# Patient Record
Sex: FEMALE | Race: WHITE | NOT HISPANIC OR LATINO | Employment: OTHER | URBAN - METROPOLITAN AREA
[De-identification: names, ages, dates, MRNs, and addresses within clinical notes are randomized per-mention and may not be internally consistent; named-entity substitution may affect disease eponyms.]

---

## 2017-01-10 ENCOUNTER — OFFICE VISIT (OUTPATIENT)
Dept: ORTHOPEDIC SURGERY | Facility: CLINIC | Age: 67
End: 2017-01-10

## 2017-01-10 VITALS — HEIGHT: 67 IN | TEMPERATURE: 98.6 F | BODY MASS INDEX: 31.39 KG/M2 | WEIGHT: 200 LBS

## 2017-01-10 DIAGNOSIS — M54.50 SPINE PAIN, LUMBAR: Primary | ICD-10-CM

## 2017-01-10 DIAGNOSIS — M43.16 SPONDYLOLISTHESIS OF LUMBAR REGION: ICD-10-CM

## 2017-01-10 PROCEDURE — 72100 X-RAY EXAM L-S SPINE 2/3 VWS: CPT | Performed by: ORTHOPAEDIC SURGERY

## 2017-01-10 PROCEDURE — 99204 OFFICE O/P NEW MOD 45 MIN: CPT | Performed by: ORTHOPAEDIC SURGERY

## 2017-01-10 NOTE — LETTER
January 10, 2017     Jayne Guerra MD  [address]  [address] [address]    Patient: Nessa Osuna   YOB: 1950   Date of Visit: 1/10/2017       Dear Dr. Charlie MD:    Thank you for referring Nessa Osuna to me for evaluation. Below are the relevant portions of my assessment and plan of care.    If you have questions, please do not hesitate to call me. I look forward to following Nessa along with you.         Sincerely,        Dougie Tobar MD        CC: No Recipients  Dougie Tobar MD  1/10/2017  3:08 PM  Signed  New patient or new problem visit    Chief Complaint   Patient presents with   • Lumbar Spine - Pain   • Right Hip - Pain   • Left Hip - Pain       HPI: She complains of back pain sometimes radiating into the hips.  Occasional pain on the outside a left foot and also complains of pain at the base of the right thumb following a lifting injury 2 or 3 months ago.  She is taken a Medrol Dosepak in the past and its helped with her back.  She has a spare but it is .  Physical therapy is also helped.  Pain is moderate, lumbar, constant aching worse when standing anti-inflammatory agents seem to help somewhat.  She is seen Dr. Mcknight in the past.    PFSH: See chart- reviewed    Review of Systems   Constitutional: Negative.  Negative for chills, diaphoresis, fever and unexpected weight change.   HENT: Positive for postnasal drip. Negative for hearing loss, nosebleeds, sore throat and tinnitus.    Eyes: Negative.  Negative for pain and visual disturbance.   Respiratory: Positive for shortness of breath. Negative for cough and wheezing.    Cardiovascular: Positive for chest pain and leg swelling. Negative for palpitations.   Gastrointestinal: Negative.  Negative for abdominal pain, diarrhea, nausea and vomiting.   Endocrine: Negative.  Negative for cold intolerance, heat intolerance and polydipsia.   Genitourinary: Positive for decreased urine volume. Negative for  difficulty urinating, dysuria and hematuria.   Musculoskeletal: Positive for back pain. Negative for arthralgias, joint swelling and myalgias.   Skin: Negative.  Negative for rash and wound.   Allergic/Immunologic: Negative for environmental allergies.   Neurological: Negative.  Negative for dizziness, syncope and numbness.   Hematological: Negative.  Does not bruise/bleed easily.   Psychiatric/Behavioral: Negative.  Negative for dysphoric mood and sleep disturbance. The patient is not nervous/anxious.        PE: Constitutional: Vital signs above-noted.  Awake, alert and oriented    Psychiatric: Affect and insight do not appear grossly disturbed.    Pulmonary: Breathing is unlabored, color is good.    Skin: Warm, dry and normal turgor    Cardiac:  pedal pulses intact.  No edema.    Eyesight and hearing appear adequate for examination purposes      Musculoskeletal:  There is minimal tenderness to percussion and palpation of the spine. Motion appears undisturbed.  Posture is unremarkable to coronal and sagittal inspection.    The skin about the area is intact.  There is no palpable or visible deformity.  There is no local spasm.       Neurologic:   Reflexes are 2+ and symmetrical in the patellae and achilles.   Motor function is undisturbed in quadriceps, EHL, and gastrocnemius      Sensation appears symmetrically intact to light touch   .  In the bilateral lower extremities there is no evidence of atrophy.   Clonus is absent..  Gait appears undisturbed.  SLR test negative      MEDICAL DECISION MAKING    XRAY: Plain film x-rays of lumbar spine show slight anterolisthesis at L4 5 and disc space narrowing at the same level.  No comparison views are available.    Other: n/a    Impression: Lumbar disc degeneration probable lumbar spinal stenosis    Plan: For now she may live with this I refilled a Dosepak she'll do home exercises and I will see her as needed.

## 2017-01-10 NOTE — MR AVS SNAPSHOT
[facility]  [phone]                    Nessa Osuna   1/10/2017 1:30 PM   Office Visit    Dept Phone:  [phone]   Encounter #:  [id number]    Provider:  Dougie Tobar MD   Department:  [facility]                Your Full Care Plan              Your Updated Medication List          This list is accurate as of: 1/10/17  2:07 PM.  Always use your most recent med list.                acetaminophen 500 MG tablet   Commonly known as:  TYLENOL       buPROPion  MG 12 hr tablet   Commonly known as:  WELLBUTRIN SR   TAKE ONE TABLET BY MOUTH TWICE A DAY       Calcium Citrate 200 MG tablet       diphenhydrAMINE 25 MG tablet   Commonly known as:  BENADRYL       Flaxseed Oil 1000 MG capsule       FLUoxetine 20 MG capsule   Commonly known as:  PROZAC   Take 1 capsule by mouth Daily.       fluticasone 50 MCG/ACT nasal spray   Commonly known as:  FLONASE       gabapentin 300 MG capsule   Commonly known as:  NEURONTIN   Take 1 capsule by mouth 2 (Two) Times a Day.       simvastatin 20 MG tablet   Commonly known as:  ZOCOR   TAKE ONE TABLET BY MOUTH DAILY       Vitamin D 2000 UNITS capsule               You Were Diagnosed With        Codes Comments    Spine pain, lumbar    -  Primary ICD-10-CM: M54.5  ICD-9-CM: 724.2       Instructions     None    Patient Instructions History      Upcoming Appointments     Visit Type Date Time Department    OFFICE VISIT 1/10/2017  1:30 PM [facility]    MAMMO [facility] SCREEN BILAT 3/7/2017 12:45 PM [facility]    FOLLOW UP 3/7/2017  2:00 PM [facility]      [facility] Signup     [facility] allows you to send messages to your doctor, view your test results, renew your prescriptions, schedule appointments, and more. To sign up, go to [email or web address] and click on the Sign Up Now link in the New User? box. Enter your [facility] Activation Code  "exactly as it appears below along with the last four digits of your Social Security Number and your Date of Birth () to complete the sign-up process. If you do not sign up before the expiration date, you must request a new code.    [facility] Activation Code: [email or web address]  Expires: 2017  2:06 PM    If you have questions, you can email [email or web address] or call [phone] to talk to our [facility] staff. Remember, [facility] is NOT to be used for urgent needs. For medical emergencies, dial 911.               Other Info from Your Visit           Your Appointments     Mar 07, 2017 12:45 PM EST   Mammo [facility] screen bilat with [facility]   [facility] ([place])    [address] [facility] [address] [phone]            Bring any films and reports from outside facilities. Arrive 15 min prior to exam time. Do not apply any lotions, oils, body sprays, powders, perfumes, or deodorants on the day of the exam. Bring a current list of medications. Arrive 15 mi            Mar 07, 2017  2:00 PM EST   Follow Up with Jayne Guerra MD   [facility] (--)    [address] [place] [id number] [phone]           Arrive 15 minutes prior to appointment.              Allergies     No Known Allergies      Reason for Visit     Lumbar Spine - Pain     Right Hip - Pain     Left Hip - Pain           Vital Signs     Temperature Height Weight Body Mass Index Smoking Status       98.6 °F (37 °C) 67\" (170.2 cm) 200 lb (90.7 kg) 31.32 kg/m2 Former Smoker       Problems and Diagnoses Noted     Low back pain    -  Primary        "

## 2017-01-11 RX ORDER — FLUTICASONE PROPIONATE 50 MCG
SPRAY, SUSPENSION (ML) NASAL
Qty: 1 EACH | Refills: 5 | Status: SHIPPED | OUTPATIENT
Start: 2017-01-11 | End: 2018-05-26 | Stop reason: SDUPTHER

## 2017-01-11 RX ORDER — BUPROPION HYDROCHLORIDE 150 MG/1
TABLET, EXTENDED RELEASE ORAL
Qty: 180 TABLET | Refills: 0 | Status: SHIPPED | OUTPATIENT
Start: 2017-01-11 | End: 2017-08-08

## 2017-01-11 RX ORDER — GABAPENTIN 300 MG/1
CAPSULE ORAL
Qty: 180 CAPSULE | Refills: 0 | Status: SHIPPED | OUTPATIENT
Start: 2017-01-11 | End: 2017-03-07

## 2017-02-28 RX ORDER — SIMVASTATIN 20 MG
TABLET ORAL
Qty: 90 TABLET | Refills: 0 | Status: SHIPPED | OUTPATIENT
Start: 2017-02-28 | End: 2017-04-30 | Stop reason: SDUPTHER

## 2017-03-07 ENCOUNTER — OFFICE VISIT (OUTPATIENT)
Dept: INTERNAL MEDICINE | Facility: CLINIC | Age: 67
End: 2017-03-07

## 2017-03-07 ENCOUNTER — HOSPITAL ENCOUNTER (OUTPATIENT)
Dept: MAMMOGRAPHY | Facility: HOSPITAL | Age: 67
Discharge: HOME | End: 2017-03-07
Admitting: INTERNAL MEDICINE

## 2017-03-07 VITALS
HEART RATE: 66 BPM | SYSTOLIC BLOOD PRESSURE: 110 MMHG | DIASTOLIC BLOOD PRESSURE: 70 MMHG | BODY MASS INDEX: 30.85 KG/M2 | WEIGHT: 197 LBS | OXYGEN SATURATION: 96 %

## 2017-03-07 DIAGNOSIS — M51.36 DDD (DEGENERATIVE DISC DISEASE), LUMBAR: ICD-10-CM

## 2017-03-07 DIAGNOSIS — E78.5 HYPERLIPIDEMIA, UNSPECIFIED HYPERLIPIDEMIA TYPE: ICD-10-CM

## 2017-03-07 DIAGNOSIS — F32.A DEPRESSION, UNSPECIFIED DEPRESSION TYPE: Primary | ICD-10-CM

## 2017-03-07 DIAGNOSIS — Z12.39 BREAST CANCER SCREENING: ICD-10-CM

## 2017-03-07 PROCEDURE — 99214 OFFICE O/P EST MOD 30 MIN: CPT | Performed by: INTERNAL MEDICINE

## 2017-03-07 PROCEDURE — G0202 SCR MAMMO BI INCL CAD: HCPCS

## 2017-03-07 NOTE — PROGRESS NOTES
"Chief Complaint   Patient presents with   • Hyperlipidemia   • Follow-up     no differnece in taking the med     depression  History of Present Illness   Nessa Osuna is a 67 y.o. female presents for follow up evaluation. Has depression. Started prozac. She really has not noticed much benefit from this medication. Reports that she would like to actually try coming off of medication. She does feel somewhat flat. Started buproprion years ago for to cessation and has never stopped the med after that. She is not suicidal. She is engaging in activities that she enjoys. She is going to many plays. Walking dogs almost every day. Healthy diet. Does feel fatigued. She in general gets \"at least 6 \" hours of sleep at night. Staying up at times to 4-5 am.   Long standing history of ddd and back pain. She has some burning of her hand and foot.       The following portions of the patient's history were reviewed and updated as appropriate: allergies, current medications, past family history, past medical history, past social history, past surgical history and problem list.  Current Outpatient Prescriptions on File Prior to Visit   Medication Sig Dispense Refill   • acetaminophen (TYLENOL) 500 MG tablet Take by mouth. Take 1 tablet every 4-6 hours as needed     • buPROPion SR (WELLBUTRIN SR) 150 MG 12 hr tablet TAKE ONE TABLET BY MOUTH TWICE A  tablet 0   • Calcium Citrate 200 MG tablet Take by mouth.     • Cholecalciferol (VITAMIN D) 2000 UNITS capsule Take 1 capsule by mouth daily.     • diphenhydrAMINE (BENADRYL) 25 MG tablet Take 2 tablets by mouth nightly as needed.     • Flaxseed, Linseed, (FLAXSEED OIL) 1000 MG capsule Take 1 capsule by mouth 2 (two) times a day.     • FLUoxetine (PROZAC) 20 MG capsule Take 1 capsule by mouth Daily. 30 capsule 4   • fluticasone (FLONASE) 50 MCG/ACT nasal spray PLACE TWO SPRAYS IN EACH NOSTRIL ONCE DAILY 1 each 5   • gabapentin (NEURONTIN) 300 MG capsule Take 1 capsule by mouth 2 " (Two) Times a Day. 180 capsule 0   • simvastatin (ZOCOR) 20 MG tablet TAKE ONE TABLET BY MOUTH DAILY 90 tablet 0   • [DISCONTINUED] gabapentin (NEURONTIN) 300 MG capsule TAKE ONE CAPSULE BY MOUTH EVERY 12 HOURS 180 capsule 0     No current facility-administered medications on file prior to visit.      Review of Systems   Constitutional: Positive for fatigue.   HENT: Negative.    Eyes: Negative.    Respiratory: Negative.    Cardiovascular: Negative.    Gastrointestinal: Negative.    Endocrine: Negative.    Genitourinary: Negative.    Musculoskeletal: Positive for arthralgias and back pain.   Skin: Negative.    Allergic/Immunologic: Negative.    Neurological: Negative.    Hematological: Negative.    Psychiatric/Behavioral: Positive for sleep disturbance. Negative for self-injury and suicidal ideas.        Depression       Objective   Physical Exam   Constitutional: She is oriented to person, place, and time. She appears well-developed and well-nourished.   HENT:   Head: Normocephalic and atraumatic.   Right Ear: External ear normal.   Left Ear: External ear normal.   Nose: Nose normal.   Mouth/Throat: Oropharynx is clear and moist.   Eyes: Conjunctivae and EOM are normal. Pupils are equal, round, and reactive to light.   Neck: Normal range of motion. Neck supple.   Cardiovascular: Normal rate, regular rhythm, normal heart sounds and intact distal pulses.    Pulmonary/Chest: Effort normal and breath sounds normal.   Abdominal: Soft. Bowel sounds are normal.   Musculoskeletal: Normal range of motion.   Neurological: She is alert and oriented to person, place, and time. She has normal reflexes.   Skin: Skin is warm and dry.   Psychiatric: She has a normal mood and affect. Her behavior is normal. Thought content normal.   Nursing note and vitals reviewed.       Visit Vitals   • /70   • Pulse 66   • Wt 197 lb (89.4 kg)   • SpO2 96%   • BMI 30.85 kg/m2       Assessment/Plan   Diagnoses and all orders for this  visit:    Depression, unspecified depression type    DDD (degenerative disc disease), lumbar    Hyperlipidemia, unspecified hyperlipidemia type    Other orders  -     diclofenac (VOLTAREN) 1 % gel gel; Apply 4 g topically 4 (Four) Times a Day As Needed (pain).      Patient w/ depression. She would like to stop meds. Will call and schedule w/ a therapist. She will try tapering off of prozac. If this goes well may try tapering buproprion as well. If needed in the future will explore cymbalta or other. She is encouraged to engage in increased fitness- possibly yoga v water activity due to ddd. Lipids are at goal on last assessment. Will retest at next lab evaluation. F/u in 4-5 months.

## 2017-04-14 ENCOUNTER — TELEPHONE (OUTPATIENT)
Dept: ORTHOPEDIC SURGERY | Facility: CLINIC | Age: 67
End: 2017-04-14

## 2017-04-20 ENCOUNTER — TELEPHONE (OUTPATIENT)
Dept: INTERNAL MEDICINE | Facility: CLINIC | Age: 67
End: 2017-04-20

## 2017-04-20 NOTE — TELEPHONE ENCOUNTER
"Pt called to see if she can get a referral to a specialist who sees \"all bones\" not just back or hip. Pt says she has arthritis and wants to be treated for all aches, did go to see Dr Tobar but said they only specialize in the back.  "

## 2017-04-21 NOTE — TELEPHONE ENCOUNTER
"There are other specialists in dr morgan office for hip. i do not know of any ortho who treats \"all bones\" as spinal specialty is very specific  "

## 2017-05-01 RX ORDER — SIMVASTATIN 20 MG
TABLET ORAL
Qty: 90 TABLET | Refills: 0 | Status: SHIPPED | OUTPATIENT
Start: 2017-05-01 | End: 2017-09-09 | Stop reason: SDUPTHER

## 2017-05-01 RX ORDER — GABAPENTIN 300 MG/1
CAPSULE ORAL
Qty: 180 CAPSULE | Refills: 0 | Status: SHIPPED | OUTPATIENT
Start: 2017-05-01 | End: 2017-08-08 | Stop reason: SDUPTHER

## 2017-05-02 ENCOUNTER — OFFICE VISIT (OUTPATIENT)
Dept: INTERNAL MEDICINE | Facility: CLINIC | Age: 67
End: 2017-05-02

## 2017-05-02 VITALS
WEIGHT: 198 LBS | SYSTOLIC BLOOD PRESSURE: 100 MMHG | OXYGEN SATURATION: 98 % | HEART RATE: 70 BPM | BODY MASS INDEX: 31.01 KG/M2 | DIASTOLIC BLOOD PRESSURE: 58 MMHG

## 2017-05-02 DIAGNOSIS — M15.9 OSTEOARTHRITIS OF MULTIPLE JOINTS, UNSPECIFIED OSTEOARTHRITIS TYPE: Primary | ICD-10-CM

## 2017-05-02 DIAGNOSIS — M79.672 LEFT FOOT PAIN: ICD-10-CM

## 2017-05-02 DIAGNOSIS — M79.641 RIGHT HAND PAIN: ICD-10-CM

## 2017-05-02 PROCEDURE — 99214 OFFICE O/P EST MOD 30 MIN: CPT | Performed by: INTERNAL MEDICINE

## 2017-05-02 RX ORDER — METHYLPREDNISOLONE 4 MG/1
TABLET ORAL
Qty: 21 TABLET | Refills: 0 | Status: SHIPPED | OUTPATIENT
Start: 2017-05-02 | End: 2017-06-01 | Stop reason: HOSPADM

## 2017-05-31 ENCOUNTER — HOSPITAL ENCOUNTER (OUTPATIENT)
Facility: HOSPITAL | Age: 67
Setting detail: OBSERVATION
Discharge: HOME | End: 2017-06-01
Attending: EMERGENCY MEDICINE | Admitting: INTERNAL MEDICINE

## 2017-05-31 ENCOUNTER — APPOINTMENT (OUTPATIENT)
Dept: GENERAL RADIOLOGY | Facility: HOSPITAL | Age: 67
End: 2017-05-31

## 2017-05-31 DIAGNOSIS — I48.91 ATRIAL FIBRILLATION WITH RVR (HCC): Primary | ICD-10-CM

## 2017-05-31 LAB
ALBUMIN SERPL-MCNC: 4.6 G/DL (ref 3.5–5.2)
ALBUMIN/GLOB SERPL: 1.6 G/DL
ALP SERPL-CCNC: 52 U/L (ref 39–117)
ALT SERPL W P-5'-P-CCNC: 18 U/L (ref 1–33)
ANION GAP SERPL CALCULATED.3IONS-SCNC: 13.8 MMOL/L
AST SERPL-CCNC: 22 U/L (ref 1–32)
BASOPHILS # BLD AUTO: 0.05 10*3/MM3 (ref 0–0.2)
BASOPHILS NFR BLD AUTO: 0.9 % (ref 0–1.5)
BILIRUB SERPL-MCNC: 0.9 MG/DL (ref 0.1–1.2)
BUN BLD-MCNC: 16 MG/DL (ref 8–23)
BUN/CREAT SERPL: 21.6 (ref 7–25)
CALCIUM SPEC-SCNC: 9.9 MG/DL (ref 8.6–10.5)
CHLORIDE SERPL-SCNC: 105 MMOL/L (ref 98–107)
CK SERPL-CCNC: 186 U/L (ref 20–180)
CO2 SERPL-SCNC: 26.2 MMOL/L (ref 22–29)
CREAT BLD-MCNC: 0.74 MG/DL (ref 0.57–1)
DEPRECATED RDW RBC AUTO: 44.8 FL (ref 37–54)
EOSINOPHIL # BLD AUTO: 0.26 10*3/MM3 (ref 0–0.7)
EOSINOPHIL NFR BLD AUTO: 4.8 % (ref 0.3–6.2)
ERYTHROCYTE [DISTWIDTH] IN BLOOD BY AUTOMATED COUNT: 13.2 % (ref 11.7–13)
GFR SERPL CREATININE-BSD FRML MDRD: 78 ML/MIN/1.73
GLOBULIN UR ELPH-MCNC: 2.8 GM/DL
GLUCOSE BLD-MCNC: 101 MG/DL (ref 65–99)
HCT VFR BLD AUTO: 42.1 % (ref 35.6–45.5)
HGB BLD-MCNC: 14.3 G/DL (ref 11.9–15.5)
HOLD SPECIMEN: NORMAL
HOLD SPECIMEN: NORMAL
IMM GRANULOCYTES # BLD: 0 10*3/MM3 (ref 0–0.03)
IMM GRANULOCYTES NFR BLD: 0 % (ref 0–0.5)
INR PPP: 1.03 (ref 0.9–1.1)
LYMPHOCYTES # BLD AUTO: 1.6 10*3/MM3 (ref 0.9–4.8)
LYMPHOCYTES NFR BLD AUTO: 29.6 % (ref 19.6–45.3)
MCH RBC QN AUTO: 31.8 PG (ref 26.9–32)
MCHC RBC AUTO-ENTMCNC: 34 G/DL (ref 32.4–36.3)
MCV RBC AUTO: 93.8 FL (ref 80.5–98.2)
MONOCYTES # BLD AUTO: 0.5 10*3/MM3 (ref 0.2–1.2)
MONOCYTES NFR BLD AUTO: 9.3 % (ref 5–12)
NEUTROPHILS # BLD AUTO: 2.99 10*3/MM3 (ref 1.9–8.1)
NEUTROPHILS NFR BLD AUTO: 55.4 % (ref 42.7–76)
PLATELET # BLD AUTO: 233 10*3/MM3 (ref 140–500)
PMV BLD AUTO: 12.1 FL (ref 6–12)
POTASSIUM BLD-SCNC: 3.7 MMOL/L (ref 3.5–5.2)
PROT SERPL-MCNC: 7.4 G/DL (ref 6–8.5)
PROTHROMBIN TIME: 13.1 SECONDS (ref 11.7–14.2)
RBC # BLD AUTO: 4.49 10*6/MM3 (ref 3.9–5.2)
SODIUM BLD-SCNC: 145 MMOL/L (ref 136–145)
TROPONIN T SERPL-MCNC: <0.01 NG/ML (ref 0–0.03)
TSH SERPL DL<=0.05 MIU/L-ACNC: 0.83 MIU/ML (ref 0.27–4.2)
WBC NRBC COR # BLD: 5.4 10*3/MM3 (ref 4.5–10.7)
WHOLE BLOOD HOLD SPECIMEN: NORMAL
WHOLE BLOOD HOLD SPECIMEN: NORMAL

## 2017-05-31 PROCEDURE — 71020 HC CHEST PA AND LATERAL: CPT

## 2017-05-31 PROCEDURE — G0378 HOSPITAL OBSERVATION PER HR: HCPCS

## 2017-05-31 PROCEDURE — 80053 COMPREHEN METABOLIC PANEL: CPT | Performed by: EMERGENCY MEDICINE

## 2017-05-31 PROCEDURE — 96365 THER/PROPH/DIAG IV INF INIT: CPT

## 2017-05-31 PROCEDURE — 82550 ASSAY OF CK (CPK): CPT | Performed by: EMERGENCY MEDICINE

## 2017-05-31 PROCEDURE — 84484 ASSAY OF TROPONIN QUANT: CPT | Performed by: EMERGENCY MEDICINE

## 2017-05-31 PROCEDURE — 96372 THER/PROPH/DIAG INJ SC/IM: CPT

## 2017-05-31 PROCEDURE — 99284 EMERGENCY DEPT VISIT MOD MDM: CPT

## 2017-05-31 PROCEDURE — 93005 ELECTROCARDIOGRAM TRACING: CPT

## 2017-05-31 PROCEDURE — 84443 ASSAY THYROID STIM HORMONE: CPT | Performed by: EMERGENCY MEDICINE

## 2017-05-31 PROCEDURE — 93010 ELECTROCARDIOGRAM REPORT: CPT | Performed by: INTERNAL MEDICINE

## 2017-05-31 PROCEDURE — 85025 COMPLETE CBC W/AUTO DIFF WBC: CPT | Performed by: EMERGENCY MEDICINE

## 2017-05-31 PROCEDURE — 93005 ELECTROCARDIOGRAM TRACING: CPT | Performed by: EMERGENCY MEDICINE

## 2017-05-31 PROCEDURE — 25010000002 ENOXAPARIN PER 10 MG: Performed by: EMERGENCY MEDICINE

## 2017-05-31 PROCEDURE — 85610 PROTHROMBIN TIME: CPT | Performed by: EMERGENCY MEDICINE

## 2017-05-31 PROCEDURE — 96366 THER/PROPH/DIAG IV INF ADDON: CPT

## 2017-05-31 RX ORDER — SODIUM CHLORIDE 0.9 % (FLUSH) 0.9 %
10 SYRINGE (ML) INJECTION AS NEEDED
Status: DISCONTINUED | OUTPATIENT
Start: 2017-05-31 | End: 2017-06-01 | Stop reason: HOSPADM

## 2017-05-31 RX ORDER — DIPHENHYDRAMINE HCL 25 MG
25 CAPSULE ORAL NIGHTLY PRN
Status: DISCONTINUED | OUTPATIENT
Start: 2017-05-31 | End: 2017-06-01 | Stop reason: HOSPADM

## 2017-05-31 RX ORDER — CELECOXIB 200 MG/1
200 CAPSULE ORAL AS NEEDED
COMMUNITY
End: 2017-06-01 | Stop reason: HOSPADM

## 2017-05-31 RX ORDER — GABAPENTIN 300 MG/1
300 CAPSULE ORAL EVERY 12 HOURS SCHEDULED
Status: DISCONTINUED | OUTPATIENT
Start: 2017-06-01 | End: 2017-06-01 | Stop reason: HOSPADM

## 2017-05-31 RX ORDER — ACETAMINOPHEN 325 MG/1
650 TABLET ORAL EVERY 4 HOURS PRN
Status: DISCONTINUED | OUTPATIENT
Start: 2017-05-31 | End: 2017-06-01 | Stop reason: HOSPADM

## 2017-05-31 RX ORDER — GABAPENTIN 100 MG/1
200 CAPSULE ORAL EVERY 12 HOURS SCHEDULED
Status: DISCONTINUED | OUTPATIENT
Start: 2017-06-01 | End: 2017-05-31

## 2017-05-31 RX ADMIN — DILTIAZEM HYDROCHLORIDE 5 MG/HR: 100 INJECTION, POWDER, LYOPHILIZED, FOR SOLUTION INTRAVENOUS at 18:57

## 2017-05-31 RX ADMIN — ENOXAPARIN SODIUM 90 MG: 100 INJECTION SUBCUTANEOUS at 19:32

## 2017-05-31 RX ADMIN — DILTIAZEM HYDROCHLORIDE 10 MG/HR: 100 INJECTION, POWDER, LYOPHILIZED, FOR SOLUTION INTRAVENOUS at 19:27

## 2017-06-01 ENCOUNTER — APPOINTMENT (OUTPATIENT)
Dept: CARDIOLOGY | Facility: HOSPITAL | Age: 67
End: 2017-06-01
Attending: INTERNAL MEDICINE

## 2017-06-01 VITALS
HEART RATE: 55 BPM | OXYGEN SATURATION: 94 % | SYSTOLIC BLOOD PRESSURE: 100 MMHG | WEIGHT: 200 LBS | TEMPERATURE: 98.1 F | HEIGHT: 68 IN | RESPIRATION RATE: 16 BRPM | DIASTOLIC BLOOD PRESSURE: 61 MMHG | BODY MASS INDEX: 30.31 KG/M2

## 2017-06-01 LAB
ASCENDING AORTA: 2.5 CM
BH CV ECHO MEAS - ACS: 1.5 CM
BH CV ECHO MEAS - AO MAX PG (FULL): 3.8 MMHG
BH CV ECHO MEAS - AO MAX PG: 6.1 MMHG
BH CV ECHO MEAS - AO MEAN PG (FULL): 2 MMHG
BH CV ECHO MEAS - AO MEAN PG: 3 MMHG
BH CV ECHO MEAS - AO ROOT AREA (BSA CORRECTED): 1.4
BH CV ECHO MEAS - AO ROOT AREA: 6.2 CM^2
BH CV ECHO MEAS - AO ROOT DIAM: 2.8 CM
BH CV ECHO MEAS - AO V2 MAX: 123 CM/SEC
BH CV ECHO MEAS - AO V2 MEAN: 80.2 CM/SEC
BH CV ECHO MEAS - AO V2 VTI: 30.5 CM
BH CV ECHO MEAS - ASC AORTA: 2.5 CM
BH CV ECHO MEAS - AVA(I,A): 2 CM^2
BH CV ECHO MEAS - AVA(I,D): 2 CM^2
BH CV ECHO MEAS - AVA(V,A): 1.9 CM^2
BH CV ECHO MEAS - AVA(V,D): 1.9 CM^2
BH CV ECHO MEAS - BSA(HAYCOCK): 2.1 M^2
BH CV ECHO MEAS - BSA: 2 M^2
BH CV ECHO MEAS - BZI_BMI: 30.4 KILOGRAMS/M^2
BH CV ECHO MEAS - BZI_METRIC_HEIGHT: 172.7 CM
BH CV ECHO MEAS - BZI_METRIC_WEIGHT: 90.7 KG
BH CV ECHO MEAS - CONTRAST EF (2CH): 50.4 ML/M^2
BH CV ECHO MEAS - CONTRAST EF 4CH: 56.9 ML/M^2
BH CV ECHO MEAS - EDV(CUBED): 74.1 ML
BH CV ECHO MEAS - EDV(MOD-SP2): 121 ML
BH CV ECHO MEAS - EDV(MOD-SP4): 109 ML
BH CV ECHO MEAS - EDV(TEICH): 78.6 ML
BH CV ECHO MEAS - EF(CUBED): 70.4 %
BH CV ECHO MEAS - EF(MOD-SP2): 50.4 %
BH CV ECHO MEAS - EF(MOD-SP4): 56.9 %
BH CV ECHO MEAS - EF(TEICH): 62.4 %
BH CV ECHO MEAS - ESV(CUBED): 22 ML
BH CV ECHO MEAS - ESV(MOD-SP2): 60 ML
BH CV ECHO MEAS - ESV(MOD-SP4): 47 ML
BH CV ECHO MEAS - ESV(TEICH): 29.6 ML
BH CV ECHO MEAS - FS: 33.3 %
BH CV ECHO MEAS - IVS/LVPW: 0.91
BH CV ECHO MEAS - IVSD: 1 CM
BH CV ECHO MEAS - LAT PEAK E' VEL: 9 CM/SEC
BH CV ECHO MEAS - LV DIASTOLIC VOL/BSA (35-75): 53.3 ML/M^2
BH CV ECHO MEAS - LV MASS(C)D: 147 GRAMS
BH CV ECHO MEAS - LV MASS(C)DI: 71.9 GRAMS/M^2
BH CV ECHO MEAS - LV MAX PG: 2.2 MMHG
BH CV ECHO MEAS - LV MEAN PG: 1 MMHG
BH CV ECHO MEAS - LV SYSTOLIC VOL/BSA (12-30): 23 ML/M^2
BH CV ECHO MEAS - LV V1 MAX: 74.7 CM/SEC
BH CV ECHO MEAS - LV V1 MEAN: 48.6 CM/SEC
BH CV ECHO MEAS - LV V1 VTI: 19.6 CM
BH CV ECHO MEAS - LVIDD: 4.2 CM
BH CV ECHO MEAS - LVIDS: 2.8 CM
BH CV ECHO MEAS - LVLD AP2: 8.4 CM
BH CV ECHO MEAS - LVLD AP4: 8.4 CM
BH CV ECHO MEAS - LVLS AP2: 7.3 CM
BH CV ECHO MEAS - LVLS AP4: 6.5 CM
BH CV ECHO MEAS - LVOT AREA (M): 3.1 CM^2
BH CV ECHO MEAS - LVOT AREA: 3.1 CM^2
BH CV ECHO MEAS - LVOT DIAM: 2 CM
BH CV ECHO MEAS - LVPWD: 1.1 CM
BH CV ECHO MEAS - MED PEAK E' VEL: 8 CM/SEC
BH CV ECHO MEAS - MR MAX PG: 97.2 MMHG
BH CV ECHO MEAS - MR MAX VEL: 493 CM/SEC
BH CV ECHO MEAS - MV A DUR: 0.17 SEC
BH CV ECHO MEAS - MV A MAX VEL: 90.7 CM/SEC
BH CV ECHO MEAS - MV DEC SLOPE: 340 CM/SEC^2
BH CV ECHO MEAS - MV DEC TIME: 0.23 SEC
BH CV ECHO MEAS - MV E MAX VEL: 118 CM/SEC
BH CV ECHO MEAS - MV E/A: 1.3
BH CV ECHO MEAS - MV MAX PG: 5.4 MMHG
BH CV ECHO MEAS - MV MEAN PG: 2 MMHG
BH CV ECHO MEAS - MV P1/2T MAX VEL: 117 CM/SEC
BH CV ECHO MEAS - MV P1/2T: 100.8 MSEC
BH CV ECHO MEAS - MV V2 MAX: 116 CM/SEC
BH CV ECHO MEAS - MV V2 MEAN: 59 CM/SEC
BH CV ECHO MEAS - MV V2 VTI: 40.4 CM
BH CV ECHO MEAS - MVA P1/2T LCG: 1.9 CM^2
BH CV ECHO MEAS - MVA(P1/2T): 2.2 CM^2
BH CV ECHO MEAS - MVA(VTI): 1.5 CM^2
BH CV ECHO MEAS - PA ACC TIME: 0.12 SEC
BH CV ECHO MEAS - PA MAX PG (FULL): 0.83 MMHG
BH CV ECHO MEAS - PA MAX PG: 2.6 MMHG
BH CV ECHO MEAS - PA PR(ACCEL): 23.7 MMHG
BH CV ECHO MEAS - PA V2 MAX: 80.1 CM/SEC
BH CV ECHO MEAS - PULM A REVS DUR: 0.16 SEC
BH CV ECHO MEAS - PULM A REVS VEL: 32 CM/SEC
BH CV ECHO MEAS - PULM DIAS VEL: 56.8 CM/SEC
BH CV ECHO MEAS - PULM S/D: 0.93
BH CV ECHO MEAS - PULM SYS VEL: 52.9 CM/SEC
BH CV ECHO MEAS - PVA(V,A): 2.3 CM^2
BH CV ECHO MEAS - PVA(V,D): 2.3 CM^2
BH CV ECHO MEAS - QP/QS: 0.81
BH CV ECHO MEAS - RAP SYSTOLE: 8 MMHG
BH CV ECHO MEAS - RV MAX PG: 1.7 MMHG
BH CV ECHO MEAS - RV MEAN PG: 1 MMHG
BH CV ECHO MEAS - RV V1 MAX: 65.8 CM/SEC
BH CV ECHO MEAS - RV V1 MEAN: 45 CM/SEC
BH CV ECHO MEAS - RV V1 VTI: 17.6 CM
BH CV ECHO MEAS - RVOT AREA: 2.8 CM^2
BH CV ECHO MEAS - RVOT DIAM: 1.9 CM
BH CV ECHO MEAS - RVSP: 31.2 MMHG
BH CV ECHO MEAS - SI(AO): 91.9 ML/M^2
BH CV ECHO MEAS - SI(CUBED): 25.5 ML/M^2
BH CV ECHO MEAS - SI(LVOT): 30.1 ML/M^2
BH CV ECHO MEAS - SI(MOD-SP2): 29.8 ML/M^2
BH CV ECHO MEAS - SI(MOD-SP4): 30.3 ML/M^2
BH CV ECHO MEAS - SI(TEICH): 24 ML/M^2
BH CV ECHO MEAS - SUP REN AO DIAM: 1.43 CM
BH CV ECHO MEAS - SV(AO): 187.8 ML
BH CV ECHO MEAS - SV(CUBED): 52.1 ML
BH CV ECHO MEAS - SV(LVOT): 61.6 ML
BH CV ECHO MEAS - SV(MOD-SP2): 61 ML
BH CV ECHO MEAS - SV(MOD-SP4): 62 ML
BH CV ECHO MEAS - SV(RVOT): 49.9 ML
BH CV ECHO MEAS - SV(TEICH): 49 ML
BH CV ECHO MEAS - TAPSE (>1.6): 2.8 CM2
BH CV ECHO MEAS - TR MAX VEL: 241 CM/SEC
BH CV VAS BP RIGHT ARM: NORMAL MMHG
BH CV XLRA - RV BASE: 3.27 CM
BH CV XLRA - TDI S': 11 CM/SEC
E/E' RATIO: 14
LEFT ATRIUM VOLUME INDEX: 54 ML/M2
LV EF 2D ECHO EST: 57 %

## 2017-06-01 PROCEDURE — 93306 TTE W/DOPPLER COMPLETE: CPT

## 2017-06-01 PROCEDURE — G0378 HOSPITAL OBSERVATION PER HR: HCPCS

## 2017-06-01 PROCEDURE — 99219 PR INITIAL OBSERVATION CARE/DAY 50 MINUTES: CPT | Performed by: INTERNAL MEDICINE

## 2017-06-01 PROCEDURE — 63710000001 DIPHENHYDRAMINE PER 50 MG: Performed by: INTERNAL MEDICINE

## 2017-06-01 PROCEDURE — 93306 TTE W/DOPPLER COMPLETE: CPT | Performed by: INTERNAL MEDICINE

## 2017-06-01 RX ORDER — SODIUM CHLORIDE 0.9 % (FLUSH) 0.9 %
1-10 SYRINGE (ML) INJECTION AS NEEDED
Status: DISCONTINUED | OUTPATIENT
Start: 2017-06-01 | End: 2017-06-01 | Stop reason: HOSPADM

## 2017-06-01 RX ORDER — ATORVASTATIN CALCIUM 10 MG/1
10 TABLET, FILM COATED ORAL DAILY
Status: DISCONTINUED | OUTPATIENT
Start: 2017-06-01 | End: 2017-06-01

## 2017-06-01 RX ORDER — ATORVASTATIN CALCIUM 10 MG/1
10 TABLET, FILM COATED ORAL NIGHTLY
Status: DISCONTINUED | OUTPATIENT
Start: 2017-06-01 | End: 2017-06-01 | Stop reason: HOSPADM

## 2017-06-01 RX ORDER — BUPROPION HYDROCHLORIDE 150 MG/1
150 TABLET, EXTENDED RELEASE ORAL EVERY 12 HOURS SCHEDULED
Status: DISCONTINUED | OUTPATIENT
Start: 2017-06-01 | End: 2017-06-01

## 2017-06-01 RX ORDER — FLUOXETINE HYDROCHLORIDE 20 MG/1
20 CAPSULE ORAL DAILY
Status: DISCONTINUED | OUTPATIENT
Start: 2017-06-01 | End: 2017-06-01

## 2017-06-01 RX ADMIN — DIPHENHYDRAMINE HYDROCHLORIDE 25 MG: 25 CAPSULE ORAL at 00:33

## 2017-06-01 RX ADMIN — METOPROLOL TARTRATE 12.5 MG: 25 TABLET ORAL at 11:00

## 2017-06-01 RX ADMIN — GABAPENTIN 300 MG: 300 CAPSULE ORAL at 00:33

## 2017-06-01 RX ADMIN — ACETAMINOPHEN 650 MG: 325 TABLET ORAL at 00:33

## 2017-06-01 RX ADMIN — GABAPENTIN 300 MG: 300 CAPSULE ORAL at 08:00

## 2017-06-01 NOTE — PLAN OF CARE
Problem: Patient Care Overview (Adult)  Goal: Plan of Care Review  Outcome: Ongoing (interventions implemented as appropriate)    06/01/17 1310   Coping/Psychosocial Response Interventions   Plan Of Care Reviewed With patient   Patient Care Overview   Progress improving   Outcome Evaluation   Outcome Summary/Follow up Plan no c/o pain, denies soa, no chest pain, v/s stabel will continue to monitor       Goal: Adult Individualization and Mutuality  Outcome: Ongoing (interventions implemented as appropriate)  Goal: Discharge Needs Assessment  Outcome: Ongoing (interventions implemented as appropriate)    Problem: Fall Risk (Adult)  Goal: Identify Related Risk Factors and Signs and Symptoms  Outcome: Ongoing (interventions implemented as appropriate)  Goal: Absence of Falls  Outcome: Ongoing (interventions implemented as appropriate)

## 2017-06-01 NOTE — PROGRESS NOTES
Continued Stay Note  [facility]     Patient Name: Nessa Osuna  MRN: [id number]  Today's Date: 6/1/2017    Admit Date: 5/31/2017          Discharge Plan       06/01/17 1209    Case Management/Social Work Plan    Plan Home, no needs    Patient/Family In Agreement With Plan yes    Additional Comments S/W patient at bedside and notified of cost of $30 for Xarelto and she said that it was fine for now.  She is able to pay the co-pay for Xarelto.  MATTHEW Porras, CCP      06/01/17 1109    Case Management/Social Work Plan    Plan Home, no needs    Patient/Family In Agreement With Plan yes    Additional Comments S/W patient at bedside and verified face sheet.  IMM noted.  Patient states that she normally lives alone, niece is staying with her now.  Called and s/w Pharmacist at [employer] and Xarelto will be a $30 co-pay.  Patient states that she drives, does not use any equipment at home.  Patient states that she would like to draw up an advance directive and have notified the 's office.  Patient states that she uses [facility] for medications and has no problems paying for them.  Patient will have a ride home, not sure who will pick her up (niece, or sister).  Will continue to monitor and assist, as needed.  MATTHEW Porras, CCP              Discharge Codes     None            Madisyn Callejas RN

## 2017-06-01 NOTE — ED NOTES
Spoke to Deshawn CASTRO on the floor and updated about pt converting to NSR and put a page out to MD Farrell.      Rashida Major RN  05/31/17 3152

## 2017-06-01 NOTE — PLAN OF CARE
Problem: Patient Care Overview (Adult)  Goal: Plan of Care Review  Outcome: Ongoing (interventions implemented as appropriate)    05/31/17 6520   Coping/Psychosocial Response Interventions   Plan Of Care Reviewed With patient   Patient Care Overview   Progress no change   Outcome Evaluation   Outcome Summary/Follow up Plan pt to floor vitals wnl. no complaints of pain. no chest pain or trouble breathing. call out to md for addmission orders        Goal: Adult Individualization and Mutuality  Outcome: Ongoing (interventions implemented as appropriate)  Goal: Discharge Needs Assessment  Outcome: Ongoing (interventions implemented as appropriate)    Problem: Fall Risk (Adult)  Goal: Identify Related Risk Factors and Signs and Symptoms  Outcome: Ongoing (interventions implemented as appropriate)  Goal: Absence of Falls  Outcome: Ongoing (interventions implemented as appropriate)

## 2017-06-01 NOTE — ED NOTES
Paged MD Farrell to notify pt converted into NSR. MD Pratt notified as well and gave verbal order to complete an ECG.      Rashida Major RN  05/31/17 5839

## 2017-06-01 NOTE — H&P
Patient Name: Nessa Osuna  :1950  67 y.o.    Date of Admission: 2017  Date of Consultation:  17  Encounter Provider: Adamaris Blue MD  Place of Service: [facility]  Referring Provider: Parker Farrell MD  Patient Care Team:  Jayne Guerra MD as PCP - General      Chief complaint: palpitations    History of Present Illness: Patient is a 67 year old female, with past medical history of HLD who is admitted with new onset atrial fibrillation. She denies any history of DM, HTN, prior stroke, or vascular disease. She has no prior cardiac evaluation. Over the past few years, patient has noticed occasional (8-9 times a year) episodes of palpitations lasting up to 2-3 hours. They typically go away after a couple hours and are not associated caffeine intake or anything else she can pinpoint. Yesterday, had palpitations starting in the morning and associated with fatigue and feeling listless.  She took her HR with an at home monitor and noted it to be 150. This time the episode lasted longer than normal which prompted her to come to the emergency room.  She presented to the emergency room and was found to be in atrial fibrillation with RVR. She was started on a cardizem drip and was given a dose of therapeutic lovenox. She had a normal TSH and CXR with mild congestion and chronic changes. She was admitted to telemetry and subsequently converted to NSR. Patient's symptoms have resolved. She denies any symptoms of angina. She has had dyspnea on exertion for several years and it is unchanged. She denies any recent falls or bleeding.           Past Medical History:   Diagnosis Date   • Closed fracture of head of metacarpal     left second   • Closed fracture of metacarpal bone     left   • DDD (degenerative disc disease), lumbar    • Depression    • Hyperlipidemia    • Osteopenia    • Primary localized osteoarthritis of hip    • Spinal stenosis        Past  Surgical History:   Procedure Laterality Date   • MOHS SURGERY      chemosurgery (Mohs Micrographic Technique); Dr Jarrett and Dr Franco   • TRIANA'S NEUROMA EXCISION      single neuroma   • TONSILLECTOMY      age 5         Prior to Admission medications    Medication Sig Start Date End Date Taking? Authorizing Provider   acetaminophen (TYLENOL) 500 MG tablet Take by mouth. Take 1 tablet every 4-6 hours as needed 6/29/13  Yes Historical Provider, MD   Calcium Citrate-Vitamin D (CALCIUM + D PO) Take 1,200 mg by mouth 2 (Two) Times a Day.   Yes Historical Provider, MD   celecoxib (CeleBREX) 200 MG capsule Take 200 mg by mouth As Needed.   Yes Historical Provider, MD   Cholecalciferol (VITAMIN D) 2000 UNITS capsule Take 1 capsule by mouth daily. 6/29/13  Yes Historical Provider, MD   Diclofenac Sodium (PENNSAID) 2 % solution Place 4 g on the skin 4 (Four) Times a Day. 5/2/17  Yes Jayne Guerra MD   diphenhydrAMINE (BENADRYL) 25 MG tablet Take 25 mg by mouth At Night As Needed. 8/14/13  Yes Historical Provider, MD   Flaxseed, Linseed, (FLAXSEED OIL) 1000 MG capsule Take 1 capsule by mouth 2 (two) times a day. 8/14/13  Yes Historical Provider, MD   fluticasone (FLONASE) 50 MCG/ACT nasal spray PLACE TWO SPRAYS IN EACH NOSTRIL ONCE DAILY 1/11/17  Yes Jayne Guerra MD   gabapentin (NEURONTIN) 300 MG capsule Take 1 capsule by mouth 2 (Two) Times a Day. 11/3/16  Yes Jayne Guerra MD   MethylPREDNISolone (MEDROL, KENDALL,) 4 MG tablet Take as directed on package instructions.  Patient taking differently: As Needed. Take as directed on package instructions. 5/2/17  Yes Jayne Guerra MD   simvastatin (ZOCOR) 20 MG tablet TAKE ONE TABLET BY MOUTH DAILY 5/1/17  Yes Jayne Guerra MD   buPROPion SR (WELLBUTRIN SR) 150 MG 12 hr tablet TAKE ONE TABLET BY MOUTH TWICE A DAY 1/11/17   Jayne Guerra MD   Calcium Citrate 200 MG tablet Take by mouth. 6/29/13   Historical Provider, MD   diclofenac (VOLTAREN) 1 % gel gel Apply 4 g  topically 4 (Four) Times a Day As Needed (pain). 3/7/17   Jayne Guerra MD   FLUoxetine (PROZAC) 20 MG capsule Take 1 capsule by mouth Daily. 11/29/16   Jayne Guerra MD   gabapentin (NEURONTIN) 300 MG capsule TAKE ONE CAPSULE BY MOUTH EVERY 12 HOURS 5/1/17   Jayne Guerra MD       No Known Allergies    Social History     Social History   • Marital status: Single     Spouse name: N/A   • Number of children: N/A   • Years of education: N/A     Social History Main Topics   • Smoking status: Former Smoker     Quit date: 1997   • Smokeless tobacco: Never Used   • Alcohol use Yes      Comment: social drinker   • Drug use: No   • Sexual activity: Defer     Other Topics Concern   • None     Social History Narrative   • None       Family History   Problem Relation Age of Onset   • Cancer Mother      bladder   • Osteoporosis Mother    • Lung cancer Father        REVIEW OF SYSTEMS:   All systems reviewed.  Pertinent positives identified in HPI.  All other systems are negative.      Objective:     Vitals:    05/31/17 2128 05/31/17 2208 06/01/17 0405 06/01/17 0741   BP:  96/73 103/58 113/69   BP Location:  Right arm Right arm Right arm   Patient Position:  Sitting Lying Lying   Pulse: 74 80 60 61   Resp:  16 16 16   Temp:  97.8 °F (36.6 °C) 97.5 °F (36.4 °C) 97.8 °F (36.6 °C)   TempSrc:  Oral Oral Oral   SpO2:  94%  95%   Weight:       Height:         Body mass index is 30.41 kg/(m^2).    General Appearance:    Alert, cooperative, in no acute distress   Head:    Normocephalic, without obvious abnormality, atraumatic   Eyes:            Lids and lashes normal, conjunctivae and sclerae normal, no   icterus, no pallor, corneas clear, PERRLA   Ears:    Ears appear intact with no abnormalities noted   Neck:   No adenopathy, supple, trachea midline, no thyromegaly, no   carotid bruit, no JVD   Back:     No kyphosis present, no scoliosis present, no skin lesions, erythema or scars, no tenderness to percussion or palpation, range  of motion normal   Lungs:     Clear to auscultation,respirations regular, even and unlabored    Heart:    Regular rhythm and normal rate, normal S1 and S2, no murmur, no gallop, no rub, no click   Chest Wall:    No abnormalities observed   Abdomen:     Normal bowel sounds, no masses, no organomegaly, soft        non-tender, non-distended, no guarding, no rebound  tenderness   Extremities:   Moves all extremities well, no edema, no cyanosis, no redness   Pulses:   Pulses palpable and equal bilaterally. Normal radial, carotid, femoral, dorsalis pedis and posterior tibial pulses bilaterally. Normal abdominal aorta   Skin:  Psychiatric:   No bleeding, bruising or rash    Alert and oriented x 3, normal mood and affect   Lab Review:       Results from last 7 days  Lab Units 05/31/17  1748   SODIUM mmol/L 145   POTASSIUM mmol/L 3.7   CHLORIDE mmol/L 105   TOTAL CO2 mmol/L 26.2   BUN mg/dL 16   CREATININE mg/dL 0.74   CALCIUM mg/dL 9.9   BILIRUBIN mg/dL 0.9   ALK PHOS U/L 52   ALT (SGPT) U/L 18   AST (SGOT) U/L 22   GLUCOSE mg/dL 101*       Results from last 7 days  Lab Units 05/31/17  1748   CK TOTAL U/L 186*   TROPONIN T ng/mL <0.010       Results from last 7 days  Lab Units 05/31/17  1748   WBC 10*3/mm3 5.40   HEMOGLOBIN g/dL 14.3   HEMATOCRIT % 42.1   PLATELETS 10*3/mm3 233       Results from last 7 days  Lab Units 05/31/17  1748   INR  1.03                              I personally viewed and interpreted the patient's EKG/Telemetry data.        Assessment and Plan:       1. Paroxysmal atrial fibrillation: Back in sinus rhythm.  It appears she has been having episodes that are likely atrial fibrillation over the last couple of years lasting 2-3 hours at a time.   -  Will start low dose metoprolol tartrate.  Consider antiarrhythmic in the future if does not tolerate.   -  Check echocardiogram  -  Discussed anticoagulation options with her recurrent episodes and CHADS-VASc of 2. If no significant valvular disease we  decided to try rivaroxaban.  Will have CCP check into cost.   2.  Dyslipidemia: Continue statin.    -  If echo looks ok and tolerate metoprolol will plan for discharge home later today.     Adamaris Blue MD  06/01/17  8:44 AM    Addendum  Echo reviewed (report pending).  Normal left ventricular systolic function and no significant valvular issues.  CCP note about Xarelto cost noted.    Will discharge home.  Results of echo and discharge instruction discussed over the phone.  Will see patient back in office in 4 weeks.

## 2017-06-01 NOTE — PROGRESS NOTES
Discharge Planning Assessment  [facility]     Patient Name: Nessa Osuna  MRN: [id number]  Today's Date: 6/1/2017    Admit Date: 5/31/2017          Discharge Needs Assessment       06/01/17 1104    Living Environment    Lives With alone    Living Arrangements house    Home Accessibility no concerns    Stair Railings at Home none    Type of Financial/Environmental Concern none    Transportation Available car;family or friend will provide    Discharge Needs Assessment    Concerns To Be Addressed basic needs concerns    Readmission Within The Last 30 Days no previous admission in last 30 days    Equipment Currently Used at Home none    Equipment Needed After Discharge none    Discharge Planning Comments Home, no needs            Discharge Plan       06/01/17 1109    Case Management/Social Work Plan    Plan Home, no needs    Patient/Family In Agreement With Plan yes    Additional Comments S/W patient at bedside and verified face sheet.  IMM noted.  Patient states that she normally lives alone, niece is staying with her now.  Called and s/w Pharmacist at [employer] and Xarelto will be a $30 co-pay.  Patient states that she drives, does not use any equipment at home.  Patient states that she would like to draw up an advance directive and have notified the 's office.  Patient states that she uses [facility] for medications and has no problems paying for them.  Patient will have a ride home, not sure who will pick her up (niece, or sister).  Will continue to monitor and assist, as needed.  Chiquita RN, CCP        Discharge Placement     No information found                Demographic Summary       06/01/17 1056    Referral Information    Admission Type inpatient    Arrived From home or self-care    Referral Source admission list    Reason For Consult discharge planning    Record Reviewed plan of care    Contact Information    Permission Granted to Share Information With family/designee   sister Kumar  [phone] or solo Hernandez [phone]    Primary Care Physician Information    Name Dr. Jayne Guerra            Functional Status       06/01/17 1058    Functional Status Current    Ambulation 0-->independent    Transferring 0-->independent    Toileting 0-->independent    Bathing 0-->independent    Dressing 0-->independent    Eating 0-->independent    Communication 0-->understands/communicates without difficulty    Swallowing (if score 2 or more for any item, consult Rehab Services) 0-->swallows foods/liquids without difficulty    Functional Status Prior    Ambulation 0-->independent    Transferring 0-->independent    Toileting 0-->independent    Bathing 0-->independent    Dressing 0-->independent    Eating 0-->independent    Communication 0-->understands/communicates without difficulty    Swallowing 0-->swallows foods/liquids without difficulty    IADL    Medications independent    Meal Preparation independent    Housekeeping independent    Laundry independent    Shopping independent    Oral Care independent    Activity Tolerance    Current Activity Limitations none    Cognitive/Perceptual/Developmental    Current Mental Status/Cognitive Functioning no deficits noted            Psychosocial     None            Abuse/Neglect     None            Legal     None            Substance Abuse     None            Patient Forms     None          Madisyn Callejas, RN

## 2017-06-01 NOTE — PLAN OF CARE
Problem: Arrhythmia/Dysrhythmia (Symptomatic) (Adult)  Goal: Signs and Symptoms of Listed Potential Problems Will be Absent or Manageable (Arrhythmia/Dysrhythmia)  Outcome: Ongoing (interventions implemented as appropriate)    06/01/17 1546   Arrhythmia/Dysrhythmia (Symptomatic)   Problems Assessed (Arrhythmia/Dysrhythmia) all   Problems Present (Arrhythmia/Dysrhythmia) none

## 2017-06-02 NOTE — PROGRESS NOTES
Continued Stay Note  [facility]     Patient Name: Nessa Osuna  MRN: [id number]  Today's Date: 6/2/2017    Admit Date: 5/31/2017          Discharge Plan       06/02/17 0748    Case Management/Social Work Plan    Plan Home, no needs    Final Note    Final Note d/c'd home              Discharge Codes       06/02/17 0748    Discharge Codes    Discharge Codes 01  Discharge to home        Expected Discharge Date and Time     Expected Discharge Date Expected Discharge Time    Jun 1, 2017             Madisyn Callejas RN

## 2017-06-27 ENCOUNTER — OFFICE VISIT (OUTPATIENT)
Dept: CARDIOLOGY | Facility: CLINIC | Age: 67
End: 2017-06-27

## 2017-06-27 VITALS
DIASTOLIC BLOOD PRESSURE: 74 MMHG | HEIGHT: 67 IN | SYSTOLIC BLOOD PRESSURE: 118 MMHG | BODY MASS INDEX: 30.76 KG/M2 | HEART RATE: 64 BPM | WEIGHT: 196 LBS

## 2017-06-27 DIAGNOSIS — E78.5 HYPERLIPIDEMIA, UNSPECIFIED HYPERLIPIDEMIA TYPE: ICD-10-CM

## 2017-06-27 DIAGNOSIS — I48.0 PAROXYSMAL ATRIAL FIBRILLATION (HCC): Primary | ICD-10-CM

## 2017-06-27 PROCEDURE — 99213 OFFICE O/P EST LOW 20 MIN: CPT | Performed by: INTERNAL MEDICINE

## 2017-06-27 PROCEDURE — 93000 ELECTROCARDIOGRAM COMPLETE: CPT | Performed by: INTERNAL MEDICINE

## 2017-06-27 RX ORDER — METHYLPREDNISOLONE 4 MG/1
4 TABLET ORAL DAILY
COMMUNITY
End: 2017-08-08

## 2017-08-08 ENCOUNTER — OFFICE VISIT (OUTPATIENT)
Dept: INTERNAL MEDICINE | Facility: CLINIC | Age: 67
End: 2017-08-08

## 2017-08-08 VITALS
WEIGHT: 199 LBS | HEART RATE: 58 BPM | BODY MASS INDEX: 31.17 KG/M2 | SYSTOLIC BLOOD PRESSURE: 110 MMHG | DIASTOLIC BLOOD PRESSURE: 60 MMHG | OXYGEN SATURATION: 98 %

## 2017-08-08 DIAGNOSIS — F32.A DEPRESSION, UNSPECIFIED DEPRESSION TYPE: ICD-10-CM

## 2017-08-08 DIAGNOSIS — M54.30 SCIATICA, UNSPECIFIED LATERALITY: ICD-10-CM

## 2017-08-08 DIAGNOSIS — E78.5 HYPERLIPIDEMIA, UNSPECIFIED HYPERLIPIDEMIA TYPE: Primary | ICD-10-CM

## 2017-08-08 DIAGNOSIS — R53.83 FATIGUE, UNSPECIFIED TYPE: ICD-10-CM

## 2017-08-08 DIAGNOSIS — I48.0 PAROXYSMAL ATRIAL FIBRILLATION (HCC): ICD-10-CM

## 2017-08-08 DIAGNOSIS — M19.072 OSTEOARTHRITIS OF LEFT FOOT, UNSPECIFIED OSTEOARTHRITIS TYPE: ICD-10-CM

## 2017-08-08 PROCEDURE — 99214 OFFICE O/P EST MOD 30 MIN: CPT | Performed by: INTERNAL MEDICINE

## 2017-08-08 RX ORDER — DULOXETIN HYDROCHLORIDE 60 MG/1
60 CAPSULE, DELAYED RELEASE ORAL DAILY
Qty: 90 CAPSULE | Refills: 2 | Status: SHIPPED | OUTPATIENT
Start: 2017-08-08 | End: 2018-05-05 | Stop reason: SDUPTHER

## 2017-08-08 RX ORDER — GABAPENTIN 300 MG/1
300 CAPSULE ORAL 2 TIMES DAILY
Qty: 180 CAPSULE | Refills: 0 | Status: SHIPPED | OUTPATIENT
Start: 2017-08-08 | End: 2017-11-10 | Stop reason: SDUPTHER

## 2017-08-08 NOTE — PROGRESS NOTES
No chief complaint on file.    Fatigue, depressed mood, night time urination, hyperlipidemia, atrial fibrillation    History of Present Illness   Nessa Osuna is a 67 y.o. female presents for f/u evaluation. She reports feeling fatigued and depressed recently. Has atrial fibrillation. She walks dogs at a slow pace without dyspnea. Does note some dyspnea with more activity but activity is really limited by left ankle pain. She went to an orthopedist to have this evaluated. Was told to elevate and ice and wear compression stockings no real surgical measures offered. MRI taken at [facility]. Takes neurontin for pain of the back w sciatic radiation w/ fairly good benefit. Not interested in epidurals for these.   Fatigue. Sleeping 6-7 hours nightly. Goes to bed around 11:30 but not sleeping until 4-5 am.  Up in night approx 2 times nightly to urinate. Drinking about 4 cups of coffee daily.   Previously taking welbutrin v prozac for mood and has felt less motivation, worse mood since stopping.           The following portions of the patient's history were reviewed and updated as appropriate: allergies, current medications, past family history, past medical history, past social history, past surgical history and problem list.  Current Outpatient Prescriptions on File Prior to Visit   Medication Sig Dispense Refill   • acetaminophen (TYLENOL) 500 MG tablet Take by mouth. Take 1 tablet every 4-6 hours as needed     • Calcium Citrate 200 MG tablet Take by mouth.     • Calcium Citrate-Vitamin D (CALCIUM + D PO) Take 1,200 mg by mouth 2 (Two) Times a Day.     • Cholecalciferol (VITAMIN D) 2000 UNITS capsule Take 1 capsule by mouth daily.     • diphenhydrAMINE (BENADRYL) 25 MG tablet Take 25 mg by mouth At Night As Needed.     • Flaxseed, Linseed, (FLAXSEED OIL) 1000 MG capsule Take 1 capsule by mouth 2 (two) times a day.     • fluticasone (FLONASE) 50 MCG/ACT nasal spray PLACE TWO SPRAYS IN EACH NOSTRIL ONCE DAILY 1  each 5   • metoprolol tartrate (LOPRESSOR) 25 MG tablet Take 0.5 tablets by mouth Every 12 (Twelve) Hours. 90 tablet 2   • rivaroxaban (XARELTO) 20 MG tablet Take 1 tablet by mouth Daily With Dinner. 90 tablet 2   • simvastatin (ZOCOR) 20 MG tablet TAKE ONE TABLET BY MOUTH DAILY 90 tablet 0   • [DISCONTINUED] buPROPion SR (WELLBUTRIN SR) 150 MG 12 hr tablet TAKE ONE TABLET BY MOUTH TWICE A  tablet 0   • [DISCONTINUED] diclofenac (VOLTAREN) 1 % gel gel Apply 4 g topically 4 (Four) Times a Day As Needed (pain). 500 g 2   • [DISCONTINUED] Diclofenac Sodium (PENNSAID) 2 % solution Place 4 g on the skin 4 (Four) Times a Day. 112 g 5   • [DISCONTINUED] FLUoxetine (PROZAC) 20 MG capsule Take 1 capsule by mouth Daily. 30 capsule 4   • [DISCONTINUED] gabapentin (NEURONTIN) 300 MG capsule Take 1 capsule by mouth 2 (Two) Times a Day. 180 capsule 0   • [DISCONTINUED] gabapentin (NEURONTIN) 300 MG capsule TAKE ONE CAPSULE BY MOUTH EVERY 12 HOURS 180 capsule 0   • [DISCONTINUED] methylPREDNISolone (MEDROL) 4 MG tablet Take 4 mg by mouth Daily.       No current facility-administered medications on file prior to visit.      Review of Systems   Constitutional: Positive for fatigue.   HENT: Negative.    Eyes: Negative.    Respiratory: Positive for shortness of breath.    Cardiovascular: Positive for leg swelling.   Gastrointestinal: Negative.    Endocrine: Negative.    Genitourinary:        2 times night nocturia   Musculoskeletal: Positive for back pain.        Ankle and foot pain   Skin: Negative.    Allergic/Immunologic: Negative.    Neurological: Negative.    Hematological: Negative.    Psychiatric/Behavioral: Positive for sleep disturbance. The patient is nervous/anxious.         Depression         Objective   Physical Exam   Constitutional: She is oriented to person, place, and time. She appears well-developed and well-nourished.   HENT:   Head: Normocephalic and atraumatic.   Right Ear: External ear normal.   Left Ear:  External ear normal.   Nose: Nose normal.   Mouth/Throat: Oropharynx is clear and moist.   Eyes: Conjunctivae and EOM are normal. Pupils are equal, round, and reactive to light.   Neck: Normal range of motion. Neck supple.   Cardiovascular: Normal rate, regular rhythm, normal heart sounds and intact distal pulses.    Pulmonary/Chest: Effort normal and breath sounds normal.   Abdominal: Soft. Bowel sounds are normal.   Musculoskeletal:   Left ankle pain/ foot pain   Neurological: She is alert and oriented to person, place, and time.   Skin: Skin is warm and dry.   Psychiatric: She has a normal mood and affect. Her behavior is normal. Judgment and thought content normal.   Nursing note and vitals reviewed.       /60  Pulse 58  Wt 199 lb (90.3 kg)  SpO2 98%  BMI 31.17 kg/m2    Assessment/Plan   Diagnoses and all orders for this visit:    Hyperlipidemia, unspecified hyperlipidemia type  -     Basic Metabolic Panel    Paroxysmal atrial fibrillation    Sciatica, unspecified laterality    Osteoarthritis of left foot, unspecified osteoarthritis type  -     Basic Metabolic Panel  -     Ambulatory Referral to Podiatry    Depression, unspecified depression type  -     CBC & Differential  -     Basic Metabolic Panel  -     TSH    Fatigue, unspecified type  -     Ambulatory Referral to Sleep Medicine    Other orders  -     DULoxetine (CYMBALTA) 60 MG capsule; Take 1 capsule by mouth Daily.      Patient w/ fatigue. Advised to reduce caffeine. Stop evening benadryl. Trial melatonin. To go to be earlier/ get on regular schedule. She will try cymbalta 60 mg qam. Will continue foot exercises, wear brace if needed. Consider orthotics. She will go to podiatry for further evaluation of foot pain. Sleep medicine if continued fatigue after listed changes. Will test listed labs. Follow up in 6 weeks or prn.

## 2017-08-09 LAB
BASOPHILS # BLD AUTO: 0.03 10*3/MM3 (ref 0–0.2)
BASOPHILS NFR BLD AUTO: 0.6 % (ref 0–1.5)
BUN SERPL-MCNC: 17 MG/DL (ref 8–23)
BUN/CREAT SERPL: 24.6 (ref 7–25)
CALCIUM SERPL-MCNC: 9.6 MG/DL (ref 8.6–10.5)
CHLORIDE SERPL-SCNC: 102 MMOL/L (ref 98–107)
CO2 SERPL-SCNC: 26.9 MMOL/L (ref 22–29)
CREAT SERPL-MCNC: 0.69 MG/DL (ref 0.57–1)
EOSINOPHIL # BLD AUTO: 0.25 10*3/MM3 (ref 0–0.7)
EOSINOPHIL NFR BLD AUTO: 5 % (ref 0.3–6.2)
ERYTHROCYTE [DISTWIDTH] IN BLOOD BY AUTOMATED COUNT: 13.2 % (ref 11.7–13)
GLUCOSE SERPL-MCNC: 89 MG/DL (ref 65–99)
HCT VFR BLD AUTO: 38 % (ref 35.6–45.5)
HGB BLD-MCNC: 12.3 G/DL (ref 11.9–15.5)
IMM GRANULOCYTES # BLD: 0 10*3/MM3 (ref 0–0.03)
IMM GRANULOCYTES NFR BLD: 0 % (ref 0–0.5)
LYMPHOCYTES # BLD AUTO: 1.61 10*3/MM3 (ref 0.9–4.8)
LYMPHOCYTES NFR BLD AUTO: 32.4 % (ref 19.6–45.3)
MCH RBC QN AUTO: 31.4 PG (ref 26.9–32)
MCHC RBC AUTO-ENTMCNC: 32.4 G/DL (ref 32.4–36.3)
MCV RBC AUTO: 96.9 FL (ref 80.5–98.2)
MONOCYTES # BLD AUTO: 0.53 10*3/MM3 (ref 0.2–1.2)
MONOCYTES NFR BLD AUTO: 10.7 % (ref 5–12)
NEUTROPHILS # BLD AUTO: 2.55 10*3/MM3 (ref 1.9–8.1)
NEUTROPHILS NFR BLD AUTO: 51.3 % (ref 42.7–76)
PLATELET # BLD AUTO: 220 10*3/MM3 (ref 140–500)
POTASSIUM SERPL-SCNC: 4.1 MMOL/L (ref 3.5–5.2)
RBC # BLD AUTO: 3.92 10*6/MM3 (ref 3.9–5.2)
SODIUM SERPL-SCNC: 142 MMOL/L (ref 136–145)
TSH SERPL DL<=0.005 MIU/L-ACNC: 1.18 MIU/ML (ref 0.27–4.2)
WBC # BLD AUTO: 4.97 10*3/MM3 (ref 4.5–10.7)

## 2017-08-09 NOTE — PROGRESS NOTES
Your laboratory results are NORMAL. We will discuss these results in detail at your next office visit. Please call with any questions or concerns.  Sincerely,  Jayne morales MD

## 2017-09-11 RX ORDER — SIMVASTATIN 20 MG
TABLET ORAL
Qty: 90 TABLET | Refills: 0 | Status: SHIPPED | OUTPATIENT
Start: 2017-09-11 | End: 2017-12-03 | Stop reason: SDUPTHER

## 2017-10-10 ENCOUNTER — RESULTS ENCOUNTER (OUTPATIENT)
Dept: INTERNAL MEDICINE | Facility: CLINIC | Age: 67
End: 2017-10-10

## 2017-10-10 ENCOUNTER — OFFICE VISIT (OUTPATIENT)
Dept: INTERNAL MEDICINE | Facility: CLINIC | Age: 67
End: 2017-10-10

## 2017-10-10 VITALS
BODY MASS INDEX: 31.66 KG/M2 | OXYGEN SATURATION: 98 % | HEART RATE: 59 BPM | WEIGHT: 197 LBS | DIASTOLIC BLOOD PRESSURE: 78 MMHG | SYSTOLIC BLOOD PRESSURE: 120 MMHG | HEIGHT: 66 IN | RESPIRATION RATE: 18 BRPM

## 2017-10-10 DIAGNOSIS — E78.5 HYPERLIPIDEMIA, UNSPECIFIED HYPERLIPIDEMIA TYPE: ICD-10-CM

## 2017-10-10 DIAGNOSIS — F32.A DEPRESSION, UNSPECIFIED DEPRESSION TYPE: Primary | ICD-10-CM

## 2017-10-10 DIAGNOSIS — M19.072 OSTEOARTHRITIS OF LEFT FOOT, UNSPECIFIED OSTEOARTHRITIS TYPE: ICD-10-CM

## 2017-10-10 DIAGNOSIS — I48.0 PAROXYSMAL ATRIAL FIBRILLATION (HCC): ICD-10-CM

## 2017-10-10 DIAGNOSIS — Z12.11 COLON CANCER SCREENING: ICD-10-CM

## 2017-10-10 PROCEDURE — 99214 OFFICE O/P EST MOD 30 MIN: CPT | Performed by: INTERNAL MEDICINE

## 2017-10-10 NOTE — PROGRESS NOTES
"Chief Complaint   Patient presents with   • Hyperlipidemia     2 month      hyperlipidemia, depression,atrial fibrillation   History of Present Illness   Nessa Osuna is a 67 y.o. female presents for follow up evaluation. She is doing relatively well today. Reports that mood is improving. She is starting to engage in more activities. Still feels like she has to \"make\" herself do these things. Activity is predominanty walking her dog. She has medically managed dyslipidemia. She has rate controlled afib on xarelto and metoprolol. Recently noted vaginal polyp. To gynecologist. Pathology is pending. Some foot pain related to oa. She has good footwear most of the time.         The following portions of the patient's history were reviewed and updated as appropriate: allergies, current medications, past family history, past medical history, past social history, past surgical history and problem list.  Current Outpatient Prescriptions on File Prior to Visit   Medication Sig Dispense Refill   • acetaminophen (TYLENOL) 500 MG tablet Take by mouth. Take 1 tablet every 4-6 hours as needed     • Calcium Citrate 200 MG tablet Take by mouth.     • Calcium Citrate-Vitamin D (CALCIUM + D PO) Take 1,200 mg by mouth 2 (Two) Times a Day.     • Cholecalciferol (VITAMIN D) 2000 UNITS capsule Take 1 capsule by mouth daily.     • diphenhydrAMINE (BENADRYL) 25 MG tablet Take 25 mg by mouth At Night As Needed.     • DULoxetine (CYMBALTA) 60 MG capsule Take 1 capsule by mouth Daily. 90 capsule 2   • Flaxseed, Linseed, (FLAXSEED OIL) 1000 MG capsule Take 1 capsule by mouth 2 (two) times a day.     • fluticasone (FLONASE) 50 MCG/ACT nasal spray PLACE TWO SPRAYS IN EACH NOSTRIL ONCE DAILY 1 each 5   • gabapentin (NEURONTIN) 300 MG capsule Take 1 capsule by mouth 2 (Two) Times a Day. 180 capsule 0   • metoprolol tartrate (LOPRESSOR) 25 MG tablet Take 0.5 tablets by mouth Every 12 (Twelve) Hours. 90 tablet 2   • rivaroxaban (XARELTO) 20 MG " "tablet Take 1 tablet by mouth Daily With Dinner. 90 tablet 2   • simvastatin (ZOCOR) 20 MG tablet TAKE ONE TABLET BY MOUTH DAILY 90 tablet 0     No current facility-administered medications on file prior to visit.      Review of Systems   Constitutional: Negative.    HENT: Negative.    Eyes: Negative.    Respiratory: Negative.    Cardiovascular: Negative.    Gastrointestinal: Negative.    Endocrine: Negative.    Genitourinary: Negative.    Musculoskeletal: Positive for arthralgias.   Skin: Negative.    Allergic/Immunologic: Negative.    Neurological: Negative.    Hematological: Negative.    Psychiatric/Behavioral: Negative.        Objective   Physical Exam   Constitutional: She is oriented to person, place, and time. She appears well-developed and well-nourished.   HENT:   Head: Normocephalic and atraumatic.   Right Ear: External ear normal.   Left Ear: External ear normal.   Nose: Nose normal.   Mouth/Throat: Oropharynx is clear and moist.   Eyes: Conjunctivae and EOM are normal. Pupils are equal, round, and reactive to light.   Neck: Normal range of motion. Neck supple.   Cardiovascular: Normal rate, regular rhythm and normal heart sounds.    Pulmonary/Chest: Effort normal and breath sounds normal. No respiratory distress.   Abdominal: Soft. Bowel sounds are normal.   Musculoskeletal: Normal range of motion.   Neurological: She is alert and oriented to person, place, and time.   Skin: Skin is warm and dry.   Psychiatric: She has a normal mood and affect. Her behavior is normal. Judgment and thought content normal.   Nursing note and vitals reviewed.       /78  Pulse 59  Resp 18  Ht 65.5\" (166.4 cm)  Wt 197 lb (89.4 kg)  SpO2 98%  BMI 32.28 kg/m2    Assessment/Plan   Diagnoses and all orders for this visit:    Colon cancer screening  -     Cologuard - Stool, Per Rectum; Future    Hyperlipidemia, unspecified hyperlipidemia type    Paroxysmal atrial fibrillation    Osteoarthritis of left foot, unspecified " osteoarthritis type    Depression, unspecified depression type      Depression- much improved. Will continue daily cymbalta and is encouraged to engage in more physical activities especially w/ others. She has oa and will continue w/ orthotics and foot specialist as needed. Has a regular rate and will cotninue xarelto and metoprolol for atrial fibrillation. She will continue zocor for lipids. To have colorectal cancer screening by [facility].. Follow up for cpe in 8 mo or prn.

## 2017-10-12 ENCOUNTER — TELEPHONE (OUTPATIENT)
Dept: INTERNAL MEDICINE | Facility: CLINIC | Age: 67
End: 2017-10-12

## 2017-11-09 ENCOUNTER — TELEPHONE (OUTPATIENT)
Dept: INTERNAL MEDICINE | Facility: CLINIC | Age: 67
End: 2017-11-09

## 2017-11-09 NOTE — TELEPHONE ENCOUNTER
Patient states she called a few hours ago asking about her refill on her gabapentin. She states we only filled it for 30 days 1 tab by mouth BID. I do see that before we called it for 90 days 1 tab by mouth BID. I did called Randy to verify what we sent in. The patient is correct it was only filled for 30 days 1 tab by mouth BID. Patient would like this corrected. Please advise

## 2017-11-10 RX ORDER — GABAPENTIN 300 MG/1
300 CAPSULE ORAL 2 TIMES DAILY
Qty: 180 CAPSULE | Refills: 0 | Status: SHIPPED | OUTPATIENT
Start: 2017-11-10 | End: 2018-01-09 | Stop reason: SDUPTHER

## 2017-11-10 NOTE — TELEPHONE ENCOUNTER
Please see below. We have made the patient aware that we have called a new prescription for gabapentin 300 mg OTY: 180 with one refill. Verify with the pharmacy for next months refill.

## 2017-12-04 RX ORDER — SIMVASTATIN 20 MG
TABLET ORAL
Qty: 90 TABLET | Refills: 0 | Status: SHIPPED | OUTPATIENT
Start: 2017-12-04 | End: 2018-02-27 | Stop reason: SDUPTHER

## 2018-01-09 ENCOUNTER — OFFICE VISIT (OUTPATIENT)
Dept: CARDIOLOGY | Facility: CLINIC | Age: 68
End: 2018-01-09

## 2018-01-09 VITALS
BODY MASS INDEX: 31.39 KG/M2 | HEART RATE: 55 BPM | WEIGHT: 200 LBS | HEIGHT: 67 IN | DIASTOLIC BLOOD PRESSURE: 82 MMHG | SYSTOLIC BLOOD PRESSURE: 118 MMHG

## 2018-01-09 DIAGNOSIS — K21.9 GASTROESOPHAGEAL REFLUX DISEASE, ESOPHAGITIS PRESENCE NOT SPECIFIED: ICD-10-CM

## 2018-01-09 DIAGNOSIS — I48.0 PAROXYSMAL ATRIAL FIBRILLATION (HCC): Primary | ICD-10-CM

## 2018-01-09 DIAGNOSIS — E78.5 HYPERLIPIDEMIA, UNSPECIFIED HYPERLIPIDEMIA TYPE: ICD-10-CM

## 2018-01-09 PROCEDURE — 99213 OFFICE O/P EST LOW 20 MIN: CPT | Performed by: INTERNAL MEDICINE

## 2018-01-09 PROCEDURE — 93000 ELECTROCARDIOGRAM COMPLETE: CPT | Performed by: INTERNAL MEDICINE

## 2018-01-09 RX ORDER — GABAPENTIN 300 MG/1
300 CAPSULE ORAL 3 TIMES DAILY
Qty: 180 CAPSULE | Refills: 1 | Status: SHIPPED | OUTPATIENT
Start: 2018-01-09 | End: 2018-04-06 | Stop reason: SDUPTHER

## 2018-01-09 RX ORDER — INFLUENZA VACCINE, ADJUVANTED 15; 15; 15 UG/.5ML; UG/.5ML; UG/.5ML
INJECTION, SUSPENSION INTRAMUSCULAR
Refills: 0 | COMMUNITY
Start: 2017-11-08 | End: 2018-04-23

## 2018-02-28 RX ORDER — SIMVASTATIN 20 MG
TABLET ORAL
Qty: 90 TABLET | Refills: 0 | Status: SHIPPED | OUTPATIENT
Start: 2018-02-28 | End: 2018-06-09 | Stop reason: SDUPTHER

## 2018-03-13 DIAGNOSIS — I48.0 PAROXYSMAL ATRIAL FIBRILLATION (HCC): ICD-10-CM

## 2018-03-29 DIAGNOSIS — I48.0 PAROXYSMAL ATRIAL FIBRILLATION (HCC): ICD-10-CM

## 2018-03-29 RX ORDER — RIVAROXABAN 20 MG/1
TABLET, FILM COATED ORAL
Qty: 90 TABLET | Refills: 1 | Status: SHIPPED | OUTPATIENT
Start: 2018-03-29 | End: 2018-09-17 | Stop reason: SDUPTHER

## 2018-04-06 RX ORDER — GABAPENTIN 300 MG/1
300 CAPSULE ORAL 3 TIMES DAILY
Qty: 270 CAPSULE | Refills: 1 | Status: SHIPPED | OUTPATIENT
Start: 2018-04-06 | End: 2018-08-15 | Stop reason: SDUPTHER

## 2018-04-09 ENCOUNTER — TELEPHONE (OUTPATIENT)
Dept: INTERNAL MEDICINE | Facility: CLINIC | Age: 68
End: 2018-04-09

## 2018-04-09 NOTE — TELEPHONE ENCOUNTER
Patient should get a tdap booster. She may check w/ her insurance to see if this is covered. She would likely get this for less expense at her local pharmacy. She should have them fax us a record of vaccination after she receives this.

## 2018-04-23 ENCOUNTER — OFFICE VISIT (OUTPATIENT)
Dept: INTERNAL MEDICINE | Facility: CLINIC | Age: 68
End: 2018-04-23

## 2018-04-23 VITALS
OXYGEN SATURATION: 98 % | DIASTOLIC BLOOD PRESSURE: 66 MMHG | HEART RATE: 65 BPM | WEIGHT: 205 LBS | SYSTOLIC BLOOD PRESSURE: 120 MMHG | BODY MASS INDEX: 32.11 KG/M2

## 2018-04-23 DIAGNOSIS — Z12.31 ENCOUNTER FOR SCREENING MAMMOGRAM FOR BREAST CANCER: ICD-10-CM

## 2018-04-23 DIAGNOSIS — R31.9 HEMATURIA, UNSPECIFIED TYPE: Primary | ICD-10-CM

## 2018-04-23 DIAGNOSIS — I48.0 PAROXYSMAL ATRIAL FIBRILLATION (HCC): ICD-10-CM

## 2018-04-23 DIAGNOSIS — Z80.52 FAMILY HISTORY OF BLADDER CANCER: ICD-10-CM

## 2018-04-23 PROCEDURE — 99214 OFFICE O/P EST MOD 30 MIN: CPT | Performed by: INTERNAL MEDICINE

## 2018-04-23 PROCEDURE — 81003 URINALYSIS AUTO W/O SCOPE: CPT | Performed by: INTERNAL MEDICINE

## 2018-04-23 NOTE — PROGRESS NOTES
Chief Complaint   Patient presents with   • Blood in Urine     Hematuria, atrial fibrillation    History of Present Illness   Nessa Osuna is a 68 y.o. female presents for acute care. Patient recently had a low grade fever. Patient went to the urgent care. She was diagnosed with an upper respiratory infection. She started zyrtec and symptoms did resolve. She was found to have microscopic blood in the urine. She is concerned as she is on a blood thinner for atrial fibrillation. She is stable from this but does get palpitations on occasion.  She reports having a recent cut on her finger and had 2 hours until she reached hemostasis. She did not do continuous direct pressure.       The following portions of the patient's history were reviewed and updated as appropriate: allergies, current medications, past family history, past medical history, past social history, past surgical history and problem list.  Current Outpatient Prescriptions on File Prior to Visit   Medication Sig Dispense Refill   • acetaminophen (TYLENOL) 500 MG tablet Take by mouth. Take 1 tablet every 4-6 hours as needed     • Calcium Citrate 200 MG tablet Take by mouth.     • Calcium Citrate-Vitamin D (CALCIUM + D PO) Take 1,200 mg by mouth 2 (Two) Times a Day.     • Cholecalciferol (VITAMIN D) 2000 UNITS capsule Take 1 capsule by mouth daily.     • diphenhydrAMINE (BENADRYL) 25 MG tablet Take 25 mg by mouth At Night As Needed.     • DULoxetine (CYMBALTA) 60 MG capsule Take 1 capsule by mouth Daily. 90 capsule 2   • Flaxseed, Linseed, (FLAXSEED OIL) 1000 MG capsule Take 1 capsule by mouth 2 (two) times a day.     • fluticasone (FLONASE) 50 MCG/ACT nasal spray PLACE TWO SPRAYS IN EACH NOSTRIL ONCE DAILY 1 each 5   • gabapentin (NEURONTIN) 300 MG capsule Take 1 capsule by mouth 3 (Three) Times a Day. 270 capsule 1   • metoprolol tartrate (LOPRESSOR) 25 MG tablet Take 0.5 tablets by mouth Every 12 (Twelve) Hours. 90 tablet 2   • simvastatin (ZOCOR)  20 MG tablet TAKE ONE TABLET BY MOUTH DAILY 90 tablet 0   • XARELTO 20 MG tablet TAKE ONE TABLET BY MOUTH DAILY WITH DINNER 90 tablet 1   • [DISCONTINUED] FLUAD 0.5 ML suspension prefilled syringe ADM 0.5ML IM UTD  0     No current facility-administered medications on file prior to visit.      Review of Systems   Constitutional: Negative.    HENT: Positive for postnasal drip and rhinorrhea.    Eyes: Negative.    Respiratory: Negative.  Negative for cough.    Cardiovascular: Negative.    Gastrointestinal: Negative.    Endocrine: Negative.    Genitourinary: Negative for flank pain, frequency and urgency.   Musculoskeletal: Negative.    Skin: Negative.    Allergic/Immunologic: Negative.    Neurological: Negative.    Hematological: Negative.    Psychiatric/Behavioral: Negative.        Objective   Physical Exam   Constitutional: She is oriented to person, place, and time. She appears well-developed and well-nourished.   HENT:   Head: Normocephalic and atraumatic.   Right Ear: External ear normal.   Left Ear: External ear normal.   Nose: Nose normal.   Mouth/Throat: Oropharynx is clear and moist.   Eyes: Conjunctivae and EOM are normal. Pupils are equal, round, and reactive to light.   Neck: Normal range of motion. Neck supple.   Cardiovascular: Normal rate, regular rhythm and normal heart sounds.    Pulmonary/Chest: Effort normal and breath sounds normal. No respiratory distress.   Abdominal: Soft. Bowel sounds are normal.   Musculoskeletal: Normal range of motion.   Neurological: She is alert and oriented to person, place, and time.   Skin: Skin is warm and dry.   Psychiatric: She has a normal mood and affect. Her behavior is normal. Judgment and thought content normal.   Nursing note and vitals reviewed.       /66   Pulse 65   Wt 93 kg (205 lb)   SpO2 98%   BMI 32.11 kg/m²     Assessment/Plan   Diagnoses and all orders for this visit:    Hematuria, unspecified type  -     POC Urinalysis Dipstick, Automated  -      Urine Culture - Urine, Urine, Clean Catch    Paroxysmal atrial fibrillation    Encounter for screening mammogram for breast cancer  -     Mammo screening digital tomosynthesis bilateral w CAD; Future      Patient with microscopic hematuria at outlying facility. Will retest. Given maternal h/o bladder CA will refer to urology if still present. Increase hydration. Caffeine avoidance for atrial fibrillation. She was instructed on the correct way to give direct pressure to a wound for hemostasis. She should report if she has any bleeding difficulties in the future.

## 2018-04-24 LAB
BILIRUB BLD-MCNC: NEGATIVE MG/DL
CLARITY, POC: ABNORMAL
COLOR UR: YELLOW
GLUCOSE UR STRIP-MCNC: NEGATIVE MG/DL
KETONES UR QL: NEGATIVE
LEUKOCYTE EST, POC: NEGATIVE
NITRITE UR-MCNC: NEGATIVE MG/ML
PH UR: 6 [PH] (ref 5–8)
PROT UR STRIP-MCNC: ABNORMAL MG/DL
RBC # UR STRIP: ABNORMAL /UL
SP GR UR: 1.03 (ref 1–1.03)
UROBILINOGEN UR QL: NORMAL

## 2018-04-25 LAB
APPEARANCE UR: CLEAR
BACTERIA #/AREA URNS HPF: NORMAL /HPF
BILIRUB UR QL STRIP: NEGATIVE
COLOR UR: YELLOW
EPI CELLS #/AREA URNS HPF: NORMAL /HPF
GLUCOSE UR QL: NEGATIVE
HGB UR QL STRIP: NEGATIVE
KETONES UR QL STRIP: ABNORMAL
LEUKOCYTE ESTERASE UR QL STRIP: NEGATIVE
MICRO URNS: ABNORMAL
MICRO URNS: ABNORMAL
MUCOUS THREADS URNS QL MICRO: PRESENT /HPF
NITRITE UR QL STRIP: NEGATIVE
PH UR STRIP: 5.5 [PH] (ref 5–7.5)
PROT UR QL STRIP: NEGATIVE
RBC #/AREA URNS HPF: NORMAL /HPF
SP GR UR: 1.02 (ref 1–1.03)
URINALYSIS REFLEX: ABNORMAL
UROBILINOGEN UR STRIP-MCNC: 0.2 MG/DL (ref 0.2–1)
WBC #/AREA URNS HPF: NORMAL /HPF

## 2018-05-07 RX ORDER — DULOXETIN HYDROCHLORIDE 60 MG/1
CAPSULE, DELAYED RELEASE ORAL
Qty: 90 CAPSULE | Refills: 1 | Status: SHIPPED | OUTPATIENT
Start: 2018-05-07 | End: 2018-05-08 | Stop reason: SDUPTHER

## 2018-05-08 ENCOUNTER — OFFICE VISIT (OUTPATIENT)
Dept: INTERNAL MEDICINE | Facility: CLINIC | Age: 68
End: 2018-05-08

## 2018-05-08 VITALS
BODY MASS INDEX: 32.42 KG/M2 | WEIGHT: 207 LBS | DIASTOLIC BLOOD PRESSURE: 60 MMHG | HEART RATE: 70 BPM | SYSTOLIC BLOOD PRESSURE: 116 MMHG | OXYGEN SATURATION: 97 %

## 2018-05-08 DIAGNOSIS — R31.9 HEMATURIA, UNSPECIFIED TYPE: Primary | ICD-10-CM

## 2018-05-08 DIAGNOSIS — R35.0 URINARY FREQUENCY: ICD-10-CM

## 2018-05-08 LAB
BILIRUB BLD-MCNC: NEGATIVE MG/DL
CLARITY, POC: CLEAR
COLOR UR: YELLOW
GLUCOSE UR STRIP-MCNC: NEGATIVE MG/DL
KETONES UR QL: NEGATIVE
LEUKOCYTE EST, POC: ABNORMAL
NITRITE UR-MCNC: NEGATIVE MG/ML
PH UR: 7 [PH] (ref 5–8)
PROT UR STRIP-MCNC: ABNORMAL MG/DL
RBC # UR STRIP: ABNORMAL /UL
SP GR UR: 1.01 (ref 1–1.03)
UROBILINOGEN UR QL: NORMAL

## 2018-05-08 PROCEDURE — 99213 OFFICE O/P EST LOW 20 MIN: CPT | Performed by: INTERNAL MEDICINE

## 2018-05-08 PROCEDURE — 81003 URINALYSIS AUTO W/O SCOPE: CPT | Performed by: INTERNAL MEDICINE

## 2018-05-08 RX ORDER — DULOXETIN HYDROCHLORIDE 60 MG/1
60 CAPSULE, DELAYED RELEASE ORAL DAILY
Qty: 90 CAPSULE | Refills: 2 | Status: SHIPPED | OUTPATIENT
Start: 2018-05-08 | End: 2020-03-17

## 2018-05-08 NOTE — PROGRESS NOTES
Chief Complaint   Patient presents with   • Blood in Urine       History of Present Illness   Nessa Osuna is a 68 y.o. female presents for acute care. Reports having urinary discomfort started Friday night. She started uricalm max (pyridium with cranberry) and this did relieve her symptoms. Last dose was 24 hours ago and she is symptom free today. Prior urine w/ trace blood. However, microscopy revealed 0-2 rbc on repeat in our office. Patient's mother did have bladder cancer.       The following portions of the patient's history were reviewed and updated as appropriate: allergies, current medications, past family history, past medical history, past social history, past surgical history and problem list.  Current Outpatient Prescriptions on File Prior to Visit   Medication Sig Dispense Refill   • acetaminophen (TYLENOL) 500 MG tablet Take by mouth. Take 1 tablet every 4-6 hours as needed     • Calcium Citrate 200 MG tablet Take by mouth.     • Calcium Citrate-Vitamin D (CALCIUM + D PO) Take 1,200 mg by mouth 2 (Two) Times a Day.     • Cholecalciferol (VITAMIN D) 2000 UNITS capsule Take 1 capsule by mouth daily.     • diphenhydrAMINE (BENADRYL) 25 MG tablet Take 25 mg by mouth At Night As Needed.     • Flaxseed, Linseed, (FLAXSEED OIL) 1000 MG capsule Take 1 capsule by mouth 2 (two) times a day.     • fluticasone (FLONASE) 50 MCG/ACT nasal spray PLACE TWO SPRAYS IN EACH NOSTRIL ONCE DAILY 1 each 5   • gabapentin (NEURONTIN) 300 MG capsule Take 1 capsule by mouth 3 (Three) Times a Day. 270 capsule 1   • metoprolol tartrate (LOPRESSOR) 25 MG tablet Take 0.5 tablets by mouth Every 12 (Twelve) Hours. 90 tablet 2   • simvastatin (ZOCOR) 20 MG tablet TAKE ONE TABLET BY MOUTH DAILY 90 tablet 0   • XARELTO 20 MG tablet TAKE ONE TABLET BY MOUTH DAILY WITH DINNER 90 tablet 1   • [DISCONTINUED] DULoxetine (CYMBALTA) 60 MG capsule TAKE ONE CAPSULE BY MOUTH DAILY 90 capsule 1     No current facility-administered  medications on file prior to visit.      Review of Systems   Constitutional: Negative.    HENT: Negative.    Eyes: Negative.    Respiratory: Negative.    Cardiovascular: Negative.    Gastrointestinal: Negative.    Endocrine: Negative.    Genitourinary: Positive for frequency and urgency.   Musculoskeletal: Negative.    Skin: Negative.    Allergic/Immunologic: Negative.    Neurological: Negative.    Hematological: Negative.    Psychiatric/Behavioral: Negative.         Stable mood.        Objective   Physical Exam   Constitutional: She is oriented to person, place, and time. She appears well-developed and well-nourished.   HENT:   Head: Normocephalic and atraumatic.   Right Ear: External ear normal.   Left Ear: External ear normal.   Nose: Nose normal.   Mouth/Throat: Oropharynx is clear and moist.   Eyes: EOM are normal. Pupils are equal, round, and reactive to light.   Neck: Normal range of motion. Neck supple.   Cardiovascular: Normal rate, regular rhythm and normal heart sounds.    Pulmonary/Chest: Effort normal and breath sounds normal.   Abdominal: Soft. Bowel sounds are normal.   Musculoskeletal: Normal range of motion.   Neurological: She is alert and oriented to person, place, and time.   Skin: Skin is warm and dry.   Psychiatric: She has a normal mood and affect. Her behavior is normal. Judgment and thought content normal.   Nursing note and vitals reviewed.       /60   Pulse 70   Wt 93.9 kg (207 lb)   SpO2 97%   BMI 32.42 kg/m²     Assessment/Plan   Diagnoses and all orders for this visit:    Hematuria, unspecified type  -     POC Urinalysis Dipstick, Automated  -     Urine Culture - Urine, Urine, Clean Catch; Future    Urinary frequency  -     Urine Culture - Urine, Urine, Clean Catch; Future    Other orders  -     DULoxetine (CYMBALTA) 60 MG capsule; Take 1 capsule by mouth Daily.      Patient w/ recent possible hematuria now w/ urinary frequency/ discomfort that improved after pyridium. Will  send urine for culture. Given several episodes of urinary symptoms recently and maternal history of bladder cancer she will be referred to urology for possible cystoscopy. Follow up routinely.

## 2018-05-11 LAB
BACTERIA UR CULT: ABNORMAL
BACTERIA UR CULT: ABNORMAL
OTHER ANTIBIOTIC SUSC ISLT: ABNORMAL

## 2018-05-11 RX ORDER — CIPROFLOXACIN 500 MG/1
500 TABLET, FILM COATED ORAL 2 TIMES DAILY
Qty: 14 TABLET | Refills: 0 | Status: SHIPPED | OUTPATIENT
Start: 2018-05-11 | End: 2018-05-18

## 2018-05-15 ENCOUNTER — HOSPITAL ENCOUNTER (OUTPATIENT)
Dept: MAMMOGRAPHY | Facility: HOSPITAL | Age: 68
Discharge: HOME | End: 2018-05-15
Admitting: INTERNAL MEDICINE

## 2018-05-15 DIAGNOSIS — Z12.31 ENCOUNTER FOR SCREENING MAMMOGRAM FOR BREAST CANCER: ICD-10-CM

## 2018-05-15 PROCEDURE — 77063 BREAST TOMOSYNTHESIS BI: CPT

## 2018-05-15 PROCEDURE — 77067 SCR MAMMO BI INCL CAD: CPT

## 2018-05-17 ENCOUNTER — TELEPHONE (OUTPATIENT)
Dept: INTERNAL MEDICINE | Facility: CLINIC | Age: 68
End: 2018-05-17

## 2018-05-17 NOTE — TELEPHONE ENCOUNTER
DIONEI pt called to say that she rescheduled her appt for June 13 with Dr Ye (Urology) and wanted to make sure your ok with this

## 2018-05-29 RX ORDER — FLUTICASONE PROPIONATE 50 MCG
SPRAY, SUSPENSION (ML) NASAL
Qty: 1 BOTTLE | Refills: 4 | Status: SHIPPED | OUTPATIENT
Start: 2018-05-29 | End: 2023-02-24

## 2018-05-30 DIAGNOSIS — E55.9 VITAMIN D DEFICIENCY: ICD-10-CM

## 2018-05-30 DIAGNOSIS — Z11.59 ENCOUNTER FOR HEPATITIS C SCREENING TEST FOR LOW RISK PATIENT: ICD-10-CM

## 2018-05-30 DIAGNOSIS — F32.A DEPRESSION, UNSPECIFIED DEPRESSION TYPE: ICD-10-CM

## 2018-05-30 DIAGNOSIS — E78.5 HYPERLIPIDEMIA, UNSPECIFIED HYPERLIPIDEMIA TYPE: Primary | ICD-10-CM

## 2018-05-30 DIAGNOSIS — M85.80 OSTEOPENIA, UNSPECIFIED LOCATION: ICD-10-CM

## 2018-06-01 LAB
25(OH)D3+25(OH)D2 SERPL-MCNC: 36.8 NG/ML (ref 30–100)
ALBUMIN SERPL-MCNC: 4.4 G/DL (ref 3.5–5.2)
ALBUMIN/GLOB SERPL: 2.2 G/DL
ALP SERPL-CCNC: 52 U/L (ref 39–117)
ALT SERPL-CCNC: 17 U/L (ref 1–33)
APPEARANCE UR: CLEAR
AST SERPL-CCNC: 19 U/L (ref 1–32)
BACTERIA #/AREA URNS HPF: NORMAL /HPF
BASOPHILS # BLD AUTO: 0.04 10*3/MM3 (ref 0–0.2)
BASOPHILS NFR BLD AUTO: 0.9 % (ref 0–1.5)
BILIRUB SERPL-MCNC: 1.1 MG/DL (ref 0.1–1.2)
BILIRUB UR QL STRIP: NEGATIVE
BUN SERPL-MCNC: 19 MG/DL (ref 8–23)
BUN/CREAT SERPL: 27.1 (ref 7–25)
CALCIUM SERPL-MCNC: 9.2 MG/DL (ref 8.6–10.5)
CHLORIDE SERPL-SCNC: 101 MMOL/L (ref 98–107)
CHOLEST SERPL-MCNC: 167 MG/DL (ref 0–200)
CO2 SERPL-SCNC: 24.9 MMOL/L (ref 22–29)
COLOR UR: YELLOW
CREAT SERPL-MCNC: 0.7 MG/DL (ref 0.57–1)
EOSINOPHIL # BLD AUTO: 0.37 10*3/MM3 (ref 0–0.7)
EOSINOPHIL NFR BLD AUTO: 8.6 % (ref 0.3–6.2)
EPI CELLS #/AREA URNS HPF: NORMAL /HPF
ERYTHROCYTE [DISTWIDTH] IN BLOOD BY AUTOMATED COUNT: 13 % (ref 11.7–13)
GFR SERPLBLD CREATININE-BSD FMLA CKD-EPI: 101 ML/MIN/1.73
GFR SERPLBLD CREATININE-BSD FMLA CKD-EPI: 83 ML/MIN/1.73
GLOBULIN SER CALC-MCNC: 2 GM/DL
GLUCOSE SERPL-MCNC: 88 MG/DL (ref 65–99)
GLUCOSE UR QL: NEGATIVE
HCT VFR BLD AUTO: 38.4 % (ref 35.6–45.5)
HCV AB S/CO SERPL IA: 0.1 S/CO RATIO (ref 0–0.9)
HDLC SERPL-MCNC: 57 MG/DL (ref 40–60)
HGB BLD-MCNC: 12.4 G/DL (ref 11.9–15.5)
HGB UR QL STRIP: NEGATIVE
IMM GRANULOCYTES # BLD: 0 10*3/MM3 (ref 0–0.03)
IMM GRANULOCYTES NFR BLD: 0 % (ref 0–0.5)
KETONES UR QL STRIP: NEGATIVE
LDLC SERPL CALC-MCNC: 93 MG/DL (ref 0–100)
LDLC/HDLC SERPL: 1.64 {RATIO}
LEUKOCYTE ESTERASE UR QL STRIP: NEGATIVE
LYMPHOCYTES # BLD AUTO: 1.52 10*3/MM3 (ref 0.9–4.8)
LYMPHOCYTES NFR BLD AUTO: 35.5 % (ref 19.6–45.3)
MCH RBC QN AUTO: 30.5 PG (ref 26.9–32)
MCHC RBC AUTO-ENTMCNC: 32.3 G/DL (ref 32.4–36.3)
MCV RBC AUTO: 94.6 FL (ref 80.5–98.2)
MICRO URNS: NORMAL
MICRO URNS: NORMAL
MONOCYTES # BLD AUTO: 0.52 10*3/MM3 (ref 0.2–1.2)
MONOCYTES NFR BLD AUTO: 12.1 % (ref 5–12)
MUCOUS THREADS URNS QL MICRO: PRESENT /HPF
NEUTROPHILS # BLD AUTO: 1.83 10*3/MM3 (ref 1.9–8.1)
NEUTROPHILS NFR BLD AUTO: 42.9 % (ref 42.7–76)
NITRITE UR QL STRIP: NEGATIVE
PH UR STRIP: 6.5 [PH] (ref 5–7.5)
PLATELET # BLD AUTO: 237 10*3/MM3 (ref 140–500)
POTASSIUM SERPL-SCNC: 4.1 MMOL/L (ref 3.5–5.2)
PROT SERPL-MCNC: 6.4 G/DL (ref 6–8.5)
PROT UR QL STRIP: NEGATIVE
RBC # BLD AUTO: 4.06 10*6/MM3 (ref 3.9–5.2)
RBC #/AREA URNS HPF: NORMAL /HPF
SODIUM SERPL-SCNC: 141 MMOL/L (ref 136–145)
SP GR UR: 1.01 (ref 1–1.03)
TRIGL SERPL-MCNC: 84 MG/DL (ref 0–150)
TSH SERPL DL<=0.005 MIU/L-ACNC: 1.34 MIU/ML (ref 0.27–4.2)
URINALYSIS REFLEX: NORMAL
UROBILINOGEN UR STRIP-MCNC: 0.2 MG/DL (ref 0.2–1)
VLDLC SERPL CALC-MCNC: 16.8 MG/DL (ref 5–40)
WBC # BLD AUTO: 4.28 10*3/MM3 (ref 4.5–10.7)
WBC #/AREA URNS HPF: NORMAL /HPF

## 2018-06-11 RX ORDER — SIMVASTATIN 20 MG
TABLET ORAL
Qty: 90 TABLET | Refills: 0 | Status: SHIPPED | OUTPATIENT
Start: 2018-06-11 | End: 2018-09-03 | Stop reason: SDUPTHER

## 2018-06-12 ENCOUNTER — OFFICE VISIT (OUTPATIENT)
Dept: INTERNAL MEDICINE | Facility: CLINIC | Age: 68
End: 2018-06-12

## 2018-06-12 VITALS
WEIGHT: 203 LBS | BODY MASS INDEX: 31.86 KG/M2 | DIASTOLIC BLOOD PRESSURE: 72 MMHG | OXYGEN SATURATION: 97 % | HEIGHT: 67 IN | SYSTOLIC BLOOD PRESSURE: 124 MMHG | RESPIRATION RATE: 18 BRPM | HEART RATE: 67 BPM

## 2018-06-12 DIAGNOSIS — Z00.00 HEALTHCARE MAINTENANCE: Primary | ICD-10-CM

## 2018-06-12 DIAGNOSIS — J30.2 CHRONIC SEASONAL ALLERGIC RHINITIS, UNSPECIFIED TRIGGER: ICD-10-CM

## 2018-06-12 DIAGNOSIS — E78.5 HYPERLIPIDEMIA, UNSPECIFIED HYPERLIPIDEMIA TYPE: ICD-10-CM

## 2018-06-12 DIAGNOSIS — M25.562 ACUTE PAIN OF LEFT KNEE: ICD-10-CM

## 2018-06-12 DIAGNOSIS — I48.0 PAROXYSMAL ATRIAL FIBRILLATION (HCC): ICD-10-CM

## 2018-06-12 PROCEDURE — 96160 PT-FOCUSED HLTH RISK ASSMT: CPT | Performed by: INTERNAL MEDICINE

## 2018-06-12 PROCEDURE — G0439 PPPS, SUBSEQ VISIT: HCPCS | Performed by: INTERNAL MEDICINE

## 2018-06-12 PROCEDURE — 99214 OFFICE O/P EST MOD 30 MIN: CPT | Performed by: INTERNAL MEDICINE

## 2018-06-12 RX ORDER — BENZONATATE 200 MG/1
200 CAPSULE ORAL 3 TIMES DAILY PRN
Qty: 30 CAPSULE | Refills: 2 | Status: SHIPPED | OUTPATIENT
Start: 2018-06-12 | End: 2019-01-22

## 2018-06-12 RX ORDER — CHOLECALCIFEROL (VITAMIN D3) 50 MCG
CAPSULE ORAL
Refills: 0 | COMMUNITY
Start: 2018-04-13 | End: 2020-03-17

## 2018-06-12 RX ORDER — CETIRIZINE HYDROCHLORIDE 10 MG/1
10 TABLET ORAL
COMMUNITY
Start: 2018-04-13 | End: 2018-06-12 | Stop reason: CLARIF

## 2018-06-12 RX ORDER — ALBUTEROL SULFATE 90 UG/1
2 AEROSOL, METERED RESPIRATORY (INHALATION) EVERY 6 HOURS PRN
Qty: 1 INHALER | Refills: 11 | Status: SHIPPED | OUTPATIENT
Start: 2018-06-12 | End: 2019-05-29

## 2018-06-12 RX ORDER — MONTELUKAST SODIUM 10 MG/1
10 TABLET ORAL NIGHTLY
Qty: 30 TABLET | Refills: 6 | Status: SHIPPED | OUTPATIENT
Start: 2018-06-12 | End: 2019-01-22

## 2018-06-12 NOTE — PROGRESS NOTES
"Subjective   AWV  Allergic rhinitis  Left knee pain  Cough  Depression  Hyperlipidemia      Nessa Osuna is a 68 y.o. female who presents for an annual wellness visit, review of chronic issues, and acute needs that are new. Patient has a complaint of left knee pain. Onset approx 2 weeks ago. First noted after working in her yard \"quite a bit\". Swollen and painful front and back of knee. Pain improved today compared to 2 weeks ago but some persistence of pain and swelling. She is taking tylenol only for pain relief. Patient has a persistent cough. She went to [facility] and was diagnosed with allergies. She was started on claritin and flonase. Some modest benefit from this.   Patient has a history of depression. She reports she just returned from vacationing at [place] and this was good for her mood. She feels that mood is stable on cymbalta daily.         Review of Systems   Constitutional: Negative.    HENT: Positive for postnasal drip and rhinorrhea.    Eyes: Negative.    Respiratory: Positive for cough.    Cardiovascular: Positive for palpitations.        Atrial fibrillation. Takes additional metoprolol prn w/ good benefit.    Gastrointestinal: Negative.    Endocrine: Negative.    Genitourinary: Positive for frequency.   Musculoskeletal: Positive for arthralgias.   Skin: Negative.    Allergic/Immunologic: Negative.    Neurological: Negative.    Hematological: Negative.    Psychiatric/Behavioral: Negative.        The following portions of the patient's history were reviewed and updated as appropriate: allergies, current medications, past family history, past medical history, past social history, past surgical history and problem list.     Patient Active Problem List   Diagnosis   • Hyperlipidemia   • Osteoarthritis   • Depression   • Osteopenia   • Sciatica   • Healthcare maintenance   • DDD (degenerative disc disease), lumbar   • Paroxysmal atrial fibrillation   • Vitamin D deficiency       Past Medical " History:   Diagnosis Date   • Breast nodule    • Chest wall mass    • Closed fracture of head of metacarpal     left second   • Closed fracture of metacarpal bone     left   • DDD (degenerative disc disease), lumbar    • Depression    • Difficulty breathing    • Fatigue    • GERD (gastroesophageal reflux disease)    • Hip pain, right    • Hyperlipidemia    • Leiomyoma of uterus    • Osteoarthritis    • Osteopenia    • Primary localized osteoarthritis of hip    • Sciatica    • Spinal stenosis        Past Surgical History:   Procedure Laterality Date   • MOHS SURGERY      chemosurgery (Mohs Micrographic Technique); Dr Jarrett and Dr Franco   • TRIANA'S NEUROMA EXCISION      single neuroma   • TONSILLECTOMY      age 5       Family History   Problem Relation Age of Onset   • Cancer Mother         bladder   • Osteoporosis Mother    • Lung cancer Father        Social History     Social History   • Marital status: Single     Spouse name: N/A   • Number of children: N/A   • Years of education: N/A     Occupational History   • Not on file.     Social History Main Topics   • Smoking status: Former Smoker     Quit date: 1997   • Smokeless tobacco: Never Used   • Alcohol use Yes      Comment: social drinker   • Drug use: No   • Sexual activity: Defer     Other Topics Concern   • Not on file     Social History Narrative   • No narrative on file       Current Outpatient Prescriptions on File Prior to Visit   Medication Sig Dispense Refill   • acetaminophen (TYLENOL) 500 MG tablet Take by mouth. Take 1 tablet every 4-6 hours as needed     • Cholecalciferol (VITAMIN D) 2000 UNITS capsule Take 1 capsule by mouth daily.     • DULoxetine (CYMBALTA) 60 MG capsule Take 1 capsule by mouth Daily. 90 capsule 2   • Flaxseed, Linseed, (FLAXSEED OIL) 1000 MG capsule Take 1 capsule by mouth 2 (two) times a day.     • fluticasone (FLONASE) 50 MCG/ACT nasal spray PLACE TWO SPRAYS IN EACH NOSTRIL ONCE DAILY 1 bottle 4   • gabapentin (NEURONTIN) 300  "MG capsule Take 1 capsule by mouth 3 (Three) Times a Day. 270 capsule 1   • metoprolol tartrate (LOPRESSOR) 25 MG tablet Take 0.5 tablets by mouth Every 12 (Twelve) Hours. 90 tablet 2   • metoprolol tartrate (LOPRESSOR) 25 MG tablet TAKE ONE-HALF TABLET BY MOUTH EVERY 12 HOURS 90 tablet 2   • simvastatin (ZOCOR) 20 MG tablet TAKE ONE TABLET BY MOUTH DAILY 90 tablet 0   • XARELTO 20 MG tablet TAKE ONE TABLET BY MOUTH DAILY WITH DINNER 90 tablet 1   • [DISCONTINUED] diphenhydrAMINE (BENADRYL) 25 MG tablet Take 25 mg by mouth At Night As Needed.       No current facility-administered medications on file prior to visit.        No Known Allergies    Immunization History   Administered Date(s) Administered   • Influenza, Quadrivalent 09/15/2015   • Pneumococcal Polysaccharide (PPSV23) 09/15/2015   • Td 01/05/2016   • Tdap 11/13/2007   • Zostavax 07/24/2011       Objective     /72   Pulse 67   Resp 18   Ht 170.2 cm (67\")   Wt 92.1 kg (203 lb)   SpO2 97%   BMI 31.79 kg/m²     Physical Exam   Constitutional: She is oriented to person, place, and time. She appears well-developed and well-nourished.   HENT:   Head: Normocephalic and atraumatic.   Right Ear: External ear normal.   Left Ear: External ear normal.   Eyes: Conjunctivae and EOM are normal. Pupils are equal, round, and reactive to light.   Neck: Normal range of motion. Neck supple.   Cardiovascular: Normal rate, regular rhythm, normal heart sounds and intact distal pulses.    Pulmonary/Chest: Effort normal and breath sounds normal.   Abdominal: Soft. Bowel sounds are normal.   Musculoskeletal:   Right knee w/ crepitus, pain, and swelling   Neurological: She is alert and oriented to person, place, and time.   Skin: Skin is warm and dry.   Psychiatric: She has a normal mood and affect. Her behavior is normal. Judgment and thought content normal.   Nursing note and vitals reviewed.      Assessment/Plan   Nessa was seen today for annual exam.    Diagnoses " and all orders for this visit:    Healthcare maintenance    Chronic seasonal allergic rhinitis, unspecified trigger    Paroxysmal atrial fibrillation    Hyperlipidemia, unspecified hyperlipidemia type    Acute pain of left knee    Other orders  -     montelukast (SINGULAIR) 10 MG tablet; Take 1 tablet by mouth Every Night.  -     albuterol (PROVENTIL HFA;VENTOLIN HFA) 108 (90 Base) MCG/ACT inhaler; Inhale 2 puffs Every 6 (Six) Hours As Needed for Wheezing.  -     benzonatate (TESSALON) 200 MG capsule; Take 1 capsule by mouth 3 (Three) Times a Day As Needed for Cough.        Discussion    AWV.     Direct observation of cognitive ability was evaluated and is normal. Patient was given health advice or handouts on the following:  nutrition, fall prevention, exercise, weight management. Patient is encouraged reduced calories and increased activity as knee allows. She is going to try weight watchers or nutrisystem. Her knee is improving w/ conservative measures and she is encouraged continued ice elevation etc. She may try biofreeze. She declines referral to sports med or physical therapy at this time. She has afib and is rate controlled and anticoagulated. She will use tessalon, albuterol prn cough. To start singulair qd. Continue flonase and claritin. Start nasal saline rinse. Call if worsens or no improvemetn. Continue med for lipid regulation. Labs reviewed w/ patient and at goal levels. Follow up 6 mo or prn.              Future Appointments  Date Time Provider Department Center   7/17/2018 12:30 PM Adamaris Blue MD [id number] None

## 2018-06-12 NOTE — PATIENT INSTRUCTIONS
Knee Pain, Adult  Knee pain in adults is common. It can be caused by many things, including:  · Arthritis.  · A fluid-filled sac (cyst) or growth in your knee.  · An infection in your knee.  · An injury that will not heal.  · Damage, swelling, or irritation of the tissues that support your knee.  Knee pain is usually not a sign of a serious problem. The pain may go away on its own with time and rest. If it does not, a health care provider may order tests to find the cause of the pain. These may include:  · Imaging tests, such as an X-ray, MRI, or ultrasound.  · Joint aspiration. In this test, fluid is removed from the knee.  · Arthroscopy. In this test, a lighted tube is inserted into knee and an image is projected onto a TV screen.  · A biopsy. In this test, a sample of tissue is removed from the body and studied under a microscope.  Follow these instructions at home:  Pay attention to any changes in your symptoms. Take these actions to relieve your pain.  Activity   · Rest your knee.  · Do not do things that cause pain or make pain worse.  · Avoid high-impact activities or exercises, such as running, jumping rope, or doing jumping jacks.  General instructions   · Take over-the-counter and prescription medicines only as told by your health care provider.  · Raise (elevate) your knee above the level of your heart when you are sitting or lying down.  · Sleep with a pillow under your knee.  · If directed, apply ice to the knee:  ¨ Put ice in a plastic bag.  ¨ Place a towel between your skin and the bag.  ¨ Leave the ice on for 20 minutes, 2-3 times a day.  · Ask your health care provider if you should wear an elastic knee support.  · Lose weight if you are overweight. Extra weight can put pressure on your knee.  · Do not use any products that contain nicotine or tobacco, such as cigarettes and e-cigarettes. Smoking may slow the healing of any bone and joint problems that you may have. If you need help quitting, ask  your health care provider.  Contact a health care provider if:  · Your knee pain continues, changes, or gets worse.  · You have a fever along with knee pain.  · Your knee dixie or locks up.  · Your knee swells, and the swelling becomes worse.  Get help right away if:  · Your knee feels warm to the touch.  · You cannot move your knee.  · You have severe pain in your knee.  · You have chest pain.  · You have trouble breathing.  Summary  · Knee pain in adults is common. It can be caused by many things, including, arthritis, infection, cysts, or injury.  · Knee pain is usually not a sign of a serious problem, but if it does not go away, a health care provider may perform tests to know the cause of the pain.  · Pay attention to any changes in your symptoms. Relieve your pain with rest, medicines, light activity, and use of ice.  · Get help if your pain continues or becomes very severe, or if your knee dixie or locks up, or if you have chest pain or trouble breathing.  This information is not intended to replace advice given to you by your health care provider. Make sure you discuss any questions you have with your health care provider.  Document Released: 10/14/2008 Document Revised: 12/08/2017 Document Reviewed: 12/08/2017  [employer] Interactive Patient Education © 2017 [employer]

## 2018-08-14 ENCOUNTER — OFFICE VISIT (OUTPATIENT)
Dept: CARDIOLOGY | Facility: CLINIC | Age: 68
End: 2018-08-14

## 2018-08-14 VITALS
WEIGHT: 206 LBS | HEART RATE: 61 BPM | SYSTOLIC BLOOD PRESSURE: 134 MMHG | BODY MASS INDEX: 32.33 KG/M2 | HEIGHT: 67 IN | DIASTOLIC BLOOD PRESSURE: 82 MMHG

## 2018-08-14 DIAGNOSIS — I48.0 PAROXYSMAL ATRIAL FIBRILLATION (HCC): Primary | ICD-10-CM

## 2018-08-14 DIAGNOSIS — E78.5 HYPERLIPIDEMIA, UNSPECIFIED HYPERLIPIDEMIA TYPE: ICD-10-CM

## 2018-08-14 PROCEDURE — 99213 OFFICE O/P EST LOW 20 MIN: CPT | Performed by: INTERNAL MEDICINE

## 2018-08-14 PROCEDURE — 93000 ELECTROCARDIOGRAM COMPLETE: CPT | Performed by: INTERNAL MEDICINE

## 2018-08-14 NOTE — PROGRESS NOTES
Subjective:     Encounter Date:08/14/2018      Patient ID: Nessa Osuna is a 68 y.o. female.    Chief Complaint:  History of Present Illness    This is a 68-year-old with a history of hyperlipidemia, paroxysmal atrial fibrillation, who presents for follow-up.     I began following the patient when she was admitted to the hospital on 5/2017 with atrial fibrillation with rapid ventricular rate.  Patient reported at that time that she been having episodes lasting up to 2-3 hours over the last few years.  Episodes occurred about 8-9 times a year.  She underwent echocardiogram during that admission that showed normal left ventricular systolic function and wall motion with an ejection fraction of 57%, moderately to severely dilated left atrium, and no significant valvular disease.  I started on metoprolol tartrate 12.5 mg twice a day.  She is also started on rivaroxaban for her CHADS-VASc score of 2. In follow-up she has done well with no prolonged episodes.     I saw her last in follow-up in 1/2018 she reported continued intermittent episodes of atrial fibrillation.  The majority of them lasted only a few minutes but somewhat last half-hour or more.  I recommended that she take an additional dose of metoprolol tartrate 12.5 mg as needed for prolonged episodes.    Today she presents for routine follow-up.  She was that she continues to have episodes of atrial fibrillation usually 5-6 times a week.  She reports that about every 2 weeks she has an episode that lasted 30 minutes or more at which point she'll take an additional metoprolol tartrate.  With this additional medication she reports that the atrial fibrillation usually resolves.  She continues to have chronic dyspnea on exertion that is unchanged.  Chronic lower extremity edema is also stable.  She denies any chest pain, PND or orthopnea, near-syncope or syncope or bleeding issues.  She has been having more issues with her knees.  For this reason she has  started exercising by swimming in the pool.    Review of Systems   Constitution: Positive for malaise/fatigue. Negative for weakness.   HENT: Negative for hearing loss, hoarse voice, nosebleeds and sore throat.    Eyes: Negative for pain.   Cardiovascular: Positive for dyspnea on exertion, leg swelling and palpitations. Negative for chest pain, claudication, cyanosis, irregular heartbeat, near-syncope, orthopnea, paroxysmal nocturnal dyspnea and syncope.   Respiratory: Negative for shortness of breath and snoring.    Endocrine: Negative for cold intolerance, heat intolerance, polydipsia, polyphagia and polyuria.   Skin: Negative for itching and rash.   Musculoskeletal: Negative for arthritis, falls, joint pain, joint swelling, muscle cramps, muscle weakness and myalgias.   Gastrointestinal: Negative for constipation, diarrhea, dysphagia, heartburn, hematemesis, hematochezia, melena, nausea and vomiting.   Genitourinary: Negative for frequency, hematuria and hesitancy.   Neurological: Negative for excessive daytime sleepiness, dizziness, headaches, light-headedness and numbness.   Psychiatric/Behavioral: Negative for depression. The patient is not nervous/anxious.           Current Outpatient Prescriptions:   •  acetaminophen (TYLENOL) 500 MG tablet, Take by mouth. Take 1 tablet every 4-6 hours as needed, Disp: , Rfl:   •  Cholecalciferol (VITAMIN D) 2000 UNITS capsule, Take 1 capsule by mouth daily., Disp: , Rfl:   •  [employer] ALLERGY RELIEF 10 MG tablet, TAKE 1 TABLET BY MOUTH DAILY, Disp: , Rfl: 0  •  DULoxetine (CYMBALTA) 60 MG capsule, Take 1 capsule by mouth Daily., Disp: 90 capsule, Rfl: 2  •  Flaxseed, Linseed, (FLAXSEED OIL) 1000 MG capsule, Take 1 capsule by mouth 2 (two) times a day., Disp: , Rfl:   •  fluticasone (FLONASE) 50 MCG/ACT nasal spray, PLACE TWO SPRAYS IN EACH NOSTRIL ONCE DAILY, Disp: 1 bottle, Rfl: 4  •  gabapentin (NEURONTIN) 300 MG capsule, Take 1 capsule by mouth 3 (Three) Times a Day., Disp:  270 capsule, Rfl: 1  •  metoprolol tartrate (LOPRESSOR) 25 MG tablet, Take 0.5 tablets by mouth Every 12 (Twelve) Hours., Disp: 90 tablet, Rfl: 2  •  simvastatin (ZOCOR) 20 MG tablet, TAKE ONE TABLET BY MOUTH DAILY, Disp: 90 tablet, Rfl: 0  •  XARELTO 20 MG tablet, TAKE ONE TABLET BY MOUTH DAILY WITH DINNER, Disp: 90 tablet, Rfl: 1  •  albuterol (PROVENTIL HFA;VENTOLIN HFA) 108 (90 Base) MCG/ACT inhaler, Inhale 2 puffs Every 6 (Six) Hours As Needed for Wheezing., Disp: 1 inhaler, Rfl: 11  •  benzonatate (TESSALON) 200 MG capsule, Take 1 capsule by mouth 3 (Three) Times a Day As Needed for Cough., Disp: 30 capsule, Rfl: 2  •  montelukast (SINGULAIR) 10 MG tablet, Take 1 tablet by mouth Every Night., Disp: 30 tablet, Rfl: 6    Past Medical History:   Diagnosis Date   • Breast nodule    • Chest wall mass    • Closed fracture of head of metacarpal     left second   • Closed fracture of metacarpal bone     left   • DDD (degenerative disc disease), lumbar    • Depression    • Difficulty breathing    • Fatigue    • GERD (gastroesophageal reflux disease)    • Hip pain, right    • Hyperlipidemia    • Leiomyoma of uterus    • Osteoarthritis    • Osteopenia    • Primary localized osteoarthritis of hip    • Sciatica    • Spinal stenosis      Past Surgical History:   Procedure Laterality Date   • MOHS SURGERY      chemosurgery (Mohs Micrographic Technique); Dr Jarrett and Dr Franco   • TRIANA'S NEUROMA EXCISION      single neuroma   • TONSILLECTOMY      age 5     Family History   Problem Relation Age of Onset   • Cancer Mother         bladder   • Osteoporosis Mother    • Lung cancer Father      Social History   Substance Use Topics   • Smoking status: Former Smoker     Quit date: 1997   • Smokeless tobacco: Never Used   • Alcohol use Yes      Comment: social drinker           ECG 12 Lead  Date/Time: 8/14/2018 4:40 PM  Performed by: LANA BENNETT  Authorized by: LANA BENNETT   Comparison: compared with previous ECG   Similar  "to previous ECG  Rhythm: sinus rhythm               Objective:         Visit Vitals  /82 (BP Location: Right arm, Patient Position: Sitting)   Pulse 61   Ht 170.2 cm (67\")   Wt 93.4 kg (206 lb)   BMI 32.26 kg/m²          Physical Exam   Constitutional: She is oriented to person, place, and time. She appears well-developed and well-nourished.   HENT:   Head: Normocephalic and atraumatic.   Eyes: Pupils are equal, round, and reactive to light. Conjunctivae, EOM and lids are normal.   Neck: Normal range of motion and full passive range of motion without pain. Neck supple. No JVD present. Carotid bruit is not present.   Cardiovascular: Normal rate, regular rhythm, S1 normal and S2 normal.  Exam reveals no gallop.    No murmur heard.  Pulses:       Radial pulses are 2+ on the right side, and 2+ on the left side.   No bilateral lower extremity edema   Pulmonary/Chest: Effort normal and breath sounds normal.   Abdominal: Soft. Normal appearance.   Lymphadenopathy:     She has no cervical adenopathy.   Neurological: She is alert and oriented to person, place, and time.   Skin: Skin is warm, dry and intact.   Psychiatric: She has a normal mood and affect.       Lab Review:       Assessment:          Diagnosis Plan   1. Paroxysmal atrial fibrillation (CMS/HCC)     2. Hyperlipidemia, unspecified hyperlipidemia type            Plan:       1.  Paroxysmal atrial fibrillation.  Remains in sinus rhythm today.  Continue daily metoprolol tartrate with an additional dosage with prolonged episodes of atrial fibrillation.  On anticoagulation with rivaroxaban.  I encouraged the patient to continue with her exercise.  I explained that lifestyle changes including weight loss and exercise can help reduce the frequency of her atrial fibrillation.  2.  Hyperlipidemia.  On simvastatin which is managed by Dr. Guerra.    Atrial Fibrillation and Atrial Flutter  Assessment  • The patient has paroxysmal atrial fibrillation  • This is " non-valvular in etiology  • The patient's CHADS2-VASc score is 2  • A [id number] score of 2 or more is considered a high risk for a thromboembolic event  • Rivaroxaban prescribed    Plan  • Attempt to maintain sinus rhythm  • Continue rivaroxaban for antithrombotic therapy, bleeding issues discussed  • Continue beta blocker for rhythm control  • Continue beta blocker for rate control

## 2018-08-17 RX ORDER — GABAPENTIN 300 MG/1
CAPSULE ORAL
Qty: 270 CAPSULE | Refills: 0 | Status: SHIPPED | OUTPATIENT
Start: 2018-08-17 | End: 2018-12-30 | Stop reason: SDUPTHER

## 2018-09-04 RX ORDER — SIMVASTATIN 20 MG
TABLET ORAL
Qty: 90 TABLET | Refills: 0 | Status: SHIPPED | OUTPATIENT
Start: 2018-09-04 | End: 2018-12-06 | Stop reason: SDUPTHER

## 2018-09-17 DIAGNOSIS — I48.0 PAROXYSMAL ATRIAL FIBRILLATION (HCC): ICD-10-CM

## 2018-09-18 RX ORDER — RIVAROXABAN 20 MG/1
TABLET, FILM COATED ORAL
Qty: 90 TABLET | Refills: 3 | Status: SHIPPED | OUTPATIENT
Start: 2018-09-18 | End: 2019-09-11 | Stop reason: SDUPTHER

## 2018-12-07 RX ORDER — SIMVASTATIN 20 MG
TABLET ORAL
Qty: 90 TABLET | Refills: 0 | Status: SHIPPED | OUTPATIENT
Start: 2018-12-07 | End: 2019-03-05 | Stop reason: SDUPTHER

## 2018-12-31 RX ORDER — GABAPENTIN 300 MG/1
CAPSULE ORAL
Qty: 270 CAPSULE | Refills: 0 | Status: SHIPPED | OUTPATIENT
Start: 2018-12-31 | End: 2019-05-06 | Stop reason: SDUPTHER

## 2019-01-22 ENCOUNTER — OFFICE VISIT (OUTPATIENT)
Dept: INTERNAL MEDICINE | Facility: CLINIC | Age: 69
End: 2019-01-22

## 2019-01-22 VITALS
HEART RATE: 64 BPM | BODY MASS INDEX: 31.95 KG/M2 | DIASTOLIC BLOOD PRESSURE: 70 MMHG | SYSTOLIC BLOOD PRESSURE: 134 MMHG | WEIGHT: 204 LBS

## 2019-01-22 DIAGNOSIS — M51.36 DDD (DEGENERATIVE DISC DISEASE), LUMBAR: ICD-10-CM

## 2019-01-22 DIAGNOSIS — M19.072 OSTEOARTHRITIS OF LEFT FOOT, UNSPECIFIED OSTEOARTHRITIS TYPE: ICD-10-CM

## 2019-01-22 DIAGNOSIS — M54.16 LUMBAR RADICULOPATHY: Primary | ICD-10-CM

## 2019-01-22 PROCEDURE — 99214 OFFICE O/P EST MOD 30 MIN: CPT | Performed by: INTERNAL MEDICINE

## 2019-01-22 NOTE — PROGRESS NOTES
Chief Complaint   Patient presents with   • Hyperlipidemia   back hip and leg pain  Difficulty with walking      History of Present Illness   Nessa Osuna is a 68 y.o. female presents for routine follow up evaluation. Patient is doing fairly well today. She does report some difficulty with walking. She is having left hip pain down into her left leg. She feels a lack of strength when she wants to go from sitting to standing. pateint has known spinal stenosis as well as OA of the hip. She has been to orthopedist for this in the past. She has been to physical therapy without benefit. She is taking gabapentin 2 times daily.         The following portions of the patient's history were reviewed and updated as appropriate: allergies, current medications, past family history, past medical history, past social history, past surgical history and problem list.  Current Outpatient Medications on File Prior to Visit   Medication Sig Dispense Refill   • acetaminophen (TYLENOL) 500 MG tablet Take by mouth. Take 1 tablet every 4-6 hours as needed     • albuterol (PROVENTIL HFA;VENTOLIN HFA) 108 (90 Base) MCG/ACT inhaler Inhale 2 puffs Every 6 (Six) Hours As Needed for Wheezing. 1 inhaler 11   • Cholecalciferol (VITAMIN D) 2000 UNITS capsule Take 1 capsule by mouth daily.     • [employer] ALLERGY RELIEF 10 MG tablet TAKE 1 TABLET BY MOUTH DAILY  0   • DULoxetine (CYMBALTA) 60 MG capsule Take 1 capsule by mouth Daily. 90 capsule 2   • Flaxseed, Linseed, (FLAXSEED OIL) 1000 MG capsule Take 1 capsule by mouth 2 (two) times a day.     • fluticasone (FLONASE) 50 MCG/ACT nasal spray PLACE TWO SPRAYS IN EACH NOSTRIL ONCE DAILY 1 bottle 4   • gabapentin (NEURONTIN) 300 MG capsule TAKE ONE CAPSULE BY MOUTH THREE TIMES A  capsule 0   • metoprolol tartrate (LOPRESSOR) 25 MG tablet Take 0.5 tablets by mouth Every 12 (Twelve) Hours. 90 tablet 2   • simvastatin (ZOCOR) 20 MG tablet TAKE ONE TABLET BY MOUTH DAILY 90 tablet 0   • XARELTO 20  MG tablet TAKE ONE TABLET BY MOUTH DAILY WITH DINNER 90 tablet 3   • [DISCONTINUED] benzonatate (TESSALON) 200 MG capsule Take 1 capsule by mouth 3 (Three) Times a Day As Needed for Cough. 30 capsule 2   • [DISCONTINUED] montelukast (SINGULAIR) 10 MG tablet Take 1 tablet by mouth Every Night. 30 tablet 6     No current facility-administered medications on file prior to visit.      Review of Systems   Constitutional: Negative.    HENT: Negative.    Eyes: Negative.    Respiratory: Negative.    Cardiovascular: Negative.    Gastrointestinal: Negative.    Endocrine: Negative.    Genitourinary: Negative.    Musculoskeletal: Positive for back pain and gait problem.   Skin: Negative.    Allergic/Immunologic: Negative.    Hematological: Negative.    Psychiatric/Behavioral: Negative.        Objective   Physical Exam   Constitutional: She is oriented to person, place, and time. She appears well-developed and well-nourished.   HENT:   Head: Normocephalic and atraumatic.   Right Ear: External ear normal.   Left Ear: External ear normal.   Nose: Nose normal.   Mouth/Throat: Oropharynx is clear and moist.   Eyes: Conjunctivae and EOM are normal. Pupils are equal, round, and reactive to light.   Neck: Normal range of motion. Neck supple.   Cardiovascular: Normal rate, regular rhythm, normal heart sounds and intact distal pulses.   Pulmonary/Chest: Effort normal and breath sounds normal.   Abdominal: Soft. Bowel sounds are normal.   Musculoskeletal:   Left low back pain radiating into the left leg   Neurological: She is alert and oriented to person, place, and time.   Skin: Skin is warm and dry.   Psychiatric: She has a normal mood and affect. Her behavior is normal. Judgment and thought content normal.   Nursing note and vitals reviewed.       /70   Pulse 64   Wt 92.5 kg (204 lb)   BMI 31.95 kg/m²     Assessment/Plan   Diagnoses and all orders for this visit:    Lumbar radiculopathy  -     MRI Lumbar Spine Without  Contrast; Future    DDD (degenerative disc disease), lumbar  -     MRI Lumbar Spine Without Contrast; Future    Osteoarthritis of left foot, unspecified osteoarthritis type        Patient w/ acute on chronic low back pain. She will increase gabapentin to tid. Will get MRI lspine. Then consider injection therapy or other. Declines physical therapy. She will follow up routinely.

## 2019-02-05 ENCOUNTER — HOSPITAL ENCOUNTER (OUTPATIENT)
Dept: MRI IMAGING | Facility: HOSPITAL | Age: 69
Discharge: HOME | End: 2019-02-05
Admitting: INTERNAL MEDICINE

## 2019-02-05 DIAGNOSIS — M54.16 LUMBAR RADICULOPATHY: ICD-10-CM

## 2019-02-05 DIAGNOSIS — M51.36 DDD (DEGENERATIVE DISC DISEASE), LUMBAR: ICD-10-CM

## 2019-02-05 PROCEDURE — 72148 MRI LUMBAR SPINE W/O DYE: CPT

## 2019-02-08 DIAGNOSIS — M48.061 SPINAL STENOSIS OF LUMBAR REGION WITHOUT NEUROGENIC CLAUDICATION: Primary | ICD-10-CM

## 2019-02-21 ENCOUNTER — TELEPHONE (OUTPATIENT)
Dept: NEUROSURGERY | Facility: CLINIC | Age: 69
End: 2019-02-21

## 2019-03-04 ENCOUNTER — OFFICE VISIT (OUTPATIENT)
Dept: NEUROSURGERY | Facility: CLINIC | Age: 69
End: 2019-03-04

## 2019-03-04 VITALS
HEART RATE: 78 BPM | SYSTOLIC BLOOD PRESSURE: 122 MMHG | DIASTOLIC BLOOD PRESSURE: 74 MMHG | WEIGHT: 200 LBS | HEIGHT: 67 IN | BODY MASS INDEX: 31.39 KG/M2

## 2019-03-04 DIAGNOSIS — M48.062 SPINAL STENOSIS OF LUMBAR REGION WITH NEUROGENIC CLAUDICATION: Primary | ICD-10-CM

## 2019-03-04 DIAGNOSIS — M43.16 SPONDYLOLISTHESIS AT L4-L5 LEVEL: ICD-10-CM

## 2019-03-04 PROCEDURE — 99204 OFFICE O/P NEW MOD 45 MIN: CPT | Performed by: NEUROLOGICAL SURGERY

## 2019-03-05 ENCOUNTER — TELEPHONE (OUTPATIENT)
Dept: INTERNAL MEDICINE | Facility: CLINIC | Age: 69
End: 2019-03-05

## 2019-03-05 DIAGNOSIS — R25.2 LEG CRAMPS: Primary | ICD-10-CM

## 2019-03-05 NOTE — TELEPHONE ENCOUNTER
Pt would like to start magnesium 400 mg for leg cramps  She is asking if it is ok to take with her other meds

## 2019-03-06 RX ORDER — SIMVASTATIN 20 MG
TABLET ORAL
Qty: 90 TABLET | Refills: 0 | Status: SHIPPED | OUTPATIENT
Start: 2019-03-06 | End: 2019-06-09 | Stop reason: SDUPTHER

## 2019-05-06 DIAGNOSIS — I48.0 PAROXYSMAL ATRIAL FIBRILLATION (HCC): ICD-10-CM

## 2019-05-07 RX ORDER — GABAPENTIN 300 MG/1
CAPSULE ORAL
Qty: 270 CAPSULE | Refills: 0 | Status: SHIPPED | OUTPATIENT
Start: 2019-05-07 | End: 2019-12-15 | Stop reason: SDUPTHER

## 2019-05-17 NOTE — PROGRESS NOTES
Subjective   Patient ID: Nessa Osuna is a 69 y.o. female is here today for follow-up.  She is taking Gabapentin 300 mg TID as instructed.  She has not had any GABY since her last visit here.      Patient was last seen 3.4.19 for low back, hip and leg pain and weakness.    Patient is currently having constant mild to moderate low back and bilateral hip pain, no leg pain.  Patient is now having intermittent right foot pain.  No weakness.  She does have intermittent N/T bilateral feet.    The patient is with her sister and daughter.  She has severe spinal stenosis at L4-L5 with a degenerative spondylolisthesis.  Last time she was getting a little bit better.  She remains on the gabapentin at 300 mg t.i.d., but she has been having a bit more pain into her back and into her buttocks.  She has some discomfort in the dorsum of her right foot, but it is tender to palpation and I do not think that it is related to the spine.  She has had a history of a Becerra's neuroma and has a podiatrist, so I told her to speak to her podiatrist about that.  But she does have trouble walking and standing, even though she does take the dog for a walk.  She thinks she is ready for lumbar epidural blocks and we will go ahead and give that a try and have her come back in 2 months to see how she is doing.                   Back Pain   The problem occurs constantly. The problem is unchanged. The pain is present in the lumbar spine. The pain does not radiate. The pain is at a severity of 5/10. The pain is moderate. Associated symptoms include numbness and tingling. Pertinent negatives include no weakness.       The following portions of the patient's history were reviewed and updated as appropriate: allergies, current medications, past family history, past medical history, past social history, past surgical history and problem list.    Review of Systems   Musculoskeletal: Positive for arthralgias and back pain.   Neurological: Positive for  tingling and numbness. Negative for weakness.   All other systems reviewed and are negative.      Objective   Physical Exam   Constitutional: She is oriented to person, place, and time. She appears well-developed and well-nourished.   HENT:   Head: Normocephalic and atraumatic.   Eyes: Conjunctivae and EOM are normal. Pupils are equal, round, and reactive to light.   Fundoscopic exam:       The right eye shows no papilledema. The right eye shows venous pulsations.        The left eye shows no papilledema. The left eye shows venous pulsations.   Neck: Carotid bruit is not present.   Neurological: She is oriented to person, place, and time. She has a normal Finger-Nose-Finger Test and a normal Heel to Shin Test. Gait normal.   Reflex Scores:       Tricep reflexes are 2+ on the right side and 2+ on the left side.       Bicep reflexes are 2+ on the right side and 2+ on the left side.       Brachioradialis reflexes are 2+ on the right side and 2+ on the left side.       Patellar reflexes are 2+ on the right side and 2+ on the left side.       Achilles reflexes are 2+ on the right side and 2+ on the left side.  Psychiatric: Her speech is normal.     Neurologic Exam     Mental Status   Oriented to person, place, and time.   Registration of memory: Good recent and remote memory.   Attention: normal. Concentration: normal.   Speech: speech is normal   Level of consciousness: alert  Knowledge: consistent with education.     Cranial Nerves     CN II   Visual fields full to confrontation.   Visual acuity: normal    CN III, IV, VI   Pupils are equal, round, and reactive to light.  Extraocular motions are normal.     CN V   Facial sensation intact.   Right corneal reflex: normal  Left corneal reflex: normal    CN VII   Facial expression full, symmetric.   Right facial weakness: none  Left facial weakness: none    CN VIII   Hearing: intact    CN IX, X   Palate: symmetric    CN XI   Right sternocleidomastoid strength: normal  Left  sternocleidomastoid strength: normal    CN XII   Tongue: not atrophic  Tongue deviation: none    Motor Exam   Muscle bulk: normal  Right arm tone: normal  Left arm tone: normal  Right leg tone: normal  Left leg tone: normal    Strength   Strength 5/5 except as noted.     Sensory Exam   Light touch normal.     Gait, Coordination, and Reflexes     Gait  Gait: normal    Coordination   Finger to nose coordination: normal  Heel to shin coordination: normal    Reflexes   Right brachioradialis: 2+  Left brachioradialis: 2+  Right biceps: 2+  Left biceps: 2+  Right triceps: 2+  Left triceps: 2+  Right patellar: 2+  Left patellar: 2+  Right achilles: 2+  Left achilles: 2+  Right : 2+  Left : 2+      Assessment/Plan   Independent Review of Radiographic Studies:    I reviewed her lumbar MRI done in February 2019 which did show severe spinal stenosis at L4-L5 with a 7 mm anterolisthesis.  Agree with the report.      Medical Decision Making:    We will go ahead and set up the epidural blocks and have her come back in 2 months.  Hopefully this will help her symptoms and hopefully we can avoid surgery.      Nessa was seen today for back pain, hip pain, foot pain and numbness.    Diagnoses and all orders for this visit:    Spinal stenosis of lumbar region with neurogenic claudication  -     Epidural Block    Spondylolisthesis at L4-L5 level  -     Epidural Block      Return in about 2 months (around 7/29/2019).

## 2019-05-29 ENCOUNTER — OFFICE VISIT (OUTPATIENT)
Dept: NEUROSURGERY | Facility: CLINIC | Age: 69
End: 2019-05-29

## 2019-05-29 VITALS
HEART RATE: 72 BPM | DIASTOLIC BLOOD PRESSURE: 78 MMHG | HEIGHT: 67 IN | SYSTOLIC BLOOD PRESSURE: 120 MMHG | BODY MASS INDEX: 31.39 KG/M2 | WEIGHT: 200 LBS

## 2019-05-29 DIAGNOSIS — M43.16 SPONDYLOLISTHESIS AT L4-L5 LEVEL: ICD-10-CM

## 2019-05-29 DIAGNOSIS — M48.062 SPINAL STENOSIS OF LUMBAR REGION WITH NEUROGENIC CLAUDICATION: Primary | ICD-10-CM

## 2019-05-29 PROCEDURE — 99214 OFFICE O/P EST MOD 30 MIN: CPT | Performed by: NEUROLOGICAL SURGERY

## 2019-06-10 DIAGNOSIS — I10 ESSENTIAL HYPERTENSION: ICD-10-CM

## 2019-06-10 DIAGNOSIS — Z00.00 HEALTHCARE MAINTENANCE: Primary | ICD-10-CM

## 2019-06-10 DIAGNOSIS — E78.5 HYPERLIPIDEMIA, UNSPECIFIED HYPERLIPIDEMIA TYPE: ICD-10-CM

## 2019-06-10 RX ORDER — SIMVASTATIN 20 MG
TABLET ORAL
Qty: 90 TABLET | Refills: 0 | Status: SHIPPED | OUTPATIENT
Start: 2019-06-10 | End: 2019-09-11 | Stop reason: SDUPTHER

## 2019-06-14 LAB
ALBUMIN SERPL-MCNC: 4.3 G/DL (ref 3.5–5.2)
ALBUMIN/GLOB SERPL: 2 G/DL
ALP SERPL-CCNC: 65 U/L (ref 39–117)
ALT SERPL-CCNC: 13 U/L (ref 1–33)
APPEARANCE UR: CLEAR
AST SERPL-CCNC: 17 U/L (ref 1–32)
BACTERIA #/AREA URNS HPF: NORMAL /HPF
BASOPHILS # BLD AUTO: 0.04 10*3/MM3 (ref 0–0.2)
BASOPHILS NFR BLD AUTO: 1.1 % (ref 0–1.5)
BILIRUB SERPL-MCNC: 1 MG/DL (ref 0.2–1.2)
BILIRUB UR QL STRIP: NEGATIVE
BUN SERPL-MCNC: 15 MG/DL (ref 8–23)
BUN/CREAT SERPL: 21.7 (ref 7–25)
CALCIUM SERPL-MCNC: 9.1 MG/DL (ref 8.6–10.5)
CHLORIDE SERPL-SCNC: 105 MMOL/L (ref 98–107)
CHOLEST SERPL-MCNC: 156 MG/DL (ref 0–200)
CO2 SERPL-SCNC: 28.2 MMOL/L (ref 22–29)
COLOR UR: YELLOW
CREAT SERPL-MCNC: 0.69 MG/DL (ref 0.57–1)
EOSINOPHIL # BLD AUTO: 0.26 10*3/MM3 (ref 0–0.4)
EOSINOPHIL NFR BLD AUTO: 6.9 % (ref 0.3–6.2)
EPI CELLS #/AREA URNS HPF: NORMAL /HPF
ERYTHROCYTE [DISTWIDTH] IN BLOOD BY AUTOMATED COUNT: 12.7 % (ref 12.3–15.4)
GLOBULIN SER CALC-MCNC: 2.1 GM/DL
GLUCOSE SERPL-MCNC: 90 MG/DL (ref 65–99)
GLUCOSE UR QL: NEGATIVE
HCT VFR BLD AUTO: 40 % (ref 34–46.6)
HDLC SERPL-MCNC: 59 MG/DL (ref 40–60)
HGB BLD-MCNC: 13 G/DL (ref 12–15.9)
HGB UR QL STRIP: NEGATIVE
IMM GRANULOCYTES # BLD AUTO: 0.01 10*3/MM3 (ref 0–0.05)
IMM GRANULOCYTES NFR BLD AUTO: 0.3 % (ref 0–0.5)
KETONES UR QL STRIP: NEGATIVE
LDLC SERPL CALC-MCNC: 85 MG/DL (ref 0–100)
LEUKOCYTE ESTERASE UR QL STRIP: NEGATIVE
LYMPHOCYTES # BLD AUTO: 1.29 10*3/MM3 (ref 0.7–3.1)
LYMPHOCYTES NFR BLD AUTO: 34.3 % (ref 19.6–45.3)
MAGNESIUM SERPL-MCNC: 2.3 MG/DL (ref 1.6–2.4)
MCH RBC QN AUTO: 31.3 PG (ref 26.6–33)
MCHC RBC AUTO-ENTMCNC: 32.5 G/DL (ref 31.5–35.7)
MCV RBC AUTO: 96.4 FL (ref 79–97)
MICRO URNS: ABNORMAL
MICRO URNS: ABNORMAL
MONOCYTES # BLD AUTO: 0.43 10*3/MM3 (ref 0.1–0.9)
MONOCYTES NFR BLD AUTO: 11.4 % (ref 5–12)
MUCOUS THREADS URNS QL MICRO: PRESENT /HPF
NEUTROPHILS # BLD AUTO: 1.73 10*3/MM3 (ref 1.7–7)
NEUTROPHILS NFR BLD AUTO: 46 % (ref 42.7–76)
NITRITE UR QL STRIP: NEGATIVE
NRBC BLD AUTO-RTO: 0 /100 WBC (ref 0–0.2)
PH UR STRIP: 8 [PH] (ref 5–7.5)
PLATELET # BLD AUTO: 211 10*3/MM3 (ref 140–450)
POTASSIUM SERPL-SCNC: 4 MMOL/L (ref 3.5–5.2)
PROT SERPL-MCNC: 6.4 G/DL (ref 6–8.5)
PROT UR QL STRIP: NEGATIVE
RBC # BLD AUTO: 4.15 10*6/MM3 (ref 3.77–5.28)
RBC #/AREA URNS HPF: NORMAL /HPF
SODIUM SERPL-SCNC: 142 MMOL/L (ref 136–145)
SP GR UR: 1.02 (ref 1–1.03)
TRIGL SERPL-MCNC: 62 MG/DL (ref 0–150)
URINALYSIS REFLEX: ABNORMAL
UROBILINOGEN UR STRIP-MCNC: 0.2 MG/DL (ref 0.2–1)
VLDLC SERPL CALC-MCNC: 12.4 MG/DL
WBC # BLD AUTO: 3.76 10*3/MM3 (ref 3.4–10.8)
WBC #/AREA URNS HPF: NORMAL /HPF

## 2019-06-18 ENCOUNTER — OFFICE VISIT (OUTPATIENT)
Dept: INTERNAL MEDICINE | Facility: CLINIC | Age: 69
End: 2019-06-18

## 2019-06-18 VITALS
OXYGEN SATURATION: 98 % | HEIGHT: 67 IN | DIASTOLIC BLOOD PRESSURE: 68 MMHG | BODY MASS INDEX: 32.33 KG/M2 | SYSTOLIC BLOOD PRESSURE: 128 MMHG | WEIGHT: 206 LBS | HEART RATE: 58 BPM

## 2019-06-18 DIAGNOSIS — Z12.31 ENCOUNTER FOR SCREENING MAMMOGRAM FOR MALIGNANT NEOPLASM OF BREAST: Primary | ICD-10-CM

## 2019-06-18 DIAGNOSIS — Z00.00 HEALTHCARE MAINTENANCE: ICD-10-CM

## 2019-06-18 DIAGNOSIS — M48.062 SPINAL STENOSIS OF LUMBAR REGION WITH NEUROGENIC CLAUDICATION: ICD-10-CM

## 2019-06-18 DIAGNOSIS — I48.0 PAROXYSMAL ATRIAL FIBRILLATION (HCC): ICD-10-CM

## 2019-06-18 DIAGNOSIS — M19.072 OSTEOARTHRITIS OF LEFT FOOT, UNSPECIFIED OSTEOARTHRITIS TYPE: ICD-10-CM

## 2019-06-18 DIAGNOSIS — E78.5 HYPERLIPIDEMIA, UNSPECIFIED HYPERLIPIDEMIA TYPE: ICD-10-CM

## 2019-06-18 PROCEDURE — 90670 PCV13 VACCINE IM: CPT | Performed by: INTERNAL MEDICINE

## 2019-06-18 PROCEDURE — G0009 ADMIN PNEUMOCOCCAL VACCINE: HCPCS | Performed by: INTERNAL MEDICINE

## 2019-06-18 PROCEDURE — G0439 PPPS, SUBSEQ VISIT: HCPCS | Performed by: INTERNAL MEDICINE

## 2019-06-18 PROCEDURE — 99213 OFFICE O/P EST LOW 20 MIN: CPT | Performed by: INTERNAL MEDICINE

## 2019-06-18 RX ORDER — MONTELUKAST SODIUM 10 MG/1
10 TABLET ORAL NIGHTLY
Qty: 90 TABLET | Refills: 1 | Status: SHIPPED | OUTPATIENT
Start: 2019-06-18 | End: 2020-06-15

## 2019-06-18 NOTE — PROGRESS NOTES
"Subjective   AWV  hyperlipidema  Osteoarthritis  Depression  Osteopenia  Atrial fibrillation  Spinal stenosis    Nessa Osuna is a 69 y.o. female who presents for an annual wellness visit, to review chronic medical issues, and to discuss acute needs. Patient reports right foot pain on the dorsal surface medially.   She has atrial fibrillation. She is rate controlled. She reports that it is \"worse\" in that she feels palpitations. She does not have dyspnea or exertional symtpoms.   She has a history of dyslipidemia, OA, depression, spinal stenosis, and osteopenia. She is having OA pain that \"migrates\" but in general is well managed. She ahs been offered spinal epidural injections but feels that the pain is not great enough to warrant that at this time.   Patient has depression. Mood is doing fairly well today. She has recently been to a therapist but could benefit from this.           Review of Systems   Constitutional: Negative.    HENT: Positive for postnasal drip and rhinorrhea.         Flonase and claritin for sinus allergies   Eyes: Negative.    Respiratory: Negative.    Cardiovascular: Negative.    Gastrointestinal: Negative.    Endocrine: Negative.    Genitourinary: Negative.    Musculoskeletal: Positive for arthralgias and back pain.   Skin: Negative.    Allergic/Immunologic: Negative.    Neurological: Negative.    Hematological: Negative.    Psychiatric/Behavioral: Negative.        The following portions of the patient's history were reviewed and updated as appropriate: allergies, current medications, past family history, past medical history, past social history, past surgical history and problem list.     Patient Active Problem List   Diagnosis   • Hyperlipidemia   • Osteoarthritis   • Depression   • Osteopenia   • Sciatica   • Healthcare maintenance   • DDD (degenerative disc disease), lumbar   • Paroxysmal atrial fibrillation (CMS/[facility])   • Vitamin D deficiency   • Spinal stenosis of lumbar region " with neurogenic claudication   • Spondylolisthesis at L4-L5 level       Past Medical History:   Diagnosis Date   • A-fib (CMS/[facility]) 2017   • Breast nodule    • Chest wall mass    • Closed fracture of head of metacarpal     left second   • Closed fracture of metacarpal bone     left   • DDD (degenerative disc disease), lumbar    • Depression    • Difficulty breathing    • Diverticulitis    • Fatigue    • GERD (gastroesophageal reflux disease)    • Hip pain, right    • Hyperlipidemia    • Leiomyoma of uterus    • Osteoarthritis    • Osteopenia    • Primary localized osteoarthritis of hip    • Sciatica    • Skin cancer    • Spinal stenosis        Past Surgical History:   Procedure Laterality Date   • MOHS SURGERY      chemosurgery (Mohs Micrographic Technique); Dr Jarrett and Dr Franco   • TRIANA'S NEUROMA EXCISION      single neuroma   • TONSILLECTOMY      age 5   • TONSILLECTOMY AND ADENOIDECTOMY         Family History   Problem Relation Age of Onset   • Cancer Mother         bladder   • Osteoporosis Mother    • Heart failure Mother    • Lung cancer Father    • No Known Problems Sister        Social History     Socioeconomic History   • Marital status: Single     Spouse name: Not on file   • Number of children: 0   • Years of education: 14   • Highest education level: Not asked   Occupational History   • Occupation: RETIRED   Social Needs   • Financial resource strain: Patient refused   • Food insecurity:     Worry: Patient refused     Inability: Patient refused   • Transportation needs:     Medical: Patient refused     Non-medical: Patient refused   Tobacco Use   • Smoking status: Former Smoker     Last attempt to quit:      Years since quittin.4   • Smokeless tobacco: Never Used   Substance and Sexual Activity   • Alcohol use: Yes     Comment: social drinker   • Drug use: No   • Sexual activity: Defer   Social History Narrative    LIVES ALONE       Current Outpatient Medications on File  "Prior to Visit   Medication Sig Dispense Refill   • acetaminophen (TYLENOL) 500 MG tablet Take by mouth. Take 1 tablet every 4-6 hours as needed     • Cholecalciferol (VITAMIN D) 2000 UNITS capsule Take 1 capsule by mouth daily.     • [employer] ALLERGY RELIEF 10 MG tablet TAKE 1 TABLET BY MOUTH DAILY  0   • DULoxetine (CYMBALTA) 60 MG capsule Take 1 capsule by mouth Daily. 90 capsule 2   • Flaxseed, Linseed, (FLAXSEED OIL) 1000 MG capsule Take 1 capsule by mouth 2 (two) times a day.     • fluticasone (FLONASE) 50 MCG/ACT nasal spray PLACE TWO SPRAYS IN EACH NOSTRIL ONCE DAILY 1 bottle 4   • gabapentin (NEURONTIN) 300 MG capsule TAKE ONE CAPSULE BY MOUTH THREE TIMES A  capsule 0   • metoprolol tartrate (LOPRESSOR) 25 MG tablet TAKE ONE-HALF TABLET BY MOUTH EVERY 12 HOURS 90 tablet 1   • simvastatin (ZOCOR) 20 MG tablet TAKE ONE TABLET BY MOUTH DAILY 90 tablet 0   • XARELTO 20 MG tablet TAKE ONE TABLET BY MOUTH DAILY WITH DINNER 90 tablet 3     No current facility-administered medications on file prior to visit.        No Known Allergies    Immunization History   Administered Date(s) Administered   • Flu Mist 09/15/2015   • Flu Vaccine High Dose PF 65YR+ 09/23/2018   • Hepatitis A 01/01/1970, 01/01/1970, 01/01/1970   • Hepatitis B 01/01/1970, 01/01/1970, 01/01/1970, 01/01/1970   • Pneumococcal Polysaccharide (PPSV23) 09/15/2015   • Shingrix 09/23/2018   • Td 01/05/2016   • Tdap 11/13/2007   • Zostavax 07/24/2011       Objective     /68   Pulse 58   Ht 170.2 cm (67\")   Wt 93.4 kg (206 lb)   SpO2 98%   BMI 32.26 kg/m²     Physical Exam   Constitutional: She is oriented to person, place, and time. She appears well-developed and well-nourished.   HENT:   Head: Normocephalic and atraumatic.   Right Ear: External ear normal.   Left Ear: External ear normal.   Nose: Nose normal.   Mouth/Throat: Oropharynx is clear and moist.   Eyes: Conjunctivae and EOM are normal. Pupils are equal, round, and reactive to light. "   Neck: Normal range of motion. Neck supple.   Cardiovascular: Normal rate, regular rhythm, normal heart sounds and intact distal pulses.   Pulmonary/Chest: Effort normal and breath sounds normal. No respiratory distress.   Abdominal: Soft. Bowel sounds are normal.   Musculoskeletal: Normal range of motion.   Neurological: She is alert and oriented to person, place, and time.   Skin: Skin is warm and dry.   Psychiatric: She has a normal mood and affect. Her behavior is normal. Judgment and thought content normal.   Nursing note and vitals reviewed.      Assessment/Plan   Nessa was seen today for annual exam.    Diagnoses and all orders for this visit:    Healthcare maintenance    Spinal stenosis of lumbar region with neurogenic claudication    Paroxysmal atrial fibrillation (CMS/HCC)    Hyperlipidemia, unspecified hyperlipidemia type    Osteoarthritis of left foot, unspecified osteoarthritis type    Other orders  -     montelukast (SINGULAIR) 10 MG tablet; Take 1 tablet by mouth Every Night.        Discussion    AWV.     Direct observation of cognitive ability was evaluated and is normal. Patient was given health advice or handouts on the following:  nutrition, fall prevention, exercise, tobacco cessation, weight management  Patient with spinal stenosis and atrial fibrillation. She has OA and is encouraged to increase physical fitness. She will continue meds for atrial fibrillation and will call if any symptoms develop. She has foot pain. She will stretch, ice, and use topicals on the area. She is given a contact number for her podiatrist to follow up there. She will start singulair for sinus allergies. Lab results were reviewed and these are wnl. She is encouraged to start a new fitness plan and a hobby. She will follow up in 8 months or prn.              Future Appointments   Date Time Provider Department Center   8/15/2019 12:30 PM Adamaris Blue MD [facility] None   9/11/2019 11:45 AM George Hensley  MD [facility] None

## 2019-07-09 ENCOUNTER — HOSPITAL ENCOUNTER (OUTPATIENT)
Dept: MAMMOGRAPHY | Facility: HOSPITAL | Age: 69
Discharge: HOME | End: 2019-07-09
Admitting: INTERNAL MEDICINE

## 2019-07-09 PROCEDURE — 77067 SCR MAMMO BI INCL CAD: CPT

## 2019-07-09 PROCEDURE — 77063 BREAST TOMOSYNTHESIS BI: CPT

## 2019-08-05 RX ORDER — DULOXETIN HYDROCHLORIDE 60 MG/1
CAPSULE, DELAYED RELEASE ORAL
Qty: 90 CAPSULE | Refills: 0 | Status: SHIPPED | OUTPATIENT
Start: 2019-08-05 | End: 2019-10-15 | Stop reason: SDUPTHER

## 2019-08-15 ENCOUNTER — OFFICE VISIT (OUTPATIENT)
Dept: CARDIOLOGY | Facility: CLINIC | Age: 69
End: 2019-08-15

## 2019-08-15 ENCOUNTER — TELEPHONE (OUTPATIENT)
Dept: INTERNAL MEDICINE | Facility: CLINIC | Age: 69
End: 2019-08-15

## 2019-08-15 VITALS
WEIGHT: 208 LBS | DIASTOLIC BLOOD PRESSURE: 84 MMHG | HEART RATE: 63 BPM | SYSTOLIC BLOOD PRESSURE: 142 MMHG | BODY MASS INDEX: 32.65 KG/M2 | HEIGHT: 67 IN

## 2019-08-15 DIAGNOSIS — E78.5 HYPERLIPIDEMIA, UNSPECIFIED HYPERLIPIDEMIA TYPE: ICD-10-CM

## 2019-08-15 DIAGNOSIS — I48.0 PAROXYSMAL ATRIAL FIBRILLATION (HCC): Primary | ICD-10-CM

## 2019-08-15 PROCEDURE — 93000 ELECTROCARDIOGRAM COMPLETE: CPT | Performed by: INTERNAL MEDICINE

## 2019-08-15 PROCEDURE — 99214 OFFICE O/P EST MOD 30 MIN: CPT | Performed by: INTERNAL MEDICINE

## 2019-08-15 NOTE — TELEPHONE ENCOUNTER
Pt LM asking for a referral to [facility] for dry needling. She needs it back dated for 8-   She did not leave any other info  I LM for pt to call back. I will add more info when she does

## 2019-08-15 NOTE — PROGRESS NOTES
Subjective:     Encounter Date:08/15/2019      Patient ID: Nessa Osuna is a 69 y.o. female.    Chief Complaint:  History of Present Illness    This is a 69-year-old with a history of hyperlipidemia, paroxysmal atrial fibrillation, who presents for follow-up.     I began following the patient when she was admitted to the hospital on 5/2017 with atrial fibrillation with rapid ventricular rate.  Patient reported at that time that she been having episodes lasting up to 2-3 hours over the last few years.  Episodes occurred about 8-9 times a year.  She underwent echocardiogram during that admission that showed normal left ventricular systolic function and wall motion with an ejection fraction of 57%, moderately to severely dilated left atrium, and no significant valvular disease.  I started on metoprolol tartrate 12.5 mg twice a day.  She is also started on rivaroxaban for her CHADS-VASc score of 2.      Today she presents for annual follow-up.  She reports that over the last year she feels like the frequency of her atrial fibrillation episodes has increased.  For the most part the episodes do not last longer than about 10 or 15 minutes.  She reports that she tolerates them pretty well and her only symptom is palpitations.  She only takes an additional half tablet of metoprolol if the symptoms started at nighttime when she is trying to fall asleep.  She otherwise denies any chest pain, PND orthopnea, near-syncope or syncope, or lower extremity edema.  She is been dealing with more issues with spinal stenosis and may be requiring epidural soon.  She denies any bleeding issues with the rivaroxaban.      Review of Systems   Constitution: Positive for malaise/fatigue. Negative for weakness.   HENT: Negative for hearing loss, hoarse voice, nosebleeds and sore throat.    Eyes: Negative for pain.   Cardiovascular: Positive for palpitations. Negative for chest pain, claudication, cyanosis, dyspnea on exertion,  irregular heartbeat, leg swelling, near-syncope, orthopnea, paroxysmal nocturnal dyspnea and syncope.   Respiratory: Negative for shortness of breath and snoring.    Endocrine: Negative for cold intolerance, heat intolerance, polydipsia, polyphagia and polyuria.   Skin: Negative for itching and rash.   Musculoskeletal: Positive for muscle cramps. Negative for arthritis, falls, joint pain, joint swelling, muscle weakness and myalgias.   Gastrointestinal: Negative for constipation, diarrhea, dysphagia, heartburn, hematemesis, hematochezia, melena, nausea and vomiting.   Genitourinary: Negative for frequency, hematuria and hesitancy.   Neurological: Negative for excessive daytime sleepiness, dizziness, headaches, light-headedness and numbness.   Psychiatric/Behavioral: Negative for depression. The patient is not nervous/anxious.           Current Outpatient Medications:   •  acetaminophen (TYLENOL) 500 MG tablet, Take by mouth. Take 1 tablet every 4-6 hours as needed, Disp: , Rfl:   •  Cholecalciferol (VITAMIN D) 2000 UNITS capsule, Take 1 capsule by mouth daily., Disp: , Rfl:   •  [employer]S ALLERGY RELIEF 10 MG tablet, TAKE 1 TABLET BY MOUTH DAILY, Disp: , Rfl: 0  •  DULoxetine (CYMBALTA) 60 MG capsule, Take 1 capsule by mouth Daily., Disp: 90 capsule, Rfl: 2  •  DULoxetine (CYMBALTA) 60 MG capsule, TAKE ONE CAPSULE BY MOUTH DAILY, Disp: 90 capsule, Rfl: 0  •  Flaxseed, Linseed, (FLAXSEED OIL) 1000 MG capsule, Take 1 capsule by mouth 2 (two) times a day., Disp: , Rfl:   •  fluticasone (FLONASE) 50 MCG/ACT nasal spray, PLACE TWO SPRAYS IN EACH NOSTRIL ONCE DAILY, Disp: 1 bottle, Rfl: 4  •  gabapentin (NEURONTIN) 300 MG capsule, TAKE ONE CAPSULE BY MOUTH THREE TIMES A DAY, Disp: 270 capsule, Rfl: 0  •  metoprolol tartrate (LOPRESSOR) 25 MG tablet, TAKE ONE-HALF TABLET BY MOUTH EVERY 12 HOURS, Disp: 90 tablet, Rfl: 1  •  montelukast (SINGULAIR) 10 MG tablet, Take 1 tablet by mouth Every Night., Disp: 90 tablet, Rfl: 1  •   "simvastatin (ZOCOR) 20 MG tablet, TAKE ONE TABLET BY MOUTH DAILY, Disp: 90 tablet, Rfl: 0  •  XARELTO 20 MG tablet, TAKE ONE TABLET BY MOUTH DAILY WITH DINNER, Disp: 90 tablet, Rfl: 3    Past Medical History:   Diagnosis Date   • A-fib (CMS/HCC) 2017   • Breast nodule    • Chest wall mass    • Closed fracture of head of metacarpal     left second   • Closed fracture of metacarpal bone     left   • DDD (degenerative disc disease), lumbar    • Depression    • Difficulty breathing    • Diverticulitis    • Fatigue    • GERD (gastroesophageal reflux disease)    • Hip pain, right    • Hyperlipidemia    • Leiomyoma of uterus    • Osteoarthritis    • Osteopenia    • Primary localized osteoarthritis of hip    • Sciatica    • Skin cancer    • Spinal stenosis      Past Surgical History:   Procedure Laterality Date   • MOHS SURGERY      chemosurgery (Mohs Micrographic Technique); Dr Jarrett and Dr Franco   • TRIANA'S NEUROMA EXCISION      single neuroma   • TONSILLECTOMY      age 5   • TONSILLECTOMY AND ADENOIDECTOMY       Family History   Problem Relation Age of Onset   • Cancer Mother         bladder   • Osteoporosis Mother    • Heart failure Mother    • Lung cancer Father    • No Known Problems Sister      Social History     Tobacco Use   • Smoking status: Former Smoker     Last attempt to quit:      Years since quittin.6   • Smokeless tobacco: Never Used   Substance Use Topics   • Alcohol use: Yes     Comment: social drinker   • Drug use: No           ECG 12 Lead  Date/Time: 8/15/2019 5:26 PM  Performed by: Adamaris Blue MD  Authorized by: Adamaris Blue MD   Comparison: compared with previous ECG   Similar to previous ECG  Rhythm: sinus rhythm               Objective:         Visit Vitals  /84   Pulse 63   Ht 170.2 cm (67\")   Wt 94.3 kg (208 lb)   BMI 32.58 kg/m²          Physical Exam   Constitutional: She is oriented to person, place, and time. She appears well-developed and " well-nourished.   HENT:   Head: Normocephalic and atraumatic.   Eyes: Conjunctivae, EOM and lids are normal. Pupils are equal, round, and reactive to light.   Neck: Normal range of motion and full passive range of motion without pain. Neck supple. No JVD present. Carotid bruit is not present.   Cardiovascular: Normal rate, regular rhythm, S1 normal and S2 normal. Exam reveals no gallop.   No murmur heard.  Pulses:       Radial pulses are 2+ on the right side, and 2+ on the left side.   No bilateral lower extremity edema   Pulmonary/Chest: Effort normal and breath sounds normal.   Abdominal: Soft. Normal appearance.   Lymphadenopathy:     She has no cervical adenopathy.   Neurological: She is alert and oriented to person, place, and time.   Skin: Skin is warm, dry and intact.   Psychiatric: She has a normal mood and affect.       Lab Review:       Assessment:          Diagnosis Plan   1. Paroxysmal atrial fibrillation (CMS/HCC)     2. Hyperlipidemia, unspecified hyperlipidemia type            Plan:       1.  Paroxysmal atrial fibrillation.  In sinus rhythm today.  She reports some increase in the frequency of her symptoms although the duration and her symptoms associated with the episodes are tolerable.  For the most part she is not requiring additional metoprolol unless she is has episodes at night when she is trying to fall asleep.  I told her that she does not need to take additional metoprolol unless the symptoms are really bothering her.  Otherwise continue current medical management including rivaroxaban for anticoagulation.  I told her that she could hold the rivaroxaban 3 days before if she ends up pursuing epidural injections.  2.  Hyperlipidemia.  On simvastatin which is managed by Dr. Guerra.    We will plan on seeing the patient back again in 1 year or sooner if further issues arise.    Atrial Fibrillation and Atrial Flutter  Assessment  • The patient has paroxysmal atrial fibrillation  • This is  non-valvular in etiology  • The patient's CHADS2-VASc score is 2  • A [id number] score of 2 or more is considered a high risk for a thromboembolic event  • Rivaroxaban prescribed    Plan  • Attempt to maintain sinus rhythm  • Continue rivaroxaban for antithrombotic therapy, bleeding issues discussed  • Continue beta blocker for rhythm control  • Continue beta blocker for rate control

## 2019-09-06 NOTE — PROGRESS NOTES
Subjective   Patient ID: Nessa Osuna is a 69 y.o. female is here today for follow-up after GABY, however, patient decided not to pursue that at this time.  She is going for PT and having dry needling as ordered by her PCP, she states that it is helping some.  She saw a podiatrist who gave her brace for her right foot yesterday    Patient was last seen 5.29.19 for intermittent mild low back and left hip pain, right foot pain and numbness which is unchanged.  She does have intermittent bilateral leg pain that is mild.  She denies leg weakness or N/T.    She is taking Gabapentin 300 mg TID    She has known spinal stenosis at L4-L5 with a spondylolisthesis. We have been trying to avoid surgery. She decided not to pursue the blocks since she felt that she was not hurting enough to justify it. But she takes her gabapentin at 300 mg tid given by her primary care physician. She does her exercises and does dry needling. She feels that she can tolerate things for now so we will continue that and just see her from time to time starting around 6 months from now. If she wants to try a block she will let us know in the meantime.        Back Pain   The problem occurs intermittently. The problem is unchanged. The pain is present in the lumbar spine. The pain radiates to the left thigh and right thigh. The pain is at a severity of 3/10. The pain is mild. The symptoms are aggravated by bending and twisting. Associated symptoms include leg pain. Pertinent negatives include no numbness, tingling or weakness.   Leg Pain    The pain is present in the left leg, right leg, left hip and right foot. The pain is at a severity of 3/10. The pain is mild. Pertinent negatives include no numbness or tingling.       The following portions of the patient's history were reviewed and updated as appropriate: allergies, current medications, past family history, past medical history, past social history, past surgical history and problem  list.    Review of Systems   Musculoskeletal: Positive for back pain. Negative for arthralgias, gait problem and myalgias.   Neurological: Negative for tingling, weakness and numbness.   All other systems reviewed and are negative.      Objective   Physical Exam   Constitutional: She is oriented to person, place, and time. She appears well-developed and well-nourished.   HENT:   Head: Normocephalic and atraumatic.   Eyes: Conjunctivae and EOM are normal. Pupils are equal, round, and reactive to light.   Fundoscopic exam:       The right eye shows no papilledema. The right eye shows venous pulsations.        The left eye shows no papilledema. The left eye shows venous pulsations.   Neck: Carotid bruit is not present.   Neurological: She is oriented to person, place, and time. She has a normal Finger-Nose-Finger Test and a normal Heel to Shin Test. Gait normal.   Reflex Scores:       Tricep reflexes are 2+ on the right side and 2+ on the left side.       Bicep reflexes are 2+ on the right side and 2+ on the left side.       Brachioradialis reflexes are 2+ on the right side and 2+ on the left side.       Patellar reflexes are 2+ on the right side and 2+ on the left side.       Achilles reflexes are 2+ on the right side and 2+ on the left side.  Psychiatric: Her speech is normal.     Neurologic Exam     Mental Status   Oriented to person, place, and time.   Registration of memory: Good recent and remote memory.   Attention: normal. Concentration: normal.   Speech: speech is normal   Level of consciousness: alert  Knowledge: consistent with education.     Cranial Nerves     CN II   Visual fields full to confrontation.   Visual acuity: normal    CN III, IV, VI   Pupils are equal, round, and reactive to light.  Extraocular motions are normal.     CN V   Facial sensation intact.   Right corneal reflex: normal  Left corneal reflex: normal    CN VII   Facial expression full, symmetric.   Right facial weakness: none  Left  facial weakness: none    CN VIII   Hearing: intact    CN IX, X   Palate: symmetric    CN XI   Right sternocleidomastoid strength: normal  Left sternocleidomastoid strength: normal    CN XII   Tongue: not atrophic  Tongue deviation: none    Motor Exam   Muscle bulk: normal  Right arm tone: normal  Left arm tone: normal  Right leg tone: normal  Left leg tone: normal    Strength   Strength 5/5 except as noted.     Sensory Exam   Light touch normal.     Gait, Coordination, and Reflexes     Gait  Gait: normal    Coordination   Finger to nose coordination: normal  Heel to shin coordination: normal    Reflexes   Right brachioradialis: 2+  Left brachioradialis: 2+  Right biceps: 2+  Left biceps: 2+  Right triceps: 2+  Left triceps: 2+  Right patellar: 2+  Left patellar: 2+  Right achilles: 2+  Left achilles: 2+  Right : 2+  Left : 2+      Assessment/Plan   Independent Review of Radiographic Studies:    I reviewed her lumbar MRI done in February 2019 which did show severe spinal stenosis at L4-L5 with a 7 mm anterolisthesis.  Agree with the report.    Medical Decision Making:    We will continue with nonoperative treatment for right now.  We will hold off on the blocks unless she calls us and let us know that she wants to try it.  She will take her gabapentin from her primary care physician and do her exercises and come back to see us in 6 months.      Nessa was seen today for back pain, hip pain, foot pain and leg pain.    Diagnoses and all orders for this visit:    Spinal stenosis of lumbar region with neurogenic claudication    Spondylolisthesis at L4-L5 level      Return in about 6 months (around 3/11/2020).

## 2019-09-11 ENCOUNTER — OFFICE VISIT (OUTPATIENT)
Dept: NEUROSURGERY | Facility: CLINIC | Age: 69
End: 2019-09-11

## 2019-09-11 VITALS
HEART RATE: 74 BPM | DIASTOLIC BLOOD PRESSURE: 78 MMHG | HEIGHT: 67 IN | SYSTOLIC BLOOD PRESSURE: 143 MMHG | BODY MASS INDEX: 31.39 KG/M2 | WEIGHT: 200 LBS

## 2019-09-11 DIAGNOSIS — I48.0 PAROXYSMAL ATRIAL FIBRILLATION (HCC): ICD-10-CM

## 2019-09-11 DIAGNOSIS — M43.16 SPONDYLOLISTHESIS AT L4-L5 LEVEL: ICD-10-CM

## 2019-09-11 DIAGNOSIS — M48.062 SPINAL STENOSIS OF LUMBAR REGION WITH NEUROGENIC CLAUDICATION: Primary | ICD-10-CM

## 2019-09-11 PROCEDURE — 99213 OFFICE O/P EST LOW 20 MIN: CPT | Performed by: NEUROLOGICAL SURGERY

## 2019-09-12 RX ORDER — SIMVASTATIN 20 MG
TABLET ORAL
Qty: 90 TABLET | Refills: 0 | Status: SHIPPED | OUTPATIENT
Start: 2019-09-12 | End: 2019-12-15 | Stop reason: SDUPTHER

## 2019-09-12 RX ORDER — RIVAROXABAN 20 MG/1
TABLET, FILM COATED ORAL
Qty: 90 TABLET | Refills: 3 | Status: SHIPPED | OUTPATIENT
Start: 2019-09-12 | End: 2020-09-18

## 2019-10-15 DIAGNOSIS — I48.0 PAROXYSMAL ATRIAL FIBRILLATION (HCC): ICD-10-CM

## 2019-10-15 RX ORDER — DULOXETIN HYDROCHLORIDE 60 MG/1
CAPSULE, DELAYED RELEASE ORAL
Qty: 90 CAPSULE | Refills: 0 | Status: SHIPPED | OUTPATIENT
Start: 2019-10-15 | End: 2020-01-27

## 2019-12-16 RX ORDER — SIMVASTATIN 20 MG
TABLET ORAL
Qty: 90 TABLET | Refills: 0 | Status: SHIPPED | OUTPATIENT
Start: 2019-12-16 | End: 2020-03-09

## 2019-12-16 RX ORDER — GABAPENTIN 300 MG/1
CAPSULE ORAL
Qty: 270 CAPSULE | Refills: 0 | Status: SHIPPED | OUTPATIENT
Start: 2019-12-16 | End: 2019-12-17 | Stop reason: SDUPTHER

## 2019-12-17 RX ORDER — GABAPENTIN 300 MG/1
CAPSULE ORAL
Qty: 270 CAPSULE | Refills: 0 | Status: SHIPPED | OUTPATIENT
Start: 2019-12-17 | End: 2020-03-20

## 2020-01-27 RX ORDER — DULOXETIN HYDROCHLORIDE 60 MG/1
CAPSULE, DELAYED RELEASE ORAL
Qty: 90 CAPSULE | Refills: 0 | Status: SHIPPED | OUTPATIENT
Start: 2020-01-27 | End: 2020-04-23

## 2020-03-09 RX ORDER — SIMVASTATIN 20 MG
TABLET ORAL
Qty: 90 TABLET | Refills: 0 | Status: SHIPPED | OUTPATIENT
Start: 2020-03-09 | End: 2020-06-15 | Stop reason: SDUPTHER

## 2020-03-10 DIAGNOSIS — E78.5 HYPERLIPIDEMIA, UNSPECIFIED HYPERLIPIDEMIA TYPE: Primary | ICD-10-CM

## 2020-03-10 DIAGNOSIS — I48.0 PAROXYSMAL ATRIAL FIBRILLATION (HCC): ICD-10-CM

## 2020-03-12 LAB
ALBUMIN SERPL-MCNC: 4.4 G/DL (ref 3.5–5.2)
ALBUMIN/GLOB SERPL: 2.2 G/DL
ALP SERPL-CCNC: 71 U/L (ref 39–117)
ALT SERPL-CCNC: 20 U/L (ref 1–33)
APPEARANCE UR: CLEAR
AST SERPL-CCNC: 20 U/L (ref 1–32)
BACTERIA #/AREA URNS HPF: NORMAL /HPF
BASOPHILS # BLD AUTO: 0.04 10*3/MM3 (ref 0–0.2)
BASOPHILS NFR BLD AUTO: 1 % (ref 0–1.5)
BILIRUB SERPL-MCNC: 1.3 MG/DL (ref 0.2–1.2)
BILIRUB UR QL STRIP: NEGATIVE
BUN SERPL-MCNC: 12 MG/DL (ref 8–23)
BUN/CREAT SERPL: 17.4 (ref 7–25)
CALCIUM SERPL-MCNC: 9.2 MG/DL (ref 8.6–10.5)
CHLORIDE SERPL-SCNC: 103 MMOL/L (ref 98–107)
CHOLEST SERPL-MCNC: 150 MG/DL (ref 0–200)
CO2 SERPL-SCNC: 26.6 MMOL/L (ref 22–29)
COLOR UR: YELLOW
CREAT SERPL-MCNC: 0.69 MG/DL (ref 0.57–1)
EOSINOPHIL # BLD AUTO: 0.26 10*3/MM3 (ref 0–0.4)
EOSINOPHIL NFR BLD AUTO: 6.4 % (ref 0.3–6.2)
EPI CELLS #/AREA URNS HPF: NORMAL /HPF (ref 0–10)
ERYTHROCYTE [DISTWIDTH] IN BLOOD BY AUTOMATED COUNT: 12.4 % (ref 12.3–15.4)
GLOBULIN SER CALC-MCNC: 2 GM/DL
GLUCOSE SERPL-MCNC: 94 MG/DL (ref 65–99)
GLUCOSE UR QL: NEGATIVE
HCT VFR BLD AUTO: 39.1 % (ref 34–46.6)
HDLC SERPL-MCNC: 60 MG/DL (ref 40–60)
HGB BLD-MCNC: 13 G/DL (ref 12–15.9)
HGB UR QL STRIP: NEGATIVE
IMM GRANULOCYTES # BLD AUTO: 0.01 10*3/MM3 (ref 0–0.05)
IMM GRANULOCYTES NFR BLD AUTO: 0.2 % (ref 0–0.5)
KETONES UR QL STRIP: NEGATIVE
LDLC SERPL CALC-MCNC: 74 MG/DL (ref 0–100)
LDLC/HDLC SERPL: 1.24 {RATIO}
LEUKOCYTE ESTERASE UR QL STRIP: NEGATIVE
LYMPHOCYTES # BLD AUTO: 1.17 10*3/MM3 (ref 0.7–3.1)
LYMPHOCYTES NFR BLD AUTO: 28.8 % (ref 19.6–45.3)
Lab: NORMAL
MCH RBC QN AUTO: 30.4 PG (ref 26.6–33)
MCHC RBC AUTO-ENTMCNC: 33.2 G/DL (ref 31.5–35.7)
MCV RBC AUTO: 91.4 FL (ref 79–97)
MICRO URNS: NORMAL
MICRO URNS: NORMAL
MONOCYTES # BLD AUTO: 0.5 10*3/MM3 (ref 0.1–0.9)
MONOCYTES NFR BLD AUTO: 12.3 % (ref 5–12)
MUCOUS THREADS URNS QL MICRO: PRESENT /HPF
NEUTROPHILS # BLD AUTO: 2.08 10*3/MM3 (ref 1.7–7)
NEUTROPHILS NFR BLD AUTO: 51.3 % (ref 42.7–76)
NITRITE UR QL STRIP: NEGATIVE
NRBC BLD AUTO-RTO: 0 /100 WBC (ref 0–0.2)
PH UR STRIP: 7 [PH] (ref 5–7.5)
PLATELET # BLD AUTO: 212 10*3/MM3 (ref 140–450)
POTASSIUM SERPL-SCNC: 4 MMOL/L (ref 3.5–5.2)
PROT SERPL-MCNC: 6.4 G/DL (ref 6–8.5)
PROT UR QL STRIP: NEGATIVE
RBC # BLD AUTO: 4.28 10*6/MM3 (ref 3.77–5.28)
RBC #/AREA URNS HPF: NORMAL /HPF (ref 0–2)
SODIUM SERPL-SCNC: 142 MMOL/L (ref 136–145)
SP GR UR: 1.01 (ref 1–1.03)
TRIGL SERPL-MCNC: 78 MG/DL (ref 0–150)
TSH SERPL DL<=0.005 MIU/L-ACNC: 2.09 UIU/ML (ref 0.27–4.2)
URINALYSIS REFLEX: NORMAL
UROBILINOGEN UR STRIP-MCNC: 0.2 MG/DL (ref 0.2–1)
VLDLC SERPL CALC-MCNC: 15.6 MG/DL
WBC # BLD AUTO: 4.06 10*3/MM3 (ref 3.4–10.8)
WBC #/AREA URNS HPF: NORMAL /HPF (ref 0–5)

## 2020-03-12 NOTE — PROGRESS NOTES
Your laboratory results are NORMAL. We will discuss these results in detail at your next office visit. Please call with any questions or concerns.  Sincerely,  Jayne Guerra MD

## 2020-03-17 ENCOUNTER — TELEPHONE (OUTPATIENT)
Dept: NEUROSURGERY | Facility: CLINIC | Age: 70
End: 2020-03-17

## 2020-03-17 ENCOUNTER — OFFICE VISIT (OUTPATIENT)
Dept: INTERNAL MEDICINE | Facility: CLINIC | Age: 70
End: 2020-03-17

## 2020-03-17 VITALS
WEIGHT: 214 LBS | DIASTOLIC BLOOD PRESSURE: 80 MMHG | HEART RATE: 87 BPM | SYSTOLIC BLOOD PRESSURE: 108 MMHG | BODY MASS INDEX: 33.59 KG/M2 | HEIGHT: 67 IN

## 2020-03-17 DIAGNOSIS — R06.09 DYSPNEA ON EXERTION: ICD-10-CM

## 2020-03-17 DIAGNOSIS — R10.32 LEFT LOWER QUADRANT ABDOMINAL PAIN: ICD-10-CM

## 2020-03-17 DIAGNOSIS — M54.30 SCIATICA, UNSPECIFIED LATERALITY: ICD-10-CM

## 2020-03-17 DIAGNOSIS — I07.1 TRICUSPID VALVE INSUFFICIENCY, UNSPECIFIED ETIOLOGY: ICD-10-CM

## 2020-03-17 DIAGNOSIS — Z12.2 ENCOUNTER FOR SCREENING FOR LUNG CANCER: Primary | ICD-10-CM

## 2020-03-17 DIAGNOSIS — I48.91 ATRIAL FIBRILLATION, UNSPECIFIED TYPE (HCC): ICD-10-CM

## 2020-03-17 DIAGNOSIS — Z87.891 HISTORY OF TOBACCO ABUSE: ICD-10-CM

## 2020-03-17 PROCEDURE — 99214 OFFICE O/P EST MOD 30 MIN: CPT | Performed by: INTERNAL MEDICINE

## 2020-03-17 NOTE — PROGRESS NOTES
Chief Complaint   Patient presents with   • Hyperlipidemia   • Back Pain     refill gabapentin   • Abdominal Pain     left lower quadrant       History of Present Illness   Nessa Osuna is a 70 y.o. female presents for follow up evaluation. Patient has back pain that radiates into her leg. She takes gabapentin 300 mg po tid as well as cymbalta one daily. She reports that with these medications she is fairly well regulated. She reports some shortness of breath. Has needed to reduce her walking w/ her dog by a couple of blocks. Patient has atrial fibrillation. She takes xarelto and metoprolol for this. Patient worries about lung cancer related to father w/ lung cancer.   She has a separate issue of left lower quadrant pain. She has thrown up about 6 times in the last year with this.      The following portions of the patient's history were reviewed and updated as appropriate: allergies, current medications, past family history, past medical history, past social history, past surgical history and problem list.  Current Outpatient Medications on File Prior to Visit   Medication Sig Dispense Refill   • acetaminophen (TYLENOL) 500 MG tablet Take by mouth. Take 1 tablet every 4-6 hours as needed     • Cholecalciferol (VITAMIN D) 2000 UNITS capsule Take 1 capsule by mouth daily.     • DULoxetine (CYMBALTA) 60 MG capsule TAKE ONE CAPSULE BY MOUTH DAILY 90 capsule 0   • Flaxseed, Linseed, (FLAXSEED OIL) 1000 MG capsule Take 1 capsule by mouth 2 (two) times a day.     • fluticasone (FLONASE) 50 MCG/ACT nasal spray PLACE TWO SPRAYS IN EACH NOSTRIL ONCE DAILY 1 bottle 4   • gabapentin (NEURONTIN) 300 MG capsule TAKE ONE CAPSULE BY MOUTH THREE TIMES A  capsule 0   • metoprolol tartrate (LOPRESSOR) 25 MG tablet TAKE ONE-HALF TABLET BY MOUTH EVERY 12 HOURS 90 tablet 3   • montelukast (SINGULAIR) 10 MG tablet Take 1 tablet by mouth Every Night. 90 tablet 1   • simvastatin (ZOCOR) 20 MG tablet TAKE ONE TABLET BY MOUTH  DAILY 90 tablet 0   • XARELTO 20 MG tablet TAKE ONE TABLET BY MOUTH DAILY WITH DINNER 90 tablet 3   • [DISCONTINUED] [facility] ALLERGY RELIEF 10 MG tablet TAKE 1 TABLET BY MOUTH DAILY  0   • [DISCONTINUED] DULoxetine (CYMBALTA) 60 MG capsule Take 1 capsule by mouth Daily. 90 capsule 2     No current facility-administered medications on file prior to visit.      Review of Systems   Constitutional: Positive for fatigue.   HENT: Positive for rhinorrhea. Negative for ear pain and sore throat.    Eyes: Negative.    Respiratory: Positive for shortness of breath. Negative for wheezing.    Cardiovascular: Positive for leg swelling. Negative for chest pain.   Gastrointestinal: Positive for abdominal pain and vomiting.   Endocrine: Negative.    Genitourinary: Negative.    Musculoskeletal: Negative.  Negative for neck pain.   Skin: Negative.  Negative for rash.   Allergic/Immunologic: Negative.    Neurological: Negative.  Negative for headaches.   Hematological: Negative.    Psychiatric/Behavioral: Negative.        Objective   Physical Exam   Constitutional: She is oriented to person, place, and time. She appears well-developed and well-nourished.   HENT:   Head: Normocephalic and atraumatic.   Right Ear: External ear normal.   Left Ear: External ear normal.   Nose: Nose normal.   Mouth/Throat: Oropharynx is clear and moist.   Eyes: Pupils are equal, round, and reactive to light. EOM are normal.   Neck: Normal range of motion. Neck supple.   Cardiovascular: Normal rate, regular rhythm, normal heart sounds and intact distal pulses.   Pulmonary/Chest: Effort normal and breath sounds normal.   Abdominal: Soft. Bowel sounds are normal.   Musculoskeletal: Normal range of motion.   Neurological: She is alert and oriented to person, place, and time.   Skin: Skin is warm and dry.   Psychiatric: She has a normal mood and affect. Her behavior is normal. Judgment and thought content normal.   Nursing note and vitals reviewed.       /80   " Pulse 87   Ht 170.2 cm (67\")   Wt 97.1 kg (214 lb)   BMI 33.52 kg/m²     Assessment/Plan   Diagnoses and all orders for this visit:    Encounter for screening for lung cancer  -     CT Chest Low Dose Wo; Future    History of tobacco abuse  -     CT Chest Low Dose Wo; Future    Dyspnea on exertion  -     Adult Transthoracic Echo Complete W/ Cont if Necessary Per Protocol; Future  -     Ambulatory Referral to Cardiology    Atrial fibrillation, unspecified type (CMS/HCC)  -     Adult Transthoracic Echo Complete W/ Cont if Necessary Per Protocol; Future  -     Ambulatory Referral to Cardiology    Tricuspid valve insufficiency, unspecified etiology  -     Adult Transthoracic Echo Complete W/ Cont if Necessary Per Protocol; Future    Left lower quadrant abdominal pain  -     CT Abdomen Pelvis Without Contrast; Future    patient w/ sciatica in for routine follow up. She will continue gabapentin tid. She also complains of exertional dyspnea. Advise and echo and follow up w/ cardiology. To continue metoprolol and xarelto. She has a history of smoking and will order ct lung screening. She has abdominal pain that is intermittent in nature. Some emesis with this. She is to have ct abd for this. She had cologuard 10/17 and will repeat 10/20 v have cscope if continued symptoms.            "

## 2020-03-17 NOTE — TELEPHONE ENCOUNTER
I have left Ms. Osuna a message to call the office back to reschedule her appointment. Her new appointment will need to be after May 18th.

## 2020-03-19 ENCOUNTER — HOSPITAL ENCOUNTER (OUTPATIENT)
Dept: CARDIOLOGY | Facility: HOSPITAL | Age: 70
Discharge: HOME | End: 2020-03-19
Admitting: INTERNAL MEDICINE

## 2020-03-19 VITALS
WEIGHT: 214 LBS | SYSTOLIC BLOOD PRESSURE: 110 MMHG | BODY MASS INDEX: 33.59 KG/M2 | OXYGEN SATURATION: 97 % | HEIGHT: 67 IN | DIASTOLIC BLOOD PRESSURE: 70 MMHG

## 2020-03-19 DIAGNOSIS — I48.91 ATRIAL FIBRILLATION, UNSPECIFIED TYPE (HCC): ICD-10-CM

## 2020-03-19 DIAGNOSIS — I07.1 TRICUSPID VALVE INSUFFICIENCY, UNSPECIFIED ETIOLOGY: ICD-10-CM

## 2020-03-19 DIAGNOSIS — R06.09 DYSPNEA ON EXERTION: ICD-10-CM

## 2020-03-19 LAB
AORTIC ARCH: 2.4 CM
AORTIC ROOT ANNULUS: 1.8 CM
ASCENDING AORTA: 2.6 CM
BH CV ECHO MEAS - ACS: 1.8 CM
BH CV ECHO MEAS - AO MAX PG (FULL): 2.3 MMHG
BH CV ECHO MEAS - AO MAX PG: 5 MMHG
BH CV ECHO MEAS - AO MEAN PG (FULL): 1.3 MMHG
BH CV ECHO MEAS - AO MEAN PG: 2.9 MMHG
BH CV ECHO MEAS - AO ROOT AREA (BSA CORRECTED): 1.5
BH CV ECHO MEAS - AO ROOT AREA: 7.5 CM^2
BH CV ECHO MEAS - AO ROOT DIAM: 3.1 CM
BH CV ECHO MEAS - AO V2 MAX: 111.8 CM/SEC
BH CV ECHO MEAS - AO V2 MEAN: 79.4 CM/SEC
BH CV ECHO MEAS - AO V2 VTI: 22.4 CM
BH CV ECHO MEAS - ASC AORTA: 2.6 CM
BH CV ECHO MEAS - AVA(I,A): 1.5 CM^2
BH CV ECHO MEAS - AVA(I,D): 1.5 CM^2
BH CV ECHO MEAS - AVA(V,A): 1.8 CM^2
BH CV ECHO MEAS - AVA(V,D): 1.8 CM^2
BH CV ECHO MEAS - BSA(HAYCOCK): 2.2 M^2
BH CV ECHO MEAS - BSA: 2.1 M^2
BH CV ECHO MEAS - BZI_BMI: 33.5 KILOGRAMS/M^2
BH CV ECHO MEAS - BZI_METRIC_HEIGHT: 170.2 CM
BH CV ECHO MEAS - BZI_METRIC_WEIGHT: 97.1 KG
BH CV ECHO MEAS - EDV(MOD-SP2): 56 ML
BH CV ECHO MEAS - EDV(MOD-SP4): 76 ML
BH CV ECHO MEAS - EDV(TEICH): 72.8 ML
BH CV ECHO MEAS - EF(CUBED): 60.7 %
BH CV ECHO MEAS - EF(MOD-BP): 53 %
BH CV ECHO MEAS - EF(MOD-SP2): 50 %
BH CV ECHO MEAS - EF(MOD-SP4): 55.3 %
BH CV ECHO MEAS - EF(TEICH): 52.7 %
BH CV ECHO MEAS - ESV(MOD-SP2): 28 ML
BH CV ECHO MEAS - ESV(MOD-SP4): 34 ML
BH CV ECHO MEAS - ESV(TEICH): 34.4 ML
BH CV ECHO MEAS - FS: 26.7 %
BH CV ECHO MEAS - IVS/LVPW: 1.1
BH CV ECHO MEAS - IVSD: 1.1 CM
BH CV ECHO MEAS - LV DIASTOLIC VOL/BSA (35-75): 36.5 ML/M^2
BH CV ECHO MEAS - LV MASS(C)D: 144.1 GRAMS
BH CV ECHO MEAS - LV MASS(C)DI: 69.2 GRAMS/M^2
BH CV ECHO MEAS - LV MAX PG: 2.7 MMHG
BH CV ECHO MEAS - LV MEAN PG: 1.5 MMHG
BH CV ECHO MEAS - LV SYSTOLIC VOL/BSA (12-30): 16.3 ML/M^2
BH CV ECHO MEAS - LV V1 MAX: 82.6 CM/SEC
BH CV ECHO MEAS - LV V1 MEAN: 58.8 CM/SEC
BH CV ECHO MEAS - LV V1 VTI: 14.4 CM
BH CV ECHO MEAS - LVIDD: 4.1 CM
BH CV ECHO MEAS - LVIDS: 3 CM
BH CV ECHO MEAS - LVLD AP2: 7.1 CM
BH CV ECHO MEAS - LVLD AP4: 7.4 CM
BH CV ECHO MEAS - LVLS AP2: 6.5 CM
BH CV ECHO MEAS - LVLS AP4: 6.3 CM
BH CV ECHO MEAS - LVOT AREA (M): 2.3 CM^2
BH CV ECHO MEAS - LVOT AREA: 2.4 CM^2
BH CV ECHO MEAS - LVOT DIAM: 1.7 CM
BH CV ECHO MEAS - LVPWD: 1 CM
BH CV ECHO MEAS - MV DEC SLOPE: 542.4 CM/SEC^2
BH CV ECHO MEAS - MV DEC TIME: 0.17 SEC
BH CV ECHO MEAS - MV E MAX VEL: 108 CM/SEC
BH CV ECHO MEAS - MV MAX PG: 6.9 MMHG
BH CV ECHO MEAS - MV MEAN PG: 3.6 MMHG
BH CV ECHO MEAS - MV P1/2T MAX VEL: 123.1 CM/SEC
BH CV ECHO MEAS - MV P1/2T: 66.5 MSEC
BH CV ECHO MEAS - MV V2 MAX: 130.9 CM/SEC
BH CV ECHO MEAS - MV V2 MEAN: 88.5 CM/SEC
BH CV ECHO MEAS - MV V2 VTI: 26.6 CM
BH CV ECHO MEAS - MVA P1/2T LCG: 1.8 CM^2
BH CV ECHO MEAS - MVA(P1/2T): 3.3 CM^2
BH CV ECHO MEAS - MVA(VTI): 1.3 CM^2
BH CV ECHO MEAS - PA ACC TIME: 0.07 SEC
BH CV ECHO MEAS - PA MAX PG (FULL): 1.1 MMHG
BH CV ECHO MEAS - PA MAX PG: 2.7 MMHG
BH CV ECHO MEAS - PA PR(ACCEL): 49.3 MMHG
BH CV ECHO MEAS - PA V2 MAX: 81.4 CM/SEC
BH CV ECHO MEAS - PULM DIAS VEL: 35.1 CM/SEC
BH CV ECHO MEAS - PULM S/D: 0.65
BH CV ECHO MEAS - PULM SYS VEL: 22.7 CM/SEC
BH CV ECHO MEAS - PVA(V,A): 3.5 CM^2
BH CV ECHO MEAS - PVA(V,D): 3.5 CM^2
BH CV ECHO MEAS - QP/QS: 1.7
BH CV ECHO MEAS - RAP SYSTOLE: 15 MMHG
BH CV ECHO MEAS - RV MAX PG: 1.5 MMHG
BH CV ECHO MEAS - RV MEAN PG: 0.88 MMHG
BH CV ECHO MEAS - RV V1 MAX: 62.1 CM/SEC
BH CV ECHO MEAS - RV V1 MEAN: 43.9 CM/SEC
BH CV ECHO MEAS - RV V1 VTI: 12.5 CM
BH CV ECHO MEAS - RVOT AREA: 4.6 CM^2
BH CV ECHO MEAS - RVOT DIAM: 2.4 CM
BH CV ECHO MEAS - RVSP: 48 MMHG
BH CV ECHO MEAS - SI(AO): 80.8 ML/M^2
BH CV ECHO MEAS - SI(CUBED): 19.6 ML/M^2
BH CV ECHO MEAS - SI(LVOT): 16.6 ML/M^2
BH CV ECHO MEAS - SI(MOD-SP2): 13.5 ML/M^2
BH CV ECHO MEAS - SI(MOD-SP4): 20.2 ML/M^2
BH CV ECHO MEAS - SI(TEICH): 18.5 ML/M^2
BH CV ECHO MEAS - SUP REN AO DIAM: 2.3 CM
BH CV ECHO MEAS - SV(AO): 168.1 ML
BH CV ECHO MEAS - SV(CUBED): 40.8 ML
BH CV ECHO MEAS - SV(LVOT): 34.4 ML
BH CV ECHO MEAS - SV(MOD-SP2): 28 ML
BH CV ECHO MEAS - SV(MOD-SP4): 42 ML
BH CV ECHO MEAS - SV(RVOT): 57.6 ML
BH CV ECHO MEAS - SV(TEICH): 38.4 ML
BH CV ECHO MEAS - TAPSE (>1.6): 2 CM2
BH CV ECHO MEAS - TR MAX VEL: 285.1 CM/SEC
BH CV XLRA - RV BASE: 3 CM
BH CV XLRA - RV LENGTH: 7.2 CM
BH CV XLRA - RV MID: 3 CM
BH CV XLRA - TDI S': 8 CM/SEC
LEFT ATRIUM VOLUME INDEX: 54 ML/M2
SINUS: 2.7 CM
STJ: 2.6 CM

## 2020-03-19 PROCEDURE — 93306 TTE W/DOPPLER COMPLETE: CPT

## 2020-03-19 PROCEDURE — 93306 TTE W/DOPPLER COMPLETE: CPT | Performed by: INTERNAL MEDICINE

## 2020-03-20 RX ORDER — GABAPENTIN 300 MG/1
CAPSULE ORAL
Qty: 270 CAPSULE | Refills: 0 | Status: SHIPPED | OUTPATIENT
Start: 2020-03-20 | End: 2020-06-15

## 2020-03-23 ENCOUNTER — TELEPHONE (OUTPATIENT)
Dept: CARDIOLOGY | Facility: CLINIC | Age: 70
End: 2020-03-23

## 2020-03-23 NOTE — TELEPHONE ENCOUNTER
I called her and she indicated that her symptoms are currently stable.  She denies any chest pain.  She does feel like she is having more frequent episodes of atrial fibrillation but that it is not limiting.  I went over results of echocardiogram.     I explained to her that we are trying to limit in office visits and testing as much as possible at this time.  Eventually I would like to perform further testing (monitor and possibly a stress test) but would like to see if we can wait on that as long as she remains stable.     I am going to switch her metoprolol tartrate to succinate and set up a telephone visit in a couple of weeks with plans for in office visit as soon as possible.     Kristin- can you see that she is scheduled for a telephone visit in 2 weeks?

## 2020-03-25 RX ORDER — METOPROLOL SUCCINATE 25 MG/1
25 TABLET, EXTENDED RELEASE ORAL DAILY
Qty: 90 TABLET | Refills: 3 | Status: SHIPPED | OUTPATIENT
Start: 2020-03-25 | End: 2020-04-20 | Stop reason: SDUPTHER

## 2020-03-25 NOTE — TELEPHONE ENCOUNTER
You mentioned changing metop tart to succ.  Can you advise on dose please and I will send in.    Thanks!

## 2020-04-09 ENCOUNTER — OFFICE VISIT (OUTPATIENT)
Dept: CARDIOLOGY | Facility: CLINIC | Age: 70
End: 2020-04-09

## 2020-04-09 VITALS
BODY MASS INDEX: 33.59 KG/M2 | WEIGHT: 214 LBS | SYSTOLIC BLOOD PRESSURE: 122 MMHG | DIASTOLIC BLOOD PRESSURE: 73 MMHG | HEART RATE: 98 BPM | HEIGHT: 67 IN

## 2020-04-09 DIAGNOSIS — E78.5 HYPERLIPIDEMIA, UNSPECIFIED HYPERLIPIDEMIA TYPE: ICD-10-CM

## 2020-04-09 DIAGNOSIS — I42.9 CARDIOMYOPATHY, UNSPECIFIED TYPE (HCC): ICD-10-CM

## 2020-04-09 DIAGNOSIS — I48.0 PAROXYSMAL ATRIAL FIBRILLATION (HCC): Primary | ICD-10-CM

## 2020-04-09 PROCEDURE — 99442 PR PHYS/QHP TELEPHONE EVALUATION 11-20 MIN: CPT | Performed by: INTERNAL MEDICINE

## 2020-04-09 NOTE — PROGRESS NOTES
Subjective:     Encounter Date:04/09/2020      Patient ID: Nessa Osuna is a 70 y.o. female.    Chief Complaint:  History of Present Illness    This is a 70-year-old with a history of hyperlipidemia, paroxysmal atrial fibrillation, cardiomyopathy, who presents for telephone follow-up.     The patient presents today for telephone follow up in place of an office visit due to the ongoing COVID-19 pandemic.  Since I saw the patient last she was evaluated by Dr. Guerra for worsening dyspnea on exertion.  She reported getting out of breath after walking a 1/2 block as opposed to the 2 miles she was walking in the past.  She was sent for an echocardiogram on 3/19/2020 that showed mildly depressed left ventricular systolic function with an EF of 40-45%, mild to moderate mitral and tricuspid regurgitation, biatrial enlargement and a right ventricular systolic pressure of 48 mmHg.  I called the patient at that time and recommended switching metoprolol tartrate to succinate 25 mg.  I recommended that we hold off on further work up due to he pandemic.     She reports today that her symptoms are minimally better.  She denies any chest pain, worsening of chronic lower extremity swelling, orthopnea, near syncope or syncope.  She does not believe that her atrial fibrillation is persistent but does note that her heart rates are often labile.      Prior History:  I began following the patient when she was admitted to the hospital on 5/2017 with atrial fibrillation with rapid ventricular rate.  Patient reported at that time that she been having episodes lasting up to 2-3 hours over the last few years.  Episodes occurred about 8-9 times a year.  She underwent echocardiogram during that admission that showed normal left ventricular systolic function and wall motion with an ejection fraction of 57%, moderately to severely dilated left atrium, and no significant valvular disease.  I started on metoprolol tartrate 12.5 mg twice a  day.  She is also started on rivaroxaban for her CHADS-VASc score of 2.      In 8/2019 she reported that the frequency of her atrial fibrillation episodes had increased.  For the most part the episodes do not last longer than about 10 or 15 minutes.  She was only taking an additional half tablet of metoprolol if the symptoms started at nighttime when she is trying to fall asleep.      Review of Systems   Constitution: Negative for malaise/fatigue.   HENT: Negative for hearing loss, hoarse voice, nosebleeds and sore throat.    Eyes: Negative for pain.   Cardiovascular: Positive for dyspnea on exertion, leg swelling and palpitations. Negative for chest pain, claudication, cyanosis, irregular heartbeat, near-syncope, orthopnea, paroxysmal nocturnal dyspnea and syncope.   Respiratory: Negative for shortness of breath and snoring.    Endocrine: Negative for cold intolerance, heat intolerance, polydipsia, polyphagia and polyuria.   Skin: Negative for itching and rash.   Musculoskeletal: Negative for arthritis, falls, joint pain, joint swelling, muscle cramps, muscle weakness and myalgias.   Gastrointestinal: Negative for constipation, diarrhea, dysphagia, heartburn, hematemesis, hematochezia, melena, nausea and vomiting.   Genitourinary: Negative for frequency, hematuria and hesitancy.   Neurological: Negative for excessive daytime sleepiness, dizziness, headaches, light-headedness, numbness and weakness.   Psychiatric/Behavioral: Negative for depression. The patient is not nervous/anxious.          Current Outpatient Medications:   •  acetaminophen (TYLENOL) 500 MG tablet, Take by mouth. Take 1 tablet every 4-6 hours as needed, Disp: , Rfl:   •  Cholecalciferol (VITAMIN D) 2000 UNITS capsule, Take 1 capsule by mouth daily., Disp: , Rfl:   •  DULoxetine (CYMBALTA) 60 MG capsule, TAKE ONE CAPSULE BY MOUTH DAILY, Disp: 90 capsule, Rfl: 0  •  Flaxseed, Linseed, (FLAXSEED OIL) 1000 MG capsule, Take 1 capsule by mouth 2 (two)  times a day., Disp: , Rfl:   •  fluticasone (FLONASE) 50 MCG/ACT nasal spray, PLACE TWO SPRAYS IN EACH NOSTRIL ONCE DAILY, Disp: 1 bottle, Rfl: 4  •  gabapentin (NEURONTIN) 300 MG capsule, TAKE ONE CAPSULE BY MOUTH THREE TIMES A DAY, Disp: 270 capsule, Rfl: 0  •  metoprolol succinate XL (TOPROL-XL) 25 MG 24 hr tablet, Take 1 tablet by mouth Daily., Disp: 90 tablet, Rfl: 3  •  montelukast (SINGULAIR) 10 MG tablet, Take 1 tablet by mouth Every Night., Disp: 90 tablet, Rfl: 1  •  simvastatin (ZOCOR) 20 MG tablet, TAKE ONE TABLET BY MOUTH DAILY, Disp: 90 tablet, Rfl: 0  •  XARELTO 20 MG tablet, TAKE ONE TABLET BY MOUTH DAILY WITH DINNER, Disp: 90 tablet, Rfl: 3    Past Medical History:   Diagnosis Date   • A-fib (CMS/[facility]) 2017   • Breast nodule    • Chest wall mass    • Closed fracture of head of metacarpal     left second   • Closed fracture of metacarpal bone     left   • DDD (degenerative disc disease), lumbar    • Depression    • Difficulty breathing    • Diverticulitis    • Fatigue    • GERD (gastroesophageal reflux disease)    • Hip pain, right    • Hyperlipidemia    • Leiomyoma of uterus    • Osteoarthritis    • Osteopenia    • Primary localized osteoarthritis of hip    • Sciatica    • Skin cancer    • Spinal stenosis        Past Surgical History:   Procedure Laterality Date   • MOHS SURGERY      chemosurgery (Mohs Micrographic Technique); Dr Jarrett and Dr Franco   • TRIANA'S NEUROMA EXCISION      single neuroma   • TONSILLECTOMY      age 5   • TONSILLECTOMY AND ADENOIDECTOMY         Family History   Problem Relation Age of Onset   • Cancer Mother         bladder   • Osteoporosis Mother    • Heart failure Mother    • Lung cancer Father    • No Known Problems Sister        Social History     Tobacco Use   • Smoking status: Former Smoker     Last attempt to quit:      Years since quittin.2   • Smokeless tobacco: Never Used   Substance Use Topics   • Alcohol use: Yes     Comment: social  "drinker   • Drug use: No          Objective:     Visit Vitals (per patient's BP cuff at home)  /73 (BP Location: Right leg)   Pulse 98   Ht 170.2 cm (67\")   Wt 97.1 kg (214 lb)   BMI 33.52 kg/m²         Physical Exam  Unable to perform due to telephone visit.     Assessment:          Diagnosis Plan   1. Paroxysmal atrial fibrillation (CMS/HCC)     2. Hyperlipidemia, unspecified hyperlipidemia type     3. Cardiomyopathy, unspecified type (CMS/HCC)            Plan:       1. Cardiomyopathy.  Unclear if this is non-ischemic and possibly tachycardia mediated versus ischemic.  Continue metoprolol succinate.  Will titrate as tolerates.  Will get her set up with a monitor since that can be mailed to the patient without her coming in to office, in order to determine frequency of atrial fibrillation and if it is now persistent.  Eventually will likely need an ischemic work up.   2. Dyspnea on exertion.  Unclear if this is due to cardiomyopathy, atrial fibrillation, or if an anginal equivalent.  She does not sound like she is volume overloaded but her RVSP was elevated on echocardiogram and may need to consider starting a diuretic.   3. Paroxysmal atrial fibrillation.  Plan as above.  Otherwise continue rivaroxaban.   4. Hyperlipidemia    Will set the patient up for a monitor and determine further recommendations based on those results.      This patient has consented to a telehealth visit via telephone. The visit was scheduled as a telephone visit to comply with patient safety concerns in accordance with CDC recommendations.  All vitals recorded within this visit are reported by the patient.  I spent  30 minutes in total including but not limited to the 16 minutes spent in direct conversation with this patient.            "

## 2020-04-13 ENCOUNTER — TELEPHONE (OUTPATIENT)
Dept: CARDIOLOGY | Facility: CLINIC | Age: 70
End: 2020-04-13

## 2020-04-13 NOTE — TELEPHONE ENCOUNTER
Patient calling to schedule heart monitor.  Can someone please call patient to schedule.    Thanks!

## 2020-04-20 DIAGNOSIS — I48.19 ATRIAL FIBRILLATION, PERSISTENT (HCC): Primary | ICD-10-CM

## 2020-04-20 RX ORDER — METOPROLOL SUCCINATE 25 MG/1
25 TABLET, EXTENDED RELEASE ORAL 2 TIMES DAILY
Qty: 180 TABLET | Refills: 3 | Status: SHIPPED | OUTPATIENT
Start: 2020-04-20 | End: 2020-05-22

## 2020-04-23 RX ORDER — DULOXETIN HYDROCHLORIDE 60 MG/1
CAPSULE, DELAYED RELEASE ORAL
Qty: 90 CAPSULE | Refills: 0 | Status: SHIPPED | OUTPATIENT
Start: 2020-04-23 | End: 2020-08-07 | Stop reason: SDUPTHER

## 2020-05-22 ENCOUNTER — OFFICE VISIT (OUTPATIENT)
Dept: CARDIOLOGY | Facility: CLINIC | Age: 70
End: 2020-05-22

## 2020-05-22 ENCOUNTER — TRANSCRIBE ORDERS (OUTPATIENT)
Dept: CARDIOLOGY | Facility: CLINIC | Age: 70
End: 2020-05-22

## 2020-05-22 VITALS
HEART RATE: 108 BPM | SYSTOLIC BLOOD PRESSURE: 118 MMHG | DIASTOLIC BLOOD PRESSURE: 60 MMHG | BODY MASS INDEX: 33.43 KG/M2 | HEIGHT: 67 IN | WEIGHT: 213 LBS

## 2020-05-22 DIAGNOSIS — Z13.6 SCREENING FOR CARDIOVASCULAR CONDITION: Primary | ICD-10-CM

## 2020-05-22 DIAGNOSIS — Z01.810 PREPROCEDURAL CARDIOVASCULAR EXAMINATION: ICD-10-CM

## 2020-05-22 DIAGNOSIS — I42.9 CARDIOMYOPATHY, UNSPECIFIED TYPE (HCC): ICD-10-CM

## 2020-05-22 DIAGNOSIS — I48.19 PERSISTENT ATRIAL FIBRILLATION (HCC): Primary | ICD-10-CM

## 2020-05-22 DIAGNOSIS — I48.19 PERSISTENT ATRIAL FIBRILLATION (HCC): ICD-10-CM

## 2020-05-22 DIAGNOSIS — E78.5 HYPERLIPIDEMIA, UNSPECIFIED HYPERLIPIDEMIA TYPE: ICD-10-CM

## 2020-05-22 PROCEDURE — 93000 ELECTROCARDIOGRAM COMPLETE: CPT | Performed by: INTERNAL MEDICINE

## 2020-05-22 PROCEDURE — 99214 OFFICE O/P EST MOD 30 MIN: CPT | Performed by: INTERNAL MEDICINE

## 2020-05-22 RX ORDER — ATENOLOL 50 MG/1
50 TABLET ORAL 2 TIMES DAILY
Qty: 60 TABLET | Refills: 5 | Status: SHIPPED | OUTPATIENT
Start: 2020-05-22 | End: 2020-06-24

## 2020-05-22 NOTE — PROGRESS NOTES
Subjective:     Encounter Date:05/22/2020      Patient ID: Nessa Osuna is a 70 y.o. female.    Chief Complaint:  History of Present Illness    This is a 70-year-old with a history of hyperlipidemia, paroxysmal atrial fibrillation, cardiomyopathy, who presents for telephone follow-up.     She presents today for follow-up.  Since the increase in the metoprolol succinate she has not had any improvement in her symptoms.  Her dyspnea on exertion and fatigue has been largely stable.  She denies any chest pain, orthopnea, near-syncope or syncope, or change in her chronic lower extremity edema.  She remains compliant with rivaroxaban and has not missed any doses.     Prior History:  I began following the patient when she was admitted to the hospital on 5/2017 with atrial fibrillation with rapid ventricular rate.  Patient reported at that time that she been having episodes lasting up to 2-3 hours over the last few years.  Episodes occurred about 8-9 times a year.  She underwent echocardiogram during that admission that showed normal left ventricular systolic function and wall motion with an ejection fraction of 57%, moderately to severely dilated left atrium, and no significant valvular disease.  I started on metoprolol tartrate 12.5 mg twice a day.  She is also started on rivaroxaban for her CHADS-VASc score of 2.      In 8/2019 she reported that the frequency of her atrial fibrillation episodes had increased.  For the most part the episodes do not last longer than about 10 or 15 minutes.  She was only taking an additional half tablet of metoprolol if the symptoms started at nighttime when she is trying to fall asleep.       In 3/2020 she complained of worsening dyspnea on exertion to Dr. Guerra.  She reported getting out of breath after walking a 1/2 block as opposed to the 2 miles she was walking in the past.  She was sent for an echocardiogram on 3/19/2020 that showed mildly depressed left ventricular systolic  function with an EF of 40-45%, mild to moderate mitral and tricuspid regurgitation, biatrial enlargement and a right ventricular systolic pressure of 48 mmHg.      I called the patient at that time and recommended switching metoprolol tartrate to succinate 25 mg.  I recommended that we hold off on further work up due to he pandemic.  We had a telephone follow up appointment on 4/9 at which time she reported only minimal improvement in her symptoms.  There was concern at that time that she was now in persistent atrial fibrillation.  I recommended proceeding with a holter monitor.  This was performed on 4/16/2020 and showed persistent atrial fibrillation with an average heart rate of 112 bpm and a maximum heart rate of 176 bpm.  When I called the patient back I recommended increasing her metoprolol succinate to 25 mg twice a day.       Review of Systems   Constitution: Positive for malaise/fatigue.   HENT: Negative for hearing loss, hoarse voice, nosebleeds and sore throat.    Eyes: Negative for pain.   Cardiovascular: Positive for dyspnea on exertion and leg swelling. Negative for chest pain, claudication, cyanosis, irregular heartbeat, near-syncope, orthopnea, palpitations, paroxysmal nocturnal dyspnea and syncope.   Respiratory: Negative for shortness of breath and snoring.    Endocrine: Negative for cold intolerance, heat intolerance, polydipsia, polyphagia and polyuria.   Skin: Negative for itching and rash.   Musculoskeletal: Negative for arthritis, falls, joint pain, joint swelling, muscle cramps, muscle weakness and myalgias.   Gastrointestinal: Negative for constipation, diarrhea, dysphagia, heartburn, hematemesis, hematochezia, melena, nausea and vomiting.   Genitourinary: Negative for frequency, hematuria and hesitancy.   Neurological: Negative for excessive daytime sleepiness, dizziness, headaches, light-headedness, numbness and weakness.   Psychiatric/Behavioral: Negative for depression. The patient is  not nervous/anxious.          Current Outpatient Medications:   •  acetaminophen (TYLENOL) 500 MG tablet, Take 500 mg by mouth Every Night. Take 1 tablet every 4-6 hours as needed, Disp: , Rfl:   •  Cholecalciferol (VITAMIN D) 2000 UNITS capsule, Take 1 capsule by mouth daily., Disp: , Rfl:   •  DULoxetine (CYMBALTA) 60 MG capsule, TAKE ONE CAPSULE BY MOUTH DAILY, Disp: 90 capsule, Rfl: 0  •  Flaxseed, Linseed, (FLAXSEED OIL) 1000 MG capsule, Take 1 capsule by mouth Daily., Disp: , Rfl:   •  fluticasone (FLONASE) 50 MCG/ACT nasal spray, PLACE TWO SPRAYS IN EACH NOSTRIL ONCE DAILY, Disp: 1 bottle, Rfl: 4  •  gabapentin (NEURONTIN) 300 MG capsule, TAKE ONE CAPSULE BY MOUTH THREE TIMES A DAY, Disp: 270 capsule, Rfl: 0  •  metoprolol succinate XL (TOPROL-XL) 25 MG 24 hr tablet, Take 1 tablet by mouth 2 (Two) Times a Day., Disp: 180 tablet, Rfl: 3  •  montelukast (SINGULAIR) 10 MG tablet, Take 1 tablet by mouth Every Night., Disp: 90 tablet, Rfl: 1  •  simvastatin (ZOCOR) 20 MG tablet, TAKE ONE TABLET BY MOUTH DAILY, Disp: 90 tablet, Rfl: 0  •  XARELTO 20 MG tablet, TAKE ONE TABLET BY MOUTH DAILY WITH DINNER, Disp: 90 tablet, Rfl: 3    Past Medical History:   Diagnosis Date   • A-fib (CMS/[facility]) 05/31/2017   • Breast nodule    • Chest wall mass    • Closed fracture of head of metacarpal     left second   • Closed fracture of metacarpal bone     left   • DDD (degenerative disc disease), lumbar    • Depression    • Difficulty breathing    • Diverticulitis 2010   • Fatigue    • GERD (gastroesophageal reflux disease)    • Hip pain, right    • Hyperlipidemia    • Leiomyoma of uterus    • Osteoarthritis    • Osteopenia    • Primary localized osteoarthritis of hip    • Sciatica    • Skin cancer 2010   • Spinal stenosis        Past Surgical History:   Procedure Laterality Date   • MOHS SURGERY      chemosurgery (Mohs Micrographic Technique); Dr Jarrett and Dr Franco   • TRIANA'S NEUROMA EXCISION      single neuroma   • TONSILLECTOMY  "     age 5   • TONSILLECTOMY AND ADENOIDECTOMY  5       Family History   Problem Relation Age of Onset   • Cancer Mother         bladder   • Osteoporosis Mother    • Heart failure Mother    • Lung cancer Father    • No Known Problems Sister        Social History     Tobacco Use   • Smoking status: Former Smoker     Last attempt to quit:      Years since quittin.4   • Smokeless tobacco: Never Used   Substance Use Topics   • Alcohol use: Yes     Comment: social drinker   • Drug use: No         ECG 12 Lead  Date/Time: 2020 4:46 PM  Performed by: Adamaris Blue MD  Authorized by: Adamaris Blue MD   Comparison: compared with previous ECG   Comparison to previous ECG: Atrial fibrillation is now present  Rhythm: atrial fibrillation  Ectopy: unifocal PVCs               Objective:     Visit Vitals  /60   Pulse 108   Ht 170.2 cm (67\")   Wt 96.6 kg (213 lb)   BMI 33.36 kg/m²         Physical Exam   Constitutional: She is oriented to person, place, and time. She appears well-developed and well-nourished.   HENT:   Head: Normocephalic and atraumatic.   Eyes: Pupils are equal, round, and reactive to light. Conjunctivae, EOM and lids are normal.   Neck: Normal range of motion and full passive range of motion without pain. Neck supple. No JVD present. Carotid bruit is not present.   Cardiovascular: Normal rate, S1 normal and S2 normal. An irregularly irregular rhythm present. Exam reveals no gallop.   No murmur heard.  Pulses:       Radial pulses are 2+ on the right side, and 2+ on the left side.   Pulmonary/Chest: Effort normal and breath sounds normal.   Abdominal: Soft. Normal appearance.   Musculoskeletal: She exhibits edema.   Trace lower extremity edema   Lymphadenopathy:     She has no cervical adenopathy.   Neurological: She is alert and oriented to person, place, and time.   Skin: Skin is warm, dry and intact.   Psychiatric: She has a normal mood and affect.           Assessment:          " Diagnosis Plan   1. Persistent atrial fibrillation     2. Cardiomyopathy, unspecified type (CMS/HCC)     3. Hyperlipidemia, unspecified hyperlipidemia type            Plan:       1.  Persistent atrial fibrillation.  I think this is largely responsible for her symptoms.  At this point is unclear to me whether rate control versus rhythm control strategy would be better.  I do not think we have a lot of room from a blood pressure standpoint to push the metoprolol dosage much further.  I discussed the case with Dr. Wagner.  He recommended switching her to atenolol 50 mg twice a day and setting her up for a cardioversion next week.  If she is able to convert to sinus rhythm we will then be able to determine if her symptoms improve in sinus rhythm.  If that is the case even if she does revert back to atrial fibrillation we will then know if a rhythm strategy is worth pursuing.  In the meanwhile she will continue to remain compliant with the rivaroxaban.  2.  Cardiomyopathy.  I strongly suspect this is related to her atrial fibrillation.  Although we have not ruled out ischemia I think this is less likely.  I suspect her LV function will improve once we are able to obtain either better rate control or get her back in sinus rhythm.  3.  Hyperlipidemia.  On simvastatin which is managed by Dr. Guerra.    She will be scheduled for a cardioversion with Dr. Wagner next week.  Further recommendations regarding her management will be determined at that time.

## 2020-05-26 ENCOUNTER — LAB (OUTPATIENT)
Dept: LAB | Facility: HOSPITAL | Age: 70
End: 2020-05-26

## 2020-05-26 DIAGNOSIS — I48.19 PERSISTENT ATRIAL FIBRILLATION (HCC): ICD-10-CM

## 2020-05-26 DIAGNOSIS — Z13.6 SCREENING FOR CARDIOVASCULAR CONDITION: ICD-10-CM

## 2020-05-26 DIAGNOSIS — Z01.810 PREPROCEDURAL CARDIOVASCULAR EXAMINATION: ICD-10-CM

## 2020-05-26 LAB
ANION GAP SERPL CALCULATED.3IONS-SCNC: 10 MMOL/L (ref 5–15)
BUN BLD-MCNC: 18 MG/DL (ref 8–23)
BUN/CREAT SERPL: 25 (ref 7–25)
CALCIUM SPEC-SCNC: 9.2 MG/DL (ref 8.6–10.5)
CHLORIDE SERPL-SCNC: 106 MMOL/L (ref 98–107)
CO2 SERPL-SCNC: 25 MMOL/L (ref 22–29)
CREAT BLD-MCNC: 0.72 MG/DL (ref 0.57–1)
GFR SERPL CREATININE-BSD FRML MDRD: 80 ML/MIN/1.73
GLUCOSE BLD-MCNC: 82 MG/DL (ref 65–99)
POTASSIUM BLD-SCNC: 4.4 MMOL/L (ref 3.5–5.2)
SODIUM BLD-SCNC: 141 MMOL/L (ref 136–145)

## 2020-05-26 PROCEDURE — 36415 COLL VENOUS BLD VENIPUNCTURE: CPT

## 2020-05-26 PROCEDURE — C9803 HOPD COVID-19 SPEC COLLECT: HCPCS

## 2020-05-26 PROCEDURE — 80048 BASIC METABOLIC PNL TOTAL CA: CPT

## 2020-05-26 PROCEDURE — U0004 COV-19 TEST NON-CDC HGH THRU: HCPCS

## 2020-05-27 LAB
REF LAB TEST METHOD: NORMAL
SARS-COV-2 RNA RESP QL NAA+PROBE: NOT DETECTED

## 2020-05-28 ENCOUNTER — HOSPITAL ENCOUNTER (OUTPATIENT)
Dept: CARDIOLOGY | Facility: HOSPITAL | Age: 70
Discharge: HOME | End: 2020-05-28
Admitting: INTERNAL MEDICINE

## 2020-05-28 VITALS
OXYGEN SATURATION: 95 % | WEIGHT: 200 LBS | HEIGHT: 67 IN | BODY MASS INDEX: 31.39 KG/M2 | RESPIRATION RATE: 16 BRPM | DIASTOLIC BLOOD PRESSURE: 90 MMHG | SYSTOLIC BLOOD PRESSURE: 118 MMHG | HEART RATE: 59 BPM | TEMPERATURE: 97.3 F

## 2020-05-28 DIAGNOSIS — I48.19 PERSISTENT ATRIAL FIBRILLATION (HCC): ICD-10-CM

## 2020-05-28 DIAGNOSIS — I42.9 CARDIOMYOPATHY, UNSPECIFIED TYPE (HCC): ICD-10-CM

## 2020-05-28 PROCEDURE — 93005 ELECTROCARDIOGRAM TRACING: CPT | Performed by: INTERNAL MEDICINE

## 2020-05-28 PROCEDURE — 93010 ELECTROCARDIOGRAM REPORT: CPT | Performed by: INTERNAL MEDICINE

## 2020-05-28 PROCEDURE — 92960 CARDIOVERSION ELECTRIC EXT: CPT | Performed by: INTERNAL MEDICINE

## 2020-05-28 PROCEDURE — 92960 CARDIOVERSION ELECTRIC EXT: CPT

## 2020-05-28 RX ORDER — SODIUM CHLORIDE 0.9 % (FLUSH) 0.9 %
3 SYRINGE (ML) INJECTION EVERY 12 HOURS SCHEDULED
Status: DISCONTINUED | OUTPATIENT
Start: 2020-05-28 | End: 2020-05-28 | Stop reason: HOSPADM

## 2020-05-28 RX ORDER — SODIUM CHLORIDE 0.9 % (FLUSH) 0.9 %
10 SYRINGE (ML) INJECTION AS NEEDED
Status: DISCONTINUED | OUTPATIENT
Start: 2020-05-28 | End: 2020-05-28 | Stop reason: HOSPADM

## 2020-05-28 RX ORDER — SODIUM CHLORIDE 9 MG/ML
75 INJECTION, SOLUTION INTRAVENOUS CONTINUOUS
Status: DISCONTINUED | OUTPATIENT
Start: 2020-05-28 | End: 2020-05-29 | Stop reason: HOSPADM

## 2020-05-28 RX ADMIN — SODIUM CHLORIDE 75 ML/HR: 9 INJECTION, SOLUTION INTRAVENOUS at 12:04

## 2020-05-28 RX ADMIN — METHOHEXITAL SODIUM 60 MG: 500 INJECTION, POWDER, LYOPHILIZED, FOR SOLUTION INTRAMUSCULAR; INTRAVENOUS; RECTAL at 12:30

## 2020-06-01 ENCOUNTER — TELEPHONE (OUTPATIENT)
Dept: CARDIOLOGY | Facility: CLINIC | Age: 70
End: 2020-06-01

## 2020-06-01 NOTE — TELEPHONE ENCOUNTER
Josefina, please call patient to schedule appt with Dr. Wagner s/p cardioversion on 5/28.    Thank you!

## 2020-06-01 NOTE — TELEPHONE ENCOUNTER
She can start with the appointment with Bernard and they can decide when she needs to see me after that.

## 2020-06-01 NOTE — TELEPHONE ENCOUNTER
Dr. Wagner cardioverted Mrs. Osuna on 5/28.  She was told that she needed to f/u with Dr. Wagner in 2 weeks and Dr. Blue in 1 month.      Patient would like to know if she needs both appts or just needs to see 1 of you.

## 2020-06-03 ENCOUNTER — TELEPHONE (OUTPATIENT)
Dept: CARDIOLOGY | Facility: CLINIC | Age: 70
End: 2020-06-03

## 2020-06-03 NOTE — TELEPHONE ENCOUNTER
Patient s/p ablation on 5/28, called today reporting that she has been in AFIB.  She can't tell if it's constant, but def at times she can tell she is in AFIB and would like to discuss.    She can be reached at [phone].    Thank you!

## 2020-06-03 NOTE — TELEPHONE ENCOUNTER
Called and spoke with patient.  She believes she went back into atrial fibrillation 3 days ago.  She did not feel much improvement in her breathing when she was in sinus rhythm versus atrial fibrillation.  I told her I would touch base with Dr. Wagner and we would get back with her.

## 2020-06-04 NOTE — TELEPHONE ENCOUNTER
I think I should do a visit with her in the office  However --if no diff in QOL in SR then I would not rec further rhythm control

## 2020-06-09 ENCOUNTER — HOSPITAL ENCOUNTER (OUTPATIENT)
Dept: CT IMAGING | Facility: HOSPITAL | Age: 70
Discharge: HOME | End: 2020-06-09
Admitting: INTERNAL MEDICINE

## 2020-06-09 ENCOUNTER — HOSPITAL ENCOUNTER (OUTPATIENT)
Dept: CT IMAGING | Facility: HOSPITAL | Age: 70
Discharge: HOME | End: 2020-06-09

## 2020-06-09 DIAGNOSIS — Z12.2 ENCOUNTER FOR SCREENING FOR LUNG CANCER: ICD-10-CM

## 2020-06-09 DIAGNOSIS — Z87.891 HISTORY OF TOBACCO ABUSE: ICD-10-CM

## 2020-06-09 DIAGNOSIS — R10.32 LEFT LOWER QUADRANT ABDOMINAL PAIN: ICD-10-CM

## 2020-06-09 PROCEDURE — 74176 CT ABD & PELVIS W/O CONTRAST: CPT

## 2020-06-09 PROCEDURE — G0297 LDCT FOR LUNG CA SCREEN: HCPCS

## 2020-06-12 NOTE — PROGRESS NOTES
Subjective   History of Present Illness: Nessa Osuna is a 70 y.o. female for televisit follow up.  Patient states that she did not go to pain management as recommended at her last visit, but would like to now.    Patient was last seen 9.11.19 for low back, hip, leg and foot pain    Patient states that she is currently having intermittent low back and left left hip and knee pain with prolonged walking, she denies leg weakness or N/T, urinary incontinence or problems with her balance and gait.  She denies leg pain    You have chosen to receive care through a telephone visit. Do you consent to use a telephone visit for your medical care today? Yes, at home    This was a televisit from my office. The patient was at home. The visit lasted 8 minutes. I have been following her for lumbar stenosis at L4-L5 with a spondylolisthesis. Previously she had been trying to manage this with gabapentin only, which she continues to take at 300 mg t.i.d., but things have progressed in her back and her right leg to the extent that she cannot sleep well and she now is exhibiting a shopping cart sign when she goes to the store. Standing for a long period of time brings on the pain and sitting helps it. She wants to try the epidural blocks. She has been diagnosed with atrial fibrillation and is on Xarelto, so she would need to come off of that temporarily for a block, but I think it is reasonable to do at this point. We will have her come back for a followup visit in 2 months. Hopefully, we can avoid surgery.       Back Pain   The problem occurs intermittently. The pain is present in the lumbar spine. The pain radiates to the left thigh. The pain is at a severity of 5/10. The pain is moderate. The symptoms are aggravated by position, lying down, standing, sitting, twisting and bending. Pertinent negatives include no leg pain, numbness or weakness.       The following portions of the patient's history were reviewed and updated as  appropriate: allergies, current medications, past family history, past medical history, past social history, past surgical history and problem list.    Review of Systems   Constitutional: Negative.    HENT: Negative.    Eyes: Negative.    Respiratory: Negative.    Cardiovascular: Negative.    Gastrointestinal: Negative.    Endocrine: Negative.    Genitourinary: Negative for urgency.   Musculoskeletal: Positive for back pain. Negative for arthralgias, gait problem and myalgias.   Allergic/Immunologic: Negative.    Neurological: Negative for weakness and numbness.   Hematological: Negative.    Psychiatric/Behavioral: Negative.        Objective             Physical Exam   Deferred  Neurologic Exam   Deferred        Assessment/Plan   Independent Review of Radiographic Studies:      I personally reviewed the images from the following studies.    I reviewed her lumbar MRI done in February 2019 which did show severe spinal stenosis at L4-L5 with a 7 mm anterolisthesis.  Agree with the report.    Medical Decision Making:      We will go ahead and set up the lumbar epidural blocks and have her follow-up with me in 2 months.  Hopefully we can avoid surgery but it might come to that given the severity of her stenosis.      Nessa was seen today for back pain, hip pain and knee pain.    Diagnoses and all orders for this visit:    Spinal stenosis of lumbar region with neurogenic claudication  -     Epidural Block    Spondylolisthesis at L4-L5 level  -     Epidural Block      Return in about 2 months (around 8/24/2020) for Face-to-face visit.

## 2020-06-15 RX ORDER — GABAPENTIN 300 MG/1
CAPSULE ORAL
Qty: 270 CAPSULE | Refills: 0 | Status: SHIPPED | OUTPATIENT
Start: 2020-06-15 | End: 2020-09-18

## 2020-06-15 RX ORDER — MONTELUKAST SODIUM 10 MG/1
TABLET ORAL
Qty: 90 TABLET | Refills: 0 | Status: SHIPPED | OUTPATIENT
Start: 2020-06-15 | End: 2020-09-18

## 2020-06-15 RX ORDER — SIMVASTATIN 20 MG
20 TABLET ORAL DAILY
Qty: 90 TABLET | Refills: 0 | Status: SHIPPED | OUTPATIENT
Start: 2020-06-15 | End: 2020-09-18

## 2020-06-16 ENCOUNTER — DOCUMENTATION (OUTPATIENT)
Dept: INTERNAL MEDICINE | Facility: CLINIC | Age: 70
End: 2020-06-16

## 2020-06-16 DIAGNOSIS — R11.11 NON-INTRACTABLE VOMITING WITHOUT NAUSEA, UNSPECIFIED VOMITING TYPE: ICD-10-CM

## 2020-06-16 DIAGNOSIS — R10.31 RIGHT LOWER QUADRANT ABDOMINAL PAIN: Primary | ICD-10-CM

## 2020-06-16 RX ORDER — FAMOTIDINE 40 MG/1
40 TABLET, FILM COATED ORAL DAILY
Qty: 90 TABLET | Refills: 1 | Status: SHIPPED | OUTPATIENT
Start: 2020-06-16 | End: 2020-06-30

## 2020-06-16 NOTE — PROGRESS NOTES
Patient w/ abdominal pain and emesis. She had ct abdomen and pelvis. She has no true source for this mild hiatal hernia. Uterine fibroids. She will try pepcid one daily, follow up w/ gyn, and will have gi evaluation given frequent emesis. She will f/u here as scheduled.     dalila

## 2020-06-23 DIAGNOSIS — Z00.00 HEALTHCARE MAINTENANCE: Primary | ICD-10-CM

## 2020-06-24 ENCOUNTER — OFFICE VISIT (OUTPATIENT)
Dept: NEUROSURGERY | Facility: CLINIC | Age: 70
End: 2020-06-24

## 2020-06-24 ENCOUNTER — OFFICE VISIT (OUTPATIENT)
Dept: CARDIOLOGY | Facility: CLINIC | Age: 70
End: 2020-06-24

## 2020-06-24 VITALS
DIASTOLIC BLOOD PRESSURE: 98 MMHG | SYSTOLIC BLOOD PRESSURE: 130 MMHG | HEART RATE: 107 BPM | BODY MASS INDEX: 32.65 KG/M2 | HEIGHT: 67 IN | WEIGHT: 208 LBS

## 2020-06-24 DIAGNOSIS — I48.19 PERSISTENT ATRIAL FIBRILLATION (HCC): Primary | ICD-10-CM

## 2020-06-24 DIAGNOSIS — M43.16 SPONDYLOLISTHESIS AT L4-L5 LEVEL: ICD-10-CM

## 2020-06-24 DIAGNOSIS — M48.062 SPINAL STENOSIS OF LUMBAR REGION WITH NEUROGENIC CLAUDICATION: Primary | ICD-10-CM

## 2020-06-24 LAB
ALBUMIN SERPL-MCNC: 4.9 G/DL (ref 3.5–5.2)
ALBUMIN/GLOB SERPL: 2.2 G/DL
ALP SERPL-CCNC: 76 U/L (ref 39–117)
ALT SERPL-CCNC: 22 U/L (ref 1–33)
AST SERPL-CCNC: 26 U/L (ref 1–32)
BASOPHILS # BLD AUTO: 0.05 10*3/MM3 (ref 0–0.2)
BASOPHILS NFR BLD AUTO: 1 % (ref 0–1.5)
BILIRUB SERPL-MCNC: 1.7 MG/DL (ref 0.2–1.2)
BUN SERPL-MCNC: 15 MG/DL (ref 8–23)
BUN/CREAT SERPL: 21.1 (ref 7–25)
CALCIUM SERPL-MCNC: 9.8 MG/DL (ref 8.6–10.5)
CHLORIDE SERPL-SCNC: 104 MMOL/L (ref 98–107)
CHOLEST SERPL-MCNC: 152 MG/DL (ref 0–200)
CO2 SERPL-SCNC: 28.5 MMOL/L (ref 22–29)
CREAT SERPL-MCNC: 0.71 MG/DL (ref 0.57–1)
EOSINOPHIL # BLD AUTO: 0.38 10*3/MM3 (ref 0–0.4)
EOSINOPHIL NFR BLD AUTO: 7.6 % (ref 0.3–6.2)
ERYTHROCYTE [DISTWIDTH] IN BLOOD BY AUTOMATED COUNT: 12.7 % (ref 12.3–15.4)
GLOBULIN SER CALC-MCNC: 2.2 GM/DL
GLUCOSE SERPL-MCNC: 93 MG/DL (ref 65–99)
HCT VFR BLD AUTO: 41.4 % (ref 34–46.6)
HDLC SERPL-MCNC: 57 MG/DL (ref 40–60)
HGB BLD-MCNC: 14.1 G/DL (ref 12–15.9)
IMM GRANULOCYTES # BLD AUTO: 0.02 10*3/MM3 (ref 0–0.05)
IMM GRANULOCYTES NFR BLD AUTO: 0.4 % (ref 0–0.5)
LDLC SERPL CALC-MCNC: 80 MG/DL (ref 0–100)
LDLC/HDLC SERPL: 1.4 {RATIO}
LYMPHOCYTES # BLD AUTO: 1.32 10*3/MM3 (ref 0.7–3.1)
LYMPHOCYTES NFR BLD AUTO: 26.3 % (ref 19.6–45.3)
MCH RBC QN AUTO: 30.9 PG (ref 26.6–33)
MCHC RBC AUTO-ENTMCNC: 34.1 G/DL (ref 31.5–35.7)
MCV RBC AUTO: 90.6 FL (ref 79–97)
MONOCYTES # BLD AUTO: 0.64 10*3/MM3 (ref 0.1–0.9)
MONOCYTES NFR BLD AUTO: 12.7 % (ref 5–12)
NEUTROPHILS # BLD AUTO: 2.61 10*3/MM3 (ref 1.7–7)
NEUTROPHILS NFR BLD AUTO: 52 % (ref 42.7–76)
NRBC BLD AUTO-RTO: 0 /100 WBC (ref 0–0.2)
PLATELET # BLD AUTO: 212 10*3/MM3 (ref 140–450)
POTASSIUM SERPL-SCNC: 3.9 MMOL/L (ref 3.5–5.2)
PROT SERPL-MCNC: 7.1 G/DL (ref 6–8.5)
RBC # BLD AUTO: 4.57 10*6/MM3 (ref 3.77–5.28)
SODIUM SERPL-SCNC: 142 MMOL/L (ref 136–145)
TRIGL SERPL-MCNC: 75 MG/DL (ref 0–150)
TSH SERPL DL<=0.005 MIU/L-ACNC: 2.8 UIU/ML (ref 0.27–4.2)
UNABLE TO VOID: NORMAL
VLDLC SERPL CALC-MCNC: 15 MG/DL
WBC # BLD AUTO: 5.02 10*3/MM3 (ref 3.4–10.8)

## 2020-06-24 PROCEDURE — 93000 ELECTROCARDIOGRAM COMPLETE: CPT | Performed by: INTERNAL MEDICINE

## 2020-06-24 PROCEDURE — 99441 PR PHYS/QHP TELEPHONE EVALUATION 5-10 MIN: CPT | Performed by: NEUROLOGICAL SURGERY

## 2020-06-24 PROCEDURE — 99214 OFFICE O/P EST MOD 30 MIN: CPT | Performed by: INTERNAL MEDICINE

## 2020-06-24 RX ORDER — ATENOLOL 50 MG/1
50 TABLET ORAL EVERY 12 HOURS
Qty: 180 TABLET | Refills: 1 | Status: SHIPPED | OUTPATIENT
Start: 2020-06-24 | End: 2020-08-17

## 2020-06-24 NOTE — PROGRESS NOTES
Date of Office Visit: 2020  Encounter Provider: Buzz Wagner MD  Place of Service: [facility] CARDIOLOGY  Patient Name: Nessa Osuna  : 1950    Subjective:     Encounter Date:2020      Patient ID: Nessa Osuna is a 70 y.o. female who has a cc of  pers AF and recent CV two weeks ago.     Her soa and la are a problem. She is in AF most of the time. She tells by pulse.     Holter -in APril showed 100% AF      She thinks the SR lasted 2 days and felt somewhat better.     Maybe more alert.     There have been no hospital admission since the last visit.     There have been no bleeding events.       Past Medical History:   Diagnosis Date   • A-fib (CMS/[facility]) 2017   • Breast nodule    • Chest wall mass    • Closed fracture of head of metacarpal     left second   • Closed fracture of metacarpal bone     left   • DDD (degenerative disc disease), lumbar    • Depression    • Difficulty breathing    • Diverticulitis    • Fatigue    • GERD (gastroesophageal reflux disease)    • Hip pain, right    • Hyperlipidemia    • Leiomyoma of uterus    • Osteoarthritis    • Osteopenia    • Primary localized osteoarthritis of hip    • Sciatica    • Skin cancer    • Spinal stenosis        Social History     Socioeconomic History   • Marital status: Single     Spouse name: Not on file   • Number of children: 0   • Years of education: 14   • Highest education level: Not asked   Occupational History   • Occupation: RETIRED   Social Needs   • Financial resource strain: Patient refused   • Food insecurity:     Worry: Patient refused     Inability: Patient refused   • Transportation needs:     Medical: Patient refused     Non-medical: Patient refused   Tobacco Use   • Smoking status: Former Smoker     Last attempt to quit:      Years since quittin.4   • Smokeless tobacco: Never Used   Substance and Sexual Activity   • Alcohol use: Yes     Comment: social drinker   •  "Drug use: No   • Sexual activity: Defer   Social History Narrative    LIVES ALONE       Review of Systems   Constitution: Negative for fever and night sweats.   HENT: Negative for ear pain and stridor.    Eyes: Negative for discharge and visual halos.   Cardiovascular: Negative for cyanosis.   Respiratory: Negative for hemoptysis and sputum production.    Hematologic/Lymphatic: Negative for adenopathy.   Skin: Negative for nail changes and unusual hair distribution.   Musculoskeletal: Negative for gout and joint swelling.   Gastrointestinal: Negative for bowel incontinence and flatus.   Genitourinary: Negative for dysuria and flank pain.   Neurological: Negative for seizures and tremors.   Psychiatric/Behavioral: Negative for altered mental status. The patient is not nervous/anxious.             Objective:     Vitals:    06/24/20 0847   BP: 130/98   BP Location: Right arm   Patient Position: Sitting   Cuff Size: Large Adult   Pulse: 107   Weight: 94.3 kg (208 lb)   Height: 170.2 cm (67\")         Physical Exam   Constitutional: She is oriented to person, place, and time.   HENT:   Head: Normocephalic and atraumatic.   Eyes: Right eye exhibits no discharge. Left eye exhibits no discharge.   Neck: No JVD present. No thyromegaly present.   Cardiovascular: Normal rate. An irregularly irregular rhythm present. Exam reveals no gallop and no friction rub.   No murmur heard.  Pulmonary/Chest: Effort normal and breath sounds normal. She has no rales.   Abdominal: Soft. Bowel sounds are normal. There is no tenderness.   Musculoskeletal: Normal range of motion. She exhibits no edema or deformity.   Neurological: She is alert and oriented to person, place, and time. She exhibits normal muscle tone.   Skin: Skin is warm and dry. No erythema.   Psychiatric: She has a normal mood and affect. Her behavior is normal. Thought content normal.         ECG 12 Lead  Date/Time: 6/24/2020 9:08 AM  Performed by: Buzz Wagner, " MD  Authorized by: Buzz Wagner MD   Comparison: compared with previous ECG   Similar to previous ECG  Rhythm: atrial fibrillation  Other findings: non-specific ST-T wave changes    Clinical impression: abnormal EKG            Lab Review:       Assessment:          Diagnosis Plan   1. Persistent atrial fibrillation            Plan:     She has had intermittent AF for years but recently seems to have dev either persistent AF or a lot more AF.     She has symptoms from this.    Echo -- mild LV dysfunction but also LA enlargement     I think she   1) needs sleep eval   2) Zio to determine is this indeed paroxysmal -- IF PAF then I might try propafenone and if persistent sotalol or dofetilide.  3) I think for now we should increase atenolol to 50 every 12 hours.

## 2020-06-26 ENCOUNTER — TELEPHONE (OUTPATIENT)
Dept: CARDIOLOGY | Facility: CLINIC | Age: 70
End: 2020-06-26

## 2020-06-30 ENCOUNTER — OFFICE VISIT (OUTPATIENT)
Dept: INTERNAL MEDICINE | Facility: CLINIC | Age: 70
End: 2020-06-30

## 2020-06-30 VITALS
OXYGEN SATURATION: 97 % | HEIGHT: 67 IN | DIASTOLIC BLOOD PRESSURE: 67 MMHG | WEIGHT: 214 LBS | TEMPERATURE: 97.5 F | HEART RATE: 59 BPM | BODY MASS INDEX: 33.59 KG/M2 | SYSTOLIC BLOOD PRESSURE: 108 MMHG

## 2020-06-30 DIAGNOSIS — Z00.00 HEALTHCARE MAINTENANCE: Primary | ICD-10-CM

## 2020-06-30 DIAGNOSIS — Z12.31 ENCOUNTER FOR SCREENING MAMMOGRAM FOR BREAST CANCER: ICD-10-CM

## 2020-06-30 DIAGNOSIS — I48.19 PERSISTENT ATRIAL FIBRILLATION (HCC): ICD-10-CM

## 2020-06-30 DIAGNOSIS — E78.5 HYPERLIPIDEMIA, UNSPECIFIED HYPERLIPIDEMIA TYPE: ICD-10-CM

## 2020-06-30 PROCEDURE — 96160 PT-FOCUSED HLTH RISK ASSMT: CPT | Performed by: INTERNAL MEDICINE

## 2020-06-30 PROCEDURE — G0439 PPPS, SUBSEQ VISIT: HCPCS | Performed by: INTERNAL MEDICINE

## 2020-06-30 PROCEDURE — 99214 OFFICE O/P EST MOD 30 MIN: CPT | Performed by: INTERNAL MEDICINE

## 2020-06-30 NOTE — PROGRESS NOTES
Subjective   AWV  DDD  Atrial fibrillation  KAREN  Dizziness    Nessa Osuna is a 70 y.o. female who presents for an annual wellness visit as well as check up of chronic issues and acute needs.       History of Present Illness   Patient w/ atrial fibrillation. She recently had cardioversion. She still is having symptoms from atrial fibrillation w/ palpitations. She feels this more on her left side particularly when lying down. Normally does not have dizziness but did have dizziness when getting out of the car. She is having dyspnea. She has a zio monitor on now to see what her rhythm is when she is having symptoms.       Review of Systems    The following portions of the patient's history were reviewed and updated as appropriate: allergies, current medications, past family history, past medical history, past social history, past surgical history and problem list.  Health maintenance tab was reviewed and updated with the patient.       Patient Active Problem List    Diagnosis Date Noted   • Cardiomyopathy (CMS/[facility]) 04/09/2020   • Spinal stenosis of lumbar region with neurogenic claudication 03/04/2019   • Spondylolisthesis at L4-L5 level 03/04/2019   • Vitamin D deficiency 05/30/2018   • Persistent atrial fibrillation 05/31/2017   • Healthcare maintenance 11/29/2016   • DDD (degenerative disc disease), lumbar 11/29/2016   • Hyperlipidemia 11/22/2016   • Osteoarthritis 11/22/2016   • Depression 11/22/2016   • Osteopenia 11/22/2016   • Sciatica 11/22/2016   • Diverticulosis 01/01/2010       Past Medical History:   Diagnosis Date   • A-fib (CMS/[facility]) 05/31/2017   • Breast nodule    • Chest wall mass    • Closed fracture of head of metacarpal     left second   • Closed fracture of metacarpal bone     left   • DDD (degenerative disc disease), lumbar    • Depression    • Difficulty breathing    • Diverticulitis 2010   • Fatigue    • GERD (gastroesophageal reflux disease)    • Hip pain, right    • Hyperlipidemia    •  Leiomyoma of uterus    • Osteoarthritis    • Osteopenia    • Primary localized osteoarthritis of hip    • Sciatica    • Skin cancer    • Spinal stenosis        Past Surgical History:   Procedure Laterality Date   • MOHS SURGERY      chemosurgery (Mohs Micrographic Technique); Dr Jarrett and Dr Franco   • TRIANA'S NEUROMA EXCISION      single neuroma   • RECONSTRUCTION OF NOSE     • TONSILLECTOMY      age 5   • TONSILLECTOMY AND ADENOIDECTOMY         Family History   Problem Relation Age of Onset   • Cancer Mother         bladder   • Osteoporosis Mother    • Heart failure Mother    • Lung cancer Father    • No Known Problems Sister        Social History     Socioeconomic History   • Marital status: Single     Spouse name: Not on file   • Number of children: 0   • Years of education: 14   • Highest education level: Not asked   Occupational History   • Occupation: RETIRED   Social Needs   • Financial resource strain: Patient refused   • Food insecurity:     Worry: Patient refused     Inability: Patient refused   • Transportation needs:     Medical: Patient refused     Non-medical: Patient refused   Tobacco Use   • Smoking status: Former Smoker     Last attempt to quit:      Years since quittin.5   • Smokeless tobacco: Never Used   Substance and Sexual Activity   • Alcohol use: Yes     Comment: social drinker   • Drug use: No   • Sexual activity: Defer   Social History Narrative    LIVES ALONE       Current Outpatient Medications on File Prior to Visit   Medication Sig Dispense Refill   • acetaminophen (TYLENOL) 500 MG tablet Take 500 mg by mouth Every Night. Take 1 tablet every 4-6 hours as needed     • atenolol (TENORMIN) 50 MG tablet Take 1 tablet by mouth Every 12 (Twelve) Hours. 180 tablet 1   • Cholecalciferol (VITAMIN D) 2000 UNITS capsule Take 1 capsule by mouth daily.     • DULoxetine (CYMBALTA) 60 MG capsule TAKE ONE CAPSULE BY MOUTH DAILY 90 capsule 0   • Flaxseed, Linseed, (FLAXSEED OIL) 1000  "MG capsule Take 1 capsule by mouth Daily.     • fluticasone (FLONASE) 50 MCG/ACT nasal spray PLACE TWO SPRAYS IN EACH NOSTRIL ONCE DAILY 1 bottle 4   • gabapentin (NEURONTIN) 300 MG capsule TAKE 1 CAPSULE BY MOUTH THREE TIMES A  capsule 0   • montelukast (SINGULAIR) 10 MG tablet TAKE ONE TABLET BY MOUTH ONCE NIGHTLY 90 tablet 0   • simvastatin (ZOCOR) 20 MG tablet Take 1 tablet by mouth Daily. 90 tablet 0   • XARELTO 20 MG tablet TAKE ONE TABLET BY MOUTH DAILY WITH DINNER 90 tablet 3   • [DISCONTINUED] famotidine (Pepcid) 40 MG tablet Take 1 tablet by mouth Daily. 90 tablet 1     No current facility-administered medications on file prior to visit.        No Known Allergies    Immunization History   Administered Date(s) Administered   • Flu Mist 09/15/2015   • Fluzone High Dose =>65 Years ([employer] ONLY) 09/23/2018, 10/05/2019   • Hepatitis A 01/01/1970, 01/01/1970, 01/01/1970, 03/03/1999   • Hepatitis B 01/01/1970, 01/01/1970, 01/01/1970, 01/01/1970   • Influenza, Unspecified 10/08/2019   • Pneumococcal Conjugate 13-Valent (PCV13) 06/18/2019   • Pneumococcal Polysaccharide (PPSV23) 09/15/2015   • Shingrix 09/23/2018, 03/27/2019   • Td 03/03/1999, 01/05/2016   • Tdap 11/13/2007, 05/24/2018   • Zostavax 07/24/2011       Objective     /67   Pulse 59   Temp 97.5 °F (36.4 °C) (Temporal)   Ht 170.2 cm (67.01\")   Wt 97.1 kg (214 lb)   SpO2 97%   BMI 33.51 kg/m²     Physical Exam   Constitutional: She is oriented to person, place, and time. She appears well-developed and well-nourished.   HENT:   Head: Normocephalic and atraumatic.   Right Ear: External ear normal.   Left Ear: External ear normal.   Nose: Nose normal.   Mouth/Throat: Oropharynx is clear and moist.   Eyes: Pupils are equal, round, and reactive to light. Conjunctivae and EOM are normal.   Neck: Normal range of motion. Neck supple.   Cardiovascular: Normal rate, regular rhythm, normal heart sounds and intact distal pulses.   Pulmonary/Chest: " Effort normal and breath sounds normal. No respiratory distress.   Abdominal: Soft. Bowel sounds are normal.   Genitourinary: Vagina normal and uterus normal.   Musculoskeletal: Normal range of motion.   Neurological: She is alert and oriented to person, place, and time.   Skin: Skin is warm and dry.   Psychiatric: She has a normal mood and affect. Her behavior is normal. Judgment and thought content normal.   Nursing note and vitals reviewed.      Assessment/Plan   Nessa was seen today for medicare wellness-subsequent.    Diagnoses and all orders for this visit:    Healthcare maintenance    Encounter for screening mammogram for breast cancer  -     Mammo screening digital tomosynthesis bilateral w CAD    Hyperlipidemia, unspecified hyperlipidemia type    Persistent atrial fibrillation        Discussion    AWV.  See below for maximilian history, PHQ-9, functional ability questionnaire, cognitive impairment screening.  Direct observation of cognitive abilities:  Normal. These were all reviewed with the patient and the patient was provided with a personal prevention plan of service in patient instructions.  Patient was given advice or handouts on the following topics:  nutrition, fall prevention, exercise, weight management.   ACP discussion was held with the patient during this visit. Patient has an advance directive (not in EMR), copy requested..  Immunizations are all complete. Patient previously was having LLQ abdominal pain. This has not been present for one month. Ct abd/ pelvis wnel 3 months ago. Neg cologuard 2017. Patient has atrial fibrillation w/ palpitations. Will check larissa monitor results. Suspect she had orthostatic hypotension earlier today. She will increase hydration and use compression stockings. Routine physical activity. She she will get a screenign mammogram. She will get a handicap permit given ddd and some gait impairment. She will follow up in 6 months or prn.     Depression Screen:     PHQ-2/PHQ-9 Depression Screening 6/30/2020   Little interest or pleasure in doing things 0   Feeling down, depressed, or hopeless 0   Total Score 0       Fall Risk Screen:  TIERAADI Fall Risk Assessment was completed, and patient is at LOW risk for falls.Assessment completed on:6/30/2020    Health Habits and Functional/Cognitive screen:  Functional & Cognitive Status 6/30/2020   Do you have difficulty preparing food and eating? No   Do you have difficulty bathing yourself, getting dressed or grooming yourself? No   Do you have difficulty using the toilet? No   Do you have difficulty moving around from place to place? Yes   Do you have trouble with steps or getting out of a bed or a chair? Yes   Current Diet Well Balanced Diet   Dental Exam Up to date   Eye Exam Up to date   Exercise (times per week) 7 times per week   Current Exercise Activities Include Walking   Do you need help using the phone?  No   Are you deaf or do you have serious difficulty hearing?  No   Do you need help with transportation? No   Do you need help shopping? No   Do you need help preparing meals?  No   Do you need help with housework?  No   Do you need help with laundry? No   Do you need help taking your medications? No   Do you need help managing money? No   Do you ever drive or ride in a car without wearing a seat belt? No   Have you felt unusual stress, anger or loneliness in the last month? No   Who do you live with? Alone   If you need help, do you have trouble finding someone available to you? No   Have you been bothered in the last four weeks by sexual problems? No   Do you have difficulty concentrating, remembering or making decisions? Yes       Health Maintenance   Topic Date Due   • DXA SCAN  11/22/2016   • MEDICARE ANNUAL WELLNESS  06/18/2020   • INFLUENZA VACCINE  08/01/2020   • LIPID PANEL  06/24/2021   • MAMMOGRAM  07/09/2021   • COLONOSCOPY  10/13/2027   • TDAP/TD VACCINES (4 - Td) 05/24/2028   • HEPATITIS C SCREENING   Completed   • Pneumococcal Vaccine Once at 65 Years Old  Completed   • ZOSTER VACCINE  Completed            Future Appointments   Date Time Provider Department Center   7/8/2020 11:30 AM TREATMENT [facility] 2 [facility]   8/5/2020 10:15 AM Jade Arcos APRN [facility] None   8/6/2020  3:15 PM Sharmila Christiansen MD [facility] None   8/20/2020 12:30 PM Adamaris Blue MD [facility] None   8/26/2020  3:15 PM George Hensley MD [facility]

## 2020-07-01 LAB
APPEARANCE UR: CLEAR
BACTERIA #/AREA URNS HPF: NORMAL /HPF
BILIRUB UR QL STRIP: NEGATIVE
COLOR UR: YELLOW
EPI CELLS #/AREA URNS HPF: NORMAL /HPF (ref 0–10)
GLUCOSE UR QL: NEGATIVE
HGB UR QL STRIP: NEGATIVE
KETONES UR QL STRIP: NEGATIVE
LEUKOCYTE ESTERASE UR QL STRIP: NEGATIVE
MICRO URNS: NORMAL
MICRO URNS: NORMAL
MUCOUS THREADS URNS QL MICRO: PRESENT /HPF
NITRITE UR QL STRIP: NEGATIVE
PH UR STRIP: 6.5 [PH] (ref 5–7.5)
PROT UR QL STRIP: NEGATIVE
RBC #/AREA URNS HPF: NORMAL /HPF (ref 0–2)
SP GR UR: 1.01 (ref 1–1.03)
URINALYSIS REFLEX: NORMAL
UROBILINOGEN UR STRIP-MCNC: 1 MG/DL (ref 0.2–1)
WBC #/AREA URNS HPF: NORMAL /HPF (ref 0–5)

## 2020-07-02 DIAGNOSIS — Z00.00 HEALTHCARE MAINTENANCE: Primary | ICD-10-CM

## 2020-07-02 PROCEDURE — 93000 ELECTROCARDIOGRAM COMPLETE: CPT | Performed by: INTERNAL MEDICINE

## 2020-07-08 ENCOUNTER — HOSPITAL ENCOUNTER (OUTPATIENT)
Dept: PAIN MEDICINE | Facility: HOSPITAL | Age: 70
Discharge: HOME | End: 2020-07-08
Admitting: ANESTHESIOLOGY

## 2020-07-08 ENCOUNTER — ANESTHESIA EVENT (OUTPATIENT)
Dept: PAIN MEDICINE | Facility: HOSPITAL | Age: 70
End: 2020-07-08

## 2020-07-08 ENCOUNTER — HOSPITAL ENCOUNTER (OUTPATIENT)
Dept: GENERAL RADIOLOGY | Facility: HOSPITAL | Age: 70
Discharge: HOME | End: 2020-07-08

## 2020-07-08 ENCOUNTER — ANESTHESIA (OUTPATIENT)
Dept: PAIN MEDICINE | Facility: HOSPITAL | Age: 70
End: 2020-07-08

## 2020-07-08 VITALS
OXYGEN SATURATION: 94 % | DIASTOLIC BLOOD PRESSURE: 68 MMHG | SYSTOLIC BLOOD PRESSURE: 128 MMHG | WEIGHT: 210 LBS | RESPIRATION RATE: 16 BRPM | BODY MASS INDEX: 32.96 KG/M2 | HEART RATE: 53 BPM | HEIGHT: 67 IN

## 2020-07-08 DIAGNOSIS — R52 PAIN: ICD-10-CM

## 2020-07-08 DIAGNOSIS — M48.062 SPINAL STENOSIS OF LUMBAR REGION WITH NEUROGENIC CLAUDICATION: Primary | ICD-10-CM

## 2020-07-08 PROCEDURE — 25010000002 METHYLPREDNISOLONE PER 80 MG: Performed by: ANESTHESIOLOGY

## 2020-07-08 PROCEDURE — 0 IOPAMIDOL 41 % SOLUTION: Performed by: ANESTHESIOLOGY

## 2020-07-08 PROCEDURE — C1755 CATHETER, INTRASPINAL: HCPCS

## 2020-07-08 PROCEDURE — 77003 FLUOROGUIDE FOR SPINE INJECT: CPT

## 2020-07-08 RX ORDER — METHYLPREDNISOLONE ACETATE 80 MG/ML
80 INJECTION, SUSPENSION INTRA-ARTICULAR; INTRALESIONAL; INTRAMUSCULAR; SOFT TISSUE ONCE
Status: COMPLETED | OUTPATIENT
Start: 2020-07-08 | End: 2020-07-08

## 2020-07-08 RX ORDER — MIDAZOLAM HYDROCHLORIDE 1 MG/ML
1 INJECTION INTRAMUSCULAR; INTRAVENOUS
Status: DISCONTINUED | OUTPATIENT
Start: 2020-07-08 | End: 2020-07-09 | Stop reason: HOSPADM

## 2020-07-08 RX ORDER — FENTANYL CITRATE 50 UG/ML
50 INJECTION, SOLUTION INTRAMUSCULAR; INTRAVENOUS AS NEEDED
Status: DISCONTINUED | OUTPATIENT
Start: 2020-07-08 | End: 2020-07-09 | Stop reason: HOSPADM

## 2020-07-08 RX ORDER — LIDOCAINE HYDROCHLORIDE 10 MG/ML
1 INJECTION, SOLUTION INFILTRATION; PERINEURAL ONCE
Status: DISCONTINUED | OUTPATIENT
Start: 2020-07-08 | End: 2020-07-09 | Stop reason: HOSPADM

## 2020-07-08 RX ADMIN — IOPAMIDOL 10 ML: 408 INJECTION, SOLUTION INTRATHECAL at 12:06

## 2020-07-08 RX ADMIN — METHYLPREDNISOLONE ACETATE 80 MG: 80 INJECTION, SUSPENSION INTRA-ARTICULAR; INTRALESIONAL; INTRAMUSCULAR; SOFT TISSUE at 12:06

## 2020-07-08 NOTE — ANESTHESIA PROCEDURE NOTES
PAIN Epidural block    Pre-sedation assessment completed: 7/8/2020 11:59 AM    Patient reassessed immediately prior to procedure    Patient location during procedure: pain clinic  Start Time: 7/8/2020 11:59 AM  Stop Time: 7/8/2020 12:07 PM  Indication:at surgeon's request and procedure for pain  Performed By  Anesthesiologist: Buzz Arthur MD  Preanesthetic Checklist  Completed: patient identified, site marked, surgical consent, pre-op evaluation, timeout performed, IV checked, risks and benefits discussed and monitors and equipment checked  Additional Notes  Dx:  Post-Op Diagnosis Codes:     * Lumbar spinal stenosis (M48.061)  80 mg depomedrol in epid    Plan : return to clinic as needed  Prep:  Pt Position:prone (prone)  Sterile Tech:cap, gloves, mask and sterile barrier  Prep:chlorhexidine gluconate and isopropyl alcohol  Monitoring:blood pressure monitoring, EKG and continuous pulse oximetry  Procedure:Sedation: no     Approach:midline  Guidance: fluoroscopy and c arm pa and lat and loss of resistance  Location:lumbar  Level:4-5 (interlaminar)  Needle Type:[employer]  Needle Gauge:20  Aspiration:negative  Medications:  Depomedrol:80 mg  Preservative Free Saline:3mL  Isovue:2mL    Post Assessment:  Post-procedure: bandaid.  Pt Tolerance:patient tolerated the procedure well with no apparent complications  Complications:no

## 2020-07-08 NOTE — H&P
[facility]    History and Physical    Patient Name: Nessa Osuna  :  1950  MRN:  [id number]  Date of Admission: 2020    Subjective     Patient is a 70 y.o. female presents with chief complaint of chronic, intermitent, severe, 9/10, unknown etiology, aching,sharp, cramping,  low back and leg: bilateral pain.  Onset of symptoms was gradual starting many years ago.  Symptoms are associated/aggravated by nothing in particular, activity, exercise, lifting, lying down, running, sitting, standing, straining, twisting or walking for more than several minutes. Symptoms improve with relaxation, pain medication, lying down, heating pad and rest    The following portions of the patients history were reviewed and updated as appropriate: current medications, allergies, past medical history, past surgical history, past family history, past social history and problem list                Objective     Past Medical History:   Past Medical History:   Diagnosis Date   • A-fib (CMS/[facility]) 2017   • Breast nodule    • Chest wall mass    • Closed fracture of head of metacarpal     left second   • Closed fracture of metacarpal bone     left   • DDD (degenerative disc disease), lumbar    • Depression    • Difficulty breathing    • Diverticulitis    • Fatigue    • GERD (gastroesophageal reflux disease)    • Hip pain, right    • Hyperlipidemia    • Leiomyoma of uterus    • Osteoarthritis    • Osteopenia    • Primary localized osteoarthritis of hip    • Sciatica    • Skin cancer    • Spinal stenosis      Past Surgical History:   Past Surgical History:   Procedure Laterality Date   • MOHS SURGERY      chemosurgery (Mohs Micrographic Technique); Dr Jarrett and Dr Franco   • TRIANA'S NEUROMA EXCISION      single neuroma   • RECONSTRUCTION OF NOSE     • TONSILLECTOMY      age 5   • TONSILLECTOMY AND ADENOIDECTOMY       Family History:   Family History   Problem Relation Age of Onset   • Cancer Mother          "bladder   • Osteoporosis Mother    • Heart failure Mother    • Lung cancer Father    • No Known Problems Sister      Social History:   Social History     Tobacco Use   • Smoking status: Former Smoker     Last attempt to quit:      Years since quittin.5   • Smokeless tobacco: Never Used   Substance Use Topics   • Alcohol use: Yes     Comment: social drinker   • Drug use: No       Vital Signs Range for the last 24 hours  Temperature:     Temp Source:     BP: BP: (123)/(75) 123/75   Pulse: Heart Rate:  [56] 56   Respirations: Resp:  [16] 16   SPO2: SpO2:  [95 %] 95 %   O2 Amount (l/min):     O2 Devices Device (Oxygen Therapy): room air   Weight: Weight:  [95.3 kg (210 lb)] 95.3 kg (210 lb)     Flowsheet Rows      First Filed Value   Admission Height  168.9 cm (66.5\") Documented at 2020 1148   Admission Weight  95.3 kg (210 lb) Documented at 2020 1148          --------------------------------------------------------------------------------    Current Outpatient Medications   Medication Sig Dispense Refill   • acetaminophen (TYLENOL) 500 MG tablet Take 500 mg by mouth Every Night. Take 1 tablet every 4-6 hours as needed     • atenolol (TENORMIN) 50 MG tablet Take 1 tablet by mouth Every 12 (Twelve) Hours. 180 tablet 1   • Cholecalciferol (VITAMIN D) 2000 UNITS capsule Take 1 capsule by mouth daily.     • DULoxetine (CYMBALTA) 60 MG capsule TAKE ONE CAPSULE BY MOUTH DAILY 90 capsule 0   • Flaxseed, Linseed, (FLAXSEED OIL) 1000 MG capsule Take 1 capsule by mouth Daily.     • fluticasone (FLONASE) 50 MCG/ACT nasal spray PLACE TWO SPRAYS IN EACH NOSTRIL ONCE DAILY 1 bottle 4   • gabapentin (NEURONTIN) 300 MG capsule TAKE 1 CAPSULE BY MOUTH THREE TIMES A  capsule 0   • Melatonin 5 MG capsule Take 5 mg by mouth Every Night.     • montelukast (SINGULAIR) 10 MG tablet TAKE ONE TABLET BY MOUTH ONCE NIGHTLY 90 tablet 0   • simvastatin (ZOCOR) 20 MG tablet Take 1 tablet by mouth Daily. 90 tablet 0   • " XARELTO 20 MG tablet TAKE ONE TABLET BY MOUTH DAILY WITH DINNER 90 tablet 3     Current Facility-Administered Medications   Medication Dose Route Frequency Provider Last Rate Last Dose   • fentaNYL citrate (PF) (SUBLIMAZE) injection 50 mcg  50 mcg Intravenous PRN Buzz Arthur MD       • iopamidol (ISOVUE-M 200) injection 41%  3 mL Epidural Once in imaging Buzz Arthur MD       • lidocaine (XYLOCAINE) 1 % injection 1 mL  1 mL Intradermal Once Buzz Arthur MD       • methylPREDNISolone acetate (DEPO-medrol) injection 80 mg  80 mg Epidural Once Buzz Arthur MD       • midazolam (VERSED) injection 1 mg  1 mg Intravenous Q5 Min PRN Buzz Arthur MD           --------------------------------------------------------------------------------  Assessment/Plan      Anesthesia Evaluation     Patient summary reviewed and Nursing notes reviewed         Pain impairs ability to perform ADLs: Bathing, Dressing, Meal prep/Eating, Safe restroom use, Housekeeping, Ambulation, Working, Sleeping and Exercise/Activity  Modalities previously tried to control pain with limited effectiveness within the last 4-6 weeks: Ice, Rest, Heat, OTC medications, Prescription medications and Physical therapy     Airway   Mallampati: II  Dental - normal exam     Pulmonary - negative pulmonary ROS and normal exam   Cardiovascular - normal exam    (+) dysrhythmias Atrial Fib,       Neuro/Psych- negative ROS and neuro exam normal  GI/Hepatic/Renal/Endo    (+)  GERD,      Musculoskeletal (-) negative ROS and normal exam    Abdominal  - normal exam   Substance History - negative use     OB/GYN negative ob/gyn ROS         Other      history of cancer             Diagnosis and Plan    Treatment Plan  ASA 3      Procedures: Lumbar Epidural Steroid Injection(LESI), With fluoroscopy,       Anesthetic plan and risks discussed with patient.          Diagnosis     * Lumbar spinal stenosis [M48.061]      CHIEF COMPLAINT:       HISTORY OF PRESENT  ILLNESS:      PAST MEDICAL HISTORY:  Current Outpatient Medications on File Prior to Encounter   Medication Sig Dispense Refill   • acetaminophen (TYLENOL) 500 MG tablet Take 500 mg by mouth Every Night. Take 1 tablet every 4-6 hours as needed     • atenolol (TENORMIN) 50 MG tablet Take 1 tablet by mouth Every 12 (Twelve) Hours. 180 tablet 1   • Cholecalciferol (VITAMIN D) 2000 UNITS capsule Take 1 capsule by mouth daily.     • DULoxetine (CYMBALTA) 60 MG capsule TAKE ONE CAPSULE BY MOUTH DAILY 90 capsule 0   • Flaxseed, Linseed, (FLAXSEED OIL) 1000 MG capsule Take 1 capsule by mouth Daily.     • fluticasone (FLONASE) 50 MCG/ACT nasal spray PLACE TWO SPRAYS IN EACH NOSTRIL ONCE DAILY 1 bottle 4   • gabapentin (NEURONTIN) 300 MG capsule TAKE 1 CAPSULE BY MOUTH THREE TIMES A  capsule 0   • Melatonin 5 MG capsule Take 5 mg by mouth Every Night.     • montelukast (SINGULAIR) 10 MG tablet TAKE ONE TABLET BY MOUTH ONCE NIGHTLY 90 tablet 0   • simvastatin (ZOCOR) 20 MG tablet Take 1 tablet by mouth Daily. 90 tablet 0   • XARELTO 20 MG tablet TAKE ONE TABLET BY MOUTH DAILY WITH DINNER 90 tablet 3     No current facility-administered medications on file prior to encounter.        Past Medical History:   Diagnosis Date   • A-fib (CMS/[facility]) 05/31/2017   • Breast nodule    • Chest wall mass    • Closed fracture of head of metacarpal     left second   • Closed fracture of metacarpal bone     left   • DDD (degenerative disc disease), lumbar    • Depression    • Difficulty breathing    • Diverticulitis 2010   • Fatigue    • GERD (gastroesophageal reflux disease)    • Hip pain, right    • Hyperlipidemia    • Leiomyoma of uterus    • Osteoarthritis    • Osteopenia    • Primary localized osteoarthritis of hip    • Sciatica    • Skin cancer 2010   • Spinal stenosis          SOCIAL HISTORY:  No tobacco    REVIEW OF SYSTEMS:  No hematologic infectious or constitutional symptoms  Other review of systems non-contributory    PHYSICAL  "EXAM:  /75 (BP Location: Left arm, Patient Position: Lying)   Pulse 56   Resp 16   Ht 168.9 cm (66.5\")   Wt 95.3 kg (210 lb)   SpO2 95%   BMI 33.39 kg/m²   Well-developed well-nourished no acute distress  Extra ocular movements intact  Mallampati class 2 airway  Cardiac:  Regular rate and rhythm  Lungs:  Clear to auscultation bilaterally with good effort  Alert and oriented ×3  Deep tendon reflexes normal in the bilateral patella  Negative straight leg raise bilaterally  5 out of 5 strength bilateral upper and lower extremities  Lumbar spine without obvious deformities ecchymoses  Lumbar spine nontender to palpation      DIAGNOSIS:  Post-Op Diagnosis Codes:     * Lumbar spinal stenosis [M48.061]    PLAN:  1.  Lumbar 4 epidural steroid injections, up to 3, spaced 1-2 weeks apart.  If pain control is acceptable after 1 or 2 injections, it was discussed with the patient that they may return for the subsequent injections if and when their pain returns.  The risks were discussed with the patient including failure of relief, worsening pain, Headache (post dural puncture headache), bleeding (epidural hematoma) and infection (epidural abscess or skin infection).  2.  Physical therapy exercises at home as prescribed by physical therapy or from the pain clinic handout (given to the patient).  Continuation of these exercises every day, or multiple times per week, even when the patient has good pain relief, was stressed to the patient as a preventative measure to decrease the frequency and severity of future pain episodes.  3.  Continue pain medicines as already prescribed.  If patient not currently taking any, it is recommended to begin Acetaminophen 1000 mg po q 8 hours.  If other medicines containing Acetaminophen are currently prescribed, maintain daily dose at 3000 mg.    4.  If they can tolerate NSAIDS, it is recommended to take Ibuprofen 600 mg po q 6 hours for 7 days during pain exacerbations.  " Alternatively, they may substitute an NSAID of their choice (e.g. Aleve).  This may be taken at the same time as Acetaminophen.  5.  Heat and ice to the affected area as tolerated for pain control.  It was discussed that heating pads can cause burns.  6.  Daily low impact exercise such as walking or water exercise was recommended to maintain overall health and aid in weight control.   7.  Follow up as needed for subsequent injections.  8.  Patient was counseled to abstain from tobacco products.    [employer] : L 4-5

## 2020-07-09 ENCOUNTER — TELEPHONE (OUTPATIENT)
Dept: INTERNAL MEDICINE | Facility: CLINIC | Age: 70
End: 2020-07-09

## 2020-07-09 NOTE — TELEPHONE ENCOUNTER
PATIENT CALLED IN AND STATED SHE IS HAVING EYE SURGERY (7/17/20 SHE NEEDS THIS DONE BEFORE 7/17/2020.  . FAX  LABS AND EKG   [phone]  ATTENTION KASSY .     PATIENT CALL BACK  [phone].

## 2020-07-13 ENCOUNTER — TELEPHONE (OUTPATIENT)
Dept: INTERNAL MEDICINE | Facility: CLINIC | Age: 70
End: 2020-07-13

## 2020-07-13 ENCOUNTER — TELEPHONE (OUTPATIENT)
Dept: CARDIOLOGY | Facility: CLINIC | Age: 70
End: 2020-07-13

## 2020-07-13 NOTE — TELEPHONE ENCOUNTER
I let patient know you were out of office and offered to see her.      ----- Message from Nessa Osuna sent at 7/13/2020  4:45 PM EDT -----  Regarding: Non-Urgent Medical Question  Contact: [phone]  I am just letting you know that I had another incident of left stomach pain and then threw-up.  It occurred Sat. Night after eating.  There were six of us and I was the only one who had an issue.

## 2020-07-15 NOTE — TELEPHONE ENCOUNTER
DR VALENZUELA WOULD LIKE FOR PT TO BE SEEN IN THE OFFICE PLEASE SCHEDULE. EITHER WITH DR. PRESLEY OR JULIEN

## 2020-07-15 NOTE — TELEPHONE ENCOUNTER
Called and left voice mail for patient to call office and schedule an appointment with Dr. Cooley or april savage

## 2020-08-05 ENCOUNTER — APPOINTMENT (OUTPATIENT)
Dept: PAIN MEDICINE | Facility: HOSPITAL | Age: 70
End: 2020-08-05

## 2020-08-06 ENCOUNTER — OFFICE VISIT (OUTPATIENT)
Dept: SLEEP MEDICINE | Facility: HOSPITAL | Age: 70
End: 2020-08-06

## 2020-08-06 VITALS
WEIGHT: 210 LBS | OXYGEN SATURATION: 97 % | SYSTOLIC BLOOD PRESSURE: 150 MMHG | BODY MASS INDEX: 32.96 KG/M2 | HEART RATE: 65 BPM | DIASTOLIC BLOOD PRESSURE: 84 MMHG | HEIGHT: 67 IN

## 2020-08-06 DIAGNOSIS — R06.83 SNORING: ICD-10-CM

## 2020-08-06 DIAGNOSIS — I48.19 PERSISTENT ATRIAL FIBRILLATION (HCC): ICD-10-CM

## 2020-08-06 DIAGNOSIS — G47.30 OBSERVED SLEEP APNEA: Primary | ICD-10-CM

## 2020-08-06 DIAGNOSIS — G47.10 HYPERSOMNIA: ICD-10-CM

## 2020-08-06 DIAGNOSIS — E66.09 CLASS 1 OBESITY DUE TO EXCESS CALORIES WITHOUT SERIOUS COMORBIDITY WITH BODY MASS INDEX (BMI) OF 32.0 TO 32.9 IN ADULT: ICD-10-CM

## 2020-08-06 PROBLEM — E66.811 CLASS 1 OBESITY DUE TO EXCESS CALORIES WITHOUT SERIOUS COMORBIDITY WITH BODY MASS INDEX (BMI) OF 32.0 TO 32.9 IN ADULT: Status: ACTIVE | Noted: 2020-08-06

## 2020-08-06 PROCEDURE — G0463 HOSPITAL OUTPT CLINIC VISIT: HCPCS

## 2020-08-06 PROCEDURE — 99204 OFFICE O/P NEW MOD 45 MIN: CPT | Performed by: INTERNAL MEDICINE

## 2020-08-06 NOTE — PROGRESS NOTES
[facility]  [address] [address]  Phone   Fax       Nessa Osuna  1950  70 y.o.  female      Referring Provider Dr. Buzz Wagner MD  PCP Jayne Guerra MD    Type of service: Initial consult  Date of service: 8/6/2020      Chief Complaint   Patient presents with   • Daytime Sleepiness   • Fatigue   • Leg/body Jerks During Sleep   • Dry Mouth   • Unable To Stay Asleep   • Unable To Fall Asleep   • Frequent Awakenings       History of present illness;  Thank you for asking to see Nessa Osuna, 70 y.o.  for evaluation of sleep apnea. The patient was seen today on 8/6/2020 at [facility].   Patient is sent for evaluation of sleep apnea because of snoring and also persistent atrial fibrillation.  She is retired and has a variable sleep schedule    Patient gives the following sleep history.  Sleep schedule:  Bedtime: 11:30 PM to 2 AM  Wake time: 9:30 AM to noon  Average hours of sleep 6-7  Number of naps per day 1  When patient wakes up still feels tired and not rested  Snoring; yes  Witnessed apneas; yes  Have you ever awakened gasping for breath, coughing, choking: No      Past Medical History:   Diagnosis Date   • A-fib (CMS/[facility]) 05/31/2017   • Breast nodule    • Chest wall mass    • Closed fracture of head of metacarpal     left second   • Closed fracture of metacarpal bone     left   • DDD (degenerative disc disease), lumbar    • Depression    • Difficulty breathing    • Diverticulitis 2010   • Fatigue    • GERD (gastroesophageal reflux disease)    • Hip pain, right    • Hyperlipidemia    • Leiomyoma of uterus    • Osteoarthritis    • Osteopenia    • Primary localized osteoarthritis of hip    • Sciatica    • Skin cancer 2010   • Spinal stenosis        Social history:  Shift work: No  Tobacco use: Quit smoking  Alcohol use: 3/week  Caffeinated drinks: 4 cups of coffee  Over-the-counter sleeping aid and  medications: None  Narcotic medications: No    Family Hx  Family history of sleep apnea, negative  Family History   Problem Relation Age of Onset   • Cancer Mother         bladder   • Osteoporosis Mother    • Heart failure Mother    • Lung cancer Father    • No Known Problems Sister        Medications: reviewed    Current Outpatient Medications:   •  acetaminophen (TYLENOL) 500 MG tablet, Take 500 mg by mouth Every Night. Take 1 tablet every 4-6 hours as needed, Disp: , Rfl:   •  atenolol (TENORMIN) 50 MG tablet, Take 1 tablet by mouth Every 12 (Twelve) Hours., Disp: 180 tablet, Rfl: 1  •  Cholecalciferol (VITAMIN D) 2000 UNITS capsule, Take 1 capsule by mouth daily., Disp: , Rfl:   •  DULoxetine (CYMBALTA) 60 MG capsule, TAKE ONE CAPSULE BY MOUTH DAILY, Disp: 90 capsule, Rfl: 0  •  Flaxseed, Linseed, (FLAXSEED OIL) 1000 MG capsule, Take 1 capsule by mouth Daily., Disp: , Rfl:   •  fluticasone (FLONASE) 50 MCG/ACT nasal spray, PLACE TWO SPRAYS IN EACH NOSTRIL ONCE DAILY, Disp: 1 bottle, Rfl: 4  •  gabapentin (NEURONTIN) 300 MG capsule, TAKE 1 CAPSULE BY MOUTH THREE TIMES A DAY, Disp: 270 capsule, Rfl: 0  •  Melatonin 5 MG capsule, Take 5 mg by mouth Every Night., Disp: , Rfl:   •  montelukast (SINGULAIR) 10 MG tablet, TAKE ONE TABLET BY MOUTH ONCE NIGHTLY, Disp: 90 tablet, Rfl: 0  •  simvastatin (ZOCOR) 20 MG tablet, Take 1 tablet by mouth Daily., Disp: 90 tablet, Rfl: 0  •  XARELTO 20 MG tablet, TAKE ONE TABLET BY MOUTH DAILY WITH DINNER, Disp: 90 tablet, Rfl: 3    Review of systems:  [place] Sleepiness Scale: Total score: 6   Positive for snoring, witnessed apneas, fatigue and daytime excessive sleepiness,   Negative for shortness of breath, cough, wheezing, chest pain, nausea and vomiting, palpitation, swelling of feet:    Morning headaches: No  Awaken with sore throat or dry mouth : No  Leg jerking at night: No  Crawly feeling/urge sensation to move in the legs: No  Teeth grinding: No  Sleepwalking, nightmares,  "muscle weakness with laughing or anger,sleep paralysis: None  Nasal Congestion: None  Nocturia (how many times/night): 1-2  Memory Problem: None  Change in weight, no    Physical exam:  Vitals:    08/06/20 1300   BP: 150/84   BP Location: Left arm   Patient Position: Sitting   Pulse: 65   SpO2: 97%   Weight: 95.3 kg (210 lb)   Height: 170.2 cm (67\")    Body mass index is 32.89 kg/m². Neck Circumference: 14.25 inches  HEENT: Head is atraumatic, normocephalic   Eyes:pupils are round equal and reacting to light and accommodation, conjunctiva normal  Nose:no nasal septal defects or deviation and the nasal passages are clear, no nasal polyps,   Throat: tonsils are not enlarged, tongue normal size, oral airway Mallampati class 2  NECK: No lymphadenopathy, trachea is in the midline, thyroid not enlarged  RESPIRATORY SYSTEM: Breath sounds are equal on both sides, there are no wheezes or crackles  CARDIOVASULAR SYSTEM: Heart sounds are regular rhythm and willie rate, there are no murmurs or thrills  ABDOMEN: Soft, no hepatosplenomegaly, no evidence of ascites  EXTREMITES: No cyanosis, clubbing or edema   NEUROLOGICAL SYSTEM: Oriented x 3, no gross neurological defects, gait normal    Medical records and labs reviewed in [facility] reviewed    Assessment and plan:  · Sleep apnea unspecified, (G47.30) Patient's symptoms and examination is consistent with sleep apnea.  I have talked to the patient about the signs and symptoms of sleep apnea and consequences of untreated sleep apnea. Discussed sleep testing.  I have placed a order in epic for a home sleep test.  Patient will have a follow-up after this sleep test is done.   · Obesity, patient's BMI is Body mass index is 32.89 kg/m².. I have discussed the relationship between weight and sleep apnea.There is direct correction between weight and severity of sleep apnea.  Weight reduction is encouraged. I have also discussed with the patient diet and exercise.  · Snoring secondary to sleep " apnea  · Hypersomnia with [place] Sleepiness Scale of Total score: 6 due to sleep apnea.  · Atrial fibrillation recently had cardioversion      I once again thank you for asking me to see this patient in consultation and I have forwarded my opinion and treatment plan.  Please do not hesitate to call me if you have any questions.     Sharmila Christiansen MD, [facility]  Sleep Medicine.(Board-certified)  [facility]  Medical Director for [facility]  8/6/2020 ,

## 2020-08-07 ENCOUNTER — HOSPITAL ENCOUNTER (OUTPATIENT)
Dept: GENERAL RADIOLOGY | Facility: HOSPITAL | Age: 70
Discharge: HOME | End: 2020-08-07

## 2020-08-07 ENCOUNTER — HOSPITAL ENCOUNTER (OUTPATIENT)
Dept: PAIN MEDICINE | Facility: HOSPITAL | Age: 70
Discharge: HOME | End: 2020-08-07
Admitting: ANESTHESIOLOGY

## 2020-08-07 ENCOUNTER — ANESTHESIA EVENT (OUTPATIENT)
Dept: PAIN MEDICINE | Facility: HOSPITAL | Age: 70
End: 2020-08-07

## 2020-08-07 ENCOUNTER — ANESTHESIA (OUTPATIENT)
Dept: PAIN MEDICINE | Facility: HOSPITAL | Age: 70
End: 2020-08-07

## 2020-08-07 VITALS
DIASTOLIC BLOOD PRESSURE: 61 MMHG | TEMPERATURE: 98 F | SYSTOLIC BLOOD PRESSURE: 114 MMHG | OXYGEN SATURATION: 95 % | RESPIRATION RATE: 16 BRPM | HEART RATE: 58 BPM

## 2020-08-07 DIAGNOSIS — M48.062 SPINAL STENOSIS OF LUMBAR REGION WITH NEUROGENIC CLAUDICATION: ICD-10-CM

## 2020-08-07 DIAGNOSIS — R52 PAIN: ICD-10-CM

## 2020-08-07 PROCEDURE — 25010000002 METHYLPREDNISOLONE PER 80 MG: Performed by: ANESTHESIOLOGY

## 2020-08-07 PROCEDURE — 0 IOPAMIDOL 41 % SOLUTION: Performed by: ANESTHESIOLOGY

## 2020-08-07 PROCEDURE — C1755 CATHETER, INTRASPINAL: HCPCS

## 2020-08-07 PROCEDURE — 77003 FLUOROGUIDE FOR SPINE INJECT: CPT

## 2020-08-07 RX ORDER — MIDAZOLAM HYDROCHLORIDE 1 MG/ML
1 INJECTION INTRAMUSCULAR; INTRAVENOUS AS NEEDED
Status: DISCONTINUED | OUTPATIENT
Start: 2020-08-07 | End: 2020-08-08 | Stop reason: HOSPADM

## 2020-08-07 RX ORDER — FENTANYL CITRATE 50 UG/ML
50 INJECTION, SOLUTION INTRAMUSCULAR; INTRAVENOUS AS NEEDED
Status: DISCONTINUED | OUTPATIENT
Start: 2020-08-07 | End: 2020-08-08 | Stop reason: HOSPADM

## 2020-08-07 RX ORDER — LIDOCAINE HYDROCHLORIDE 10 MG/ML
1 INJECTION, SOLUTION INFILTRATION; PERINEURAL ONCE AS NEEDED
Status: COMPLETED | OUTPATIENT
Start: 2020-08-07 | End: 2020-08-07

## 2020-08-07 RX ORDER — METHYLPREDNISOLONE ACETATE 80 MG/ML
80 INJECTION, SUSPENSION INTRA-ARTICULAR; INTRALESIONAL; INTRAMUSCULAR; SOFT TISSUE ONCE
Status: COMPLETED | OUTPATIENT
Start: 2020-08-07 | End: 2020-08-07

## 2020-08-07 RX ORDER — DULOXETIN HYDROCHLORIDE 60 MG/1
60 CAPSULE, DELAYED RELEASE ORAL DAILY
Qty: 90 CAPSULE | Refills: 3 | Status: SHIPPED | OUTPATIENT
Start: 2020-08-07 | End: 2021-08-06

## 2020-08-07 RX ORDER — SODIUM CHLORIDE 0.9 % (FLUSH) 0.9 %
1-10 SYRINGE (ML) INJECTION AS NEEDED
Status: DISCONTINUED | OUTPATIENT
Start: 2020-08-07 | End: 2020-08-08 | Stop reason: HOSPADM

## 2020-08-07 RX ADMIN — IOPAMIDOL 10 ML: 408 INJECTION, SOLUTION INTRATHECAL at 13:33

## 2020-08-07 RX ADMIN — METHYLPREDNISOLONE ACETATE 80 MG: 80 INJECTION, SUSPENSION INTRA-ARTICULAR; INTRALESIONAL; INTRAMUSCULAR; SOFT TISSUE at 13:34

## 2020-08-07 RX ADMIN — LIDOCAINE HYDROCHLORIDE 5 ML: 10 INJECTION, SOLUTION EPIDURAL; INFILTRATION; INTRACAUDAL; PERINEURAL at 13:34

## 2020-08-07 NOTE — ANESTHESIA PROCEDURE NOTES
PAIN Epidural block    Pre-sedation assessment completed: 8/7/2020 1:23 PM    Patient reassessed immediately prior to procedure    Patient location during procedure: pain clinic  Start Time: 8/7/2020 1:24 PM  Stop Time: 8/7/2020 1:35 PM  Indication:procedure for pain  Performed By  Anesthesiologist: Manisha Eller MD  Preanesthetic Checklist  Completed: patient identified, site marked, surgical consent, pre-op evaluation, timeout performed, IV checked, risks and benefits discussed and monitors and equipment checked  Additional Notes  Fluoro used.    Lumbar spinal stenosis.    Prep:  Pt Position:prone  Sterile Tech:cap, gloves, mask and sterile barrier  Prep:chlorhexidine gluconate and isopropyl alcohol  Monitoring:blood pressure monitoring, continuous pulse oximetry and EKG  Procedure:Sedation: no     Approach:midline  Guidance: fluoroscopy  Location:lumbar  Level:5-6  Needle Type:Tuohy  Needle Gauge:20  Aspiration:negative  Medications:  Depomedrol:80  Preservative Free Saline:3mL  Isovue:2mL  Comments:B/l spread, L>R  Post Assessment:  Dressing:occlusive dressing applied  Pt Tolerance:patient tolerated the procedure well with no apparent complications  Complications:no

## 2020-08-07 NOTE — INTERVAL H&P NOTE
[facility]  H&P reviewed. No changes since last visit.  Patient states   50-75% improvement since the last procedure/injection.  Was doing very well after last epidural until injured herself, twisting her back - which brought low back pain & left > right radiating pain into her lower extremities.  Presents for lesi 2.      Diagnosis     * Lumbar spinal stenosis [M48.061]      Airway assessed since last visit. Airway class equals: 2.

## 2020-08-17 ENCOUNTER — OFFICE VISIT (OUTPATIENT)
Dept: CARDIOLOGY | Facility: CLINIC | Age: 70
End: 2020-08-17

## 2020-08-17 VITALS
SYSTOLIC BLOOD PRESSURE: 144 MMHG | DIASTOLIC BLOOD PRESSURE: 84 MMHG | BODY MASS INDEX: 32.8 KG/M2 | HEART RATE: 60 BPM | HEIGHT: 67 IN | WEIGHT: 209 LBS

## 2020-08-17 DIAGNOSIS — R06.83 SNORING: ICD-10-CM

## 2020-08-17 DIAGNOSIS — E66.09 CLASS 1 OBESITY DUE TO EXCESS CALORIES WITHOUT SERIOUS COMORBIDITY WITH BODY MASS INDEX (BMI) OF 32.0 TO 32.9 IN ADULT: ICD-10-CM

## 2020-08-17 DIAGNOSIS — I48.0 PAF (PAROXYSMAL ATRIAL FIBRILLATION) (HCC): Primary | ICD-10-CM

## 2020-08-17 PROCEDURE — 93000 ELECTROCARDIOGRAM COMPLETE: CPT | Performed by: NURSE PRACTITIONER

## 2020-08-17 PROCEDURE — 99214 OFFICE O/P EST MOD 30 MIN: CPT | Performed by: NURSE PRACTITIONER

## 2020-08-17 RX ORDER — ATENOLOL 50 MG/1
TABLET ORAL
Qty: 270 TABLET | Refills: 3 | Status: SHIPPED | OUTPATIENT
Start: 2020-08-17 | End: 2021-06-11

## 2020-08-17 NOTE — PROGRESS NOTES
Date of Office Visit: 2020  Encounter Provider: SHAYY Carrillo  Place of Service: [facility] CARDIOLOGY  Patient Name: Nessa Osuna  :1950    Chief Complaint   Patient presents with   • persistent AFIB   :     HPI: Nessa Osuna is a 70 y.o. female who of Dr. Zamorano and Dr. Wagner's with symptomatic persistent A. fib.  She had a cardioversion in May but it only lasted a couple of days but did feel better when she was in sinus rhythm.    She saw Dr. Wagner on .  He ordered a sleep eval as well as another ZIO to see if this was truly persistent A. fib or paroxysmal to determine further treatment options. She had a Holter in April that was 100% Afib for the 24 hrs.     The ZIO showed paroxysmal atrial fib with an AF burden of 54% with the longest episode being 6 days and 5 hours with an average heart rate of 118.  Symptoms were associated with her A. fib and there was no significant bradycardia.    Echocardiogram in 2020 showed:    Interpretation Summary     · There is mild to moderate global hypokinesis.  · Estimated EF appears to be in the range of 41 - 45%.  · Left ventricular wall thickness is consistent with mild concentric hypertrophy  · Normal right ventricular cavity size and systolic function noted.  · Left atrial cavity size is severely dilated.  · The mitral valve leaflets are thickened and myxomatous.  · Mild-to-moderate mitral valve regurgitation is present.  · Mild to moderate tricuspid valve regurgitation is present.  · Calculated right ventricular systolic pressure from tricuspid regurgitation is 48 mmHg.  · There is no evidence of pericardial effusion.     She presents today for follow up.    She reports that she actually is feeling significantly better.  She thinks that her A. fib burden has decreased even more since wearing the monitor at the end of .  She denies chest pain, she still has some dyspnea with exertion but says  this is significantly better, she denies PND or orthopnea.  She said that switching to the atenolol has also made her palpitations less intense.  They were awakening her from sleep but this has significantly improved.    She is undergoing sleep evaluation and is due to  her sleep study monitor on 8/26.  She has been off of her rivaroxaban a couple of times for epidural injections for some chronic back pain and also some eye surgery.  She has been back on it since the beginning of the month with no breaks and is tolerating it well with no bleeding issues.       Past Medical History:   Diagnosis Date   • A-fib (CMS/[facility]) 05/31/2017   • Breast nodule    • Chest wall mass    • Closed fracture of head of metacarpal     left second   • Closed fracture of metacarpal bone     left   • DDD (degenerative disc disease), lumbar    • Depression    • Difficulty breathing    • Diverticulitis 2010   • Fatigue    • GERD (gastroesophageal reflux disease)    • Hip pain, right    • Hyperlipidemia    • Leiomyoma of uterus    • Low back pain    • Osteoarthritis    • Osteopenia    • Persistent atrial fibrillation (CMS/[facility])    • Primary localized osteoarthritis of hip    • Sciatica    • Skin cancer 2010   • Spinal stenosis        Past Surgical History:   Procedure Laterality Date   • CARDIOVERSION  05/28/2020    Dr. 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); Dr Jarrett and Dr Franco   • TRIANA'S NEUROMA EXCISION      single neuroma   • RECONSTRUCTION OF NOSE     • TONSILLECTOMY      age 5   • TONSILLECTOMY AND ADENOIDECTOMY  1955       Social History     Socioeconomic History   • Marital status: Single     Spouse name: Not on file   • Number of children: 0   • Years of education: 14   • Highest education level: Not asked   Occupational History   • Occupation: RETIRED   Social Needs   • Financial resource strain: Patient refused   • Food insecurity:     Worry: Patient refused     Inability: Patient  refused   • Transportation needs:     Medical: Patient refused     Non-medical: Patient refused   Tobacco Use   • Smoking status: Former Smoker     Last attempt to quit:      Years since quittin.6   • Smokeless tobacco: Never Used   Substance and Sexual Activity   • Alcohol use: Yes     Comment: social drinker   • Drug use: No   • Sexual activity: Defer   Social History Narrative    LIVES ALONE       Family History   Problem Relation Age of Onset   • Cancer Mother         bladder   • Osteoporosis Mother    • Heart failure Mother    • Lung cancer Father    • No Known Problems Sister        Review of Systems   Constitution: Negative for chills, fever and malaise/fatigue.   Cardiovascular: Positive for dyspnea on exertion, leg swelling and palpitations. Negative for chest pain, near-syncope, orthopnea, paroxysmal nocturnal dyspnea and syncope.   Respiratory: Negative for cough and shortness of breath.    Musculoskeletal: Negative for joint pain, joint swelling and myalgias.   Gastrointestinal: Negative for abdominal pain, diarrhea, melena, nausea and vomiting.   Genitourinary: Negative for frequency and hematuria.   Neurological: Negative for light-headedness, numbness, paresthesias and seizures.   Allergic/Immunologic: Negative.    All other systems reviewed and are negative.      No Known Allergies      Current Outpatient Medications:   •  acetaminophen (TYLENOL) 500 MG tablet, Take 500 mg by mouth Every Night. Take 1 tablet every 4-6 hours as needed, Disp: , Rfl:   •  atenolol (TENORMIN) 50 MG tablet, Take 1.5 tablets by mouth Every Morning AND 1 tablet Every Night., Disp: 270 tablet, Rfl: 3  •  Cholecalciferol (VITAMIN D) 2000 UNITS capsule, Take 1 capsule by mouth daily., Disp: , Rfl:   •  DULoxetine (CYMBALTA) 60 MG capsule, Take 1 capsule by mouth Daily., Disp: 90 capsule, Rfl: 3  •  Flaxseed, Linseed, (FLAXSEED OIL) 1000 MG capsule, Take 1 capsule by mouth Daily., Disp: , Rfl:   •  fluticasone  "(FLONASE) 50 MCG/ACT nasal spray, PLACE TWO SPRAYS IN EACH NOSTRIL ONCE DAILY, Disp: 1 bottle, Rfl: 4  •  gabapentin (NEURONTIN) 300 MG capsule, TAKE 1 CAPSULE BY MOUTH THREE TIMES A DAY, Disp: 270 capsule, Rfl: 0  •  Melatonin 5 MG capsule, Take 5 mg by mouth Every Night., Disp: , Rfl:   •  montelukast (SINGULAIR) 10 MG tablet, TAKE ONE TABLET BY MOUTH ONCE NIGHTLY, Disp: 90 tablet, Rfl: 0  •  simvastatin (ZOCOR) 20 MG tablet, Take 1 tablet by mouth Daily., Disp: 90 tablet, Rfl: 0  •  XARELTO 20 MG tablet, TAKE ONE TABLET BY MOUTH DAILY WITH DINNER, Disp: 90 tablet, Rfl: 3      Objective:     Vitals:    08/17/20 1142   BP: 144/84   Pulse: 60   Weight: 94.8 kg (209 lb)   Height: 170.2 cm (67\")     Body mass index is 32.73 kg/m².    PHYSICAL EXAM:    Vitals Reviewed.   General Appearance: No acute distress, well developed and well nourished.   Eyes: Conjunctiva and lids: No erythema, swelling, or discharge. Sclera non-icteric.   HENT: Atraumatic, normocephalic. External eyes, ears, and nose normal.   Respiratory: No signs of respiratory distress. Respiration rhythm and depth normal.   Clear to auscultation. No rales, crackles, rhonchi, or wheezing auscultated.   Cardiovascular:  Jugular Venous Pressure: Normal  Heart Rate and Rhythm: Normal, Heart Sounds: Normal S1 and S2. No S3 or S4 noted.  Murmurs: No murmurs noted. No rubs, thrills, or gallops.   Arterial Pulses:  Posterior tibialis and dorsalis pedis pulses normal.   Lower Extremities: No edema noted.  Gastrointestinal:  Abdomen soft, non-distended, non-tender.   Musculoskeletal: Normal movement of extremities  Skin and Nails: General appearance normal. No pallor, cyanosis, diaphoresis. Skin temperature normal. No clubbing of fingernails.   Psychiatric: Patient alert and oriented to person, place, and time. Speech and behavior appropriate. Normal mood and affect.       ECG 12 Lead  Date/Time: 8/17/2020 11:36 AM  Performed by: Jade Arcos APRN  Authorized by: " Jade Arcos APRN   Comparison: compared with previous ECG   Similar to previous ECG  Comparison to previous ECG: Afib  resolved  Rhythm: sinus rhythm  BPM: 60                Assessment:       Diagnosis Plan   1. PAF (paroxysmal atrial fibrillation) (CMS/[facility])  ECG 12 Lead   2. Snoring     3. Class 1 obesity due to excess calories without serious comorbidity with body mass index (BMI) of 32.0 to 32.9 in adult            Plan:       1.  Paroxysmal atrial fib, she actually seems much improved with just switching her to atenolol and she would really like to give it a little more time since she is doing so much better.  Her blood pressure was in the 140 systolic today and her heart rate was 60 so I think we have even a little more room to go up on the atenolol.  I am going to increase her to 75 mg in the morning and stay on 50 mg at night and see her back in the office in 4 weeks to see how she is doing.  If she is having issues then we can certainly consider starting her on an antiarrhythmic medication.    2.  Snoring, she is being evaluated for sleep apnea so hopefully we will have those results when she returns in 4 weeks.    3. For the problem of overweight/obesity, we discussed the importance of lifestyle measures and strategies for weight loss, such as improved nutrition, regular exercise and sleep hygiene.       As always, it has been a pleasure to participate in your patient's care.      Sincerely,         SHAYY Cuellar

## 2020-08-20 ENCOUNTER — APPOINTMENT (OUTPATIENT)
Dept: MAMMOGRAPHY | Facility: HOSPITAL | Age: 70
End: 2020-08-20

## 2020-08-24 NOTE — PROGRESS NOTES
Subjective   History of Present Illness: Nessa Osuna is a 70 y.o. female for televisit follow up after GABY x 2 at [facility] which have helped, she is scheduled for a 3rd one end of September    Patient was last seen 6.24.20 for low back, hip and knee pain    Patient states that she is still having low back pain, left hip and knee pain, however, it is not as severe, she is also having intermittent cramping left leg, but no weakness, N/T, urinary incontinence or problems with her balance and gait.  She is taking Gabapentin 300 mg TID    You have chosen to receive care through a telephone visit. Do you consent to use a telephone visit for your medical care today? Yes    Back Pain   The problem occurs constantly. The problem has been gradually improving since onset. The pain is present in the lumbar spine. The pain radiates to the left knee. The pain is at a severity of 4/10. The pain is mild. The symptoms are aggravated by position. Pertinent negatives include no leg pain, numbness or weakness.       The following portions of the patient's history were reviewed and updated as appropriate: allergies, current medications, past family history, past medical history, past social history, past surgical history and problem list.    Review of Systems   Constitutional: Negative.    HENT: Negative.    Eyes: Negative.    Respiratory: Negative.    Cardiovascular: Negative.    Gastrointestinal: Negative.    Endocrine: Negative.    Genitourinary: Negative for urgency.   Musculoskeletal: Positive for back pain. Negative for arthralgias, gait problem, joint swelling and myalgias.   Allergic/Immunologic: Negative.    Neurological: Negative for weakness and numbness.   Hematological: Negative.    Psychiatric/Behavioral: Negative.        This was a televisit from my office that lasted 15 minutes. The patient was at home. I last spoke with her about 2 months. She has known spinal stenosis and spondylolisthesis at L4-L5. We have been  trying to avoid surgery. Things had gotten worse so she wanted to proceed with epidural blocks. She is already on gabapentin at 300 mg t.i.d. The 2 blocks that she got helped, the first more than the second. The first one was done by Dr. Arthur. We talked about the Medicare policy that only allows for 4 injections per year. I told her if that is the case then she might want to hold off as long as possible on the next block since she got a reasonable amount of benefit. They had already set it up for 09/2020, but I think it is best to wait. If things get worse again we can alternatively raise the gabapentin, but I do not think we need to do that yet. She will follow up with me as a televisit in 4 months. She will let me know if things get worse. The next time if she wants to schedule a block for perhaps the beginning of the new year that would fit in with the timing with regards to Medicare guidelines.        Objective             Physical Exam   Deferred  Neurologic Exam   Deferred        Assessment/Plan   Independent Review of Radiographic Studies:      I personally reviewed the images from the following studies.    I reviewed her lumbar MRI done in February 2019 which did show severe spinal stenosis at L4-L5 with a 7 mm anterolisthesis.  Agree with the report    Medical Decision Making:      We will schedule a tele-visit in 4 months.  If there is some worsening between now and then she will let us know and we can consider another epidural block with Dr. Arthur or raising the gabapentin.      Nessa was seen today for back pain, hip pain and knee pain.    Diagnoses and all orders for this visit:    Spinal stenosis of lumbar region with neurogenic claudication    Spondylolisthesis at L4-L5 level      Return in about 4 months (around 12/25/2020) for As a tele-visit.

## 2020-08-25 ENCOUNTER — OFFICE VISIT (OUTPATIENT)
Dept: NEUROSURGERY | Facility: CLINIC | Age: 70
End: 2020-08-25

## 2020-08-25 DIAGNOSIS — M43.16 SPONDYLOLISTHESIS AT L4-L5 LEVEL: ICD-10-CM

## 2020-08-25 DIAGNOSIS — M48.062 SPINAL STENOSIS OF LUMBAR REGION WITH NEUROGENIC CLAUDICATION: Primary | ICD-10-CM

## 2020-08-25 PROCEDURE — 99442 PR PHYS/QHP TELEPHONE EVALUATION 11-20 MIN: CPT | Performed by: NEUROLOGICAL SURGERY

## 2020-08-26 ENCOUNTER — HOSPITAL ENCOUNTER (OUTPATIENT)
Dept: SLEEP MEDICINE | Facility: HOSPITAL | Age: 70
Discharge: HOME | End: 2020-08-26
Admitting: INTERNAL MEDICINE

## 2020-08-26 DIAGNOSIS — E66.09 CLASS 1 OBESITY DUE TO EXCESS CALORIES WITHOUT SERIOUS COMORBIDITY WITH BODY MASS INDEX (BMI) OF 32.0 TO 32.9 IN ADULT: ICD-10-CM

## 2020-08-26 DIAGNOSIS — R06.83 SNORING: ICD-10-CM

## 2020-08-26 DIAGNOSIS — I48.19 PERSISTENT ATRIAL FIBRILLATION (HCC): ICD-10-CM

## 2020-08-26 DIAGNOSIS — G47.30 OBSERVED SLEEP APNEA: ICD-10-CM

## 2020-08-26 DIAGNOSIS — G47.10 HYPERSOMNIA: ICD-10-CM

## 2020-08-26 PROCEDURE — 95806 SLEEP STUDY UNATT&RESP EFFT: CPT | Performed by: INTERNAL MEDICINE

## 2020-08-26 PROCEDURE — 95806 SLEEP STUDY UNATT&RESP EFFT: CPT

## 2020-09-11 ENCOUNTER — TELEPHONE (OUTPATIENT)
Dept: SLEEP MEDICINE | Facility: HOSPITAL | Age: 70
End: 2020-09-11

## 2020-09-11 NOTE — TELEPHONE ENCOUNTER
Spoke with patient about sleep study results, faxing orders to [facility] per patient request, follow up scheduled 10/29/20

## 2020-09-17 DIAGNOSIS — I48.0 PAROXYSMAL ATRIAL FIBRILLATION (HCC): ICD-10-CM

## 2020-09-18 RX ORDER — GABAPENTIN 300 MG/1
CAPSULE ORAL
Qty: 270 CAPSULE | Refills: 0 | Status: SHIPPED | OUTPATIENT
Start: 2020-09-18 | End: 2020-12-15

## 2020-09-18 RX ORDER — RIVAROXABAN 20 MG/1
TABLET, FILM COATED ORAL
Qty: 90 TABLET | Refills: 0 | Status: SHIPPED | OUTPATIENT
Start: 2020-09-18 | End: 2020-12-10

## 2020-09-18 RX ORDER — MONTELUKAST SODIUM 10 MG/1
TABLET ORAL
Qty: 90 TABLET | Refills: 0 | Status: SHIPPED | OUTPATIENT
Start: 2020-09-18 | End: 2020-12-15

## 2020-09-18 RX ORDER — SIMVASTATIN 20 MG
TABLET ORAL
Qty: 90 TABLET | Refills: 0 | Status: SHIPPED | OUTPATIENT
Start: 2020-09-18 | End: 2020-12-15

## 2020-09-29 ENCOUNTER — APPOINTMENT (OUTPATIENT)
Dept: PAIN MEDICINE | Facility: HOSPITAL | Age: 70
End: 2020-09-29

## 2020-10-29 ENCOUNTER — TELEMEDICINE (OUTPATIENT)
Dept: SLEEP MEDICINE | Facility: HOSPITAL | Age: 70
End: 2020-10-29

## 2020-10-29 DIAGNOSIS — E66.09 CLASS 1 OBESITY DUE TO EXCESS CALORIES WITHOUT SERIOUS COMORBIDITY WITH BODY MASS INDEX (BMI) OF 32.0 TO 32.9 IN ADULT: ICD-10-CM

## 2020-10-29 DIAGNOSIS — G47.33 OSA ON CPAP: Primary | ICD-10-CM

## 2020-10-29 DIAGNOSIS — Z99.89 OSA ON CPAP: Primary | ICD-10-CM

## 2020-10-29 PROBLEM — G47.10 HYPERSOMNIA: Status: RESOLVED | Noted: 2020-08-06 | Resolved: 2020-10-29

## 2020-10-29 PROCEDURE — 99213 OFFICE O/P EST LOW 20 MIN: CPT | Performed by: INTERNAL MEDICINE

## 2020-10-29 PROCEDURE — G0463 HOSPITAL OUTPT CLINIC VISIT: HCPCS

## 2020-10-29 NOTE — PROGRESS NOTES
[facility]  [address] [facility]  3rd [place] [address]  Phone   Fax       SLEEP CLINIC TELEHEATH FOLLOW UP PROGRESS NOTE.    This is a e - visit, done through video conferencing and utilizing my chart.  Consent was given by the patient and documented in my chart    Nessa Osuna  1950  70 y.o.  female      PCP: Jayne Guerra MD      Date of e-visit: 10/29/2020    Chief Complaint   Patient presents with   • Sleep Apnea   • Follow-up       INTERM HISTORY:  This is a 70 y.o. years old patient who has a history of sleep apnea and is on CPAP.  Patient reports good compliance with the device.  Patient has mild sleep apnea using the CPAP.  She does not complain of any new symptoms and has no dry mouth or nasal congestion.  I have interviewed the patient through video conferencing.    Sleep schedule  Normally goes to bed at midnight  Wakes up at between 8 and 10 AM  Feels refreshed after waking up: Yes      The Smart card downloaded on 10/29/2020 has been reviewed by me and shows the following..  Compliance; 100%  > 4 hr use, 96%  Average use of the device 7 hours and 9-minute per night  Residual AHI: 7 /hr (normal less than 5)  Mask type: Nasal  DME: [facility]      Past Medical History:   Diagnosis Date   • A-fib (CMS/[facility]) 05/31/2017   • Breast nodule    • Chest wall mass    • Class 1 obesity due to excess calories without serious comorbidity with body mass index (BMI) of 32.0 to 32.9 in adult    • Closed fracture of head of metacarpal     left second   • Closed fracture of metacarpal bone     left   • DDD (degenerative disc disease), lumbar    • Depression    • Difficulty breathing    • Diverticulitis 2010   • Fatigue    • GERD (gastroesophageal reflux disease)    • Hip pain, right    • Hyperlipidemia    • Leiomyoma of uterus    • Low back pain    • Osteoarthritis    • Osteopenia    • PAF (paroxysmal atrial fibrillation) (CMS/[facility])    • Persistent atrial  fibrillation (CMS/H[facility])    • Primary localized osteoarthritis of hip    • Sciatica    • Skin cancer 2010   • Snoring    • Spinal stenosis        MEDICATIONS: reviewed by me    Current Outpatient Medications:   •  acetaminophen (TYLENOL) 500 MG tablet, Take 500 mg by mouth Every Night. Take 1 tablet every 4-6 hours as needed, Disp: , Rfl:   •  atenolol (TENORMIN) 50 MG tablet, Take 1.5 tablets by mouth Every Morning AND 1 tablet Every Night., Disp: 270 tablet, Rfl: 3  •  Cholecalciferol (VITAMIN D) 2000 UNITS capsule, Take 1 capsule by mouth daily., Disp: , Rfl:   •  DULoxetine (CYMBALTA) 60 MG capsule, Take 1 capsule by mouth Daily., Disp: 90 capsule, Rfl: 3  •  Flaxseed, Linseed, (FLAXSEED OIL) 1000 MG capsule, Take 1 capsule by mouth Daily., Disp: , Rfl:   •  fluticasone (FLONASE) 50 MCG/ACT nasal spray, PLACE TWO SPRAYS IN EACH NOSTRIL ONCE DAILY, Disp: 1 bottle, Rfl: 4  •  gabapentin (NEURONTIN) 300 MG capsule, TAKE ONE CAPSULE BY MOUTH THREE TIMES A DAY, Disp: 270 capsule, Rfl: 0  •  Melatonin 5 MG capsule, Take 5 mg by mouth Every Night., Disp: , Rfl:   •  montelukast (SINGULAIR) 10 MG tablet, TAKE ONE TABLET BY MOUTH ONCE NIGHTLY, Disp: 90 tablet, Rfl: 0  •  simvastatin (ZOCOR) 20 MG tablet, TAKE ONE TABLET BY MOUTH DAILY, Disp: 90 tablet, Rfl: 0  •  Xarelto 20 MG tablet, TAKE ONE TABLET BY MOUTH DAILY WITH DINNER, Disp: 90 tablet, Rfl: 0    No Known Allergies reviewed by me    SOCIAL, FAMILY HISTORY: Medical records are reviewed and noted by me.  History of smoking no  History of alcohol use 3/week  Caffeine use 4 cups of coffee    REVIEW OF SYSTEMS:   [place] Sleepiness Scale :    Snoring: Resolved  Morning headache: No  Nasal congestion: No      PHYSICAL EXAMINATION:  Vital signs as reported by the patient patient has no cough fever.  Physical examination.  The physical examination is done by inspection through video conferencing.  Patient appearance is normal and interaction is normal.  On inspection there  is no obvious respiratory distress.  Her conversation and thought content is normal.      ASSESSMENT AND PLAN:  · Obstructive sleep apnea, patient is using the device with good compliance for treatment of sleep apnea.  I have reviewed the smart card down load and discussed with the patient the download data and encouarged the patient to continue to use the device.  The symptoms have improved and the residual AHI is acceptable.  The device is benefiting the patient and the device is medically necessary. The patient is also instructed to get the supplies from the DME company and a prescription for supplies has been sent to the DME company.  I am going to increase the auto CPAP from 8-16 to 10-16.  This should reduce the AHI.  · Obesity, patient's BMI is 32  I have discussed weight reduction and the health benefits.  I have also discuss the relationship between the weight and sleep apnea and encouraged the patient to lose weight which is beneficial in treating sleep apnea. Discussed diet and exercise with the patient.  · Return to clinic in 12 months for follow-up        Sharmila Christiansen MD  Sleep Medicine.(Board-certified)  [facility]  Medical Director for [facility]  10/29/2020

## 2020-11-10 ENCOUNTER — HOSPITAL ENCOUNTER (OUTPATIENT)
Dept: MAMMOGRAPHY | Facility: HOSPITAL | Age: 70
Discharge: HOME | End: 2020-11-10
Admitting: INTERNAL MEDICINE

## 2020-11-10 PROCEDURE — 77063 BREAST TOMOSYNTHESIS BI: CPT

## 2020-11-10 PROCEDURE — 77067 SCR MAMMO BI INCL CAD: CPT

## 2020-12-09 DIAGNOSIS — I48.0 PAROXYSMAL ATRIAL FIBRILLATION (HCC): ICD-10-CM

## 2020-12-10 RX ORDER — RIVAROXABAN 20 MG/1
TABLET, FILM COATED ORAL
Qty: 90 TABLET | Refills: 0 | Status: SHIPPED | OUTPATIENT
Start: 2020-12-10 | End: 2021-03-08

## 2020-12-15 RX ORDER — MONTELUKAST SODIUM 10 MG/1
TABLET ORAL
Qty: 90 TABLET | Refills: 0 | Status: SHIPPED | OUTPATIENT
Start: 2020-12-15 | End: 2021-03-10

## 2020-12-15 RX ORDER — GABAPENTIN 300 MG/1
CAPSULE ORAL
Qty: 270 CAPSULE | Refills: 0 | Status: SHIPPED | OUTPATIENT
Start: 2020-12-15 | End: 2021-03-10

## 2020-12-15 RX ORDER — SIMVASTATIN 20 MG
TABLET ORAL
Qty: 90 TABLET | Refills: 0 | Status: SHIPPED | OUTPATIENT
Start: 2020-12-15 | End: 2021-03-10

## 2020-12-31 DIAGNOSIS — E78.5 HYPERLIPIDEMIA, UNSPECIFIED HYPERLIPIDEMIA TYPE: Primary | ICD-10-CM

## 2020-12-31 DIAGNOSIS — Z00.00 HEALTHCARE MAINTENANCE: ICD-10-CM

## 2021-01-04 ENCOUNTER — OFFICE VISIT (OUTPATIENT)
Dept: CARDIOLOGY | Facility: CLINIC | Age: 71
End: 2021-01-04

## 2021-01-04 VITALS — HEART RATE: 61 BPM | BODY MASS INDEX: 31.32 KG/M2 | WEIGHT: 200 LBS

## 2021-01-04 DIAGNOSIS — I48.0 PAF (PAROXYSMAL ATRIAL FIBRILLATION) (HCC): Primary | ICD-10-CM

## 2021-01-04 DIAGNOSIS — E66.09 CLASS 1 OBESITY DUE TO EXCESS CALORIES WITHOUT SERIOUS COMORBIDITY WITH BODY MASS INDEX (BMI) OF 32.0 TO 32.9 IN ADULT: ICD-10-CM

## 2021-01-04 DIAGNOSIS — Z99.89 OSA ON CPAP: ICD-10-CM

## 2021-01-04 DIAGNOSIS — G47.33 OSA ON CPAP: ICD-10-CM

## 2021-01-04 PROCEDURE — 99442 PR PHYS/QHP TELEPHONE EVALUATION 11-20 MIN: CPT | Performed by: NURSE PRACTITIONER

## 2021-01-04 RX ORDER — INFLUENZA A VIRUS A/MICHIGAN/45/2015 X-275 (H1N1) ANTIGEN (FORMALDEHYDE INACTIVATED), INFLUENZA A VIRUS A/SINGAPORE/INFIMH-16-0019/2016 IVR-186 (H3N2) ANTIGEN (FORMALDEHYDE INACTIVATED), INFLUENZA B VIRUS B/PHUKET/3073/2013 ANTIGEN (FORMALDEHYDE INACTIVATED), AND INFLUENZA B VIRUS B/MARYLAND/15/2016 BX-69A ANTIGEN (FORMALDEHYDE INACTIVATED) 60; 60; 60; 60 UG/.7ML; UG/.7ML; UG/.7ML; UG/.7ML
INJECTION, SUSPENSION INTRAMUSCULAR
COMMUNITY
Start: 2020-10-16 | End: 2022-01-11

## 2021-01-04 NOTE — PROGRESS NOTES
Date of Office Visit: 2021  Encounter Provider: SHAYY Carrillo  Place of Service: [facility] CARDIOLOGY  Patient Name: Nessa Osuna  :1950    Chief Complaint   Patient presents with   • Atrial Fibrillation   :     HPI: Nessa Osuna is a 70 y.o. female who is a patient followed by Dr. Blue and Dr. Wagner.  She had symptomatic persistent A. fib and underwent cardioversion in 2020--she only stayed in sinus rhythm for a few days--had a follow-up ZIO which showed 54% AF burden---we switched her to atenolol and when I saw her in August she was doing pretty well. At that time she was in the process of being evaluated for sleep apnea.     This patient has consented to a telehealth visit via telephone. The [facility] to comply with patient safety concerns in accordance with CDC recommendations.  All vitals recorded within this visit are reported by the patient.  I spent 12 minutes in total including but not limited to the 7 minutes spent in direct conversation with this patient.     Today she reports that overall she is doing pretty well. She has some episodes of A. fib but says that they are primarily at night.  She said they do not occur every night but often occur several nights in a row.  She does not really notice episodes during the day.  She thinks this is because she is just active and busy and it does not bother her at those times.    She does occasionally check her heart rate when she is having the AF at night and it is usually around the 120s.    She is not having any chest pain, dyspnea, PND, orthopnea or dizziness.    He was diagnosed with sleep apnea and was prescribed a CPAP machine which she says she uses most of the time, she does say that her settings were recently changed and she is having trouble adapting to these and is going to follow-up with them.    She has a stationary bike that she uses  sometimes and made a New Year's resolution that she is going to try to exercise daily, she says otherwise she is very active and she does walk her dog and one of the things that she noted in the past with her A. fib is that she had shortness of breath walking her dog but this has resolved.    Her blood pressure machine did not work today but she says it has been doing well when she has monitor it.    She currently takes rivaroxaban for anticoagulation and says she has no bleeding issues.    Past Medical History:   Diagnosis Date   • A-fib (CMS/HCC) 05/31/2017   • Breast nodule    • Chest wall mass    • Class 1 obesity due to excess calories without serious comorbidity with body mass index (BMI) of 32.0 to 32.9 in adult    • Closed fracture of head of metacarpal     left second   • Closed fracture of metacarpal bone     left   • DDD (degenerative disc disease), lumbar    • Depression    • Difficulty breathing    • Diverticulitis 2010   • Fatigue    • GERD (gastroesophageal reflux disease)    • Hip pain, right    • Hyperlipidemia    • Leiomyoma of uterus    • Low back pain    • Osteoarthritis    • Osteopenia    • PAF (paroxysmal atrial fibrillation) (CMS/HCC)    • Persistent atrial fibrillation (CMS/HCC)    • Primary localized osteoarthritis of hip    • Sciatica    • Skin cancer 2010   • Snoring    • Spinal stenosis        Past Surgical History:   Procedure Laterality Date   • CARDIOVERSION  05/28/2020    Dr. 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); Dr Jarrett and Dr Franco   • TRIANA'S NEUROMA EXCISION      single neuroma   • RECONSTRUCTION OF NOSE     • TONSILLECTOMY      age 5   • TONSILLECTOMY AND ADENOIDECTOMY  1955       Social History     Socioeconomic History   • Marital status: Single     Spouse name: Not on file   • Number of children: 0   • Years of education: 14   • Highest education level: Not asked   Occupational History   • Occupation: RETIRED   Social Needs   • Financial  resource strain: Patient refused   • Food insecurity     Worry: Patient refused     Inability: Patient refused   • Transportation needs     Medical: Patient refused     Non-medical: Patient refused   Tobacco Use   • Smoking status: Former Smoker     Quit date:      Years since quittin.0   • Smokeless tobacco: Never Used   Substance and Sexual Activity   • Alcohol use: Yes     Comment: social drinker   • Drug use: No   • Sexual activity: Defer   Social History Narrative    LIVES ALONE       Family History   Problem Relation Age of Onset   • Cancer Mother         bladder   • Osteoporosis Mother    • Heart failure Mother    • Lung cancer Father    • No Known Problems Sister        Review of Systems   Constitution: Negative for chills, fever and malaise/fatigue.   Cardiovascular: Positive for irregular heartbeat. Negative for chest pain, dyspnea on exertion, leg swelling, near-syncope, orthopnea, palpitations, paroxysmal nocturnal dyspnea and syncope.   Respiratory: Negative for cough and shortness of breath.    Hematologic/Lymphatic: Negative.    Musculoskeletal: Negative for joint pain, joint swelling and myalgias.   Gastrointestinal: Negative for abdominal pain, diarrhea, melena, nausea and vomiting.   Genitourinary: Negative for frequency and hematuria.   Neurological: Negative for light-headedness, numbness, paresthesias and seizures.   Allergic/Immunologic: Negative.    All other systems reviewed and are negative.      No Known Allergies      Current Outpatient Medications:   •  acetaminophen (TYLENOL) 500 MG tablet, Take 500 mg by mouth Every Night. Take 1 tablet every 4-6 hours as needed, Disp: , Rfl:   •  atenolol (TENORMIN) 50 MG tablet, Take 1.5 tablets by mouth Every Morning AND 1 tablet Every Night., Disp: 270 tablet, Rfl: 3  •  Cholecalciferol (VITAMIN D) 2000 UNITS capsule, Take 1 capsule by mouth daily., Disp: , Rfl:   •  DULoxetine (CYMBALTA) 60 MG capsule, Take 1 capsule by mouth Daily., Disp:  90 capsule, Rfl: 3  •  Flaxseed, Linseed, (FLAXSEED OIL) 1000 MG capsule, Take 1 capsule by mouth Daily., Disp: , Rfl:   •  fluticasone (FLONASE) 50 MCG/ACT nasal spray, PLACE TWO SPRAYS IN EACH NOSTRIL ONCE DAILY, Disp: 1 bottle, Rfl: 4  •  Fluzone High-Dose Quadrivalent 0.7 ML suspension prefilled syringe, ADM 0.7ML IM UTD, Disp: , Rfl:   •  gabapentin (NEURONTIN) 300 MG capsule, TAKE ONE CAPSULE BY MOUTH THREE TIMES A DAY, Disp: 270 capsule, Rfl: 0  •  Melatonin 5 MG capsule, Take 5 mg by mouth Every Night., Disp: , Rfl:   •  montelukast (SINGULAIR) 10 MG tablet, TAKE ONE TABLET BY MOUTH ONCE NIGHTLY, Disp: 90 tablet, Rfl: 0  •  simvastatin (ZOCOR) 20 MG tablet, TAKE ONE TABLET BY MOUTH DAILY, Disp: 90 tablet, Rfl: 0  •  Xarelto 20 MG tablet, TAKE ONE TABLET BY MOUTH DAILY WITH DINNER, Disp: 90 tablet, Rfl: 0      Objective:     Vitals:    01/04/21 0951 01/04/21 1005   Pulse:  61   Weight: 90.7 kg (200 lb)      Body mass index is 31.32 kg/m².    PHYSICAL EXAM: Telehealth audio visit.    Vitals Reviewed.     Respiratory: Respirations, conversation was not labored during telehealth visit.    Psychiatric: Patient alert and oriented to person, place, and time. Speech and behavior appropriate. Normal mood and affect.       ECG 12 Lead    Date/Time: 1/4/2021 10:22 AM  Performed by: Jade Arcos APRN  Authorized by: Jade Arcos APRN           No EKG tracing was done secondary to telehealth visit but I could not delete the smart block.      Assessment:       Diagnosis Plan   1. PAF (paroxysmal atrial fibrillation) (CMS/HCC)     2. CHEN on CPAP     3. Class 1 obesity due to excess calories without serious comorbidity with body mass index (BMI) of 32.0 to 32.9 in adult            Plan:       1.  Paroxysmal atrial fib, overall she is improved, she says that her episodes primarily occur at nighttime, we discussed switching her atenolol dose to take 75 mg in the evening and 50 mg in the morning we also discussed that we  could try going to 75 mg twice a day.  She is going to try switching them and if that works fine and if not then she will try increasing it to 75 mg twice a day, she is going to call me in about 3 to 4 weeks and update me on how she is doing.  Continue rivaroxaban for anticoagulation.    2.  CHEN, treated with CPAP.    3. For the problem of overweight/obesity, we discussed the importance of lifestyle measures and strategies for weight loss, such as improved nutrition, regular exercise and sleep hygiene.      Follow-up with Dr. Wagner in 6 months.    As always, it has been a pleasure to participate in your patient's care.      Sincerely,         SHAYY Cuellar

## 2021-01-06 LAB
ALBUMIN SERPL-MCNC: 4.4 G/DL (ref 3.5–5.2)
ALBUMIN/GLOB SERPL: 1.9 G/DL
ALP SERPL-CCNC: 70 U/L (ref 39–117)
ALT SERPL-CCNC: 18 U/L (ref 1–33)
AST SERPL-CCNC: 24 U/L (ref 1–32)
BASOPHILS # BLD AUTO: 0.03 10*3/MM3 (ref 0–0.2)
BASOPHILS NFR BLD AUTO: 0.7 % (ref 0–1.5)
BILIRUB SERPL-MCNC: 0.8 MG/DL (ref 0–1.2)
BUN SERPL-MCNC: 13 MG/DL (ref 8–23)
BUN/CREAT SERPL: 21 (ref 7–25)
CALCIUM SERPL-MCNC: 9.4 MG/DL (ref 8.6–10.5)
CHLORIDE SERPL-SCNC: 104 MMOL/L (ref 98–107)
CHOLEST SERPL-MCNC: 169 MG/DL (ref 0–200)
CO2 SERPL-SCNC: 26.7 MMOL/L (ref 22–29)
CREAT SERPL-MCNC: 0.62 MG/DL (ref 0.57–1)
EOSINOPHIL # BLD AUTO: 0.31 10*3/MM3 (ref 0–0.4)
EOSINOPHIL NFR BLD AUTO: 7.5 % (ref 0.3–6.2)
ERYTHROCYTE [DISTWIDTH] IN BLOOD BY AUTOMATED COUNT: 11.5 % (ref 12.3–15.4)
GLOBULIN SER CALC-MCNC: 2.3 GM/DL
GLUCOSE SERPL-MCNC: 94 MG/DL (ref 65–99)
HCT VFR BLD AUTO: 40.7 % (ref 34–46.6)
HDLC SERPL-MCNC: 56 MG/DL (ref 40–60)
HGB BLD-MCNC: 13.6 G/DL (ref 12–15.9)
IMM GRANULOCYTES # BLD AUTO: 0.01 10*3/MM3 (ref 0–0.05)
IMM GRANULOCYTES NFR BLD AUTO: 0.2 % (ref 0–0.5)
LDLC SERPL CALC-MCNC: 98 MG/DL (ref 0–100)
LYMPHOCYTES # BLD AUTO: 1.21 10*3/MM3 (ref 0.7–3.1)
LYMPHOCYTES NFR BLD AUTO: 29.4 % (ref 19.6–45.3)
MCH RBC QN AUTO: 30.5 PG (ref 26.6–33)
MCHC RBC AUTO-ENTMCNC: 33.4 G/DL (ref 31.5–35.7)
MCV RBC AUTO: 91.3 FL (ref 79–97)
MONOCYTES # BLD AUTO: 0.51 10*3/MM3 (ref 0.1–0.9)
MONOCYTES NFR BLD AUTO: 12.4 % (ref 5–12)
NEUTROPHILS # BLD AUTO: 2.04 10*3/MM3 (ref 1.7–7)
NEUTROPHILS NFR BLD AUTO: 49.8 % (ref 42.7–76)
NRBC BLD AUTO-RTO: 0 /100 WBC (ref 0–0.2)
PLATELET # BLD AUTO: 251 10*3/MM3 (ref 140–450)
POTASSIUM SERPL-SCNC: 4.1 MMOL/L (ref 3.5–5.2)
PROT SERPL-MCNC: 6.7 G/DL (ref 6–8.5)
RBC # BLD AUTO: 4.46 10*6/MM3 (ref 3.77–5.28)
SODIUM SERPL-SCNC: 140 MMOL/L (ref 136–145)
TRIGL SERPL-MCNC: 79 MG/DL (ref 0–150)
VLDLC SERPL CALC-MCNC: 15 MG/DL (ref 5–40)
WBC # BLD AUTO: 4.11 10*3/MM3 (ref 3.4–10.8)

## 2021-01-12 ENCOUNTER — TELEMEDICINE (OUTPATIENT)
Dept: INTERNAL MEDICINE | Facility: CLINIC | Age: 71
End: 2021-01-12

## 2021-01-12 VITALS — DIASTOLIC BLOOD PRESSURE: 67 MMHG | SYSTOLIC BLOOD PRESSURE: 124 MMHG | OXYGEN SATURATION: 98 % | HEART RATE: 60 BPM

## 2021-01-12 DIAGNOSIS — Z99.89 OSA ON CPAP: ICD-10-CM

## 2021-01-12 DIAGNOSIS — G47.33 OSA ON CPAP: ICD-10-CM

## 2021-01-12 DIAGNOSIS — I48.0 PAF (PAROXYSMAL ATRIAL FIBRILLATION) (HCC): Primary | ICD-10-CM

## 2021-01-12 DIAGNOSIS — E78.5 HYPERLIPIDEMIA, UNSPECIFIED HYPERLIPIDEMIA TYPE: ICD-10-CM

## 2021-01-12 PROCEDURE — 99214 OFFICE O/P EST MOD 30 MIN: CPT | Performed by: INTERNAL MEDICINE

## 2021-01-12 NOTE — PROGRESS NOTES
Chief Complaint   Patient presents with   • Fatigue   • Sleep Apnea   • Hypertension   • Hyperlipidemia       History of Present Illness   Nessa Osuna is a 70 y.o. female presents for follow up evaluation. She is seen by video in place of office visit due to covid-19 pandemic. She is feeling well today. She has atrial fibrillation. She notes symptoms of this most predominantly in the evening. She adjusted atenolol to extra 1/2 tab in evening to accommodate for this. She does not feel particularly anxious in the evening.   She reports that her diet has not been as healthy recently as she has not gone shopping for fresh vegetables.   Cbc, cmp, lipid reviewed.  Has CHEN. Setting increased and she is having poor tolerability of this.     The following portions of the patient's history were reviewed and updated as appropriate: allergies, current medications, past family history, past medical history, past social history, past surgical history and problem list.  Current Outpatient Medications on File Prior to Visit   Medication Sig Dispense Refill   • acetaminophen (TYLENOL) 500 MG tablet Take 500 mg by mouth Every Night. Take 1 tablet every 4-6 hours as needed     • atenolol (TENORMIN) 50 MG tablet Take 1.5 tablets by mouth Every Morning AND 1 tablet Every Night. 270 tablet 3   • Cholecalciferol (VITAMIN D) 2000 UNITS capsule Take 1 capsule by mouth daily.     • DULoxetine (CYMBALTA) 60 MG capsule Take 1 capsule by mouth Daily. 90 capsule 3   • Flaxseed, Linseed, (FLAXSEED OIL) 1000 MG capsule Take 1 capsule by mouth Daily.     • fluticasone (FLONASE) 50 MCG/ACT nasal spray PLACE TWO SPRAYS IN EACH NOSTRIL ONCE DAILY 1 bottle 4   • Fluzone High-Dose Quadrivalent 0.7 ML suspension prefilled syringe ADM 0.7ML IM UTD     • gabapentin (NEURONTIN) 300 MG capsule TAKE ONE CAPSULE BY MOUTH THREE TIMES A  capsule 0   • Melatonin 5 MG capsule Take 5 mg by mouth Every Night.     • montelukast (SINGULAIR) 10 MG tablet  TAKE ONE TABLET BY MOUTH ONCE NIGHTLY 90 tablet 0   • simvastatin (ZOCOR) 20 MG tablet TAKE ONE TABLET BY MOUTH DAILY 90 tablet 0   • Xarelto 20 MG tablet TAKE ONE TABLET BY MOUTH DAILY WITH DINNER 90 tablet 0     No current facility-administered medications on file prior to visit.      Review of Systems   Constitutional: Negative.    HENT: Negative.    Eyes: Negative.    Respiratory: Negative.    Cardiovascular: Negative.    Gastrointestinal: Negative.    Endocrine: Negative.    Genitourinary: Negative.    Musculoskeletal: Negative.    Skin: Negative.    Allergic/Immunologic: Negative.    Neurological: Negative.    Hematological: Negative.    Psychiatric/Behavioral: Negative.        Objective   Physical Exam  Vitals signs reviewed.   Constitutional:       Appearance: Normal appearance.   HENT:      Head: Normocephalic and atraumatic.      Mouth/Throat:      Mouth: Mucous membranes are moist.   Eyes:      Extraocular Movements: Extraocular movements intact.   Neck:      Musculoskeletal: Normal range of motion.   Pulmonary:      Effort: Pulmonary effort is normal.   Musculoskeletal: Normal range of motion.   Skin:     General: Skin is warm.   Neurological:      General: No focal deficit present.      Mental Status: She is alert and oriented to person, place, and time.   Psychiatric:         Mood and Affect: Mood normal.         Behavior: Behavior normal.         Thought Content: Thought content normal.         Judgment: Judgment normal.          /67   Pulse 60   SpO2 98%     Assessment/Plan   Diagnoses and all orders for this visit:    PAF (paroxysmal atrial fibrillation) (CMS/[facility])    CHEN on CPAP    Hyperlipidemia, unspecified hyperlipidemia type      Patient w/ atrial fibrillation, CHEN, and hyperlipidemia. Advised to maximize hydration and reduce caffeine in order to reduce palpiations. She is encouraged healthy routine physical activity and is to push this a little bit harder as tolerated. She is also  encouraged to  some fresh produce from local vendors . She will monitor bp. Avoid sodium and follow up in 6-7 months or prn. DEXA at that time. She will discuss cpap setting w/ sleep specialist in order to maximize therapy. Total visit 27 min w/ >50% spent counseling patient.       dalila

## 2021-02-17 ENCOUNTER — OFFICE VISIT (OUTPATIENT)
Dept: NEUROSURGERY | Facility: CLINIC | Age: 71
End: 2021-02-17

## 2021-02-17 DIAGNOSIS — M48.062 SPINAL STENOSIS OF LUMBAR REGION WITH NEUROGENIC CLAUDICATION: Primary | ICD-10-CM

## 2021-02-17 DIAGNOSIS — M43.16 SPONDYLOLISTHESIS AT L4-L5 LEVEL: ICD-10-CM

## 2021-02-17 PROCEDURE — 99441 PR PHYS/QHP TELEPHONE EVALUATION 5-10 MIN: CPT | Performed by: NEUROLOGICAL SURGERY

## 2021-02-17 NOTE — PROGRESS NOTES
Subjective   Patient ID: Nessa Osuna is a 70 y.o. female is here today for follow-up.    Patient was last seen 8.25.20 for back pain, hip pain, knee pain.    Patient is being seen today as a tele visit.  Patient states she is having low back pain radiating down her legs, inside of her thighs.  No numbness and tingling.    You have chosen to receive care through a telephone visit. Do you consent to use a telephone visit for your medical care today? Yes    This was a televisit from my office lasting 7 minutes.  The patient was at home.  She has known spinal stenosis and a spondylolisthesis.  These are at L4-L5.  She seems to be doing well in fact she has not required a block since I last spoke to her 6 months ago.  She takes the gabapentin at 300 mg twice daily provided by her primary care physician and that seems to help.  She can live with her symptoms.  So we will keep it open-ended.  If symptoms worsen she will let me know.  We can set up a block if that happens with Dr. Arthur and have her follow-up afterwards but for now we will keep it as needed.      b    Back Pain  The pain is present in the lumbar spine. The quality of the pain is described as aching. The pain radiates to the left thigh and right thigh. The pain is at a severity of 2/10. The pain is the same all the time. The symptoms are aggravated by bending and twisting. Associated symptoms include leg pain. Pertinent negatives include no fever, numbness or tingling. She has tried ice and heat for the symptoms. The treatment provided mild relief.       The following portions of the patient's history were reviewed and updated as appropriate: allergies, current medications, past family history, past medical history, past social history, past surgical history and problem list.    Review of Systems   Constitutional: Negative for chills and fever.   HENT: Negative for congestion.    Musculoskeletal: Positive for back pain.   Neurological: Negative for  tingling and numbness.           Objective     There were no vitals filed for this visit.  There is no height or weight on file to calculate BMI.      Physical Exam   Deferred  Neurologic Exam   Deferred        Assessment/Plan   Independent Review of Radiographic Studies:      I personally reviewed the images from the following studies.    I reviewed her lumbar MRI done in February 2019 which did show severe spinal stenosis at L4-L5 with a 7 mm anterolisthesis.  Agree with the report      Medical Decision Making:      At this point, we can keep it open-ended.  Her primary care physician provides the gabapentin.  She can live with her symptoms as they stand.  If things worsen and she wants to try an epidural block or if we need to consider surgery in the future, she will let us know.      Diagnoses and all orders for this visit:    1. Spinal stenosis of lumbar region with neurogenic claudication (Primary)    2. Spondylolisthesis at L4-L5 level      Return if symptoms worsen or fail to improve.

## 2021-03-02 DIAGNOSIS — Z23 IMMUNIZATION DUE: ICD-10-CM

## 2021-03-05 DIAGNOSIS — I48.0 PAROXYSMAL ATRIAL FIBRILLATION (HCC): ICD-10-CM

## 2021-03-08 RX ORDER — RIVAROXABAN 20 MG/1
TABLET, FILM COATED ORAL
Qty: 90 TABLET | Refills: 1 | Status: SHIPPED | OUTPATIENT
Start: 2021-03-08 | End: 2021-09-08 | Stop reason: SDUPTHER

## 2021-03-10 DIAGNOSIS — M54.30 SCIATICA, UNSPECIFIED LATERALITY: Primary | ICD-10-CM

## 2021-03-10 RX ORDER — GABAPENTIN 300 MG/1
CAPSULE ORAL
Qty: 270 CAPSULE | Refills: 0 | Status: SHIPPED | OUTPATIENT
Start: 2021-03-10 | End: 2021-07-16

## 2021-03-10 RX ORDER — MONTELUKAST SODIUM 10 MG/1
TABLET ORAL
Qty: 90 TABLET | Refills: 0 | Status: SHIPPED | OUTPATIENT
Start: 2021-03-10 | End: 2021-06-07

## 2021-03-10 RX ORDER — SIMVASTATIN 20 MG
TABLET ORAL
Qty: 90 TABLET | Refills: 0 | Status: SHIPPED | OUTPATIENT
Start: 2021-03-10 | End: 2021-06-07

## 2021-03-29 ENCOUNTER — TELEPHONE (OUTPATIENT)
Dept: CARDIOLOGY | Facility: CLINIC | Age: 71
End: 2021-03-29

## 2021-03-29 NOTE — TELEPHONE ENCOUNTER
She try increasing it to 75 mg twice daily and see if that helps---she can try if for few days-week and call and update us

## 2021-03-29 NOTE — TELEPHONE ENCOUNTER
Called pt and LVM with information. I will call and make sure she got the message tomorrow...Lee Ann

## 2021-03-29 NOTE — TELEPHONE ENCOUNTER
Pt called stating she has been having more A-fib and she does not think the atenolol is working. She is currently taking 50 mg in the am and 75 mg in the pm. She wants to know if she needs to have a medication change or adjustment...Lee Ann

## 2021-04-14 NOTE — TELEPHONE ENCOUNTER
----- Message from Jayne Guerra MD sent at 3/23/2020 12:40 PM EDT -----  Patient in office with increasing exertional dyspnea. Now short of air after 1/2 block, previously could walk a couple of miles. She had an echo and EF is reduced from prior study 2017. She is advised to follow up w/ you. She is scheduled for august. Please advise patient if you would like for her to follow up prior to that.   Thank you,  Jayne     14-Apr-2021

## 2021-05-12 ENCOUNTER — TELEPHONE (OUTPATIENT)
Dept: CARDIOLOGY | Facility: CLINIC | Age: 71
End: 2021-05-12

## 2021-05-12 DIAGNOSIS — I48.0 PAF (PAROXYSMAL ATRIAL FIBRILLATION) (HCC): Primary | ICD-10-CM

## 2021-05-12 NOTE — TELEPHONE ENCOUNTER
Patient calling requesting to speak to Jade.States she is  Having issues with her Atenolol. States she is having increased episodes of Afib. She has been increased to 75 Mg BID and is wanting to talk about switching medications. Please advise.

## 2021-05-13 NOTE — TELEPHONE ENCOUNTER
"Called and spoke with her---she had 54% burden on Zio in 6/2020---she feels like she has weeks that she has none but then she has weeks that she is in AF a lot---lasting for several hours a day.     She does not think it limits her she primarily notices it at night when she is trying to sleep but she says she does think it is less intense.     She did say she doesn't feel like she \"ever got her breath back\"---not back to normal but not worse when she is in afib---not sure if with Covid she just isn't doing as much either.     Discussed to try and figure out if AF is causing symptoms and would she benefit from trying to be more aggressive in maintaining SR.     She has not really been checking b/p but will start---she does check HR and gets reading that are  at times but not sure if she is having any sustained elevated HR's    Will go ahead a check another Zio to see what her burden is and HR---she has appt in July but once we get Zio results will move up if needed.     She was diagnosed with sleep apnea and has been using CPAP since Sept---does not notice any difference with PAF.   "

## 2021-05-14 ENCOUNTER — TELEPHONE (OUTPATIENT)
Dept: CARDIOLOGY | Facility: CLINIC | Age: 71
End: 2021-05-14

## 2021-05-14 DIAGNOSIS — I48.0 PAF (PAROXYSMAL ATRIAL FIBRILLATION) (HCC): Primary | ICD-10-CM

## 2021-05-14 NOTE — TELEPHONE ENCOUNTER
Call and let her know that her insurance will not pay for a Zio (14 day monitor) they require us to do a 24 hr Holter first---not sure that will yield enough information so I am just going to cancel and we need to move her appt up to sometime in June when JM is in the office---she can go on his schedule or my schedule on a day he is in the office---we can talk about options at that time.

## 2021-06-07 RX ORDER — SIMVASTATIN 20 MG
TABLET ORAL
Qty: 90 TABLET | Refills: 0 | Status: SHIPPED | OUTPATIENT
Start: 2021-06-07 | End: 2021-09-06

## 2021-06-07 RX ORDER — MONTELUKAST SODIUM 10 MG/1
TABLET ORAL
Qty: 90 TABLET | Refills: 0 | Status: SHIPPED | OUTPATIENT
Start: 2021-06-07 | End: 2021-09-15

## 2021-06-11 ENCOUNTER — OFFICE VISIT (OUTPATIENT)
Dept: CARDIOLOGY | Facility: CLINIC | Age: 71
End: 2021-06-11

## 2021-06-11 ENCOUNTER — OFFICE VISIT (OUTPATIENT)
Dept: INTERNAL MEDICINE | Facility: CLINIC | Age: 71
End: 2021-06-11

## 2021-06-11 VITALS
BODY MASS INDEX: 34.21 KG/M2 | SYSTOLIC BLOOD PRESSURE: 120 MMHG | HEIGHT: 67 IN | DIASTOLIC BLOOD PRESSURE: 82 MMHG | WEIGHT: 218 LBS | HEART RATE: 95 BPM

## 2021-06-11 VITALS
DIASTOLIC BLOOD PRESSURE: 94 MMHG | HEIGHT: 67 IN | BODY MASS INDEX: 34.21 KG/M2 | HEART RATE: 120 BPM | WEIGHT: 218 LBS | SYSTOLIC BLOOD PRESSURE: 134 MMHG

## 2021-06-11 DIAGNOSIS — Z99.89 OSA ON CPAP: ICD-10-CM

## 2021-06-11 DIAGNOSIS — R10.32 LEFT LOWER QUADRANT ABDOMINAL PAIN: Primary | ICD-10-CM

## 2021-06-11 DIAGNOSIS — R11.2 NON-INTRACTABLE VOMITING WITH NAUSEA, UNSPECIFIED VOMITING TYPE: ICD-10-CM

## 2021-06-11 DIAGNOSIS — I48.0 PAF (PAROXYSMAL ATRIAL FIBRILLATION) (HCC): Primary | ICD-10-CM

## 2021-06-11 DIAGNOSIS — G47.33 OSA ON CPAP: ICD-10-CM

## 2021-06-11 DIAGNOSIS — E66.09 CLASS 1 OBESITY DUE TO EXCESS CALORIES WITHOUT SERIOUS COMORBIDITY WITH BODY MASS INDEX (BMI) OF 32.0 TO 32.9 IN ADULT: ICD-10-CM

## 2021-06-11 PROCEDURE — 99213 OFFICE O/P EST LOW 20 MIN: CPT | Performed by: NURSE PRACTITIONER

## 2021-06-11 PROCEDURE — 99214 OFFICE O/P EST MOD 30 MIN: CPT | Performed by: INTERNAL MEDICINE

## 2021-06-11 PROCEDURE — 93000 ELECTROCARDIOGRAM COMPLETE: CPT | Performed by: NURSE PRACTITIONER

## 2021-06-11 RX ORDER — ATENOLOL 100 MG/1
100 TABLET ORAL 2 TIMES DAILY
Qty: 180 TABLET | Refills: 3 | Status: SHIPPED | OUTPATIENT
Start: 2021-06-11 | End: 2022-06-13

## 2021-06-11 NOTE — PROGRESS NOTES
"Date of Office Visit: 2021  Encounter Provider: SHAYY Carrillo  Place of Service: [facility] CARDIOLOGY  Patient Name: Nessa Osuna  :1950    Chief Complaint   Patient presents with   • Atrial Fibrillation     6 MNTH FOLLOW UP   :     HPI: Nessa Osuna is a 71 y.o. female who followed by Dr. Blue and Dr. Wagner.  She has had PAF for years---then had symptomatic persistent A. fib, cardioversion in May/2020, she only stayed in sinus rhythm for a few days had a follow-up ZIO which showed a 54% burden, we had switched her to atenolol and then I followed up with her in 2020 and she was doing pretty well at that time.    We did a telehealth visit in January of this year and things were going pretty good but she called in early May with complaints of increasing episodes of AF.  We tried to order a ZIO however her insurance would not pay for it, she presents today for further evaluation.    She had an echo in 2020 which showed mild to moderate global hypokinesis with an EF of 41-45%, severely dilated left atrium, mild to moderate MR with mild to moderate TR.    She has had increase in frequency and duration of her AF episodes---she feels okay when in AF as long as her HR is controlled and sometimes she has \"harder\" heartbeats. She has taken extra BB and it does help and makes the episodes tolerable.     She is in AF today---started this morning.     When not in AF she feels good---no chest pain, dyspnea, PND, orthopnea, dizziness or edema. Afib primarily causes her fatigue.     R-ban for AC, no bleeding issues.        Past Medical History:   Diagnosis Date   • A-fib (CMS/[facility]) 2017   • Breast nodule    • Chest wall mass    • Class 1 obesity due to excess calories without serious comorbidity with body mass index (BMI) of 32.0 to 32.9 in adult    • Closed fracture of head of metacarpal     left second   • Closed fracture of metacarpal bone     " left   • DDD (degenerative disc disease), lumbar    • Depression    • Difficulty breathing    • Diverticulitis 2010   • Fatigue    • GERD (gastroesophageal reflux disease)    • Hip pain, right    • Hyperlipidemia    • Leiomyoma of uterus    • Low back pain    • CHEN on CPAP    • Osteoarthritis    • Osteopenia    • PAF (paroxysmal atrial fibrillation) (CMS/HCC)    • Persistent atrial fibrillation (CMS/HCC)    • Primary localized osteoarthritis of hip    • Sciatica    • Skin cancer    • Snoring    • Spinal stenosis        Past Surgical History:   Procedure Laterality Date   • CARDIOVERSION  2020    Dr. Wagner   • CATARACT EXTRACTION, BILATERAL     • MOHS SURGERY      chemosurgery (Mohs Micrographic Technique); Dr Jarrett and Dr Franco   • TRIANA'S NEUROMA EXCISION      single neuroma   • RECONSTRUCTION OF NOSE     • TONSILLECTOMY      age 5   • TONSILLECTOMY AND ADENOIDECTOMY         Social History     Socioeconomic History   • Marital status: Single     Spouse name: Not on file   • Number of children: 0   • Years of education: 14   • Highest education level: Not asked   Tobacco Use   • Smoking status: Former Smoker     Quit date:      Years since quittin.4   • Smokeless tobacco: Never Used   Vaping Use   • Vaping Use: Never used   Substance and Sexual Activity   • Alcohol use: Yes     Comment: social drinker   • Drug use: No   • Sexual activity: Defer       Family History   Problem Relation Age of Onset   • Cancer Mother         bladder   • Osteoporosis Mother    • Heart failure Mother    • Lung cancer Father    • No Known Problems Sister        Review of Systems   Constitutional: Negative for chills, fever and malaise/fatigue.   Cardiovascular: Positive for irregular heartbeat and palpitations. Negative for chest pain, dyspnea on exertion, leg swelling, near-syncope, orthopnea, paroxysmal nocturnal dyspnea and syncope.   Respiratory: Negative for cough and shortness of breath.   "  Hematologic/Lymphatic: Negative.    Musculoskeletal: Negative for joint pain, joint swelling and myalgias.   Gastrointestinal: Negative for abdominal pain, diarrhea, melena, nausea and vomiting.   Genitourinary: Negative for frequency and hematuria.   Neurological: Negative for light-headedness, numbness, paresthesias and seizures.   Allergic/Immunologic: Negative.    All other systems reviewed and are negative.      No Known Allergies      Current Outpatient Medications:   •  acetaminophen (TYLENOL) 500 MG tablet, Take 500 mg by mouth Every Night. Take 1 tablet every 4-6 hours as needed, Disp: , Rfl:   •  atenolol (TENORMIN) 100 MG tablet, Take 1 tablet by mouth 2 (Two) Times a Day., Disp: 180 tablet, Rfl: 3  •  Cholecalciferol (VITAMIN D) 2000 UNITS capsule, Take 1 capsule by mouth daily., Disp: , Rfl:   •  DULoxetine (CYMBALTA) 60 MG capsule, Take 1 capsule by mouth Daily., Disp: 90 capsule, Rfl: 3  •  Flaxseed, Linseed, (FLAXSEED OIL) 1000 MG capsule, Take 1 capsule by mouth Daily., Disp: , Rfl:   •  fluticasone (FLONASE) 50 MCG/ACT nasal spray, PLACE TWO SPRAYS IN EACH NOSTRIL ONCE DAILY, Disp: 1 bottle, Rfl: 4  •  Fluzone High-Dose Quadrivalent 0.7 ML suspension prefilled syringe, ADM 0.7ML IM UTD, Disp: , Rfl:   •  gabapentin (NEURONTIN) 300 MG capsule, TAKE ONE CAPSULE BY MOUTH THREE TIMES A DAY, Disp: 270 capsule, Rfl: 0  •  Melatonin 5 MG capsule, Take 5 mg by mouth Every Night., Disp: , Rfl:   •  montelukast (SINGULAIR) 10 MG tablet, TAKE ONE TABLET BY MOUTH ONCE NIGHTLY, Disp: 90 tablet, Rfl: 0  •  simvastatin (ZOCOR) 20 MG tablet, TAKE ONE TABLET BY MOUTH DAILY, Disp: 90 tablet, Rfl: 0  •  Xarelto 20 MG tablet, TAKE ONE TABLET BY MOUTH DAILY WITH DINNER, Disp: 90 tablet, Rfl: 1      Objective:     Vitals:    06/11/21 1238   BP: 120/82   Pulse: 95   Weight: 98.9 kg (218 lb)   Height: 170.2 cm (67\")     Body mass index is 34.14 kg/m².    PHYSICAL EXAM:    Vitals Reviewed.   General Appearance: No acute " distress, well developed and well nourished.   Eyes: Conjunctiva and lids: No erythema, swelling, or discharge. Sclera non-icteric.   HENT: Atraumatic, normocephalic. External eyes, ears, and nose normal.   Respiratory: No signs of respiratory distress. Respiration rhythm and depth normal.   Clear to auscultation. No rales, crackles, rhonchi, or wheezing auscultated.   Cardiovascular:  Heart Rate and Rhythm: Irregularly, irregular. Heart Sounds: Normal S1 and S2.   Murmurs: No murmurs noted. No rubs, thrills, or gallops.   Arterial Pulses:  Posterior tibialis and dorsalis pedis pulses normal.   Lower Extremities: No edema noted.  Gastrointestinal:  Abdomen soft, non-distended, non-tender.   Musculoskeletal: Normal movement of extremities  Skin and Nails: General appearance normal. No pallor, cyanosis, diaphoresis. Skin temperature normal. No clubbing of fingernails.   Psychiatric: Patient alert and oriented to person, place, and time. Speech and behavior appropriate. Normal mood and affect.       ECG 12 Lead    Date/Time: 6/11/2021 12:33 PM  Performed by: Jade Arcos APRN  Authorized by: Jade Arcos APRN   Comparison: compared with previous ECG   Rhythm: atrial fibrillation  BPM: 95                Assessment:       Diagnosis Plan   1. PAF (paroxysmal atrial fibrillation) (CMS/[facility])  ECG 12 Lead   2. Class 1 obesity due to excess calories without serious comorbidity with body mass index (BMI) of 32.0 to 32.9 in adult     3. CHEN on CPAP            Plan:       1. PAF---increased frequency/duration recently, no provoking factors that she knows of---she tolerates the AF as long as HR is controlled---it has improved with extra BB---will try increasing her to 100 mg BID and she is going to call and update me on 6/28. LA severely dilated by echo 3/2020--not sure will be able to maintain SR.     2. For the problem of overweight/obesity, we discussed the importance of lifestyle measures and strategies for weight loss,  such as improved nutrition, regular exercise and sleep hygiene.      3. CHEN treated with CPAP.     As always, it has been a pleasure to participate in your patient's care.      Sincerely,         SHAYY Cuellar

## 2021-06-11 NOTE — PROGRESS NOTES
Chief Complaint   Patient presents with   • Abdominal Pain       History of Present Illness   Nessa Osuna is a 71 y.o. female presents for acute care. She reports intermittent LLQ abdominal pain. Followed by nausea. Then emesis. This happens 9-10 times a year. No particular food pattern to this. She did not lie down after eating. She was not experiencing palpitations for atrial fibrillation. Had normal CT abdomen 3/21 when this was happening. She notes mucous when she completes emesis.       The following portions of the patient's history were reviewed and updated as appropriate: allergies, current medications, past family history, past medical history, past social history, past surgical history and problem list.  Current Outpatient Medications on File Prior to Visit   Medication Sig Dispense Refill   • acetaminophen (TYLENOL) 500 MG tablet Take 500 mg by mouth Every Night. Take 1 tablet every 4-6 hours as needed     • atenolol (TENORMIN) 100 MG tablet Take 1 tablet by mouth 2 (Two) Times a Day. 180 tablet 3   • Cholecalciferol (VITAMIN D) 2000 UNITS capsule Take 1 capsule by mouth daily.     • DULoxetine (CYMBALTA) 60 MG capsule Take 1 capsule by mouth Daily. 90 capsule 3   • Flaxseed, Linseed, (FLAXSEED OIL) 1000 MG capsule Take 1 capsule by mouth Daily.     • fluticasone (FLONASE) 50 MCG/ACT nasal spray PLACE TWO SPRAYS IN EACH NOSTRIL ONCE DAILY 1 bottle 4   • gabapentin (NEURONTIN) 300 MG capsule TAKE ONE CAPSULE BY MOUTH THREE TIMES A  capsule 0   • Melatonin 5 MG capsule Take 5 mg by mouth Every Night.     • montelukast (SINGULAIR) 10 MG tablet TAKE ONE TABLET BY MOUTH ONCE NIGHTLY 90 tablet 0   • simvastatin (ZOCOR) 20 MG tablet TAKE ONE TABLET BY MOUTH DAILY 90 tablet 0   • Xarelto 20 MG tablet TAKE ONE TABLET BY MOUTH DAILY WITH DINNER 90 tablet 1   • Fluzone High-Dose Quadrivalent 0.7 ML suspension prefilled syringe ADM 0.7ML IM UTD     • [DISCONTINUED] atenolol (TENORMIN) 50 MG tablet Take  "1.5 tablets by mouth Every Morning AND 1 tablet Every Night. (Patient taking differently: Take 1.5 tablets by mouth TWICE DAILY) 270 tablet 3     No current facility-administered medications on file prior to visit.     Review of Systems   Constitutional: Negative.    HENT: Negative.    Eyes: Negative.    Respiratory: Negative.    Cardiovascular: Negative.    Gastrointestinal: Positive for abdominal pain, nausea and vomiting.   Endocrine: Negative.    Genitourinary: Negative.    Musculoskeletal: Negative.    Skin: Negative.    Allergic/Immunologic: Negative.    Neurological: Negative.    Hematological: Negative.    Psychiatric/Behavioral: Negative.        Objective   Physical Exam  Vitals and nursing note reviewed.   Constitutional:       Appearance: She is well-developed.   HENT:      Head: Normocephalic and atraumatic.      Right Ear: External ear normal.      Left Ear: External ear normal.      Nose: Nose normal.   Eyes:      Pupils: Pupils are equal, round, and reactive to light.   Cardiovascular:      Rate and Rhythm: Normal rate and regular rhythm.      Heart sounds: Normal heart sounds.   Pulmonary:      Effort: Pulmonary effort is normal. No respiratory distress.      Breath sounds: Normal breath sounds.   Abdominal:      Palpations: Abdomen is soft.   Musculoskeletal:         General: Normal range of motion.      Cervical back: Neck supple.   Skin:     General: Skin is warm and dry.   Neurological:      Mental Status: She is alert and oriented to person, place, and time.   Psychiatric:         Behavior: Behavior normal.          /94   Pulse 120   Ht 170.2 cm (67\")   Wt 98.9 kg (218 lb)   BMI 34.14 kg/m²     Assessment/Plan   Diagnoses and all orders for this visit:    Left lower quadrant abdominal pain    Non-intractable vomiting with nausea, unspecified vomiting type        Patient w/ intermittent LLQ abdominal pain w/ nausea and emesis. Referred to gi last June but did not schedule. Encouraged to " schedule at this time to determine if additional imaging or testing. Encouraged to take small bites, masticate fully, remain upright after eating. pepcid prn. F/u routinely.

## 2021-07-02 ENCOUNTER — TELEPHONE (OUTPATIENT)
Dept: CARDIOLOGY | Facility: CLINIC | Age: 71
End: 2021-07-02

## 2021-07-02 ENCOUNTER — TELEPHONE (OUTPATIENT)
Dept: INTERNAL MEDICINE | Facility: CLINIC | Age: 71
End: 2021-07-02

## 2021-07-02 NOTE — TELEPHONE ENCOUNTER
Caller: Nessa Osuna    Relationship: Self    Best call back number:     What is the medical concern/diagnosis:     What specialty or service is being requested: GASTRO    What is the provider, practice or medical service name:     What is the office location:     What is the office phone number:     Any additional details: PATIENT STATES THAT SHE WAS TO BE REFERRED TO [facility]STR[facility] AND STILL HAS NOT HEARD FROM THE OFFICE YET.  SHE WANTS TO KNOW IF DR VALENZUELA HAS PUT THAT REFERRAL IN FOR HER.

## 2021-07-02 NOTE — TELEPHONE ENCOUNTER
Patient calling back with update after increase in atenolol.  She reports that she is still having afib frequently but is much more bearable and heart rate and b/p are much better controlled.  So she feels like she is on the right track.

## 2021-07-06 NOTE — TELEPHONE ENCOUNTER
Please call patient and see if 8/16 @ 12 with Jade will work for her.  This is a day that HENNA will be in the office as well.    Thank you!

## 2021-07-15 DIAGNOSIS — M54.30 SCIATICA, UNSPECIFIED LATERALITY: ICD-10-CM

## 2021-07-16 RX ORDER — GABAPENTIN 300 MG/1
CAPSULE ORAL
Qty: 270 CAPSULE | Refills: 0 | Status: SHIPPED | OUTPATIENT
Start: 2021-07-16 | End: 2021-10-14

## 2021-08-06 RX ORDER — DULOXETIN HYDROCHLORIDE 60 MG/1
CAPSULE, DELAYED RELEASE ORAL
Qty: 90 CAPSULE | Refills: 3 | Status: SHIPPED | OUTPATIENT
Start: 2021-08-06 | End: 2022-08-08

## 2021-08-10 ENCOUNTER — OFFICE VISIT (OUTPATIENT)
Dept: GASTROENTEROLOGY | Facility: CLINIC | Age: 71
End: 2021-08-10

## 2021-08-10 VITALS
HEIGHT: 67 IN | BODY MASS INDEX: 34.69 KG/M2 | SYSTOLIC BLOOD PRESSURE: 135 MMHG | DIASTOLIC BLOOD PRESSURE: 80 MMHG | WEIGHT: 221 LBS

## 2021-08-10 DIAGNOSIS — R10.32 LLQ PAIN: Primary | Chronic | ICD-10-CM

## 2021-08-10 DIAGNOSIS — R11.2 NAUSEA AND VOMITING IN ADULT: Chronic | ICD-10-CM

## 2021-08-10 PROCEDURE — 99204 OFFICE O/P NEW MOD 45 MIN: CPT | Performed by: INTERNAL MEDICINE

## 2021-08-10 RX ORDER — HYOSCYAMINE SULFATE 0.125 MG
0.12 TABLET ORAL EVERY 6 HOURS PRN
Qty: 60 TABLET | Refills: 0 | Status: SHIPPED | OUTPATIENT
Start: 2021-08-10 | End: 2022-09-30

## 2021-08-10 NOTE — PROGRESS NOTES
Chief Complaint   Patient presents with   • Nausea   • Vomiting   • Abdominal Pain       Subjective     HPI    Nessa Osuna is a 71 y.o. female with a past medical history noted below who presents for abdominal pain.  Symptoms have been present for 3 years.  Pain is sharp, located LLQ.  Leads to nausea, vomiting of mucus.  Happens about 1 time per month.  Lasts 20 minutes to 2 hours at a time.      CT scan a year ago with diverticulosis, otherwise bland, this was non contrasted    PCP recommend pepcid but she hasn't tried it yet.    Last colonoscopy 2005, reports stool testing 2x since then that has been normal.    No family hx of GI malignancy.      No abdominal surgeries    Former cigarette smoker.  Rare ETOH.    Retired from [employer].      Today's visit was in the office.  Both the patient and I were wearing face masks and proper hand hygiene was performed before and after the physical exam.           Current Outpatient Medications:   •  acetaminophen (TYLENOL) 500 MG tablet, Take 500 mg by mouth Every Night. Take 1 tablet every 4-6 hours as needed, Disp: , Rfl:   •  atenolol (TENORMIN) 100 MG tablet, Take 1 tablet by mouth 2 (Two) Times a Day., Disp: 180 tablet, Rfl: 3  •  Cholecalciferol (VITAMIN D) 2000 UNITS capsule, Take 1 capsule by mouth daily., Disp: , Rfl:   •  DULoxetine (CYMBALTA) 60 MG capsule, TAKE ONE CAPSULE BY MOUTH DAILY, Disp: 90 capsule, Rfl: 3  •  Flaxseed, Linseed, (FLAXSEED OIL) 1000 MG capsule, Take 1 capsule by mouth Daily., Disp: , Rfl:   •  fluticasone (FLONASE) 50 MCG/ACT nasal spray, PLACE TWO SPRAYS IN EACH NOSTRIL ONCE DAILY, Disp: 1 bottle, Rfl: 4  •  Fluzone High-Dose Quadrivalent 0.7 ML suspension prefilled syringe, ADM 0.7ML IM UTD, Disp: , Rfl:   •  gabapentin (NEURONTIN) 300 MG capsule, TAKE ONE CAPSULE BY MOUTH THREE TIMES A DAY, Disp: 270 capsule, Rfl: 0  •  Melatonin 5 MG capsule, Take 5 mg by mouth Every Night., Disp: , Rfl:   •  montelukast (SINGULAIR) 10 MG tablet, TAKE  ONE TABLET BY MOUTH ONCE NIGHTLY, Disp: 90 tablet, Rfl: 0  •  simvastatin (ZOCOR) 20 MG tablet, TAKE ONE TABLET BY MOUTH DAILY, Disp: 90 tablet, Rfl: 0  •  Xarelto 20 MG tablet, TAKE ONE TABLET BY MOUTH DAILY WITH DINNER, Disp: 90 tablet, Rfl: 1  •  hyoscyamine (ANASPAZ,LEVSIN) 0.125 MG tablet, Take 1 tablet by mouth Every 6 (Six) Hours As Needed for Cramping or Diarrhea (abdomina pain)., Disp: 60 tablet, Rfl: 0      Objective     Vitals:    08/10/21 1512   BP: 135/80         08/10/21  1512   Weight: 100 kg (221 lb)     Body mass index is 34.61 kg/m².    Physical Exam  Vitals reviewed.   Constitutional:       General: She is not in acute distress.     Appearance: Normal appearance. She is well-developed. She is obese. She is not diaphoretic.   HENT:      Head: Normocephalic.   Neck:      Thyroid: No thyromegaly.   Cardiovascular:      Rate and Rhythm: Normal rate and regular rhythm.   Pulmonary:      Effort: Pulmonary effort is normal. No respiratory distress.      Breath sounds: Normal breath sounds. No wheezing.   Abdominal:      General: Bowel sounds are normal. There is no distension.      Palpations: Abdomen is soft. Abdomen is not rigid. There is no mass.      Tenderness: There is abdominal tenderness. There is no guarding or rebound.      Hernia: No hernia is present.   Genitourinary:     Comments: Rectal exam deferred  Musculoskeletal:         General: No tenderness.   Neurological:      Mental Status: She is alert and oriented to person, place, and time.      Motor: No atrophy.      Coordination: Coordination normal.   Psychiatric:         Behavior: Behavior normal.         Thought Content: Thought content normal.         Judgment: Judgment normal.             WBC   Date Value Ref Range Status   01/05/2021 4.11 3.40 - 10.80 10*3/mm3 Final     RBC   Date Value Ref Range Status   01/05/2021 4.46 3.77 - 5.28 10*6/mm3 Final     Hemoglobin   Date Value Ref Range Status   01/05/2021 13.6 12.0 - 15.9 g/dL Final    05/31/2017 14.3 11.9 - 15.5 g/dL Final     Hematocrit   Date Value Ref Range Status   01/05/2021 40.7 34.0 - 46.6 % Final   05/31/2017 42.1 35.6 - 45.5 % Final     MCV   Date Value Ref Range Status   01/05/2021 91.3 79.0 - 97.0 fL Final   05/31/2017 93.8 80.5 - 98.2 fL Final     MCH   Date Value Ref Range Status   01/05/2021 30.5 26.6 - 33.0 pg Final   05/31/2017 31.8 26.9 - 32.0 pg Final     MCHC   Date Value Ref Range Status   01/05/2021 33.4 31.5 - 35.7 g/dL Final   05/31/2017 34.0 32.4 - 36.3 g/dL Final     RDW   Date Value Ref Range Status   01/05/2021 11.5 (L) 12.3 - 15.4 % Final   05/31/2017 13.2 (H) 11.7 - 13.0 % Final     RDW-SD   Date Value Ref Range Status   05/31/2017 44.8 37.0 - 54.0 fl Final     MPV   Date Value Ref Range Status   05/31/2017 12.1 (H) 6.0 - 12.0 fL Final     Platelets   Date Value Ref Range Status   01/05/2021 251 140 - 450 10*3/mm3 Final   05/31/2017 233 140 - 500 10*3/mm3 Final     Neutrophil Rel %   Date Value Ref Range Status   01/05/2021 49.8 42.7 - 76.0 % Final     Neutrophil %   Date Value Ref Range Status   05/31/2017 55.4 42.7 - 76.0 % Final     Lymphocyte Rel %   Date Value Ref Range Status   01/05/2021 29.4 19.6 - 45.3 % Final     Lymphocyte %   Date Value Ref Range Status   05/31/2017 29.6 19.6 - 45.3 % Final     Monocyte Rel %   Date Value Ref Range Status   01/05/2021 12.4 (H) 5.0 - 12.0 % Final     Monocyte %   Date Value Ref Range Status   05/31/2017 9.3 5.0 - 12.0 % Final     Eosinophil Rel %   Date Value Ref Range Status   01/05/2021 7.5 (H) 0.3 - 6.2 % Final     Eosinophil %   Date Value Ref Range Status   05/31/2017 4.8 0.3 - 6.2 % Final     Basophil Rel %   Date Value Ref Range Status   01/05/2021 0.7 0.0 - 1.5 % Final     Basophil %   Date Value Ref Range Status   05/31/2017 0.9 0.0 - 1.5 % Final     Immature Grans %   Date Value Ref Range Status   05/31/2017 0.0 0.0 - 0.5 % Final     Neutrophils Absolute   Date Value Ref Range Status   01/05/2021 2.04 1.70 -  7.00 10*3/mm3 Final     Neutrophils, Absolute   Date Value Ref Range Status   05/31/2017 2.99 1.90 - 8.10 10*3/mm3 Final     Lymphocytes Absolute   Date Value Ref Range Status   01/05/2021 1.21 0.70 - 3.10 10*3/mm3 Final     Lymphocytes, Absolute   Date Value Ref Range Status   05/31/2017 1.60 0.90 - 4.80 10*3/mm3 Final     Monocytes Absolute   Date Value Ref Range Status   01/05/2021 0.51 0.10 - 0.90 10*3/mm3 Final     Monocytes, Absolute   Date Value Ref Range Status   05/31/2017 0.50 0.20 - 1.20 10*3/mm3 Final     Eosinophils Absolute   Date Value Ref Range Status   01/05/2021 0.31 0.00 - 0.40 10*3/mm3 Final     Eosinophils, Absolute   Date Value Ref Range Status   05/31/2017 0.26 0.00 - 0.70 10*3/mm3 Final     Basophils Absolute   Date Value Ref Range Status   01/05/2021 0.03 0.00 - 0.20 10*3/mm3 Final     Basophils, Absolute   Date Value Ref Range Status   05/31/2017 0.05 0.00 - 0.20 10*3/mm3 Final     Immature Grans, Absolute   Date Value Ref Range Status   05/31/2017 0.00 0.00 - 0.03 10*3/mm3 Final     nRBC   Date Value Ref Range Status   01/05/2021 0.0 0.0 - 0.2 /100 WBC Final       Lab Results   Component Value Date    GLUCOSE 82 05/26/2020    BUN 13 01/05/2021    CREATININE 0.62 01/05/2021    EGFRIFNONA 95 01/05/2021    EGFRIFAFRI 115 01/05/2021    BCR 21.0 01/05/2021    CO2 26.7 01/05/2021    CALCIUM 9.4 01/05/2021    PROTENTOTREF 6.7 01/05/2021    ALBUMIN 4.40 01/05/2021    LABIL2 1.9 01/05/2021    AST 24 01/05/2021    ALT 18 01/05/2021        CT a/p    IMPRESSION:  1.  No findings of suspicious pulmonary nodule. Lung RADS category 1.  Continued screening with low-dose chest CT in 12 months is recommended.   2.  No findings of acute intraabdominal pathology.  3.  Small hiatal hernia, as before.  4.  Mild to moderate anterolisthesis of L4 and L5.  5.  Other findings as above.  6.  CTDI 3 mGy. .4 mGycm.     This report was finalized on 6/11/2020 1:46 PM by Dr. Jamarcus Gandhi M.D.      I personally  reviewed data as detailed below:     The labs listed above.    The radiology studies listed above.    Office notes from: 6/11/21 pcp note      No notes on file    Assessment/Plan    1. LLQ pain: no significant findings on 6/2020 CT imaging but episodes severe enough to lead to N/V    2. Nausea and vomiting: mucousy, non bloody and non bilious    Plan  CT scan with IV and oral contrast  Trial of hyoscyamine for pain  Further recommendations after imaging    Diagnoses and all orders for this visit:    1. LLQ pain (Primary)  -     CT Abdomen Pelvis With Contrast; Future    2. Nausea and vomiting in adult    Other orders  -     hyoscyamine (ANASPAZ,LEVSIN) 0.125 MG tablet; Take 1 tablet by mouth Every 6 (Six) Hours As Needed for Cramping or Diarrhea (abdomina pain).  Dispense: 60 tablet; Refill: 0        I have discussed the above plan with the patient.  They verbalize understanding and are in agreement with the plan.  They have been advised to contact the office for any questions, concerns, or changes related to their health.    Dictated utilizing Dragon dictation

## 2021-08-16 ENCOUNTER — OFFICE VISIT (OUTPATIENT)
Dept: CARDIOLOGY | Facility: CLINIC | Age: 71
End: 2021-08-16

## 2021-08-16 VITALS
HEART RATE: 60 BPM | BODY MASS INDEX: 34.84 KG/M2 | SYSTOLIC BLOOD PRESSURE: 144 MMHG | HEIGHT: 67 IN | WEIGHT: 222 LBS | DIASTOLIC BLOOD PRESSURE: 80 MMHG

## 2021-08-16 DIAGNOSIS — Z99.89 OSA ON CPAP: ICD-10-CM

## 2021-08-16 DIAGNOSIS — G47.33 OSA ON CPAP: ICD-10-CM

## 2021-08-16 DIAGNOSIS — E66.09 CLASS 1 OBESITY DUE TO EXCESS CALORIES WITH SERIOUS COMORBIDITY AND BODY MASS INDEX (BMI) OF 34.0 TO 34.9 IN ADULT: ICD-10-CM

## 2021-08-16 DIAGNOSIS — I48.0 PAF (PAROXYSMAL ATRIAL FIBRILLATION) (HCC): Primary | ICD-10-CM

## 2021-08-16 PROCEDURE — 93000 ELECTROCARDIOGRAM COMPLETE: CPT | Performed by: NURSE PRACTITIONER

## 2021-08-16 PROCEDURE — 99213 OFFICE O/P EST LOW 20 MIN: CPT | Performed by: NURSE PRACTITIONER

## 2021-08-16 NOTE — PROGRESS NOTES
Date of Office Visit: 2021  Encounter Provider: SHAYY Carrillo  Place of Service: [facility] CARDIOLOGY  Patient Name: Nessa Osuna  :1950    Chief Complaint   Patient presents with   • Atrial Fibrillation     2 mnth follow up   :     HPI: Nessa Osuna is a 71 y.o. female who is followed by Dr. Blue and Dr. Wagner.     She had PAF for years---then had symptomatic persistent AF, CV 2020---follow up Zio 2020 showed 54% AF burden.    Echo 3/2020--EF 41-45%, severely dilated LA, mild to mod MR/TR.     I saw her in 2021---complained of increased frequency/duration--was taking extra BB which did help so we increased her atenolol to BID--presents today for follow up.     She thinks she has improved some---episodes less intense and maybe less frequent. The PAF does not really bother her except when she has it and is trying to sleep. It does not keep her from doing any activities.     When not in AF she feels pretty good, no chest pain, dyspnea, PND, orthopnea or dizziness.     She is getting ready to have a CT scan of her abdomen---she has intermittent abdominal pain followed by emesis about once per month---she has had this for about 3 years---had other stuff going on so had not really had it evaluated.     She is on rivaroxaban for AC.        Past Medical History:   Diagnosis Date   • A-fib (CMS/[facility]) 2017   • Breast nodule    • Chest wall mass    • Class 1 obesity due to excess calories without serious comorbidity with body mass index (BMI) of 32.0 to 32.9 in adult    • Closed fracture of head of metacarpal     left second   • Closed fracture of metacarpal bone     left   • DDD (degenerative disc disease), lumbar    • Depression    • Difficulty breathing    • Diverticulitis    • Fatigue    • GERD (gastroesophageal reflux disease)    • Hip pain, right    • Hyperlipidemia    • Leiomyoma of uterus    • Low back pain    • CHEN on CPAP    •  Osteoarthritis    • Osteopenia    • PAF (paroxysmal atrial fibrillation) (CMS/HCC)    • Persistent atrial fibrillation (CMS/HCC)    • Primary localized osteoarthritis of hip    • Sciatica    • Skin cancer    • Snoring    • Spinal stenosis        Past Surgical History:   Procedure Laterality Date   • CARDIOVERSION  2020    Dr. Wagner   • CATARACT EXTRACTION, BILATERAL     • COLONOSCOPY     • MOHS SURGERY      chemosurgery (Mohs Micrographic Technique); Dr Jarrett and Dr Franco   • TRIANA'S NEUROMA EXCISION      single neuroma   • RECONSTRUCTION OF NOSE     • TONSILLECTOMY      age 5   • TONSILLECTOMY AND ADENOIDECTOMY         Social History     Socioeconomic History   • Marital status: Single     Spouse name: Not on file   • Number of children: 0   • Years of education: 14   • Highest education level: Not asked   Tobacco Use   • Smoking status: Former Smoker     Packs/day: 1.50     Types: Cigarettes     Quit date:      Years since quittin.6   • Smokeless tobacco: Never Used   Vaping Use   • Vaping Use: Never used   Substance and Sexual Activity   • Alcohol use: Yes     Comment: social drinker    • Drug use: No   • Sexual activity: Defer       Family History   Problem Relation Age of Onset   • Cancer Mother         bladder   • Osteoporosis Mother    • Heart failure Mother    • Lung cancer Father    • No Known Problems Sister        Review of Systems   Constitutional: Negative for chills, fever and malaise/fatigue.   Cardiovascular: Positive for irregular heartbeat and palpitations. Negative for chest pain, dyspnea on exertion, leg swelling, near-syncope, orthopnea, paroxysmal nocturnal dyspnea and syncope.   Respiratory: Negative for cough and shortness of breath.    Hematologic/Lymphatic: Negative.    Musculoskeletal: Negative for joint pain, joint swelling and myalgias.   Gastrointestinal: Positive for abdominal pain. Negative for diarrhea, melena, nausea and vomiting.   Genitourinary:  "Negative for frequency and hematuria.   Neurological: Negative for light-headedness, numbness, paresthesias and seizures.   Allergic/Immunologic: Negative.    All other systems reviewed and are negative.      No Known Allergies      Current Outpatient Medications:   •  acetaminophen (TYLENOL) 500 MG tablet, Take 500 mg by mouth Every Night. Take 1 tablet every 4-6 hours as needed, Disp: , Rfl:   •  atenolol (TENORMIN) 100 MG tablet, Take 1 tablet by mouth 2 (Two) Times a Day., Disp: 180 tablet, Rfl: 3  •  Cholecalciferol (VITAMIN D) 2000 UNITS capsule, Take 1 capsule by mouth daily., Disp: , Rfl:   •  DULoxetine (CYMBALTA) 60 MG capsule, TAKE ONE CAPSULE BY MOUTH DAILY, Disp: 90 capsule, Rfl: 3  •  Flaxseed, Linseed, (FLAXSEED OIL) 1000 MG capsule, Take 1 capsule by mouth Daily., Disp: , Rfl:   •  fluticasone (FLONASE) 50 MCG/ACT nasal spray, PLACE TWO SPRAYS IN EACH NOSTRIL ONCE DAILY, Disp: 1 bottle, Rfl: 4  •  Fluzone High-Dose Quadrivalent 0.7 ML suspension prefilled syringe, ADM 0.7ML IM UTD, Disp: , Rfl:   •  gabapentin (NEURONTIN) 300 MG capsule, TAKE ONE CAPSULE BY MOUTH THREE TIMES A DAY, Disp: 270 capsule, Rfl: 0  •  hyoscyamine (ANASPAZ,LEVSIN) 0.125 MG tablet, Take 1 tablet by mouth Every 6 (Six) Hours As Needed for Cramping or Diarrhea (abdomina pain)., Disp: 60 tablet, Rfl: 0  •  Melatonin 5 MG capsule, Take 5 mg by mouth Every Night., Disp: , Rfl:   •  montelukast (SINGULAIR) 10 MG tablet, TAKE ONE TABLET BY MOUTH ONCE NIGHTLY, Disp: 90 tablet, Rfl: 0  •  simvastatin (ZOCOR) 20 MG tablet, TAKE ONE TABLET BY MOUTH DAILY, Disp: 90 tablet, Rfl: 0  •  Xarelto 20 MG tablet, TAKE ONE TABLET BY MOUTH DAILY WITH DINNER, Disp: 90 tablet, Rfl: 1      Objective:     Vitals:    08/16/21 1222   BP: 144/80   Pulse: 60   Weight: 101 kg (222 lb)   Height: 170.2 cm (67\")     Body mass index is 34.77 kg/m².    PHYSICAL EXAM:    Vitals Reviewed.   General Appearance: No acute distress, well developed and well nourished. " "  Eyes: Conjunctiva and lids: No erythema, swelling, or discharge. Sclera non-icteric.   HENT: Atraumatic, normocephalic. External eyes, ears, and nose normal.   Respiratory: No signs of respiratory distress. Respiration rhythm and depth normal.   Clear to auscultation. No rales, crackles, rhonchi, or wheezing auscultated.   Cardiovascular:  Heart Rate and Rhythm: Normal, Heart Sounds: Normal S1 and S2. No S3 or S4 noted.  Murmurs: No murmurs noted. No rubs, thrills, or gallops.   Arterial Pulses:  Posterior tibialis and dorsalis pedis pulses normal.   Lower Extremities: No edema noted.  Gastrointestinal:  Abdomen soft, non-distended, non-tender.   Musculoskeletal: Normal movement of extremities  Skin and Nails: General appearance normal. No pallor, cyanosis, diaphoresis. Skin temperature normal. No clubbing of fingernails.   Psychiatric: Patient alert and oriented to person, place, and time. Speech and behavior appropriate. Normal mood and affect.       ECG 12 Lead    Date/Time: 8/16/2021 12:44 PM  Performed by: Jade Arcos APRN  Authorized by: Jade Arcos APRN   Comparison: compared with previous ECG   Comparison to previous ECG: SR replaces AFib  Rhythm: sinus rhythm  BPM: 60                Assessment:       Diagnosis Plan   1. PAF (paroxysmal atrial fibrillation) (CMS/[facility])  ECG 12 Lead   2. Class 1 obesity due to excess calories with serious comorbidity and body mass index (BMI) of 34.0 to 34.9 in adult     3. CHEN on CPAP            Plan:       1. PAF---she thinks the frequency has decreased some with the increase of BB---the intensity has lessened---she is not bothered by her PAF except when she has it and is trying to sleep---she says it tolerable, \"not the worst thing I deal with.\" Discussed rechecking monitor for burden and also repeating echo. She wants to hold off until after her GI/CT scan is done---she will call me. For now continue current regimen.     2. For the problem of overweight/obesity, " we discussed the importance of lifestyle measures and strategies for weight loss, such as improved nutrition, regular exercise and sleep hygiene.      3. CHEN, treated with CPAP.     Follow up with Dr. Wagner in 6 months.     As always, it has been a pleasure to participate in your patient's care.      Sincerely,         SHAYY Cuellar

## 2021-09-06 RX ORDER — SIMVASTATIN 20 MG
TABLET ORAL
Qty: 90 TABLET | Refills: 0 | Status: SHIPPED | OUTPATIENT
Start: 2021-09-06 | End: 2021-12-06

## 2021-09-08 DIAGNOSIS — I48.0 PAROXYSMAL ATRIAL FIBRILLATION (HCC): ICD-10-CM

## 2021-09-09 ENCOUNTER — HOSPITAL ENCOUNTER (OUTPATIENT)
Dept: CT IMAGING | Facility: HOSPITAL | Age: 71
Discharge: HOME | End: 2021-09-09
Admitting: INTERNAL MEDICINE

## 2021-09-09 DIAGNOSIS — R10.32 LLQ PAIN: Chronic | ICD-10-CM

## 2021-09-09 LAB — CREAT BLDA-MCNC: 0.7 MG/DL (ref 0.6–1.3)

## 2021-09-09 PROCEDURE — 74177 CT ABD & PELVIS W/CONTRAST: CPT

## 2021-09-09 PROCEDURE — 82565 ASSAY OF CREATININE: CPT

## 2021-09-09 PROCEDURE — 25010000002 IOPAMIDOL 61 % SOLUTION: Performed by: INTERNAL MEDICINE

## 2021-09-09 RX ADMIN — IOPAMIDOL 85 ML: 612 INJECTION, SOLUTION INTRAVENOUS at 15:24

## 2021-09-15 DIAGNOSIS — R10.32 LLQ PAIN: Primary | ICD-10-CM

## 2021-09-15 DIAGNOSIS — R93.3 ABNORMAL CT SCAN, SIGMOID COLON: ICD-10-CM

## 2021-09-15 RX ORDER — MONTELUKAST SODIUM 10 MG/1
TABLET ORAL
Qty: 90 TABLET | Refills: 0 | Status: SHIPPED | OUTPATIENT
Start: 2021-09-15 | End: 2022-01-03

## 2021-09-15 RX ORDER — SODIUM CHLORIDE, SODIUM LACTATE, POTASSIUM CHLORIDE, CALCIUM CHLORIDE 600; 310; 30; 20 MG/100ML; MG/100ML; MG/100ML; MG/100ML
30 INJECTION, SOLUTION INTRAVENOUS CONTINUOUS
Status: CANCELLED | OUTPATIENT
Start: 2022-01-10

## 2021-09-15 NOTE — PROGRESS NOTES
CT imaging shows diverticulosis and some thickening in the sigmoid colon.  This is the location of the pain she is having.  I recommend a colonoscopy for further evaluation.  I have placed the case requestCan we also reach out to her cardiologist, Dr. Blue, to make sure it is okay for her to hold her Xarelto 2 days prior to the procedure and notify her.  Thank you

## 2021-09-16 ENCOUNTER — TELEPHONE (OUTPATIENT)
Dept: GASTROENTEROLOGY | Facility: CLINIC | Age: 71
End: 2021-09-16

## 2021-09-16 NOTE — TELEPHONE ENCOUNTER
----- Message from Franci Kumar MD sent at 9/15/2021  4:55 PM EDT -----  CT imaging shows diverticulosis and some thickening in the sigmoid colon.  This is the location of the pain she is having.  I recommend a colonoscopy for further evaluation.  I have placed the case requestCan we also reach out to her cardiologist, Dr. Blue, to make sure it is okay for her to hold her Xarelto 2 days prior to the procedure and notify her.  Thank you

## 2021-09-16 NOTE — TELEPHONE ENCOUNTER
Called pt and advised of Dr Kumar note.  Verb understanding.     Message sent to Dr Blue regarding approval to hold Xarelto for 2 days prior to c/s.  Message sent thru epic.

## 2021-09-16 NOTE — TELEPHONE ENCOUNTER
Yes, since she has not had any prior stroke I would have no trouble with her stopping the rivaroxaban 2 to 3 days prior to the surgery.

## 2021-10-06 ENCOUNTER — TELEPHONE (OUTPATIENT)
Dept: GASTROENTEROLOGY | Facility: CLINIC | Age: 71
End: 2021-10-06

## 2021-10-11 PROBLEM — R93.3 ABNORMAL CT SCAN, SIGMOID COLON: Status: ACTIVE | Noted: 2021-10-11

## 2021-10-11 PROBLEM — R10.32 LLQ PAIN: Status: ACTIVE | Noted: 2021-10-11

## 2021-10-11 NOTE — TELEPHONE ENCOUNTER
spoke with pt scheduled at [facility] on antwon 10 arrive at 1200 am caity bradford--greg

## 2021-10-13 DIAGNOSIS — M54.30 SCIATICA, UNSPECIFIED LATERALITY: ICD-10-CM

## 2021-10-14 RX ORDER — GABAPENTIN 300 MG/1
CAPSULE ORAL
Qty: 270 CAPSULE | Refills: 1 | Status: SHIPPED | OUTPATIENT
Start: 2021-10-14 | End: 2022-04-18

## 2021-10-28 ENCOUNTER — OFFICE VISIT (OUTPATIENT)
Dept: SLEEP MEDICINE | Facility: HOSPITAL | Age: 71
End: 2021-10-28

## 2021-10-28 VITALS
BODY MASS INDEX: 33.27 KG/M2 | HEIGHT: 67 IN | OXYGEN SATURATION: 96 % | HEART RATE: 54 BPM | SYSTOLIC BLOOD PRESSURE: 149 MMHG | DIASTOLIC BLOOD PRESSURE: 77 MMHG | WEIGHT: 212 LBS

## 2021-10-28 DIAGNOSIS — I48.19 PERSISTENT ATRIAL FIBRILLATION (HCC): ICD-10-CM

## 2021-10-28 DIAGNOSIS — Z99.89 OSA ON CPAP: Primary | ICD-10-CM

## 2021-10-28 DIAGNOSIS — G47.33 OSA ON CPAP: Primary | ICD-10-CM

## 2021-10-28 DIAGNOSIS — E66.09 CLASS 1 OBESITY DUE TO EXCESS CALORIES WITH SERIOUS COMORBIDITY AND BODY MASS INDEX (BMI) OF 34.0 TO 34.9 IN ADULT: ICD-10-CM

## 2021-10-28 PROBLEM — R06.83 SNORING: Status: RESOLVED | Noted: 2020-08-06 | Resolved: 2021-10-28

## 2021-10-28 PROCEDURE — 99213 OFFICE O/P EST LOW 20 MIN: CPT | Performed by: INTERNAL MEDICINE

## 2021-10-28 PROCEDURE — G0463 HOSPITAL OUTPT CLINIC VISIT: HCPCS

## 2021-10-28 NOTE — PROGRESS NOTES
"  [facility]  [address]  [address] [place] [address]  Phone   Fax       SLEEP CLINIC FOLLOW UP PROGRESS NOTE.    Nessa Osuna  1950  71 y.o.  female      PCP: Jayne Guerra MD      Date of visit: 10/28/2021    Chief Complaint   Patient presents with   • Sleep Apnea   • Obesity       HPI:  This is a 71 y.o. years old patient who has a history of obstructive sleep apnea is here for  the annual compliance follow-up.  Sleep apnea is mild in severity with a AHI of 6.1/h and in supine 11.5/hr. Patient is using positive airway pressure therapy with auto CPAP and the symptoms of snoring, non-restorative sleep and daytime excessive sleepiness have improved significantly on the therapy. Normally goes to bed at 1 AM and wakes up at 9:30 AM.  The patient wakes up 4 time(s) during the night and has no problem going back to sleep.  Feels refreshed after waking up.  Patient also denies headaches and nasal congestion.   Unfortunately she has not used the CPAP for couple of months after the recall and she wanted to discuss the recall with me.  She also tells me that the pressure is too high.    Medications and allergies are reviewed by me and documented in the encounter.     SOCIAL ( habits pertaining to sleep medicine)  History tobacco use:No   History of alcohol use: 1 per week  Caffeine use: 4     REVIEW OF SYSTEMS:   [place] Sleepiness Scale :Total score: 9   Nasal congestion:No   Dry mouth/nose:Yes   Post nasal drip; Yes   Acid reflux/Heartburn:No   Abd bloating:No   Morning headache:No   Anxiety:No   Depression:No    PHYSICAL EXAMINATION:  CONSTITUTIONAL:  Vitals:    10/28/21 1259   BP: 149/77   Pulse: 54   SpO2: 96%   Weight: 96.2 kg (212 lb)   Height: 170.2 cm (67\")    Body mass index is 33.2 kg/m².   NOSE: nasal passages are clear, no nasal polyps, septum in the midline.  THROAT: throat is clear, oral airway Mallampati class 3  RESP SYSTEM: Breath sounds are " ----- Message from Catherine Bird sent at 1/9/2020  4:53 PM CST -----  Contact: Citlali  Type:  Pharmacy Calling to Clarify an RX    Name of Caller:can  Pharmacy Name:[facility]  Prescription Name:hydralazine  What do they need to clarify?:strength  Best Call Back Number:[phone]  Additional Information: n/a     normal, no wheezes or crackles  CARDIOVASULAR: Heart rate is regular without murmur. No edema      Data reviewed:  The Smart card downloaded on 10/28/2021 has been reviewed independently by me for compliance and discussed the data with the patient.   To use the CPAP for the past 3 months because of the recall  Mask type: Nasal pillows  DME: [facility] [facility]      ASSESSMENT AND PLAN:  · Obstructive sleep apnea ( G 47.33).  I talked to the patient about the importance of using the CPAP.  Also discussed the recall she needs to continue to use the CPAP only advised not to use the so clean machine which she is not using.  In addition I have also discussed change in pressure because the patient complains of pressure being high and going to reduce the pressure to 9 to 14 cm and also reduce the flex from 3 to 1.    without proper control of sleep apnea and good compliance there is a increased risk for hypertension, diabetes mellitus and nonrestorative sleep with hypersomnia which can increase risk for motor vehicle accidents.  Untreated sleep apnea is also a risk factor for development of atrial fibrillation, pulmonary hypertension and stroke. The patient is also instructed to get the supplies from the [facility] and and change them on a regular basis.  A prescription for supplies has been sent to the [facility].  I have also discussed the good sleep hygiene habits and adequate amount of sleep needed for good health.  · Obesity, class 1 with BMI is Body mass index is 33.2 kg/m².. I have discuss the relationship between the weight and sleep apnea. The benefit of weight loss in reducing severity of sleep apnea was discussed. Discussed diet and exercise with the patient to achieve ideal BMI.  · Return in about 1 year (around 10/28/2022) for Annual visit with smartcard download. . Patient's questions were answered.        Sharmila Christiansen MD  Sleep Medicine.  Medical Director, [facility], [place] [facility] [place] [facility]  [facility]  10/28/2021 ,

## 2021-12-06 RX ORDER — SIMVASTATIN 20 MG
TABLET ORAL
Qty: 90 TABLET | Refills: 0 | Status: SHIPPED | OUTPATIENT
Start: 2021-12-06 | End: 2022-04-04

## 2021-12-29 DIAGNOSIS — Z13.29 SCREENING FOR THYROID DISORDER: ICD-10-CM

## 2021-12-29 DIAGNOSIS — Z13.220 SCREENING, LIPID: ICD-10-CM

## 2021-12-29 DIAGNOSIS — E78.5 HYPERLIPIDEMIA, UNSPECIFIED HYPERLIPIDEMIA TYPE: Primary | ICD-10-CM

## 2022-01-03 RX ORDER — MONTELUKAST SODIUM 10 MG/1
TABLET ORAL
Qty: 90 TABLET | Refills: 0 | Status: SHIPPED | OUTPATIENT
Start: 2022-01-03 | End: 2022-04-04

## 2022-01-06 LAB
ALBUMIN SERPL-MCNC: 4.2 G/DL (ref 3.7–4.7)
ALBUMIN/GLOB SERPL: 2 {RATIO} (ref 1.2–2.2)
ALP SERPL-CCNC: 80 IU/L (ref 44–121)
ALT SERPL-CCNC: 19 IU/L (ref 0–32)
APPEARANCE UR: CLEAR
AST SERPL-CCNC: 26 IU/L (ref 0–40)
BACTERIA #/AREA URNS HPF: ABNORMAL /[HPF]
BASOPHILS # BLD AUTO: 0.1 X10E3/UL (ref 0–0.2)
BASOPHILS NFR BLD AUTO: 1 %
BILIRUB SERPL-MCNC: 0.9 MG/DL (ref 0–1.2)
BILIRUB UR QL STRIP: NEGATIVE
BUN SERPL-MCNC: 13 MG/DL (ref 8–27)
BUN/CREAT SERPL: 18 (ref 12–28)
CALCIUM SERPL-MCNC: 9.2 MG/DL (ref 8.7–10.3)
CASTS URNS MICRO: ABNORMAL
CASTS URNS QL MICRO: PRESENT /LPF
CHLORIDE SERPL-SCNC: 106 MMOL/L (ref 96–106)
CHOLEST SERPL-MCNC: 168 MG/DL (ref 100–199)
CO2 SERPL-SCNC: 23 MMOL/L (ref 20–29)
COLOR UR: YELLOW
CREAT SERPL-MCNC: 0.72 MG/DL (ref 0.57–1)
EOSINOPHIL # BLD AUTO: 0.4 X10E3/UL (ref 0–0.4)
EOSINOPHIL NFR BLD AUTO: 7 %
EPI CELLS #/AREA URNS HPF: ABNORMAL /HPF (ref 0–10)
ERYTHROCYTE [DISTWIDTH] IN BLOOD BY AUTOMATED COUNT: 12 % (ref 11.7–15.4)
GLOBULIN SER CALC-MCNC: 2.1 G/DL (ref 1.5–4.5)
GLUCOSE SERPL-MCNC: 96 MG/DL (ref 65–99)
GLUCOSE UR QL: NEGATIVE
HCT VFR BLD AUTO: 40.8 % (ref 34–46.6)
HDLC SERPL-MCNC: 61 MG/DL
HGB BLD-MCNC: 14.2 G/DL (ref 11.1–15.9)
HGB UR QL STRIP: NEGATIVE
IMM GRANULOCYTES # BLD AUTO: 0 X10E3/UL (ref 0–0.1)
IMM GRANULOCYTES NFR BLD AUTO: 0 %
KETONES UR QL STRIP: ABNORMAL
LDLC SERPL CALC-MCNC: 94 MG/DL (ref 0–99)
LEUKOCYTE ESTERASE UR QL STRIP: ABNORMAL
LYMPHOCYTES # BLD AUTO: 1.4 X10E3/UL (ref 0.7–3.1)
LYMPHOCYTES NFR BLD AUTO: 28 %
MCH RBC QN AUTO: 31.6 PG (ref 26.6–33)
MCHC RBC AUTO-ENTMCNC: 34.8 G/DL (ref 31.5–35.7)
MCV RBC AUTO: 91 FL (ref 79–97)
MICRO URNS: ABNORMAL
MONOCYTES # BLD AUTO: 0.7 X10E3/UL (ref 0.1–0.9)
MONOCYTES NFR BLD AUTO: 13 %
MUCOUS THREADS URNS QL MICRO: PRESENT
NEUTROPHILS # BLD AUTO: 2.6 X10E3/UL (ref 1.4–7)
NEUTROPHILS NFR BLD AUTO: 51 %
NITRITE UR QL STRIP: NEGATIVE
PH UR STRIP: 5.5 [PH] (ref 5–7.5)
PLATELET # BLD AUTO: 250 X10E3/UL (ref 150–450)
POTASSIUM SERPL-SCNC: 4.4 MMOL/L (ref 3.5–5.2)
PROT SERPL-MCNC: 6.3 G/DL (ref 6–8.5)
PROT UR QL STRIP: ABNORMAL
RBC # BLD AUTO: 4.49 X10E6/UL (ref 3.77–5.28)
RBC #/AREA URNS HPF: ABNORMAL /HPF (ref 0–2)
SODIUM SERPL-SCNC: 142 MMOL/L (ref 134–144)
SP GR UR: 1.02 (ref 1–1.03)
TRIGL SERPL-MCNC: 69 MG/DL (ref 0–149)
TSH SERPL DL<=0.005 MIU/L-ACNC: 2.31 UIU/ML (ref 0.45–4.5)
UROBILINOGEN UR STRIP-MCNC: 0.2 MG/DL (ref 0.2–1)
VLDLC SERPL CALC-MCNC: 13 MG/DL (ref 5–40)
WBC # BLD AUTO: 5.1 X10E3/UL (ref 3.4–10.8)
WBC #/AREA URNS HPF: ABNORMAL /HPF (ref 0–5)

## 2022-01-10 ENCOUNTER — HOSPITAL ENCOUNTER (OUTPATIENT)
Facility: HOSPITAL | Age: 72
Setting detail: HOSPITAL OUTPATIENT SURGERY
Discharge: HOME | End: 2022-01-10
Attending: INTERNAL MEDICINE | Admitting: INTERNAL MEDICINE

## 2022-01-10 ENCOUNTER — ANESTHESIA EVENT (OUTPATIENT)
Dept: GASTROENTEROLOGY | Facility: HOSPITAL | Age: 72
End: 2022-01-10

## 2022-01-10 ENCOUNTER — ANESTHESIA (OUTPATIENT)
Dept: GASTROENTEROLOGY | Facility: HOSPITAL | Age: 72
End: 2022-01-10

## 2022-01-10 VITALS
OXYGEN SATURATION: 100 % | WEIGHT: 217 LBS | HEIGHT: 67 IN | DIASTOLIC BLOOD PRESSURE: 84 MMHG | BODY MASS INDEX: 34.06 KG/M2 | SYSTOLIC BLOOD PRESSURE: 111 MMHG | HEART RATE: 120 BPM | RESPIRATION RATE: 16 BRPM

## 2022-01-10 DIAGNOSIS — R93.3 ABNORMAL CT SCAN, SIGMOID COLON: ICD-10-CM

## 2022-01-10 DIAGNOSIS — R10.32 LLQ PAIN: ICD-10-CM

## 2022-01-10 PROCEDURE — S0260 H&P FOR SURGERY: HCPCS | Performed by: INTERNAL MEDICINE

## 2022-01-10 PROCEDURE — 45380 COLONOSCOPY AND BIOPSY: CPT | Performed by: INTERNAL MEDICINE

## 2022-01-10 PROCEDURE — 88305 TISSUE EXAM BY PATHOLOGIST: CPT | Performed by: INTERNAL MEDICINE

## 2022-01-10 PROCEDURE — 25010000002 PROPOFOL 10 MG/ML EMULSION: Performed by: NURSE ANESTHETIST, CERTIFIED REGISTERED

## 2022-01-10 RX ORDER — PROPOFOL 10 MG/ML
VIAL (ML) INTRAVENOUS CONTINUOUS PRN
Status: DISCONTINUED | OUTPATIENT
Start: 2022-01-10 | End: 2022-01-10 | Stop reason: SURG

## 2022-01-10 RX ORDER — SODIUM CHLORIDE, SODIUM LACTATE, POTASSIUM CHLORIDE, CALCIUM CHLORIDE 600; 310; 30; 20 MG/100ML; MG/100ML; MG/100ML; MG/100ML
30 INJECTION, SOLUTION INTRAVENOUS CONTINUOUS
Status: DISCONTINUED | OUTPATIENT
Start: 2022-01-10 | End: 2022-01-10 | Stop reason: HOSPADM

## 2022-01-10 RX ORDER — SODIUM CHLORIDE 0.9 % (FLUSH) 0.9 %
3 SYRINGE (ML) INJECTION EVERY 12 HOURS SCHEDULED
Status: DISCONTINUED | OUTPATIENT
Start: 2022-01-10 | End: 2022-01-10 | Stop reason: HOSPADM

## 2022-01-10 RX ORDER — PROPOFOL 10 MG/ML
VIAL (ML) INTRAVENOUS AS NEEDED
Status: DISCONTINUED | OUTPATIENT
Start: 2022-01-10 | End: 2022-01-10 | Stop reason: SURG

## 2022-01-10 RX ORDER — SODIUM CHLORIDE 0.9 % (FLUSH) 0.9 %
10 SYRINGE (ML) INJECTION AS NEEDED
Status: DISCONTINUED | OUTPATIENT
Start: 2022-01-10 | End: 2022-01-10 | Stop reason: HOSPADM

## 2022-01-10 RX ADMIN — PROPOFOL 100 MG: 10 INJECTION, EMULSION INTRAVENOUS at 13:09

## 2022-01-10 RX ADMIN — Medication 300 MCG/KG/MIN: at 13:09

## 2022-01-10 RX ADMIN — SODIUM CHLORIDE, POTASSIUM CHLORIDE, SODIUM LACTATE AND CALCIUM CHLORIDE 30 ML/HR: 600; 310; 30; 20 INJECTION, SOLUTION INTRAVENOUS at 12:52

## 2022-01-10 NOTE — DISCHARGE INSTRUCTIONS
For the next 24 hours patient needs to be with a responsible adult.    For 24 hours DO NOT drive, operate machinery, appliances, drink alcohol, make important decisions or sign legal documents.    Start with a light or bland diet if you are feeling sick to your stomach otherwise advance to regular diet as tolerated.    Follow recommendations on procedure report if provided by your doctor.    Call Dr Kumar for problems [phone]    Problems may include but not limited to: large amounts of bleeding, trouble breathing, repeated vomiting, severe unrelieved pain, fever or chills.

## 2022-01-10 NOTE — ANESTHESIA POSTPROCEDURE EVALUATION
Patient: Nessa Osuna    Procedure Summary     Date: 01/10/22 Room / Location:  [facility]    Anesthesia Start: 1302 Anesthesia Stop: 1345    Procedure: COLONOSCOPY INTO CECUM AND TI WITH COLD BX POLYPECTOMIES (N/A ) Diagnosis:       LLQ pain      Abnormal CT scan, sigmoid colon      (LLQ pain [R10.32])      (Abnormal CT scan, sigmoid colon [R93.3])    Surgeons: Franci Kumar MD Provider: Mario Barklye MD    Anesthesia Type: MAC ASA Status: 3          Anesthesia Type: MAC    Vitals  Vitals Value Taken Time   /84 01/10/22 1402   Temp     Pulse 120 01/10/22 1402   Resp 16 01/10/22 1402   SpO2 100 % 01/10/22 1402           Post Anesthesia Care and Evaluation    Patient location during evaluation: bedside  Patient participation: complete - patient participated  Level of consciousness: awake  Pain management: adequate  Airway patency: patent  Anesthetic complications: No anesthetic complications  PONV Status: none  Cardiovascular status: acceptable  Respiratory status: acceptable  Hydration status: acceptable  Post Neuraxial Block status: Motor and sensory function returned to baseline

## 2022-01-10 NOTE — ANESTHESIA PREPROCEDURE EVALUATION
Anesthesia Evaluation     Patient summary reviewed and Nursing notes reviewed   no history of anesthetic complications:  NPO Solid Status: > 8 hours  NPO Liquid Status: > 4 hours           Airway   Mallampati: II  Dental      Pulmonary - normal exam   (+) a smoker Former, sleep apnea,   Cardiovascular - normal exam    (+) dysrhythmias Atrial Fib, Paroxysmal Atrial Fib, hyperlipidemia,       Neuro/Psych  (+) numbness, psychiatric history Depression,     GI/Hepatic/Renal/Endo    (+) obesity,  GERD,      Musculoskeletal     Abdominal    Substance History      OB/GYN          Other   arthritis,                      Anesthesia Plan    ASA 3     MAC     intravenous induction     Anesthetic plan, all risks, benefits, and alternatives have been provided, discussed and informed consent has been obtained with: patient.

## 2022-01-10 NOTE — H&P
[facility]  Pre Procedure History & Physical    Chief Complaint: Left lower quadrant pain, abnormal CT imaging      HPI: 71 y.o. female with a past medical history noted below who presents for abdominal pain.  Symptoms have been present for 3 years.  Pain is sharp, located LLQ.  Leads to nausea, vomiting of mucus.  Happens about 1 time per month.  Lasts 20 minutes to 2 hours at a time.       CT scan a year ago with diverticulosis, otherwise bland, this was non contrasted     PCP recommend pepcid but she hasn't tried it yet.     Last colonoscopy 2005, reports stool testing 2x since then that has been normal.     No family hx of GI malignancy.       No abdominal surgeries     Former cigarette smoker.  Rare ETOH.     Retired from [employer].      She has held her Xarelto 2 days prior to this procedure with the permission of her cardiologist    Past Medical History:   Past Medical History:   Diagnosis Date   • A-fib ([facility]) 05/31/2017   • Breast nodule    • Chest wall mass    • Class 1 obesity due to excess calories without serious comorbidity with body mass index (BMI) of 32.0 to 32.9 in adult    • Closed fracture of head of metacarpal     left second   • Closed fracture of metacarpal bone     left   • DDD (degenerative disc disease), lumbar    • Depression    • Difficulty breathing    • Diverticulitis 2010   • Fatigue    • GERD (gastroesophageal reflux disease)    • Hip pain, right    • Hyperlipidemia    • Leiomyoma of uterus    • Low back pain    • CHEN on CPAP    • Osteoarthritis    • Osteopenia    • PAF (paroxysmal atrial fibrillation) ([facility])    • Persistent atrial fibrillation ([facility])    • Primary localized osteoarthritis of hip    • Sciatica    • Skin cancer 2010   • Snoring    • Spinal stenosis        Family History:  Family History   Problem Relation Age of Onset   • Cancer Mother         bladder   • Osteoporosis Mother    • Heart failure Mother    • Lung cancer Father    • No Known Problems Sister         Social History:   reports that she quit smoking about 25 years ago. Her smoking use included cigarettes. She smoked 1.50 packs per day. She has never used smokeless tobacco. She reports current alcohol use. She reports that she does not use drugs.    Medications:   Medications Prior to Admission   Medication Sig Dispense Refill Last Dose   • acetaminophen (TYLENOL) 500 MG tablet Take 500 mg by mouth Every Night. Take 1 tablet every 4-6 hours as needed   Past Week at Unknown time   • atenolol (TENORMIN) 100 MG tablet Take 1 tablet by mouth 2 (Two) Times a Day. 180 tablet 3 1/9/2022 at Unknown time   • Cholecalciferol (VITAMIN D) 2000 UNITS capsule Take 1 capsule by mouth daily.   1/9/2022 at Unknown time   • DULoxetine (CYMBALTA) 60 MG capsule TAKE ONE CAPSULE BY MOUTH DAILY 90 capsule 3 1/9/2022 at Unknown time   • Flaxseed, Linseed, (FLAXSEED OIL) 1000 MG capsule Take 1 capsule by mouth Daily.   Past Week at Unknown time   • fluticasone (FLONASE) 50 MCG/ACT nasal spray PLACE TWO SPRAYS IN EACH NOSTRIL ONCE DAILY 1 bottle 4 1/10/2022 at Unknown time   • gabapentin (NEURONTIN) 300 MG capsule TAKE ONE CAPSULE BY MOUTH THREE TIMES A  capsule 1 1/9/2022 at Unknown time   • hyoscyamine (ANASPAZ,LEVSIN) 0.125 MG tablet Take 1 tablet by mouth Every 6 (Six) Hours As Needed for Cramping or Diarrhea (abdomina pain). 60 tablet 0 Past Month at Unknown time   • Melatonin 5 MG capsule Take 5 mg by mouth Every Night.   Past Week at Unknown time   • montelukast (SINGULAIR) 10 MG tablet TAKE ONE TABLET BY MOUTH ONCE NIGHTLY 90 tablet 0 Past Week at Unknown time   • simvastatin (ZOCOR) 20 MG tablet TAKE ONE TABLET BY MOUTH DAILY 90 tablet 0 1/9/2022 at Unknown time   • Fluzone High-Dose Quadrivalent 0.7 ML suspension prefilled syringe ADM 0.7ML IM UTD   More than a month at Unknown time   • rivaroxaban (Xarelto) 20 MG tablet Take 1 tablet by mouth Daily With Dinner. 90 tablet 3 1/7/2022       Allergies:  Patient has no  known allergies.    ROS:    Pertinent items are noted in HPI     Objective     There were no vitals taken for this visit.    Physical Exam   Constitutional: Pt is oriented to person, place, and time and well-developed, well-nourished, and in no distress.   HENT:   Mouth/Throat: Oropharynx is clear and moist.   Neck: Normal range of motion. Neck supple.   Cardiovascular: Normal rate, regular rhythm and normal heart sounds.    Pulmonary/Chest: Effort normal and breath sounds normal. No respiratory distress. No  wheezes.   Abdominal: Soft. Bowel sounds are normal.   Skin: Skin is warm and dry.   Psychiatric: Mood, memory, affect and judgment normal.     Assessment/Plan     Diagnosis: Left lower quadrant pain, abnormal CT imaging      Anticipated Surgical Procedure:    Colonoscopy    The risks, benefits, and alternatives of this procedure have been discussed with the patient or the responsible party- the patient understands and agrees to proceed.

## 2022-01-11 ENCOUNTER — OFFICE VISIT (OUTPATIENT)
Dept: INTERNAL MEDICINE | Facility: CLINIC | Age: 72
End: 2022-01-11

## 2022-01-11 VITALS
HEART RATE: 66 BPM | BODY MASS INDEX: 33.74 KG/M2 | WEIGHT: 215 LBS | HEIGHT: 67 IN | DIASTOLIC BLOOD PRESSURE: 82 MMHG | SYSTOLIC BLOOD PRESSURE: 118 MMHG

## 2022-01-11 DIAGNOSIS — Z12.31 ENCOUNTER FOR SCREENING MAMMOGRAM FOR BREAST CANCER: ICD-10-CM

## 2022-01-11 DIAGNOSIS — I48.0 PAF (PAROXYSMAL ATRIAL FIBRILLATION): ICD-10-CM

## 2022-01-11 DIAGNOSIS — R06.09 DYSPNEA ON EXERTION: ICD-10-CM

## 2022-01-11 DIAGNOSIS — Z87.891 HISTORY OF TOBACCO ABUSE: ICD-10-CM

## 2022-01-11 DIAGNOSIS — E78.2 MIXED HYPERLIPIDEMIA: ICD-10-CM

## 2022-01-11 DIAGNOSIS — Z00.00 HEALTHCARE MAINTENANCE: Primary | ICD-10-CM

## 2022-01-11 DIAGNOSIS — G47.33 OSA (OBSTRUCTIVE SLEEP APNEA): ICD-10-CM

## 2022-01-11 LAB
LAB AP CASE REPORT: NORMAL
PATH REPORT.FINAL DX SPEC: NORMAL
PATH REPORT.GROSS SPEC: NORMAL

## 2022-01-11 PROCEDURE — 99214 OFFICE O/P EST MOD 30 MIN: CPT | Performed by: INTERNAL MEDICINE

## 2022-01-11 PROCEDURE — 1170F FXNL STATUS ASSESSED: CPT | Performed by: INTERNAL MEDICINE

## 2022-01-11 PROCEDURE — 96160 PT-FOCUSED HLTH RISK ASSMT: CPT | Performed by: INTERNAL MEDICINE

## 2022-01-11 PROCEDURE — 1159F MED LIST DOCD IN RCRD: CPT | Performed by: INTERNAL MEDICINE

## 2022-01-11 PROCEDURE — G0439 PPPS, SUBSEQ VISIT: HCPCS | Performed by: INTERNAL MEDICINE

## 2022-01-11 NOTE — PROGRESS NOTES
Her colon polyps included 1 benign hyperplastic polyp and 1 sessile serrated adenoma.Please place in 5-year colonoscopy recall, thanks

## 2022-01-11 NOTE — PROGRESS NOTES
The ABCs of the Annual Wellness Visit  Subsequent Medicare Wellness Visit    Chief Complaint   Patient presents with   • Annual Exam   • Hyperlipidemia   • Abdominal Pain   • Shortness of Breath   • Sleep Apnea      Subjective    History of Present Illness:  Nessa Osuna is a 71 y.o. female who presents for a Subsequent Medicare Wellness Visit., to review chronic issues, and to discuss acute needs. patient has atrial fibrilliation. This is rate controlled and is anticoagulated. She has a history of 35 year smoking (stopped greater than 20 years ago may smoke 1/2 pack a year). 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 Was having llq pain. Patient had cscope yesterday. She was found to have polyps, diverticuli. She was advised ib guard and fiber daily.   Occasional dizziness that resolves with pausing for a moment. Not positional or movement related.            The following portions of the patient's history were reviewed and   updated as appropriate: allergies, current medications, past family history, past medical history, past social history, past surgical history and problem list.    Compared to one year ago, the patient feels her physical   health is the same.    Compared to one year ago, the patient feels her mental   health is the same.    Recent Hospitalizations:  She was not admitted to the hospital during the last year.       Current Medical Providers:  Patient Care Team:  Jayne Guerra MD as PCP - General  Adamaris Blue MD as Consulting Physician (Cardiology)  Letty Wells MD as Consulting Physician (Dermatology)  Wilfrid Ye MD as Consulting Physician (Urology)    Outpatient Medications Prior to Visit   Medication Sig Dispense Refill   • acetaminophen (TYLENOL) 500 MG tablet Take 500 mg by mouth Every Night. Take 1 tablet every 4-6 hours as needed     • atenolol (TENORMIN) 100 MG tablet Take 1 tablet by mouth 2 (Two) Times a Day. 180 tablet 3   • Cholecalciferol (VITAMIN D)  2000 UNITS capsule Take 1 capsule by mouth daily.     • DULoxetine (CYMBALTA) 60 MG capsule TAKE ONE CAPSULE BY MOUTH DAILY 90 capsule 3   • Flaxseed, Linseed, (FLAXSEED OIL) 1000 MG capsule Take 1 capsule by mouth Daily.     • fluticasone (FLONASE) 50 MCG/ACT nasal spray PLACE TWO SPRAYS IN EACH NOSTRIL ONCE DAILY 1 bottle 4   • gabapentin (NEURONTIN) 300 MG capsule TAKE ONE CAPSULE BY MOUTH THREE TIMES A  capsule 1   • hyoscyamine (ANASPAZ,LEVSIN) 0.125 MG tablet Take 1 tablet by mouth Every 6 (Six) Hours As Needed for Cramping or Diarrhea (abdomina pain). 60 tablet 0   • Melatonin 5 MG capsule Take 5 mg by mouth Every Night.     • montelukast (SINGULAIR) 10 MG tablet TAKE ONE TABLET BY MOUTH ONCE NIGHTLY 90 tablet 0   • rivaroxaban (Xarelto) 20 MG tablet Take 1 tablet by mouth Daily With Dinner. 90 tablet 3   • simvastatin (ZOCOR) 20 MG tablet TAKE ONE TABLET BY MOUTH DAILY 90 tablet 0   • Fluzone High-Dose Quadrivalent 0.7 ML suspension prefilled syringe ADM 0.7ML IM UTD       No facility-administered medications prior to visit.       No opioid medication identified on active medication list. I have reviewed chart for other potential  high risk medication/s and harmful drug interactions in the elderly.          Aspirin is not on active medication list.  Aspirin use is not indicated based on review of current medical condition/s. Risk of harm outweighs potential benefits.  .    Patient Active Problem List   Diagnosis   • Hyperlipidemia   • Osteoarthritis   • Depression   • Osteopenia   • Sciatica   • Healthcare maintenance   • DDD (degenerative disc disease), lumbar   • Persistent atrial fibrillation   • Vitamin D deficiency   • Spinal stenosis of lumbar region with neurogenic claudication   • Spondylolisthesis at L4-L5 level   • Cardiomyopathy (HCC)   • Diverticulosis   • CHEN on CPAP   • Class 1 obesity due to excess calories with body mass index (BMI) of 34.0 to 34.9 in adult   • PAF (paroxysmal atrial  "fibrillation) (HCC)   • LLQ pain   • Abnormal CT scan, sigmoid colon     Advance Care Planning  Advance Directive is not on file.  ACP discussion was held with the patient during this visit. Patient has an advance directive (not in EMR), copy requested.          Objective    Vitals:    01/11/22 1532   BP: 118/82   Pulse: 66   Weight: 97.5 kg (215 lb)   Height: 170.2 cm (67\")     BMI Readings from Last 1 Encounters:   01/11/22 33.67 kg/m²   BMI is above normal parameters. Recommendations include: exercise counseling and nutrition counseling    Does the patient have evidence of cognitive impairment? No    Physical Exam  Vitals and nursing note reviewed.   Constitutional:       Appearance: Normal appearance.   HENT:      Head: Normocephalic and atraumatic.      Right Ear: Tympanic membrane normal.      Left Ear: Tympanic membrane normal.      Nose: Nose normal.      Mouth/Throat:      Mouth: Mucous membranes are moist.   Eyes:      Extraocular Movements: Extraocular movements intact.      Pupils: Pupils are equal, round, and reactive to light.   Cardiovascular:      Pulses: Normal pulses.      Heart sounds: Normal heart sounds.   Pulmonary:      Effort: Pulmonary effort is normal.      Breath sounds: Normal breath sounds.   Abdominal:      General: Abdomen is flat.      Palpations: Abdomen is soft.   Musculoskeletal:         General: Normal range of motion.      Cervical back: Normal range of motion.   Skin:     General: Skin is warm and dry.   Neurological:      General: No focal deficit present.      Mental Status: She is alert and oriented to person, place, and time.   Psychiatric:         Mood and Affect: Mood normal.         Behavior: Behavior normal.         Thought Content: Thought content normal.         Judgment: Judgment normal.       Lab Results   Component Value Date    CHLPL 168 01/05/2022    TRIG 69 01/05/2022    HDL 61 01/05/2022    LDL 94 01/05/2022    VLDL 13 01/05/2022            HEALTH RISK " ASSESSMENT    Smoking Status:  Social History     Tobacco Use   Smoking Status Former Smoker   • Packs/day: 1.50   • Types: Cigarettes   • Quit date:    • Years since quittin.0   Smokeless Tobacco Never Used     Alcohol Consumption:  Social History     Substance and Sexual Activity   Alcohol Use Yes    Comment: social drinker      Fall Risk Screen:    CARA Fall Risk Assessment was completed, and patient is at LOW risk for falls.Assessment completed on:2022    Depression Screening:  PHQ-2/PHQ-9 Depression Screening 2022   Little interest or pleasure in doing things 0   Feeling down, depressed, or hopeless 0   Total Score 0       Health Habits and Functional and Cognitive Screening:  Functional & Cognitive Status 2022   Do you have difficulty preparing food and eating? No   Do you have difficulty bathing yourself, getting dressed or grooming yourself? No   Do you have difficulty using the toilet? No   Do you have difficulty moving around from place to place? No   Do you have trouble with steps or getting out of a bed or a chair? No   Current Diet Well Balanced Diet   Dental Exam Up to date   Eye Exam Up to date   Exercise (times per week) -   Current Exercise Activities Include -   Do you need help using the phone?  No   Are you deaf or do you have serious difficulty hearing?  No   Do you need help with transportation? No   Do you need help shopping? No   Do you need help preparing meals?  No   Do you need help with housework?  No   Do you need help with laundry? No   Do you need help taking your medications? No   Do you need help managing money? No   Do you ever drive or ride in a car without wearing a seat belt? No   Have you felt unusual stress, anger or loneliness in the last month? No   Who do you live with? -   If you need help, do you have trouble finding someone available to you? No   Have you been bothered in the last four weeks by sexual problems? No   Do you have difficulty  concentrating, remembering or making decisions? No       Age-appropriate Screening Schedule:  Refer to the list below for future screening recommendations based on patient's age, sex and/or medical conditions. Orders for these recommended tests are listed in the plan section. The patient has been provided with a written plan.    Health Maintenance   Topic Date Due   • DXA SCAN  04/28/2016   • MAMMOGRAM  11/10/2022   • LIPID PANEL  01/05/2023   • TDAP/TD VACCINES (5 - Td or Tdap) 05/24/2028   • INFLUENZA VACCINE  Completed   • ZOSTER VACCINE  Completed              Assessment/Plan   CMS Preventative Services Quick Reference  Risk Factors Identified During Encounter  Cardiovascular Disease  Inactivity/Sedentary  Obesity/Overweight   The above risks/problems have been discussed with the patient.  Follow up actions/plans if indicated are seen below in the Assessment/Plan Section.  Pertinent information has been shared with the patient in the After Visit Summary.    Diagnoses and all orders for this visit:    1. Healthcare maintenance (Primary)    2. Encounter for screening mammogram for breast cancer  -     Mammo screening digital tomosynthesis bilateral w CAD; Future    3. History of tobacco abuse  -      CT Chest Low Dose Cancer Screening WO; Future  -     Cancel: Ambulatory Referral to Pulmonology    4. Dyspnea on exertion  -     Cancel: Ambulatory Referral to Pulmonology  -     Ambulatory Referral to Pulmonology    5. PAF (paroxysmal atrial fibrillation) (HCC)    6. CHEN (obstructive sleep apnea)    7. Mixed hyperlipidemia        Follow Up:   Return in about 6 months (around 7/11/2022) for Recheck.     An After Visit Summary and PPPS were made available to the patient.  Having exertional dyspnea. Will advise cardiologist and likely get stress testing and holter monitor. She has some component of deconditioning and will need to start w/ low level activity as tolerated. Weight loss encouraged and restrictive lung  "issues possibly at play. Will f/u w/ her sleep med md as pulmonary eval and get pft as well given h/o tob. Will get ct lung low dose. She continues to smoke at \"less than half pack a year\". Advised to d/c this entirely. She will continue routine hcm and a mammogram is ordered. She had abdominal pain. As advised will start ib arsenio and use prn med if flare. She will take fiber daily. Follow up here in 6mo orprn.           I spent 60 minutes caring for Nessa on this date of service. This time includes time spent by me in the following activities:preparing for the visit, reviewing tests, obtaining and/or reviewing a separately obtained history, performing a medically appropriate examination and/or evaluation , counseling and educating the patient/family/caregiver, ordering medications, tests, or procedures, referring and communicating with other health care professionals  and documenting information in the medical record           "

## 2022-01-13 ENCOUNTER — TELEPHONE (OUTPATIENT)
Dept: GASTROENTEROLOGY | Facility: CLINIC | Age: 72
End: 2022-01-13

## 2022-01-13 NOTE — TELEPHONE ENCOUNTER
----- Message from Franci Kumar MD sent at 1/11/2022  4:53 PM EST -----  Her colon polyps included 1 benign hyperplastic polyp and 1 sessile serrated adenoma.Please place in 5-year colonoscopy recall, thanks

## 2022-01-13 NOTE — TELEPHONE ENCOUNTER
Called pt at both numbers and left  for pt to call back.     C/s placed in recall and  for 01/10/2027.

## 2022-01-14 ENCOUNTER — TRANSCRIBE ORDERS (OUTPATIENT)
Dept: ADMINISTRATIVE | Facility: HOSPITAL | Age: 72
End: 2022-01-14

## 2022-01-14 DIAGNOSIS — Z13.9 SCREENING FOR CONDITION: Primary | ICD-10-CM

## 2022-01-24 ENCOUNTER — APPOINTMENT (OUTPATIENT)
Dept: NUCLEAR MEDICINE | Facility: HOSPITAL | Age: 72
End: 2022-01-24

## 2022-01-24 ENCOUNTER — HOSPITAL ENCOUNTER (OUTPATIENT)
Dept: NUCLEAR MEDICINE | Facility: HOSPITAL | Age: 72
Discharge: HOME | End: 2022-01-24

## 2022-01-24 ENCOUNTER — HOSPITAL ENCOUNTER (OUTPATIENT)
Dept: CARDIOLOGY | Facility: HOSPITAL | Age: 72
Discharge: HOME | End: 2022-01-24
Admitting: INTERNAL MEDICINE

## 2022-01-24 DIAGNOSIS — R06.09 DYSPNEA ON EXERTION: ICD-10-CM

## 2022-01-24 DIAGNOSIS — I48.0 PAF (PAROXYSMAL ATRIAL FIBRILLATION): ICD-10-CM

## 2022-01-24 LAB
BH CV REST NUCLEAR ISOTOPE DOSE: 9 MCI
BH CV STRESS COMMENTS STAGE 1: NORMAL
BH CV STRESS DOSE REGADENOSON STAGE 1: 0.4
BH CV STRESS DURATION MIN STAGE 1: 0
BH CV STRESS DURATION SEC STAGE 1: 10
BH CV STRESS NUCLEAR ISOTOPE DOSE: 31 MCI
BH CV STRESS PROTOCOL 1: NORMAL
BH CV STRESS RECOVERY BP: NORMAL MMHG
BH CV STRESS RECOVERY HR: 136 BPM
BH CV STRESS STAGE 1: 1
LV EF NUC BP: 59 %
MAXIMAL PREDICTED HEART RATE: 149 BPM
PERCENT MAX PREDICTED HR: 97.99 %
STRESS BASELINE BP: NORMAL MMHG
STRESS BASELINE HR: 106 BPM
STRESS PERCENT HR: 115 %
STRESS POST PEAK BP: NORMAL MMHG
STRESS POST PEAK HR: 146 BPM
STRESS TARGET HR: 127 BPM

## 2022-01-24 PROCEDURE — A9500 TC99M SESTAMIBI: HCPCS | Performed by: INTERNAL MEDICINE

## 2022-01-24 PROCEDURE — 0 TECHNETIUM SESTAMIBI: Performed by: INTERNAL MEDICINE

## 2022-01-24 PROCEDURE — 78452 HT MUSCLE IMAGE SPECT MULT: CPT

## 2022-01-24 PROCEDURE — 78452 HT MUSCLE IMAGE SPECT MULT: CPT | Performed by: INTERNAL MEDICINE

## 2022-01-24 PROCEDURE — 93017 CV STRESS TEST TRACING ONLY: CPT

## 2022-01-24 PROCEDURE — 25010000002 REGADENOSON 0.4 MG/5ML SOLUTION: Performed by: INTERNAL MEDICINE

## 2022-01-24 PROCEDURE — 93225 XTRNL ECG REC<48 HRS REC: CPT

## 2022-01-24 PROCEDURE — 93016 CV STRESS TEST SUPVJ ONLY: CPT | Performed by: INTERNAL MEDICINE

## 2022-01-24 PROCEDURE — 93226 XTRNL ECG REC<48 HR SCAN A/R: CPT

## 2022-01-24 PROCEDURE — 93018 CV STRESS TEST I&R ONLY: CPT | Performed by: INTERNAL MEDICINE

## 2022-01-24 RX ADMIN — TECHNETIUM TC 99M SESTAMIBI 1 DOSE: 1 INJECTION INTRAVENOUS at 08:20

## 2022-01-24 RX ADMIN — REGADENOSON 0.4 MG: 0.08 INJECTION, SOLUTION INTRAVENOUS at 10:18

## 2022-01-24 RX ADMIN — TECHNETIUM TC 99M SESTAMIBI 1 DOSE: 1 INJECTION INTRAVENOUS at 10:18

## 2022-01-25 ENCOUNTER — APPOINTMENT (OUTPATIENT)
Dept: CARDIOLOGY | Facility: HOSPITAL | Age: 72
End: 2022-01-25

## 2022-01-30 LAB
MAXIMAL PREDICTED HEART RATE: 149 BPM
STRESS TARGET HR: 127 BPM

## 2022-01-30 PROCEDURE — 93227 XTRNL ECG REC<48 HR R&I: CPT | Performed by: INTERNAL MEDICINE

## 2022-02-08 ENCOUNTER — HOSPITAL ENCOUNTER (OUTPATIENT)
Dept: CT IMAGING | Facility: HOSPITAL | Age: 72
Discharge: HOME | End: 2022-02-08
Admitting: INTERNAL MEDICINE

## 2022-02-08 DIAGNOSIS — Z87.891 HISTORY OF TOBACCO ABUSE: ICD-10-CM

## 2022-02-08 PROCEDURE — 71271 CT THORAX LUNG CANCER SCR C-: CPT

## 2022-04-04 RX ORDER — MONTELUKAST SODIUM 10 MG/1
TABLET ORAL
Qty: 90 TABLET | Refills: 0 | Status: SHIPPED | OUTPATIENT
Start: 2022-04-04 | End: 2022-07-05

## 2022-04-04 RX ORDER — SIMVASTATIN 20 MG
TABLET ORAL
Qty: 90 TABLET | Refills: 0 | Status: SHIPPED | OUTPATIENT
Start: 2022-04-04 | End: 2022-07-05

## 2022-04-06 ENCOUNTER — OFFICE VISIT (OUTPATIENT)
Dept: CARDIOLOGY | Facility: CLINIC | Age: 72
End: 2022-04-06

## 2022-04-06 VITALS
SYSTOLIC BLOOD PRESSURE: 126 MMHG | WEIGHT: 225 LBS | HEIGHT: 67 IN | HEART RATE: 64 BPM | DIASTOLIC BLOOD PRESSURE: 78 MMHG | BODY MASS INDEX: 35.31 KG/M2

## 2022-04-06 DIAGNOSIS — I48.0 PAROXYSMAL ATRIAL FIBRILLATION: Primary | ICD-10-CM

## 2022-04-06 DIAGNOSIS — Z99.89 OSA ON CPAP: ICD-10-CM

## 2022-04-06 DIAGNOSIS — G47.33 OSA ON CPAP: ICD-10-CM

## 2022-04-06 PROCEDURE — 93000 ELECTROCARDIOGRAM COMPLETE: CPT | Performed by: INTERNAL MEDICINE

## 2022-04-06 PROCEDURE — 99214 OFFICE O/P EST MOD 30 MIN: CPT | Performed by: INTERNAL MEDICINE

## 2022-04-06 NOTE — PROGRESS NOTES
Date of Office Visit: 2022  Encounter Provider: Buzz Wagner MD  Place of Service: [facility] CARDIOLOGY  Patient Name: Nessa Osuna  : 1950    Subjective:     Encounter Date:2022      Patient ID: Nessa Osuna is a 72 y.o. female who has a cc of  PAF and 46% AF burden and history of NICM and normal perfusion scan in .     Now with daily soa and la -- even one block. And edema.     Interestingly, all days are the same. Despite her AF, and being in SR today, she is still soa.         No anginal chest pain,       No fainting,  No orthostasis.   No edema.   Exercise tolerance:poor.      There have been no hospital admission since the last visit.     There have been no bleeding events.       Past Medical History:   Diagnosis Date   • A-fib ([facility]) 2017   • Breast nodule    • Chest wall mass    • Class 1 obesity due to excess calories without serious comorbidity with body mass index (BMI) of 32.0 to 32.9 in adult    • Closed fracture of head of metacarpal     left second   • Closed fracture of metacarpal bone     left   • DDD (degenerative disc disease), lumbar    • Depression    • Difficulty breathing    • Diverticulitis    • Fatigue    • GERD (gastroesophageal reflux disease)    • Hip pain, right    • Hyperlipidemia    • Leiomyoma of uterus    • Low back pain    • CHEN on CPAP    • Osteoarthritis    • Osteopenia    • PAF (paroxysmal atrial fibrillation) (HCC)    • Persistent atrial fibrillation (HCC)    • Primary localized osteoarthritis of hip    • Sciatica    • Skin cancer    • Snoring    • Spinal stenosis        Social History     Socioeconomic History   • Marital status: Single   • Number of children: 0   • Years of education: 14   • Highest education level: Not asked   Tobacco Use   • Smoking status: Former Smoker     Packs/day: 1.50     Types: Cigarettes     Quit date:      Years since quittin.2   • Smokeless tobacco: Never Used   Vaping  "Use   • Vaping Use: Never used   Substance and Sexual Activity   • Alcohol use: Yes     Comment: social drinker    • Drug use: No   • Sexual activity: Defer       Family History   Problem Relation Age of Onset   • Cancer Mother         bladder   • Osteoporosis Mother    • Heart failure Mother    • Lung cancer Father    • No Known Problems Sister        Review of Systems   Constitutional: Negative for fever and night sweats.   HENT: Negative for ear pain and stridor.    Eyes: Negative for discharge and visual halos.   Cardiovascular: Negative for cyanosis.   Respiratory: Negative for hemoptysis and sputum production.    Hematologic/Lymphatic: Negative for adenopathy.   Skin: Negative for nail changes and unusual hair distribution.   Musculoskeletal: Positive for arthritis, back pain and joint pain. Negative for gout and joint swelling.   Gastrointestinal: Negative for bowel incontinence and flatus.   Genitourinary: Negative for dysuria and flank pain.   Neurological: Negative for seizures and tremors.   Psychiatric/Behavioral: Negative for altered mental status. The patient is not nervous/anxious.             Objective:     Vitals:    04/06/22 1200   BP: 126/78   Pulse: 64   Weight: 102 kg (225 lb)   Height: 170.2 cm (67\")         Eyes:      General:         Right eye: No discharge.         Left eye: No discharge.   HENT:      Head: Normocephalic and atraumatic.   Neck:      Thyroid: No thyromegaly.      Vascular: No JVD.   Pulmonary:      Effort: Pulmonary effort is normal.      Breath sounds: Normal breath sounds. No rales.   Cardiovascular:      Normal rate. Regular rhythm.      No gallop.   Edema:     Peripheral edema absent.   Abdominal:      General: Bowel sounds are normal.      Palpations: Abdomen is soft.      Tenderness: There is no abdominal tenderness.   Musculoskeletal: Normal range of motion.         General: No deformity. Skin:     General: Skin is warm and dry.      Findings: No erythema. "   Neurological:      Mental Status: Alert and oriented to person, place, and time.      Motor: Normal muscle tone.   Psychiatric:         Behavior: Behavior normal.         Thought Content: Thought content normal.           ECG 12 Lead    Date/Time: 4/6/2022 12:54 PM  Performed by: Buzz Wagner MD  Authorized by: Buzz Wagner MD   Comparison: compared with previous ECG   Similar to previous ECG  Rhythm: sinus rhythm            Lab Review:       Assessment:          Diagnosis Plan   1. Persistent atrial fibrillation     2. CHEN on CPAP            Plan:     The main problem is DELA CRUZ and It may all be due to the AF, but she was winded today and was in SR, So, I want to check an echo to eval LV and valves, tho on my exam, I heard nothing bad.     For the AF, we may switch to sotalol depending on echo.     Also possible for AF is ablation.     Body mass index is 35.24 kg/m². -- we disc weight loss.

## 2022-04-07 ENCOUNTER — HOSPITAL ENCOUNTER (OUTPATIENT)
Dept: MAMMOGRAPHY | Facility: HOSPITAL | Age: 72
Discharge: HOME | End: 2022-04-07
Admitting: INTERNAL MEDICINE

## 2022-04-07 DIAGNOSIS — Z12.31 ENCOUNTER FOR SCREENING MAMMOGRAM FOR BREAST CANCER: ICD-10-CM

## 2022-04-07 PROCEDURE — 77063 BREAST TOMOSYNTHESIS BI: CPT

## 2022-04-07 PROCEDURE — 77067 SCR MAMMO BI INCL CAD: CPT

## 2022-04-17 DIAGNOSIS — M54.30 SCIATICA, UNSPECIFIED LATERALITY: ICD-10-CM

## 2022-04-18 RX ORDER — GABAPENTIN 300 MG/1
CAPSULE ORAL
Qty: 270 CAPSULE | Refills: 1 | Status: SHIPPED | OUTPATIENT
Start: 2022-04-18 | End: 2022-11-07

## 2022-04-22 ENCOUNTER — TELEPHONE (OUTPATIENT)
Dept: INTERNAL MEDICINE | Facility: CLINIC | Age: 72
End: 2022-04-22

## 2022-04-22 NOTE — TELEPHONE ENCOUNTER
Caller: Nessa Osuna    Relationship to patient: Self    Best call back number: [phone]    Patient is needing: PATIENT WENT TO [facility] ON Tuesday AND WAS TOLD SHE HAD A TORN LIGAMENT IN HER LEFT ANKLE, SHE HAD NO BROKEN BONES AND [facility] TOLD HER TO FOLLOW UP WITH AN ORTHOPEDIC DOCTOR AND SHE DOESN'T FEEL THAT SHE NEEDS TO DO THAT AND WANTS TO KNOW WHAT DR. VALENZUELA THINKS. THE ANKLE IS SWOLLEN AND HURTS MILD PLEASE ADVISE.

## 2022-04-25 ENCOUNTER — HOSPITAL ENCOUNTER (OUTPATIENT)
Dept: CARDIOLOGY | Facility: HOSPITAL | Age: 72
Discharge: HOME | End: 2022-04-25
Admitting: INTERNAL MEDICINE

## 2022-04-25 ENCOUNTER — OFFICE VISIT (OUTPATIENT)
Dept: INTERNAL MEDICINE | Facility: CLINIC | Age: 72
End: 2022-04-25

## 2022-04-25 VITALS
SYSTOLIC BLOOD PRESSURE: 128 MMHG | BODY MASS INDEX: 35.16 KG/M2 | HEIGHT: 67 IN | HEART RATE: 59 BPM | WEIGHT: 224 LBS | DIASTOLIC BLOOD PRESSURE: 66 MMHG

## 2022-04-25 DIAGNOSIS — S93.402D SPRAIN OF LEFT ANKLE, UNSPECIFIED LIGAMENT, SUBSEQUENT ENCOUNTER: Primary | ICD-10-CM

## 2022-04-25 DIAGNOSIS — R93.6 ABNORMAL FINDINGS ON DIAGNOSTIC IMAGING OF LIMBS: ICD-10-CM

## 2022-04-25 DIAGNOSIS — K21.00 GASTROESOPHAGEAL REFLUX DISEASE WITH ESOPHAGITIS, UNSPECIFIED WHETHER HEMORRHAGE: ICD-10-CM

## 2022-04-25 DIAGNOSIS — R11.2 NAUSEA AND VOMITING, UNSPECIFIED VOMITING TYPE: ICD-10-CM

## 2022-04-25 DIAGNOSIS — Z78.0 MENOPAUSE: ICD-10-CM

## 2022-04-25 DIAGNOSIS — M85.80 OSTEOPENIA, UNSPECIFIED LOCATION: ICD-10-CM

## 2022-04-25 DIAGNOSIS — I48.0 PAROXYSMAL ATRIAL FIBRILLATION: ICD-10-CM

## 2022-04-25 LAB
ASCENDING AORTA: 2.9 CM
BH CV ECHO MEAS - ACS: 1.84 CM
BH CV ECHO MEAS - AO MAX PG: 12.4 MMHG
BH CV ECHO MEAS - AO MEAN PG: 6.4 MMHG
BH CV ECHO MEAS - AO ROOT DIAM: 2.9 CM
BH CV ECHO MEAS - AO V2 MAX: 175.9 CM/SEC
BH CV ECHO MEAS - AO V2 VTI: 44 CM
BH CV ECHO MEAS - AVA(I,D): 1.81 CM2
BH CV ECHO MEAS - EDV(CUBED): 104 ML
BH CV ECHO MEAS - EDV(MOD-SP2): 100 ML
BH CV ECHO MEAS - EDV(MOD-SP4): 90 ML
BH CV ECHO MEAS - EF(MOD-BP): 60.3 %
BH CV ECHO MEAS - EF(MOD-SP2): 59 %
BH CV ECHO MEAS - EF(MOD-SP4): 62.2 %
BH CV ECHO MEAS - ESV(CUBED): 38.2 ML
BH CV ECHO MEAS - ESV(MOD-SP2): 41 ML
BH CV ECHO MEAS - ESV(MOD-SP4): 34 ML
BH CV ECHO MEAS - FS: 28.4 %
BH CV ECHO MEAS - IVS/LVPW: 1.24 CM
BH CV ECHO MEAS - IVSD: 0.97 CM
BH CV ECHO MEAS - LAT PEAK E' VEL: 5.1 CM/SEC
BH CV ECHO MEAS - LV DIASTOLIC VOL/BSA (35-75): 42.4 CM2
BH CV ECHO MEAS - LV MASS(C)D: 138.3 GRAMS
BH CV ECHO MEAS - LV MAX PG: 4.7 MMHG
BH CV ECHO MEAS - LV MEAN PG: 2.6 MMHG
BH CV ECHO MEAS - LV SYSTOLIC VOL/BSA (12-30): 16 CM2
BH CV ECHO MEAS - LV V1 MAX: 108.4 CM/SEC
BH CV ECHO MEAS - LV V1 VTI: 26.9 CM
BH CV ECHO MEAS - LVIDD: 4.7 CM
BH CV ECHO MEAS - LVIDS: 3.4 CM
BH CV ECHO MEAS - LVOT AREA: 3 CM2
BH CV ECHO MEAS - LVOT DIAM: 1.94 CM
BH CV ECHO MEAS - LVPWD: 0.78 CM
BH CV ECHO MEAS - MED PEAK E' VEL: 3.7 CM/SEC
BH CV ECHO MEAS - MR MAX PG: 81.2 MMHG
BH CV ECHO MEAS - MR MAX VEL: 450.6 CM/SEC
BH CV ECHO MEAS - MV A DUR: 0.14 SEC
BH CV ECHO MEAS - MV A MAX VEL: 57.8 CM/SEC
BH CV ECHO MEAS - MV DEC SLOPE: 692.1 CM/SEC2
BH CV ECHO MEAS - MV DEC TIME: 0.32 MSEC
BH CV ECHO MEAS - MV E MAX VEL: 111 CM/SEC
BH CV ECHO MEAS - MV E/A: 1.92
BH CV ECHO MEAS - MV MAX PG: 5.7 MMHG
BH CV ECHO MEAS - MV MEAN PG: 2.11 MMHG
BH CV ECHO MEAS - MV V2 VTI: 38.9 CM
BH CV ECHO MEAS - MVA(VTI): 2.05 CM2
BH CV ECHO MEAS - PA ACC TIME: 0.18 SEC
BH CV ECHO MEAS - PA PR(ACCEL): -1.81 MMHG
BH CV ECHO MEAS - PA V2 MAX: 93.3 CM/SEC
BH CV ECHO MEAS - PULM A REVS DUR: 0.11 SEC
BH CV ECHO MEAS - PULM A REVS VEL: 18.8 CM/SEC
BH CV ECHO MEAS - PULM DIAS VEL: 45.9 CM/SEC
BH CV ECHO MEAS - PULM SYS VEL: 33.1 CM/SEC
BH CV ECHO MEAS - RAP SYSTOLE: 3 MMHG
BH CV ECHO MEAS - RV MAX PG: 1.57 MMHG
BH CV ECHO MEAS - RV V1 MAX: 62.6 CM/SEC
BH CV ECHO MEAS - RV V1 VTI: 15.6 CM
BH CV ECHO MEAS - RVOT DIAM: 2.06 CM
BH CV ECHO MEAS - RVSP: 55.4 MMHG
BH CV ECHO MEAS - SI(MOD-SP2): 27.8 ML/M2
BH CV ECHO MEAS - SI(MOD-SP4): 26.4 ML/M2
BH CV ECHO MEAS - SV(LVOT): 79.7 ML
BH CV ECHO MEAS - SV(MOD-SP2): 59 ML
BH CV ECHO MEAS - SV(MOD-SP4): 56 ML
BH CV ECHO MEAS - SV(RVOT): 52 ML
BH CV ECHO MEAS - TAPSE (>1.6): 2.14 CM
BH CV ECHO MEAS - TR MAX PG: 52.4 MMHG
BH CV ECHO MEAS - TR MAX VEL: 362 CM/SEC
BH CV ECHO MEASUREMENTS AVERAGE E/E' RATIO: 25.23
BH CV XLRA - RV BASE: 3.8 CM
BH CV XLRA - RV LENGTH: 7.2 CM
BH CV XLRA - RV MID: 2.8 CM
BH CV XLRA - TDI S': 7.8 CM/SEC
LEFT ATRIUM VOLUME INDEX: 44.6 ML/M2
MAXIMAL PREDICTED HEART RATE: 148 BPM
SINUS: 2.6 CM
STJ: 2.37 CM
STRESS TARGET HR: 126 BPM

## 2022-04-25 PROCEDURE — 93306 TTE W/DOPPLER COMPLETE: CPT | Performed by: INTERNAL MEDICINE

## 2022-04-25 PROCEDURE — 93306 TTE W/DOPPLER COMPLETE: CPT

## 2022-04-25 PROCEDURE — 99214 OFFICE O/P EST MOD 30 MIN: CPT | Performed by: INTERNAL MEDICINE

## 2022-04-25 RX ORDER — OMEPRAZOLE 20 MG/1
20 CAPSULE, DELAYED RELEASE ORAL DAILY
Qty: 90 CAPSULE | Refills: 2 | Status: SHIPPED | OUTPATIENT
Start: 2022-04-25 | End: 2023-01-03

## 2022-04-25 NOTE — PROGRESS NOTES
Chief Complaint   Patient presents with   • Ankle Pain     left   • Vomiting   • bone density testing       History of Present Illness   Nessa Osuna is a 72 y.o. female presents for follow up evaluation. Patient w/ recent ankle sprain injury. She was seen in ER. She did not have a fracture. She was given a boot to stabalize ankle. She notes that she felt less stable with the boot. She notes some reduction in swelling but still some present. She is wearing  Compression socks at all times.   She had tdap 2018.she reportss osteopenia noted on her xray and would like to d/c dexa for this reason. She has some continued emesis unexplained about 1-3 times a month. She was on omeprazole 2016. She had a neg cscope.         The following portions of the patient's history were reviewed and updated as appropriate: allergies, current medications, past family history, past medical history, past social history, past surgical history and problem list.  Current Outpatient Medications on File Prior to Visit   Medication Sig Dispense Refill   • acetaminophen (TYLENOL) 500 MG tablet Take 500 mg by mouth Every Night. Take 1 tablet every 4-6 hours as needed     • atenolol (TENORMIN) 100 MG tablet Take 1 tablet by mouth 2 (Two) Times a Day. 180 tablet 3   • Cholecalciferol (VITAMIN D) 2000 UNITS capsule Take 1 capsule by mouth daily.     • DULoxetine (CYMBALTA) 60 MG capsule TAKE ONE CAPSULE BY MOUTH DAILY 90 capsule 3   • Flaxseed, Linseed, (FLAXSEED OIL) 1000 MG capsule Take 1 capsule by mouth Daily.     • fluticasone (FLONASE) 50 MCG/ACT nasal spray PLACE TWO SPRAYS IN EACH NOSTRIL ONCE DAILY 1 bottle 4   • gabapentin (NEURONTIN) 300 MG capsule TAKE ONE CAPSULE BY MOUTH THREE TIMES A  capsule 1   • hyoscyamine (ANASPAZ,LEVSIN) 0.125 MG tablet Take 1 tablet by mouth Every 6 (Six) Hours As Needed for Cramping or Diarrhea (abdomina pain). 60 tablet 0   • Melatonin 5 MG capsule Take 5 mg by mouth Every Night.     •  montelukast (SINGULAIR) 10 MG tablet TAKE ONE TABLET BY MOUTH ONCE NIGHTLY 90 tablet 0   • rivaroxaban (Xarelto) 20 MG tablet Take 1 tablet by mouth Daily With Dinner. 90 tablet 3   • simvastatin (ZOCOR) 20 MG tablet TAKE ONE TABLET BY MOUTH DAILY 90 tablet 0     No current facility-administered medications on file prior to visit.     Review of Systems   Constitutional: Negative.    HENT: Negative.    Eyes: Negative.    Respiratory: Negative.    Cardiovascular: Negative.    Gastrointestinal: Negative.    Endocrine: Negative.    Genitourinary: Negative.    Musculoskeletal:        Left ankle pain and swelling     Allergic/Immunologic: Negative.    Neurological: Negative.    Hematological: Negative.    Psychiatric/Behavioral: Negative.        Objective   Physical Exam  Vitals and nursing note reviewed.   Constitutional:       Appearance: Normal appearance.   HENT:      Head: Normocephalic and atraumatic.      Right Ear: Tympanic membrane normal.      Left Ear: Tympanic membrane normal.      Nose: Nose normal.      Mouth/Throat:      Mouth: Mucous membranes are moist.   Eyes:      Extraocular Movements: Extraocular movements intact.      Pupils: Pupils are equal, round, and reactive to light.   Cardiovascular:      Rate and Rhythm: Normal rate and regular rhythm.      Pulses: Normal pulses.      Heart sounds: Normal heart sounds.   Pulmonary:      Effort: Pulmonary effort is normal.      Breath sounds: Normal breath sounds.   Abdominal:      General: Abdomen is flat.      Palpations: Abdomen is soft.   Musculoskeletal:      Cervical back: Normal range of motion.   Skin:     General: Skin is warm and dry.   Neurological:      General: No focal deficit present.      Mental Status: She is alert and oriented to person, place, and time.   Psychiatric:         Mood and Affect: Mood normal.         Behavior: Behavior normal.         Thought Content: Thought content normal.         Judgment: Judgment normal.          /66   " Pulse 59   Ht 170.2 cm (67\")   Wt 102 kg (224 lb)   BMI 35.08 kg/m²     Assessment/Plan   Diagnoses and all orders for this visit:    Sprain of left ankle, unspecified ligament, subsequent encounter    Menopause    Osteopenia, unspecified location  -     DEXA Bone Density Axial    Abnormal findings on diagnostic imaging of limbs   -     DEXA Bone Density Axial    Gastroesophageal reflux disease with esophagitis, unspecified whether hemorrhage  -     FL upper gi single contrast sbft; Future    Nausea and vomiting, unspecified vomiting type  -     FL upper gi single contrast sbft; Future    Other orders  -     omeprazole (priLOSEC) 20 MG capsule; Take 1 capsule by mouth Daily.        Patient w/ left ankle sprain. Reviewed ER records w/ patient. She is to elevate her leg. Use ice routinely. She is advised a brace to reduce risk of reinjury.Given osteopenia ankle do advise DEXA scan. he will call if pain worsens or fails to improve. She does note occasional emesis. She wnet to gi and a normal cscope. Will try restart omeprazole temporarily and check and upper GI.         "

## 2022-04-26 ENCOUNTER — TELEPHONE (OUTPATIENT)
Dept: CARDIOLOGY | Facility: CLINIC | Age: 72
End: 2022-04-26

## 2022-04-26 NOTE — PROGRESS NOTES
Can you let her know that her echocardiogram shows that her heart function is normal.  If she is still short of breath and having problems make her an appointment in the next 3 to 4 weeks with either Giana or myself

## 2022-04-26 NOTE — TELEPHONE ENCOUNTER
----- Message from Buzz Wagner MD sent at 4/26/2022  2:01 PM EDT -----  Can you let her know that her echocardiogram shows that her heart function is normal.  If she is still short of breath and having problems make her an appointment in the next 3 to 4 weeks with either Giana or myself

## 2022-05-03 ENCOUNTER — HOSPITAL ENCOUNTER (OUTPATIENT)
Dept: GENERAL RADIOLOGY | Facility: HOSPITAL | Age: 72
Discharge: HOME | End: 2022-05-03
Admitting: INTERNAL MEDICINE

## 2022-05-03 DIAGNOSIS — K21.00 GASTROESOPHAGEAL REFLUX DISEASE WITH ESOPHAGITIS, UNSPECIFIED WHETHER HEMORRHAGE: ICD-10-CM

## 2022-05-03 DIAGNOSIS — R11.2 NAUSEA AND VOMITING, UNSPECIFIED VOMITING TYPE: ICD-10-CM

## 2022-05-03 PROCEDURE — 74248 X-RAY SM INT F-THRU STD: CPT

## 2022-05-03 PROCEDURE — 74246 X-RAY XM UPR GI TRC 2CNTRST: CPT

## 2022-05-03 RX ADMIN — BARIUM SULFATE 366 ML: 960 POWDER, FOR SUSPENSION ORAL at 09:30

## 2022-06-13 ENCOUNTER — TELEPHONE (OUTPATIENT)
Dept: INTERNAL MEDICINE | Facility: CLINIC | Age: 72
End: 2022-06-13

## 2022-06-13 RX ORDER — ATENOLOL 100 MG/1
TABLET ORAL
Qty: 180 TABLET | Refills: 3 | Status: SHIPPED | OUTPATIENT
Start: 2022-06-13

## 2022-06-13 NOTE — TELEPHONE ENCOUNTER
Pt called to say she is having blood in her stool. Bright red. She has been to see gastro in the last few months with rg. Was told her had a hemorrhoid.  She did vomit last night   Pt does take Xarelto

## 2022-06-14 ENCOUNTER — TELEPHONE (OUTPATIENT)
Dept: GASTROENTEROLOGY | Facility: CLINIC | Age: 72
End: 2022-06-14

## 2022-06-14 ENCOUNTER — LAB (OUTPATIENT)
Dept: LAB | Facility: HOSPITAL | Age: 72
End: 2022-06-14

## 2022-06-14 ENCOUNTER — OFFICE VISIT (OUTPATIENT)
Dept: INTERNAL MEDICINE | Facility: CLINIC | Age: 72
End: 2022-06-14

## 2022-06-14 ENCOUNTER — APPOINTMENT (OUTPATIENT)
Dept: CARDIOLOGY | Facility: HOSPITAL | Age: 72
End: 2022-06-14

## 2022-06-14 VITALS
BODY MASS INDEX: 35.16 KG/M2 | WEIGHT: 224 LBS | DIASTOLIC BLOOD PRESSURE: 64 MMHG | SYSTOLIC BLOOD PRESSURE: 144 MMHG | HEIGHT: 67 IN | HEART RATE: 64 BPM

## 2022-06-14 DIAGNOSIS — K62.5 RECTAL BLEEDING: Primary | ICD-10-CM

## 2022-06-14 LAB
ANION GAP SERPL CALCULATED.3IONS-SCNC: 12.7 MMOL/L (ref 5–15)
BASOPHILS # BLD AUTO: 0.05 10*3/MM3 (ref 0–0.2)
BASOPHILS NFR BLD AUTO: 1 % (ref 0–1.5)
BUN SERPL-MCNC: 22 MG/DL (ref 8–23)
BUN/CREAT SERPL: 28.9 (ref 7–25)
CALCIUM SPEC-SCNC: 8.7 MG/DL (ref 8.6–10.5)
CHLORIDE SERPL-SCNC: 104 MMOL/L (ref 98–107)
CO2 SERPL-SCNC: 23.3 MMOL/L (ref 22–29)
CREAT SERPL-MCNC: 0.76 MG/DL (ref 0.57–1)
DEPRECATED RDW RBC AUTO: 45.1 FL (ref 37–54)
EGFRCR SERPLBLD CKD-EPI 2021: 83.4 ML/MIN/1.73
EOSINOPHIL # BLD AUTO: 0.36 10*3/MM3 (ref 0–0.4)
EOSINOPHIL NFR BLD AUTO: 7.3 % (ref 0.3–6.2)
ERYTHROCYTE [DISTWIDTH] IN BLOOD BY AUTOMATED COUNT: 13.1 % (ref 12.3–15.4)
GLUCOSE SERPL-MCNC: 93 MG/DL (ref 65–99)
HCT VFR BLD AUTO: 34.3 % (ref 34–46.6)
HGB BLD-MCNC: 11 G/DL (ref 12–15.9)
IMM GRANULOCYTES # BLD AUTO: 0.01 10*3/MM3 (ref 0–0.05)
IMM GRANULOCYTES NFR BLD AUTO: 0.2 % (ref 0–0.5)
LYMPHOCYTES # BLD AUTO: 1.32 10*3/MM3 (ref 0.7–3.1)
LYMPHOCYTES NFR BLD AUTO: 26.9 % (ref 19.6–45.3)
MCH RBC QN AUTO: 30.5 PG (ref 26.6–33)
MCHC RBC AUTO-ENTMCNC: 32.1 G/DL (ref 31.5–35.7)
MCV RBC AUTO: 95 FL (ref 79–97)
MONOCYTES # BLD AUTO: 0.65 10*3/MM3 (ref 0.1–0.9)
MONOCYTES NFR BLD AUTO: 13.3 % (ref 5–12)
NEUTROPHILS NFR BLD AUTO: 2.51 10*3/MM3 (ref 1.7–7)
NEUTROPHILS NFR BLD AUTO: 51.3 % (ref 42.7–76)
NRBC BLD AUTO-RTO: 0.2 /100 WBC (ref 0–0.2)
PLATELET # BLD AUTO: 239 10*3/MM3 (ref 140–450)
PMV BLD AUTO: 11.6 FL (ref 6–12)
POTASSIUM SERPL-SCNC: 4.1 MMOL/L (ref 3.5–5.2)
RBC # BLD AUTO: 3.61 10*6/MM3 (ref 3.77–5.28)
SODIUM SERPL-SCNC: 140 MMOL/L (ref 136–145)
WBC NRBC COR # BLD: 4.9 10*3/MM3 (ref 3.4–10.8)

## 2022-06-14 PROCEDURE — 80048 BASIC METABOLIC PNL TOTAL CA: CPT

## 2022-06-14 PROCEDURE — 36415 COLL VENOUS BLD VENIPUNCTURE: CPT

## 2022-06-14 PROCEDURE — 99214 OFFICE O/P EST MOD 30 MIN: CPT | Performed by: INTERNAL MEDICINE

## 2022-06-14 PROCEDURE — 85025 COMPLETE CBC W/AUTO DIFF WBC: CPT

## 2022-06-14 RX ORDER — ALBUTEROL SULFATE 90 UG/1
2 AEROSOL, METERED RESPIRATORY (INHALATION) EVERY 4 HOURS PRN
COMMUNITY
Start: 2022-04-29

## 2022-06-14 NOTE — TELEPHONE ENCOUNTER
----- Message from Franci Kumar MD sent at 6/14/2022  3:12 PM EDT -----  I will work to get her into the office.  In the meantime, I would recommend treating this is hemorrhoidal given the results from her recent colonoscopy--Preparation H to internal hemorrhoids twice daily.  To emergency room for worsening symptoms.    Schedulers, can you look to get her in to me or a PA or NP soon, thx    ----- Message -----  From: Jayne Guerra MD  Sent: 6/14/2022   1:37 PM EDT  To: Franci Kumar MD    Patient w/ brbpr for 3 days. Small volume in nature. Stool dark w/ red tinge on exam today. She is hemodynamically stable and is going to have cbc tested to ensure no large amount of blood loss. She will avoid strain for defication and will continue PPI. Had cscope w/ you earlier this year. She is taking xarelto.   Can she be seen by your office or do you advise alternate?   Thank you,  Jayne Guerra MD

## 2022-06-14 NOTE — PROGRESS NOTES
Chief Complaint   Patient presents with   • Rectal Bleeding       History of Present Illness   Nessa Osuna is a 72 y.o. female presents for acute care. 3 day history of blood in stool. She notes that initially bright red in toilet and on stool. About 1/2-1 ounce in nature. Last night noted a more tarry stool. This morning she had a more normal bm that did not appear tarry or have blood. She had emesis last two nights as well.   cscope 1/22. She had polyps removed x2 at that time. She does have diverticulae and internal hemorrhoids.   She is on xarelto for atrial fibrillation.       The following portions of the patient's history were reviewed and updated as appropriate: allergies, current medications, past family history, past medical history, past social history, past surgical history and problem list.  Current Outpatient Medications on File Prior to Visit   Medication Sig Dispense Refill   • acetaminophen (TYLENOL) 500 MG tablet Take 500 mg by mouth Every Night. Take 1 tablet every 4-6 hours as needed     • albuterol sulfate  (90 Base) MCG/ACT inhaler      • atenolol (TENORMIN) 100 MG tablet TAKE ONE TABLET BY MOUTH TWICE A  tablet 3   • Breo Ellipta 100-25 MCG/INH inhaler      • Cholecalciferol (VITAMIN D) 2000 UNITS capsule Take 1 capsule by mouth daily.     • DULoxetine (CYMBALTA) 60 MG capsule TAKE ONE CAPSULE BY MOUTH DAILY 90 capsule 3   • Flaxseed, Linseed, (FLAXSEED OIL) 1000 MG capsule Take 1 capsule by mouth Daily.     • fluticasone (FLONASE) 50 MCG/ACT nasal spray PLACE TWO SPRAYS IN EACH NOSTRIL ONCE DAILY 1 bottle 4   • gabapentin (NEURONTIN) 300 MG capsule TAKE ONE CAPSULE BY MOUTH THREE TIMES A  capsule 1   • hyoscyamine (ANASPAZ,LEVSIN) 0.125 MG tablet Take 1 tablet by mouth Every 6 (Six) Hours As Needed for Cramping or Diarrhea (abdomina pain). 60 tablet 0   • Melatonin 5 MG capsule Take 5 mg by mouth Every Night.     • montelukast (SINGULAIR) 10 MG tablet TAKE ONE  TABLET BY MOUTH ONCE NIGHTLY 90 tablet 0   • omeprazole (priLOSEC) 20 MG capsule Take 1 capsule by mouth Daily. 90 capsule 2   • rivaroxaban (Xarelto) 20 MG tablet Take 1 tablet by mouth Daily With Dinner. 90 tablet 3   • simvastatin (ZOCOR) 20 MG tablet TAKE ONE TABLET BY MOUTH DAILY 90 tablet 0     No current facility-administered medications on file prior to visit.     Review of Systems   Constitutional: Negative.  Negative for fatigue.   HENT: Negative.    Eyes: Negative.    Respiratory: Negative.    Cardiovascular: Negative.    Gastrointestinal: Positive for blood in stool and vomiting. Negative for constipation.   Endocrine: Negative.    Genitourinary: Negative.    Musculoskeletal: Negative.    Allergic/Immunologic: Negative.    Neurological: Negative.    Hematological: Negative.    Psychiatric/Behavioral: Negative.        Objective   Physical Exam  Vitals and nursing note reviewed.   Constitutional:       Appearance: Normal appearance.   HENT:      Head: Normocephalic and atraumatic.      Right Ear: Tympanic membrane normal.      Left Ear: Tympanic membrane normal.      Nose: Nose normal.      Mouth/Throat:      Mouth: Mucous membranes are moist.   Eyes:      Extraocular Movements: Extraocular movements intact.      Pupils: Pupils are equal, round, and reactive to light.   Cardiovascular:      Rate and Rhythm: Normal rate and regular rhythm.      Pulses: Normal pulses.      Heart sounds: Normal heart sounds.   Pulmonary:      Effort: Pulmonary effort is normal.      Breath sounds: Normal breath sounds.   Abdominal:      General: Abdomen is flat. Bowel sounds are normal. There is no distension.      Palpations: Abdomen is soft.      Tenderness: There is no abdominal tenderness. There is no guarding or rebound.   Genitourinary:     General: Normal vulva.      Rectum: Normal. Guaiac result positive.      Comments: Normal external examination rectum. Dark stool w/ red tinge on glove. Guaiac strongly positive.  "    Musculoskeletal:         General: Normal range of motion.      Cervical back: Normal range of motion.   Skin:     General: Skin is warm and dry.   Neurological:      General: No focal deficit present.      Mental Status: She is alert and oriented to person, place, and time.   Psychiatric:         Mood and Affect: Mood normal.         Behavior: Behavior normal.         Thought Content: Thought content normal.         Judgment: Judgment normal.          /64   Pulse 64   Ht 170.2 cm (67\")   Wt 102 kg (224 lb)   BMI 35.08 kg/m²     Assessment & Plan   Diagnoses and all orders for this visit:    Rectal bleeding  -     CBC & Differential  -     Basic Metabolic Panel    Other orders  -     Breo Ellipta 100-25 MCG/INH inhaler  -     albuterol sulfate  (90 Base) MCG/ACT inhaler      Patient w/ acute rectal bleeding. cscope 1/22 w/ internal hemorrhoids and diverticulae. Could be from this. She has no abdominal discomfort w/ a very benign examination. Do not suspect diverticulitis. She is taking omeprazole daily and will continue this. She is hemodynamically stable. Will test stat cbc. Will contact gastroenterology. To avoid straining w/ bm. Will switch from xarelto to eliquis.                "

## 2022-06-14 NOTE — TELEPHONE ENCOUNTER
Called pt and advised of Dr Kumar note.  Verb understanding and reports that the bleeding has decreased.       Pt reports she is still having issues with nausea and vomiting.  Appt made for 06/20 at 2p with Taylor SIMON.  Verb understanding.     Update sent to Dr Kumar.

## 2022-06-15 RX ORDER — HYDROCORTISONE ACETATE PRAMOXINE HCL 2.5; 1 G/100G; G/100G
CREAM TOPICAL 3 TIMES DAILY
Qty: 30 G | Refills: 1 | Status: SHIPPED | OUTPATIENT
Start: 2022-06-15 | End: 2022-06-20

## 2022-06-16 ENCOUNTER — APPOINTMENT (OUTPATIENT)
Dept: GENERAL RADIOLOGY | Facility: HOSPITAL | Age: 72
End: 2022-06-16

## 2022-06-16 ENCOUNTER — HOSPITAL ENCOUNTER (OUTPATIENT)
Facility: HOSPITAL | Age: 72
Discharge: HOME | End: 2022-06-17
Attending: EMERGENCY MEDICINE | Admitting: INTERNAL MEDICINE

## 2022-06-16 ENCOUNTER — ANESTHESIA EVENT (OUTPATIENT)
Dept: GASTROENTEROLOGY | Facility: HOSPITAL | Age: 72
End: 2022-06-16

## 2022-06-16 ENCOUNTER — TELEPHONE (OUTPATIENT)
Dept: GASTROENTEROLOGY | Facility: CLINIC | Age: 72
End: 2022-06-16

## 2022-06-16 ENCOUNTER — PREP FOR SURGERY (OUTPATIENT)
Dept: SURGERY | Facility: SURGERY CENTER | Age: 72
End: 2022-06-16

## 2022-06-16 DIAGNOSIS — K92.1 BLOOD IN STOOL: Primary | ICD-10-CM

## 2022-06-16 DIAGNOSIS — K92.2 GASTROINTESTINAL HEMORRHAGE, UNSPECIFIED GASTROINTESTINAL HEMORRHAGE TYPE: Primary | ICD-10-CM

## 2022-06-16 DIAGNOSIS — Z79.01 ANTICOAGULATED: ICD-10-CM

## 2022-06-16 DIAGNOSIS — D64.9 ANEMIA, UNSPECIFIED TYPE: ICD-10-CM

## 2022-06-16 DIAGNOSIS — K92.1 BLOOD IN STOOL: ICD-10-CM

## 2022-06-16 LAB
ABO GROUP BLD: NORMAL
ALBUMIN SERPL-MCNC: 4.3 G/DL (ref 3.5–5.2)
ALBUMIN/GLOB SERPL: 2.4 G/DL
ALP SERPL-CCNC: 67 U/L (ref 39–117)
ALT SERPL W P-5'-P-CCNC: 18 U/L (ref 1–33)
ANION GAP SERPL CALCULATED.3IONS-SCNC: 10.8 MMOL/L (ref 5–15)
APTT PPP: 31.6 SECONDS (ref 22.7–35.4)
AST SERPL-CCNC: 25 U/L (ref 1–32)
BASOPHILS # BLD AUTO: 0.04 10*3/MM3 (ref 0–0.2)
BASOPHILS NFR BLD AUTO: 0.9 % (ref 0–1.5)
BILIRUB SERPL-MCNC: 1.2 MG/DL (ref 0–1.2)
BLD GP AB SCN SERPL QL: NEGATIVE
BUN SERPL-MCNC: 16 MG/DL (ref 8–23)
BUN/CREAT SERPL: 21.3 (ref 7–25)
CALCIUM SPEC-SCNC: 8.7 MG/DL (ref 8.6–10.5)
CHLORIDE SERPL-SCNC: 107 MMOL/L (ref 98–107)
CO2 SERPL-SCNC: 24.2 MMOL/L (ref 22–29)
CREAT SERPL-MCNC: 0.75 MG/DL (ref 0.57–1)
DEPRECATED RDW RBC AUTO: 44 FL (ref 37–54)
EGFRCR SERPLBLD CKD-EPI 2021: 84.7 ML/MIN/1.73
EOSINOPHIL # BLD AUTO: 0.28 10*3/MM3 (ref 0–0.4)
EOSINOPHIL NFR BLD AUTO: 6.5 % (ref 0.3–6.2)
ERYTHROCYTE [DISTWIDTH] IN BLOOD BY AUTOMATED COUNT: 13 % (ref 12.3–15.4)
GLOBULIN UR ELPH-MCNC: 1.8 GM/DL
GLUCOSE SERPL-MCNC: 94 MG/DL (ref 65–99)
HCT VFR BLD AUTO: 32.3 % (ref 34–46.6)
HCT VFR BLD AUTO: 34.3 % (ref 34–46.6)
HGB BLD-MCNC: 11.2 G/DL (ref 12–15.9)
HGB BLD-MCNC: 11.3 G/DL (ref 12–15.9)
IMM GRANULOCYTES # BLD AUTO: 0.01 10*3/MM3 (ref 0–0.05)
IMM GRANULOCYTES NFR BLD AUTO: 0.2 % (ref 0–0.5)
INR PPP: 1.22 (ref 0.9–1.1)
LYMPHOCYTES # BLD AUTO: 1.18 10*3/MM3 (ref 0.7–3.1)
LYMPHOCYTES NFR BLD AUTO: 27.3 % (ref 19.6–45.3)
MCH RBC QN AUTO: 30.7 PG (ref 26.6–33)
MCHC RBC AUTO-ENTMCNC: 32.9 G/DL (ref 31.5–35.7)
MCV RBC AUTO: 93.2 FL (ref 79–97)
MONOCYTES # BLD AUTO: 0.46 10*3/MM3 (ref 0.1–0.9)
MONOCYTES NFR BLD AUTO: 10.6 % (ref 5–12)
NEUTROPHILS NFR BLD AUTO: 2.35 10*3/MM3 (ref 1.7–7)
NEUTROPHILS NFR BLD AUTO: 54.5 % (ref 42.7–76)
NRBC BLD AUTO-RTO: 0 /100 WBC (ref 0–0.2)
PLATELET # BLD AUTO: 231 10*3/MM3 (ref 140–450)
PMV BLD AUTO: 11.5 FL (ref 6–12)
POTASSIUM SERPL-SCNC: 4.2 MMOL/L (ref 3.5–5.2)
PROT SERPL-MCNC: 6.1 G/DL (ref 6–8.5)
PROTHROMBIN TIME: 15.3 SECONDS (ref 11.7–14.2)
QT INTERVAL: 500 MS
RBC # BLD AUTO: 3.68 10*6/MM3 (ref 3.77–5.28)
RH BLD: NEGATIVE
SARS-COV-2 RNA RESP QL NAA+PROBE: NOT DETECTED
SODIUM SERPL-SCNC: 142 MMOL/L (ref 136–145)
T&S EXPIRATION DATE: NORMAL
TROPONIN T SERPL-MCNC: <0.01 NG/ML (ref 0–0.03)
WBC NRBC COR # BLD: 4.32 10*3/MM3 (ref 3.4–10.8)

## 2022-06-16 PROCEDURE — 86850 RBC ANTIBODY SCREEN: CPT | Performed by: EMERGENCY MEDICINE

## 2022-06-16 PROCEDURE — 80053 COMPREHEN METABOLIC PANEL: CPT | Performed by: EMERGENCY MEDICINE

## 2022-06-16 PROCEDURE — 85025 COMPLETE CBC W/AUTO DIFF WBC: CPT | Performed by: EMERGENCY MEDICINE

## 2022-06-16 PROCEDURE — 84484 ASSAY OF TROPONIN QUANT: CPT | Performed by: EMERGENCY MEDICINE

## 2022-06-16 PROCEDURE — 99284 EMERGENCY DEPT VISIT MOD MDM: CPT

## 2022-06-16 PROCEDURE — 96366 THER/PROPH/DIAG IV INF ADDON: CPT

## 2022-06-16 PROCEDURE — 86901 BLOOD TYPING SEROLOGIC RH(D): CPT | Performed by: EMERGENCY MEDICINE

## 2022-06-16 PROCEDURE — C9803 HOPD COVID-19 SPEC COLLECT: HCPCS

## 2022-06-16 PROCEDURE — 86900 BLOOD TYPING SEROLOGIC ABO: CPT | Performed by: EMERGENCY MEDICINE

## 2022-06-16 PROCEDURE — G0378 HOSPITAL OBSERVATION PER HR: HCPCS

## 2022-06-16 PROCEDURE — 99214 OFFICE O/P EST MOD 30 MIN: CPT | Performed by: INTERNAL MEDICINE

## 2022-06-16 PROCEDURE — 71045 X-RAY EXAM CHEST 1 VIEW: CPT

## 2022-06-16 PROCEDURE — 93005 ELECTROCARDIOGRAM TRACING: CPT | Performed by: EMERGENCY MEDICINE

## 2022-06-16 PROCEDURE — U0003 INFECTIOUS AGENT DETECTION BY NUCLEIC ACID (DNA OR RNA); SEVERE ACUTE RESPIRATORY SYNDROME CORONAVIRUS 2 (SARS-COV-2) (CORONAVIRUS DISEASE [COVID-19]), AMPLIFIED PROBE TECHNIQUE, MAKING USE OF HIGH THROUGHPUT TECHNOLOGIES AS DESCRIBED BY CMS-2020-01-R: HCPCS | Performed by: EMERGENCY MEDICINE

## 2022-06-16 PROCEDURE — 85610 PROTHROMBIN TIME: CPT | Performed by: EMERGENCY MEDICINE

## 2022-06-16 PROCEDURE — 85730 THROMBOPLASTIN TIME PARTIAL: CPT | Performed by: EMERGENCY MEDICINE

## 2022-06-16 PROCEDURE — 85018 HEMOGLOBIN: CPT | Performed by: STUDENT IN AN ORGANIZED HEALTH CARE EDUCATION/TRAINING PROGRAM

## 2022-06-16 PROCEDURE — 96376 TX/PRO/DX INJ SAME DRUG ADON: CPT

## 2022-06-16 PROCEDURE — 96365 THER/PROPH/DIAG IV INF INIT: CPT

## 2022-06-16 PROCEDURE — 85014 HEMATOCRIT: CPT | Performed by: STUDENT IN AN ORGANIZED HEALTH CARE EDUCATION/TRAINING PROGRAM

## 2022-06-16 PROCEDURE — 93010 ELECTROCARDIOGRAM REPORT: CPT | Performed by: INTERNAL MEDICINE

## 2022-06-16 RX ORDER — ONDANSETRON 4 MG/1
4 TABLET, FILM COATED ORAL EVERY 6 HOURS PRN
Status: DISCONTINUED | OUTPATIENT
Start: 2022-06-16 | End: 2022-06-17 | Stop reason: HOSPADM

## 2022-06-16 RX ORDER — ONDANSETRON 2 MG/ML
4 INJECTION INTRAMUSCULAR; INTRAVENOUS EVERY 6 HOURS PRN
Status: DISCONTINUED | OUTPATIENT
Start: 2022-06-16 | End: 2022-06-17 | Stop reason: HOSPADM

## 2022-06-16 RX ORDER — PANTOPRAZOLE SODIUM 40 MG/10ML
80 INJECTION, POWDER, LYOPHILIZED, FOR SOLUTION INTRAVENOUS ONCE
Status: COMPLETED | OUTPATIENT
Start: 2022-06-16 | End: 2022-06-16

## 2022-06-16 RX ORDER — DULOXETIN HYDROCHLORIDE 60 MG/1
60 CAPSULE, DELAYED RELEASE ORAL EVERY EVENING
Status: DISCONTINUED | OUTPATIENT
Start: 2022-06-16 | End: 2022-06-17 | Stop reason: HOSPADM

## 2022-06-16 RX ORDER — SODIUM CHLORIDE, SODIUM LACTATE, POTASSIUM CHLORIDE, CALCIUM CHLORIDE 600; 310; 30; 20 MG/100ML; MG/100ML; MG/100ML; MG/100ML
100 INJECTION, SOLUTION INTRAVENOUS CONTINUOUS
Status: DISCONTINUED | OUTPATIENT
Start: 2022-06-16 | End: 2022-06-17

## 2022-06-16 RX ORDER — SODIUM CHLORIDE 0.9 % (FLUSH) 0.9 %
10 SYRINGE (ML) INJECTION EVERY 12 HOURS SCHEDULED
Status: DISCONTINUED | OUTPATIENT
Start: 2022-06-16 | End: 2022-06-17 | Stop reason: HOSPADM

## 2022-06-16 RX ORDER — ONDANSETRON 2 MG/ML
4 INJECTION INTRAMUSCULAR; INTRAVENOUS ONCE
Status: DISCONTINUED | OUTPATIENT
Start: 2022-06-16 | End: 2022-06-16

## 2022-06-16 RX ORDER — NITROGLYCERIN 0.4 MG/1
0.4 TABLET SUBLINGUAL
Status: DISCONTINUED | OUTPATIENT
Start: 2022-06-16 | End: 2022-06-17 | Stop reason: HOSPADM

## 2022-06-16 RX ORDER — SODIUM CHLORIDE 0.9 % (FLUSH) 0.9 %
10 SYRINGE (ML) INJECTION AS NEEDED
Status: DISCONTINUED | OUTPATIENT
Start: 2022-06-16 | End: 2022-06-17 | Stop reason: HOSPADM

## 2022-06-16 RX ORDER — CHOLECALCIFEROL (VITAMIN D3) 125 MCG
5 CAPSULE ORAL NIGHTLY PRN
Status: DISCONTINUED | OUTPATIENT
Start: 2022-06-16 | End: 2022-06-17 | Stop reason: HOSPADM

## 2022-06-16 RX ORDER — POLYETHYLENE GLYCOL 3350 17 G/17G
0.5 POWDER, FOR SOLUTION ORAL EVERY 4 HOURS
Status: COMPLETED | OUTPATIENT
Start: 2022-06-16 | End: 2022-06-16

## 2022-06-16 RX ORDER — MONTELUKAST SODIUM 10 MG/1
10 TABLET ORAL NIGHTLY
Status: DISCONTINUED | OUTPATIENT
Start: 2022-06-16 | End: 2022-06-17 | Stop reason: HOSPADM

## 2022-06-16 RX ORDER — GABAPENTIN 300 MG/1
300 CAPSULE ORAL 3 TIMES DAILY
Status: DISCONTINUED | OUTPATIENT
Start: 2022-06-16 | End: 2022-06-17 | Stop reason: HOSPADM

## 2022-06-16 RX ORDER — FLUTICASONE PROPIONATE 50 MCG
2 SPRAY, SUSPENSION (ML) NASAL DAILY
Status: DISCONTINUED | OUTPATIENT
Start: 2022-06-16 | End: 2022-06-17 | Stop reason: HOSPADM

## 2022-06-16 RX ORDER — ALBUTEROL SULFATE 2.5 MG/3ML
2.5 SOLUTION RESPIRATORY (INHALATION) EVERY 6 HOURS PRN
Status: DISCONTINUED | OUTPATIENT
Start: 2022-06-16 | End: 2022-06-17 | Stop reason: HOSPADM

## 2022-06-16 RX ORDER — ATORVASTATIN CALCIUM 10 MG/1
10 TABLET, FILM COATED ORAL DAILY
Refills: 0 | Status: DISCONTINUED | OUTPATIENT
Start: 2022-06-16 | End: 2022-06-17 | Stop reason: HOSPADM

## 2022-06-16 RX ADMIN — MONTELUKAST SODIUM 10 MG: 10 TABLET, FILM COATED ORAL at 21:34

## 2022-06-16 RX ADMIN — SODIUM CHLORIDE, POTASSIUM CHLORIDE, SODIUM LACTATE AND CALCIUM CHLORIDE 100 ML/HR: 600; 310; 30; 20 INJECTION, SOLUTION INTRAVENOUS at 18:00

## 2022-06-16 RX ADMIN — POLYETHYLENE GLYCOL 3350 0.5 BOTTLE: 17 POWDER, FOR SOLUTION ORAL at 18:32

## 2022-06-16 RX ADMIN — DULOXETINE HYDROCHLORIDE 60 MG: 60 CAPSULE, DELAYED RELEASE ORAL at 21:33

## 2022-06-16 RX ADMIN — PANTOPRAZOLE SODIUM 80 MG: 40 INJECTION, POWDER, FOR SOLUTION INTRAVENOUS at 15:35

## 2022-06-16 RX ADMIN — GABAPENTIN 300 MG: 300 CAPSULE ORAL at 21:34

## 2022-06-16 RX ADMIN — ATORVASTATIN CALCIUM 10 MG: 10 TABLET, FILM COATED ORAL at 21:34

## 2022-06-16 RX ADMIN — Medication 5 MG: at 21:34

## 2022-06-16 RX ADMIN — Medication 10 ML: at 21:51

## 2022-06-16 RX ADMIN — POLYETHYLENE GLYCOL 3350 0.5 BOTTLE: 17 POWDER, FOR SOLUTION ORAL at 21:34

## 2022-06-16 RX ADMIN — PANTOPRAZOLE SODIUM 8 MG/HR: 40 INJECTION, POWDER, FOR SOLUTION INTRAVENOUS at 15:36

## 2022-06-16 NOTE — TELEPHONE ENCOUNTER
Called pt's mobile phone to check on her.  No answer.  Reviewed chart, pt in ER at present for evaluation.  Notes reviewed and plan for GI consult   Yes

## 2022-06-16 NOTE — CONSULTS
Gastroenterology   Initial Inpatient Consult Note    Referring Provider: Lino Feliciano    Reason for Consultation: GI bleeding    Subjective     History of present illness:    72 y.o. female patient of Dr. Franci Kumar who presents with GI bleeding.  The patient reports a 5-day history of bleeding.  She says that she was on Xarelto for atrial fibrillation.  She noticed the onset of bright red blood per rectum and black tarry stools.  She was seen by her primary care physician and hemoglobin at that time had decreased from a baseline of 14-11.2.  She was switched from Xarelto to Eliquis but took her last dose of Eliquis yesterday midday.  As she had some improvement in her bleeding at the time she saw her primary care physician but then a recurrence.  This has been associated with some cramping but no abdominal pain.  It is bright red blood most of the time but has been dark and tarry at times as well.  She has had some emesis when this first began and noted that there was no blood in her emesis.  She has had no NSAID use.  No EGD.  Her last colonoscopy was earlier this year due to left lower quadrant pain it was notable for some small polyps hemorrhoids and diverticulosis.    The patient has had diverticulitis in the past    Hemoglobin has increased since her last draw 2 days ago at 11.3    Patient notes that her last bowel movement which was right before I entered the room was improved but still had some blood associated with it there was more formed and less bright red    Past Medical History:  Past Medical History:   Diagnosis Date   • A-fib ([facility]) 05/31/2017   • Breast nodule    • Chest wall mass    • Class 1 obesity due to excess calories without serious comorbidity with body mass index (BMI) of 32.0 to 32.9 in adult    • Closed fracture of head of metacarpal     left second   • Closed fracture of metacarpal bone     left   • DDD (degenerative disc disease), lumbar    • Depression    • Difficulty breathing     • Diverticulitis    • Fatigue    • GERD (gastroesophageal reflux disease)    • Hip pain, right    • Hyperlipidemia    • Leiomyoma of uterus    • Low back pain    • CHEN on CPAP    • Osteoarthritis    • Osteopenia    • PAF (paroxysmal atrial fibrillation) (HCC)    • Persistent atrial fibrillation (HCC)    • Primary localized osteoarthritis of hip    • Sciatica    • Skin cancer    • Snoring    • Spinal stenosis      Past Surgical History:  Past Surgical History:   Procedure Laterality Date   • CARDIOVERSION  2020    Dr. Wagner   • CATARACT EXTRACTION, BILATERAL     • COLONOSCOPY     • COLONOSCOPY N/A 1/10/2022    Procedure: COLONOSCOPY INTO CECUM AND TI WITH COLD BX POLYPECTOMIES;  Surgeon: Franci Kumar MD;  Location: [facility] ENDOSCOPY;  Service: Gastroenterology;  Laterality: N/A;  PRE: LLQ PAIN, ABNORMAL CT  POST: SKIN TAGS, DIVERTICULOSIS, POLYPS, HEMORRHOIDS   • MOHS SURGERY      chemosurgery (Mohs Micrographic Technique); Dr Jarrett and Dr Franco   • TRIANA'S NEUROMA EXCISION      single neuroma   • RECONSTRUCTION OF NOSE     • TONSILLECTOMY      age 5   • TONSILLECTOMY AND ADENOIDECTOMY        Social History:   Social History     Tobacco Use   • Smoking status: Former Smoker     Packs/day: 1.50     Types: Cigarettes     Quit date:      Years since quittin.4   • Smokeless tobacco: Never Used   Substance Use Topics   • Alcohol use: Yes     Comment: social drinker       Family History:  Family History   Problem Relation Age of Onset   • Cancer Mother         bladder   • Osteoporosis Mother    • Heart failure Mother    • Lung cancer Father    • No Known Problems Sister    • Breast cancer Neg Hx        Home Meds:  Medications Prior to Admission   Medication Sig Dispense Refill Last Dose   • acetaminophen (TYLENOL) 500 MG tablet Take 500 mg by mouth Every Night. Take 1 tablet every 4-6 hours as needed      • albuterol sulfate  (90 Base) MCG/ACT inhaler       • apixaban  (ELIQUIS) 5 MG tablet tablet Take 1 tablet by mouth 2 (Two) Times a Day. 60 tablet 4    • atenolol (TENORMIN) 100 MG tablet TAKE ONE TABLET BY MOUTH TWICE A  tablet 3    • Breo Ellipta 100-25 MCG/INH inhaler       • Cholecalciferol (VITAMIN D) 2000 UNITS capsule Take 1 capsule by mouth daily.      • DULoxetine (CYMBALTA) 60 MG capsule TAKE ONE CAPSULE BY MOUTH DAILY 90 capsule 3    • Flaxseed, Linseed, (FLAXSEED OIL) 1000 MG capsule Take 1 capsule by mouth Daily.      • fluticasone (FLONASE) 50 MCG/ACT nasal spray PLACE TWO SPRAYS IN EACH NOSTRIL ONCE DAILY 1 bottle 4    • gabapentin (NEURONTIN) 300 MG capsule TAKE ONE CAPSULE BY MOUTH THREE TIMES A  capsule 1    • Hydrocort-Pramoxine, Perianal, (Analpram HC) 2.5-1 % rectal cream Insert  into the rectum 3 (Three) Times a Day. 30 g 1    • hyoscyamine (ANASPAZ,LEVSIN) 0.125 MG tablet Take 1 tablet by mouth Every 6 (Six) Hours As Needed for Cramping or Diarrhea (abdomina pain). 60 tablet 0    • Melatonin 5 MG capsule Take 5 mg by mouth Every Night.      • montelukast (SINGULAIR) 10 MG tablet TAKE ONE TABLET BY MOUTH ONCE NIGHTLY 90 tablet 0    • omeprazole (priLOSEC) 20 MG capsule Take 1 capsule by mouth Daily. 90 capsule 2    • simvastatin (ZOCOR) 20 MG tablet TAKE ONE TABLET BY MOUTH DAILY 90 tablet 0      Current Meds:      Allergies:  No Known Allergies  Review of Systems  Pertinent items are noted in HPI, all other systems reviewed and negative    Objective     Vital Signs  Temp:  [97.8 °F (36.6 °C)] 97.8 °F (36.6 °C)  Heart Rate:  [51-59] 59  Resp:  [18] 18  BP: (112-128)/(57-62) 128/57    Physical Exam:  CONSTITUTIONAL:  today's vital signs reviewed  EARS NOSE THROAT: trachea midline and no deformity of the nares  EYES: no scleral icterus  GASTROINTESTINAL: abdomen is soft, nontender, nondistended with normal active bowel sounds, no masses are appreciated  PSYCHIATRIC: appropriate mood and affect  RESPIRATORY: normal inspiratory effort with no  increased work of breathing  NEUROLOGIC: patient is awake and alert  DERMATOLOGIC: skin is warm with no cyanosis  LYMPHATIC: no periumbilical lymphadenopathy     Results Review:              I reviewed the patient's new clinical results.    Results from last 7 days   Lab Units 06/16/22  1428 06/14/22  1414   WBC 10*3/mm3 4.32 4.90   HEMOGLOBIN g/dL 11.3* 11.0*   HEMATOCRIT % 34.3 34.3   PLATELETS 10*3/mm3 231 239     Results from last 7 days   Lab Units 06/16/22  1428 06/14/22  1414   SODIUM mmol/L 142 140   POTASSIUM mmol/L 4.2 4.1   CHLORIDE mmol/L 107 104   CO2 mmol/L 24.2 23.3   BUN mg/dL 16 22   CREATININE mg/dL 0.75 0.76   CALCIUM mg/dL 8.7 8.7   BILIRUBIN mg/dL 1.2  --    ALK PHOS U/L 67  --    ALT (SGPT) U/L 18  --    AST (SGOT) U/L 25  --    GLUCOSE mg/dL 94 93     Results from last 7 days   Lab Units 06/16/22  1428   INR  1.22*     No results found for: LIPASE    Radiology:  XR Chest 1 View   Final Result          Assessment & Plan   Patient Active Problem List   Diagnosis   • Hyperlipidemia   • Osteoarthritis   • Depression   • Osteopenia   • Sciatica   • Healthcare maintenance   • DDD (degenerative disc disease), lumbar   • Persistent atrial fibrillation   • Vitamin D deficiency   • Spinal stenosis of lumbar region with neurogenic claudication   • Spondylolisthesis at L4-L5 level   • Cardiomyopathy (HCC)   • Diverticulosis   • CHEN on CPAP   • Class 1 obesity due to excess calories with body mass index (BMI) of 34.0 to 34.9 in adult   • LLQ pain   • Abnormal CT scan, sigmoid colon   • GI bleed       Assessment:  1. Hematochezia.  The patient is presenting most likely as a diverticular bleed.  She has known diverticulosis she has had bright red blood per rectum with painless rectal bleeding.  My only concern is she has also described a few episodes of melena with black dark tarry appearance.  This could certainly be some old blood from her diverticular bleeding but cannot completely rule out upper GI  source.  However, she did have emesis with no bleeding and it.  2. Anticoagulation initially with Xarelto and switched to Eliquis.  The last dose of Eliquis was midday 6/15/2022.    Plan:  · Planning for colonoscopy to assess for diverticular bleeding.  Also will add EGD due to the melena just to be sure that there is no upper GI source with relationship to the Eliquis and Xarelto use.  · IV PPI has been started  · Monitor CBC  · Ensure good IV access  · Primary team has been asked to contact us if there is been any acute change      I discussed the patients findings and my recommendations with patient and nursing staff.           Elan Myers M.D.  [facility] [place] [address]  Office: [phone]

## 2022-06-16 NOTE — CASE MANAGEMENT/SOCIAL WORK
Discharge Planning Assessment  [facility]     Patient Name: Nessa Osuna  MRN: [id number]  Today's Date: 6/16/2022    Admit Date: 6/16/2022     Discharge Needs Assessment     Row Name 06/16/22 1626       Living Environment    People in Home alone    Current Living Arrangements home    Primary Care Provided by self    Provides Primary Care For no one    Family Caregiver if Needed other relative(s)    Quality of Family Relationships supportive       Resource/Environmental Concerns    Resource/Environmental Concerns none       Transition Planning    Patient/Family Anticipates Transition to home    Patient/Family Anticipated Services at Transition none    Transportation Anticipated car, drives self       Discharge Needs Assessment    Equipment Currently Used at Home cpap    Concerns to be Addressed no discharge needs identified    Anticipated Changes Related to Illness none    Equipment Needed After Discharge none    Provided Post Acute Provider List? N/A    Provided Post Acute Provider Quality & Resource List? N/A               Discharge Plan     Row Name 06/16/22 1626       Plan    Plan Introduced self and explained role of CCP. PPE used. Info on facesheet verified    Plan Comments Lives at home alone and plans to return at d/c. Plans to drive self home. Independent w/ ADLs. Uses only CPAP at home. Denies any financial concerns. Denies any d/c needs              Continued Care and Services - Admitted Since 6/16/2022    Coordination has not been started for this encounter.          Demographic Summary    No documentation.                Functional Status    No documentation.                Psychosocial    No documentation.                Abuse/Neglect    No documentation.                Legal    No documentation.                Substance Abuse    No documentation.                Patient Forms    No documentation.                   Stephanie Calderon RN

## 2022-06-16 NOTE — ED PROVIDER NOTES
EMERGENCY DEPARTMENT ENCOUNTER  I wore full protective equipment throughout this patient encounter including a N95 mask, eye shield, gown and gloves. Hand hygiene was performed before donning protective equipment and after removal when leaving the room.    Room Number:  115/1  Date of encounter:  6/16/2022  PCP: Jayne Guerra MD    HPI:  Context: Nessa Osuna is a 72 y.o. female who presents to the ED c/o chief complaint of blood in stool.  Patient reports that she has been having bloody stool for the last 5 to 6 days.  Patient reports that stool was initially solid, then became loose, is now diarrhea.  Patient reports that she has gross blood in stool, filling up toilet bowl.  Patient reports that she was having dark tarry stools for several days prior to the onset of bloody stools.  Patient denies any abdominal pain, reports that she had several episodes of vomiting Sunday and Monday, emesis was nonbloody, no coffee-ground emesis.  Patient denies any emesis since.  Patient denies any history of GI bleed in the past, is on anticoagulation.  Patient reports that she is currently scheduled to follow-up with Dr. Cantu regarding this.  Patient denies any history of hepatitis or cirrhosis, does not does drink alcohol but not daily, does not take NSAIDs daily.  Patient denies any chest pain, no shortness of breath, no lightheadedness or dizziness.    MEDICAL HISTORY REVIEW  Reviewed in EPIC    PAST MEDICAL HISTORY  Active Ambulatory Problems     Diagnosis Date Noted   • Hyperlipidemia 11/22/2016   • Osteoarthritis 11/22/2016   • Depression 11/22/2016   • Osteopenia 11/22/2016   • Sciatica 11/22/2016   • Healthcare maintenance 11/29/2016   • DDD (degenerative disc disease), lumbar 11/29/2016   • Persistent atrial fibrillation 05/31/2017   • Vitamin D deficiency 05/30/2018   • Spinal stenosis of lumbar region with neurogenic claudication 03/04/2019   • Spondylolisthesis at L4-L5 level 03/04/2019   •  Cardiomyopathy ([facility]) 04/09/2020   • Diverticulosis 01/01/2010   • CHEN on CPAP 08/06/2020   • Class 1 obesity due to excess calories with body mass index (BMI) of 34.0 to 34.9 in adult 08/06/2020   • LLQ pain 10/11/2021   • Abnormal CT scan, sigmoid colon 10/11/2021     Resolved Ambulatory Problems     Diagnosis Date Noted   • Gastroesophageal reflux disease 11/22/2016   • Colon cancer screening 12/01/2016   • Snoring 08/06/2020   • Hypersomnia 08/06/2020   • PAF (paroxysmal atrial fibrillation) ([facility]) 08/17/2020     Past Medical History:   Diagnosis Date   • A-fib ([facility]) 05/31/2017   • Breast nodule    • Chest wall mass    • Class 1 obesity due to excess calories without serious comorbidity with body mass index (BMI) of 32.0 to 32.9 in adult    • Closed fracture of head of metacarpal    • Closed fracture of metacarpal bone    • Difficulty breathing    • Diverticulitis 2010   • Fatigue    • GERD (gastroesophageal reflux disease)    • Hip pain, right    • Leiomyoma of uterus    • Low back pain    • Primary localized osteoarthritis of hip    • Skin cancer 2010   • Spinal stenosis        PAST SURGICAL HISTORY  Past Surgical History:   Procedure Laterality Date   • CARDIOVERSION  05/28/2020    Dr. Wagnre   • CATARACT EXTRACTION, BILATERAL     • COLONOSCOPY  2005   • COLONOSCOPY N/A 1/10/2022    Procedure: COLONOSCOPY INTO CECUM AND TI WITH COLD BX POLYPECTOMIES;  Surgeon: Franci Kumar MD;  Location: [facility] ENDOSCOPY;  Service: Gastroenterology;  Laterality: N/A;  PRE: LLQ PAIN, ABNORMAL CT  POST: SKIN TAGS, DIVERTICULOSIS, POLYPS, HEMORRHOIDS   • MOHS SURGERY      chemosurgery (Mohs Micrographic Technique); Dr Jarrett and Dr Franco   • TRIANA'S NEUROMA EXCISION      single neuroma   • RECONSTRUCTION OF NOSE     • TONSILLECTOMY      age 5   • TONSILLECTOMY AND ADENOIDECTOMY  1955       FAMILY HISTORY  Family History   Problem Relation Age of Onset   • Cancer Mother         bladder   • Osteoporosis Mother    • Heart  failure Mother    • Lung cancer Father    • No Known Problems Sister    • Breast cancer Neg Hx        SOCIAL HISTORY  Social History     Socioeconomic History   • Marital status: Single   • Number of children: 0   • Years of education: 14   • Highest education level: Not asked   Tobacco Use   • Smoking status: Former Smoker     Packs/day: 1.50     Types: Cigarettes     Quit date:      Years since quittin.4   • Smokeless tobacco: Never Used   Vaping Use   • Vaping Use: Never used   Substance and Sexual Activity   • Alcohol use: Yes     Comment: social drinker    • Drug use: No   • Sexual activity: Defer       ALLERGIES  Patient has no known allergies.    The patient's allergies have been reviewed    REVIEW OF SYSTEMS  All systems reviewed and negative except for those discussed in HPI.     PHYSICAL EXAM  I have reviewed the triage vital signs and nursing notes.  ED Triage Vitals   Temp Heart Rate Resp BP SpO2   22 1348 22 1348 22 1348 22 1430 22 1348   97.8 °F (36.6 °C) 56 18 112/62 96 %      Temp src Heart Rate Source Patient Position BP Location FiO2 (%)   22 1348 22 1348 -- -- --   Tympanic Monitor          General: No acute distress.  HENT: NCAT, PERRL, Nares patent.  Eyes: no scleral icterus.  Neck: trachea midline, no ROM limitations.  CV: regular rhythm, regular rate.  Respiratory: normal effort, CTAB.  Abdomen: soft, nondistended, NTTP, no rebound tenderness, no guarding or rigidity.  Rectal exam performed.  Chaperone present throughout exam, ED nurse  External exam normal.  No visualized external hemorrhoids, no palpated internal hemorrhoids.  No visible anal fissures.  Gross blood seen, Hemoccult positive,  passed.  Musculoskeletal: no deformity.  Neuro: alert, moves all extremities, follows commands.  Skin: warm, dry.    LAB RESULTS  Recent Results (from the past 24 hour(s))   Comprehensive Metabolic Panel    Collection Time: 22  2:28  PM    Specimen: Blood   Result Value Ref Range    Glucose 94 65 - 99 mg/dL    BUN 16 8 - 23 mg/dL    Creatinine 0.75 0.57 - 1.00 mg/dL    Sodium 142 136 - 145 mmol/L    Potassium 4.2 3.5 - 5.2 mmol/L    Chloride 107 98 - 107 mmol/L    CO2 24.2 22.0 - 29.0 mmol/L    Calcium 8.7 8.6 - 10.5 mg/dL    Total Protein 6.1 6.0 - 8.5 g/dL    Albumin 4.30 3.50 - 5.20 g/dL    ALT (SGPT) 18 1 - 33 U/L    AST (SGOT) 25 1 - 32 U/L    Alkaline Phosphatase 67 39 - 117 U/L    Total Bilirubin 1.2 0.0 - 1.2 mg/dL    Globulin 1.8 gm/dL    A/G Ratio 2.4 g/dL    BUN/Creatinine Ratio 21.3 7.0 - 25.0    Anion Gap 10.8 5.0 - 15.0 mmol/L    eGFR 84.7 >60.0 mL/min/1.73   Protime-INR    Collection Time: 06/16/22  2:28 PM    Specimen: Blood   Result Value Ref Range    Protime 15.3 (H) 11.7 - 14.2 Seconds    INR 1.22 (H) 0.90 - 1.10   CBC Auto Differential    Collection Time: 06/16/22  2:28 PM    Specimen: Blood   Result Value Ref Range    WBC 4.32 3.40 - 10.80 10*3/mm3    RBC 3.68 (L) 3.77 - 5.28 10*6/mm3    Hemoglobin 11.3 (L) 12.0 - 15.9 g/dL    Hematocrit 34.3 34.0 - 46.6 %    MCV 93.2 79.0 - 97.0 fL    MCH 30.7 26.6 - 33.0 pg    MCHC 32.9 31.5 - 35.7 g/dL    RDW 13.0 12.3 - 15.4 %    RDW-SD 44.0 37.0 - 54.0 fl    MPV 11.5 6.0 - 12.0 fL    Platelets 231 140 - 450 10*3/mm3    Neutrophil % 54.5 42.7 - 76.0 %    Lymphocyte % 27.3 19.6 - 45.3 %    Monocyte % 10.6 5.0 - 12.0 %    Eosinophil % 6.5 (H) 0.3 - 6.2 %    Basophil % 0.9 0.0 - 1.5 %    Immature Grans % 0.2 0.0 - 0.5 %    Neutrophils, Absolute 2.35 1.70 - 7.00 10*3/mm3    Lymphocytes, Absolute 1.18 0.70 - 3.10 10*3/mm3    Monocytes, Absolute 0.46 0.10 - 0.90 10*3/mm3    Eosinophils, Absolute 0.28 0.00 - 0.40 10*3/mm3    Basophils, Absolute 0.04 0.00 - 0.20 10*3/mm3    Immature Grans, Absolute 0.01 0.00 - 0.05 10*3/mm3    nRBC 0.0 0.0 - 0.2 /100 WBC   aPTT    Collection Time: 06/16/22  2:28 PM    Specimen: Blood   Result Value Ref Range    PTT 31.6 22.7 - 35.4 seconds   Troponin     Collection Time: 06/16/22  2:28 PM    Specimen: Blood   Result Value Ref Range    Troponin T <0.010 0.000 - 0.030 ng/mL   ECG 12 Lead    Collection Time: 06/16/22  3:14 PM   Result Value Ref Range    QT Interval 500 ms   COVID-19,[facility] IN-HOUSE CEPHEID/JUDI NP SWAB IN TRANSPORT MEDIA 8-12 HR TAT - Swab, Nasopharynx    Collection Time: 06/16/22  3:35 PM    Specimen: Nasopharynx; Swab   Result Value Ref Range    COVID19 Not Detected Not Detected - Ref. Range   Type & Screen    Collection Time: 06/16/22  3:37 PM    Specimen: Blood   Result Value Ref Range    ABO Type A     RH type Negative     Antibody Screen Negative     T&S Expiration Date 6/19/2022 11:59:59 PM    Hemoglobin & Hematocrit, Blood    Collection Time: 06/16/22  7:53 PM    Specimen: Blood   Result Value Ref Range    Hemoglobin 11.2 (L) 12.0 - 15.9 g/dL    Hematocrit 32.3 (L) 34.0 - 46.6 %       I ordered the above labs and reviewed the results.    RADIOLOGY  XR Chest 1 View    Result Date: 6/16/2022  XR CHEST 1 VW-  Clinical: GI bleed  COMPARISON 5/31/2017  FINDINGS: There is cardiac enlargement. No effusion, edema or acute airspace disease. Stable mediastinum and jerman.  CONCLUSION: Stable cardiac enlargement, no acute pulmonary process has developed.  This report was finalized on 6/16/2022 3:28 PM by Dr. Thomas Castro M.D.        I ordered the above noted radiological studies. I reviewed the images and results. I agree with the radiologist interpretation.    PROCEDURES  Procedures    MEDICATIONS GIVEN IN ER  Medications   pantoprazole (PROTONIX) 40 mg in 100 mL NS (VTB) (8 mg/hr Intravenous Currently Infusing 6/16/22 5542)   nitroglycerin (NITROSTAT) SL tablet 0.4 mg (has no administration in time range)   sodium chloride 0.9 % flush 10 mL (10 mL Intravenous Given 6/16/22 2151)   sodium chloride 0.9 % flush 10 mL (has no administration in time range)   ondansetron (ZOFRAN) tablet 4 mg (has no administration in time range)     Or   ondansetron (ZOFRAN)  injection 4 mg (has no administration in time range)   melatonin tablet 5 mg (5 mg Oral Given 6/16/22 2134)   lactated ringers infusion (100 mL/hr Intravenous New Bag 6/16/22 1800)   albuterol (PROVENTIL) nebulizer solution 0.083% 2.5 mg/3mL (has no administration in time range)   DULoxetine (CYMBALTA) DR capsule 60 mg (60 mg Oral Given 6/16/22 2133)   fluticasone (FLONASE) 50 MCG/ACT nasal spray 2 spray (2 sprays Each Nare Not Given 6/16/22 1810)   gabapentin (NEURONTIN) capsule 300 mg (300 mg Oral Given 6/16/22 2134)   montelukast (SINGULAIR) tablet 10 mg (10 mg Oral Given 6/16/22 2134)   atorvastatin (LIPITOR) tablet 10 mg (10 mg Oral Given 6/16/22 2134)   pantoprazole (PROTONIX) injection 80 mg (80 mg Intravenous Given 6/16/22 1535)   polyethylene glycol (MIRALAX) powder 0.5 bottle (0.5 bottles Oral Given 6/16/22 2134)       PROGRESS, DATA ANALYSIS, CONSULTS, AND MEDICAL DECISION MAKING  A complete history and physical exam have been performed.  All available laboratory and imaging results have been reviewed by myself prior to disposition.    MDM  After the initial H&P, I discussed pertinent information from history and physical exam with patient/family.  Discussed differential diagnosis.  Discussed plan for ED evaluation/workup/treatment.  All questions answered.  Patient/family is agreeable with plan.  ED Course as of 06/16/22 3053   Thu Jun 16, 2022   1511 Patient presents with GI bleed, anticoagulated.  Obtaining GI bleed work-up including type and screen, obtaining coagulation factors.  Patient reports dark tarry stool before bloody stool, concern for upper GI bleed, treated with Protonix bolus and drip.  Patient denies any history of hepatitis or cirrhosis, no concern for varices at present.  Plan for GI consult with admission to hospitalist. [JG]   0054 EKG independently viewed and contemporaneously interpreted by ED physician. Time: 1514.  Rate 52.  Interpretation: Normal sinus rhythm, normal axis,  normal QRS, no acute ST changes. [JG]   1623 Phone call with [facility].  Discussed the patient, relevant history, exam, diagnostics, ED findings/progress, and concerns. They agree to admit the patient to observation telemetry. Care assumed by the admitting physician at this time. [JG]      ED Course User Index  [JG] Lino Feliciano MD       AS OF 22:43 EDT VITALS:    BP - 130/66  HR - 51  TEMP - 97.8 °F (36.6 °C) (Oral)  O2 SATS - 100%    DIAGNOSIS  Final diagnoses:   Gastrointestinal hemorrhage, unspecified gastrointestinal hemorrhage type   Anticoagulated   Anemia, unspecified type         DISPOSITION  ADMISSION    Discussed treatment plan and reason for admission with pt/family and admitting physician.  Pt/family voiced understanding of the plan for admission for further testing/treatment as needed.          Lino Feliicano MD  06/16/22 0750

## 2022-06-16 NOTE — PLAN OF CARE
Goal Outcome Evaluation:      Patient alert and oriented. Patient admitted to observation for bloody stools. On protonix gtt. Patient denies pain. Patient to have an egd and colonoscopy tomorrow. Will continue to monitor.

## 2022-06-16 NOTE — ED NOTES
Pt presents to ED with complaints of bloodly stool times 5-6 days. Pt is on blood thinners and had her PCP change her blood thinners and they told her if it continued to come to the ER. Pt denies any pain.    Patient was placed in face mask during first look triage.  Patient was wearing a face mask throughout encounter.  I wore personal protective equipment throughout the encounter.  Hand hygiene was performed before and after patient encounter.

## 2022-06-16 NOTE — H&P
[facility]   HISTORY AND PHYSICAL    Patient Name: Nessa Osuna  : 1950  MRN: [id number]  Primary Care Physician:  Jayne Guerra MD  Date of admission: 2022    Subjective   Subjective     Chief Complaint:   Chief Complaint   Patient presents with   • Black or Bloody Stool         HPI:    Nessa Osuna is a 72 y.o. female with a history of proximal atrial fibrillation on Eliquis, hyperlipidemia, GERD, and CHEN on CPAP who presents to [facility] ER with blood in the stool for 5 to 6 days.  Patient states she noticed having bright red blood in her stools for a little less than a week now but is also noticed a black tarry consistency to the stools as well.  She reports she has had no abdominal pain with this just felt a bit gassy.  She reports she has had some issues with episodes of vomiting intermittently over the past couple of years and had an episode on  night but no further episodes since.  She denies any blood or coffee ground quality to the emesis.  She denies any lightheadedness or dizziness.  Denies chest pain and new shortness of breath from her baseline COPD.  She denies any fevers or chills.  She reports her last dose of Eliquis was yesterday afternoon at 3 PM.    ER evaluation significant for chest x-ray revealing no acute findings.  Laboratory evaluation notable for hemoglobin of 11.3.  Patient was given a bolus of IV Protonix and started on a Protonix drip.  Patient admitted the observation unit for further evaluation.    Review of Systems   All systems were reviewed and negative except for: What is mentioned above in the HPI.    Personal History     Past Medical History:   Diagnosis Date   • A-fib (HCC) 2017   • Breast nodule    • Chest wall mass    • Class 1 obesity due to excess calories without serious comorbidity with body mass index (BMI) of 32.0 to 32.9 in adult    • Closed fracture of head of metacarpal     left second   • Closed fracture of  metacarpal bone     left   • DDD (degenerative disc disease), lumbar    • Depression    • Difficulty breathing    • Diverticulitis 2010   • Fatigue    • GERD (gastroesophageal reflux disease)    • Hip pain, right    • Hyperlipidemia    • Leiomyoma of uterus    • Low back pain    • CHEN on CPAP    • Osteoarthritis    • Osteopenia    • PAF (paroxysmal atrial fibrillation) (HCC)    • Persistent atrial fibrillation (HCC)    • Primary localized osteoarthritis of hip    • Sciatica    • Skin cancer 2010   • Snoring    • Spinal stenosis        Past Surgical History:   Procedure Laterality Date   • CARDIOVERSION  05/28/2020    Dr. Wagner   • CATARACT EXTRACTION, BILATERAL     • COLONOSCOPY  2005   • COLONOSCOPY N/A 1/10/2022    Procedure: COLONOSCOPY INTO CECUM AND TI WITH COLD BX POLYPECTOMIES;  Surgeon: Franci Kumar MD;  Location: [facility] ENDOSCOPY;  Service: Gastroenterology;  Laterality: N/A;  PRE: LLQ PAIN, ABNORMAL CT  POST: SKIN TAGS, DIVERTICULOSIS, POLYPS, HEMORRHOIDS   • MOHS SURGERY      chemosurgery (Mohs Micrographic Technique); Dr Jarrett and Dr Franco   • TRIANA'S NEUROMA EXCISION      single neuroma   • RECONSTRUCTION OF NOSE     • TONSILLECTOMY      age 5   • TONSILLECTOMY AND ADENOIDECTOMY  1955       Family History: family history includes Cancer in her mother; Heart failure in her mother; Lung cancer in her father; No Known Problems in her sister; Osteoporosis in her mother. Otherwise pertinent FHx was reviewed and not pertinent to current issue.    Social History:  reports that she quit smoking about 25 years ago. Her smoking use included cigarettes. She smoked 1.50 packs per day. She has never used smokeless tobacco. She reports current alcohol use. She reports that she does not use drugs.    Home Medications:  DULoxetine, Flaxseed Oil, Fluticasone Furoate-Vilanterol, Hydrocort-Pramoxine (Perianal), Melatonin, Vitamin D, acetaminophen, albuterol sulfate HFA, apixaban, atenolol, fluticasone,  gabapentin, hyoscyamine, montelukast, omeprazole, and simvastatin    Allergies:  No Known Allergies    Objective   Objective     Vitals:   Temp:  [97.8 °F (36.6 °C)-98.1 °F (36.7 °C)] 98.1 °F (36.7 °C)  Heart Rate:  [51-60] 60  Resp:  [18-19] 19  BP: (112-128)/(55-62) 115/55  Physical Exam    Constitutional: 72-year-old female, well-nourished, no acute distress on room air   Eyes: PERRLA, sclerae anicteric, no conjunctival injection   HENT: NCAT, mucous membranes moist   Neck: Supple, no thyromegaly, no lymphadenopathy, trachea midline   Respiratory: Clear to auscultation bilaterally, nonlabored respirations    Cardiovascular: RRR, no murmurs, rubs, or gallops, palpable pedal pulses bilaterally   Gastrointestinal: Positive bowel sounds, soft, nontender, nondistended   Musculoskeletal: No bilateral ankle edema, no clubbing or cyanosis to extremities   Psychiatric: Appropriate affect, cooperative   Neurologic: Oriented x 3, strength symmetric in all extremities, Cranial Nerves grossly intact to confrontation, speech clear   Skin: No rashes     Result Review    Result Review:  I have personally reviewed the results from the time of this admission to 6/16/2022 17:27 EDT and agree with these findings:  [x]  Laboratory list / accordion  []  Microbiology  [x]  Radiology  []  EKG/Telemetry   []  Cardiology/Vascular   []  Pathology  []  Old records  []  Other:  Most notable findings include: Hemoglobin 11.3    XR Chest 1 View    Result Date: 6/16/2022  XR CHEST 1 VW-  Clinical: GI bleed  COMPARISON 5/31/2017  FINDINGS: There is cardiac enlargement. No effusion, edema or acute airspace disease. Stable mediastinum and jerman.  CONCLUSION: Stable cardiac enlargement, no acute pulmonary process has developed.  This report was finalized on 6/16/2022 3:28 PM by Dr. Thomas Castro M.D.          Assessment & Plan   Assessment / Plan     Brief Patient Summary:  Nessa Osuna is a 72 y.o. female who is being admitted to the  observation unit for further evaluation of a GI bleed with plan for GI consultation and continued monitoring.    Active Hospital Problems:  Active Hospital Problems    Diagnosis    • **Blood in stool      Added automatically from request for surgery [id number]     • GI bleed      Plan:     GI bleed  -Hemoglobin 11.3, continue to trend H&H every 6 hours  -Has remained hemodynamically stable, type and screen  -GI following, plan for colonoscopy and EGD in the morning  -IV Protonix drip  -IV fluids  -Hold anticoagulation    Paroxysmal atrial fibrillation  -Normal sinus rhythm on exam  -Hold anticoagulation  -We will continue beta-blocker tomorrow if blood pressure appropriate    Hyperlipidemia  -Continue statin    GERD  -PPI    Obstructive sleep apnea  -Patient uses CPAP at home however did not bring with her to the hospital  -Nocturnal O2 use as needed      DVT prophylaxis:  Mechanical DVT prophylaxis orders are present.    CODE STATUS:    Code Status (Patient has no pulse and is not breathing): CPR (Attempt to Resuscitate)  Medical Interventions (Patient has pulse or is breathing): Full Support    Admission Status:  I believe this patient meets observation status.    I wore an face mask, eye protection, and gloves during this patient encounter. Patient also wearing a surgical mask. Hand hygeine performed before and after seeing the patient.    Electronically signed by Alanna Maria PA-C, 06/16/22, 5:27 PM EDT.

## 2022-06-17 ENCOUNTER — TELEPHONE (OUTPATIENT)
Dept: CARDIOLOGY | Facility: CLINIC | Age: 72
End: 2022-06-17

## 2022-06-17 ENCOUNTER — READMISSION MANAGEMENT (OUTPATIENT)
Dept: CALL CENTER | Facility: HOSPITAL | Age: 72
End: 2022-06-17

## 2022-06-17 ENCOUNTER — ANESTHESIA (OUTPATIENT)
Dept: GASTROENTEROLOGY | Facility: HOSPITAL | Age: 72
End: 2022-06-17

## 2022-06-17 VITALS
HEART RATE: 66 BPM | TEMPERATURE: 97.8 F | BODY MASS INDEX: 35.93 KG/M2 | SYSTOLIC BLOOD PRESSURE: 138 MMHG | DIASTOLIC BLOOD PRESSURE: 63 MMHG | OXYGEN SATURATION: 99 % | HEIGHT: 66 IN | WEIGHT: 223.6 LBS | RESPIRATION RATE: 17 BRPM

## 2022-06-17 LAB
ANION GAP SERPL CALCULATED.3IONS-SCNC: 9.4 MMOL/L (ref 5–15)
BUN SERPL-MCNC: 10 MG/DL (ref 8–23)
BUN/CREAT SERPL: 17.2 (ref 7–25)
CALCIUM SPEC-SCNC: 8.1 MG/DL (ref 8.6–10.5)
CHLORIDE SERPL-SCNC: 108 MMOL/L (ref 98–107)
CO2 SERPL-SCNC: 24.6 MMOL/L (ref 22–29)
CREAT SERPL-MCNC: 0.58 MG/DL (ref 0.57–1)
DEPRECATED RDW RBC AUTO: 41.1 FL (ref 37–54)
EGFRCR SERPLBLD CKD-EPI 2021: 96.3 ML/MIN/1.73
ERYTHROCYTE [DISTWIDTH] IN BLOOD BY AUTOMATED COUNT: 12.7 % (ref 12.3–15.4)
GLUCOSE SERPL-MCNC: 95 MG/DL (ref 65–99)
HCT VFR BLD AUTO: 29.3 % (ref 34–46.6)
HCT VFR BLD AUTO: 33.2 % (ref 34–46.6)
HGB BLD-MCNC: 10.1 G/DL (ref 12–15.9)
HGB BLD-MCNC: 11 G/DL (ref 12–15.9)
MCH RBC QN AUTO: 31.2 PG (ref 26.6–33)
MCHC RBC AUTO-ENTMCNC: 34.5 G/DL (ref 31.5–35.7)
MCV RBC AUTO: 90.4 FL (ref 79–97)
PLATELET # BLD AUTO: 181 10*3/MM3 (ref 140–450)
PMV BLD AUTO: 11.4 FL (ref 6–12)
POTASSIUM SERPL-SCNC: 3.7 MMOL/L (ref 3.5–5.2)
RBC # BLD AUTO: 3.24 10*6/MM3 (ref 3.77–5.28)
SODIUM SERPL-SCNC: 142 MMOL/L (ref 136–145)
WBC NRBC COR # BLD: 5.81 10*3/MM3 (ref 3.4–10.8)

## 2022-06-17 PROCEDURE — 85027 COMPLETE CBC AUTOMATED: CPT | Performed by: STUDENT IN AN ORGANIZED HEALTH CARE EDUCATION/TRAINING PROGRAM

## 2022-06-17 PROCEDURE — 80048 BASIC METABOLIC PNL TOTAL CA: CPT | Performed by: STUDENT IN AN ORGANIZED HEALTH CARE EDUCATION/TRAINING PROGRAM

## 2022-06-17 PROCEDURE — G0378 HOSPITAL OBSERVATION PER HR: HCPCS

## 2022-06-17 PROCEDURE — 96366 THER/PROPH/DIAG IV INF ADDON: CPT

## 2022-06-17 PROCEDURE — 43239 EGD BIOPSY SINGLE/MULTIPLE: CPT | Performed by: INTERNAL MEDICINE

## 2022-06-17 PROCEDURE — 88305 TISSUE EXAM BY PATHOLOGIST: CPT | Performed by: INTERNAL MEDICINE

## 2022-06-17 PROCEDURE — 85014 HEMATOCRIT: CPT | Performed by: STUDENT IN AN ORGANIZED HEALTH CARE EDUCATION/TRAINING PROGRAM

## 2022-06-17 PROCEDURE — 85018 HEMOGLOBIN: CPT | Performed by: STUDENT IN AN ORGANIZED HEALTH CARE EDUCATION/TRAINING PROGRAM

## 2022-06-17 PROCEDURE — 25010000002 PROPOFOL 10 MG/ML EMULSION: Performed by: ANESTHESIOLOGY

## 2022-06-17 PROCEDURE — 88312 SPECIAL STAINS GROUP 1: CPT | Performed by: INTERNAL MEDICINE

## 2022-06-17 PROCEDURE — 45378 DIAGNOSTIC COLONOSCOPY: CPT | Performed by: INTERNAL MEDICINE

## 2022-06-17 RX ORDER — PROPOFOL 10 MG/ML
VIAL (ML) INTRAVENOUS CONTINUOUS PRN
Status: DISCONTINUED | OUTPATIENT
Start: 2022-06-17 | End: 2022-06-17 | Stop reason: SURG

## 2022-06-17 RX ORDER — SODIUM CHLORIDE 0.9 % (FLUSH) 0.9 %
10 SYRINGE (ML) INJECTION AS NEEDED
Status: DISCONTINUED | OUTPATIENT
Start: 2022-06-17 | End: 2022-06-17

## 2022-06-17 RX ORDER — PROPOFOL 10 MG/ML
VIAL (ML) INTRAVENOUS AS NEEDED
Status: DISCONTINUED | OUTPATIENT
Start: 2022-06-17 | End: 2022-06-17 | Stop reason: SURG

## 2022-06-17 RX ORDER — LIDOCAINE HYDROCHLORIDE 20 MG/ML
INJECTION, SOLUTION INFILTRATION; PERINEURAL AS NEEDED
Status: DISCONTINUED | OUTPATIENT
Start: 2022-06-17 | End: 2022-06-17 | Stop reason: SURG

## 2022-06-17 RX ORDER — SODIUM CHLORIDE 9 MG/ML
30 INJECTION, SOLUTION INTRAVENOUS CONTINUOUS PRN
Status: DISCONTINUED | OUTPATIENT
Start: 2022-06-17 | End: 2022-06-17 | Stop reason: HOSPADM

## 2022-06-17 RX ADMIN — GABAPENTIN 300 MG: 300 CAPSULE ORAL at 11:23

## 2022-06-17 RX ADMIN — Medication 10 ML: at 07:48

## 2022-06-17 RX ADMIN — PROPOFOL 150 MG: 10 INJECTION, EMULSION INTRAVENOUS at 09:50

## 2022-06-17 RX ADMIN — SODIUM CHLORIDE 30 ML/HR: 9 INJECTION, SOLUTION INTRAVENOUS at 09:22

## 2022-06-17 RX ADMIN — FLUTICASONE PROPIONATE 2 SPRAY: 50 SPRAY, METERED NASAL at 11:23

## 2022-06-17 RX ADMIN — PANTOPRAZOLE SODIUM 8 MG/HR: 40 INJECTION, POWDER, FOR SOLUTION INTRAVENOUS at 06:18

## 2022-06-17 RX ADMIN — LIDOCAINE HYDROCHLORIDE 100 MG: 20 INJECTION, SOLUTION INFILTRATION; PERINEURAL at 09:48

## 2022-06-17 RX ADMIN — Medication 200 MCG/KG/MIN: at 09:52

## 2022-06-17 NOTE — DISCHARGE INSTRUCTIONS
Continue taking all home medications as prescribed.  Been advised to follow a high-fiber diet as well as lifestyle and diet changes for managing acid reflux such as avoiding trigger foods as well as avoiding alcohol or nicotine products.  GI will follow up with you regarding your pathology results in 7 to 14 days, if you have not heard from their office please call them at the number provided on your discharge paperwork.  Follow-up with your primary care provider in 1 to 2 weeks.    Return to the emergency department with worsening symptoms, uncontrolled pain, inability to tolerate oral liquids, fever greater than 101°F not controlled by Tylenol or as needed with emergent concerns.

## 2022-06-17 NOTE — PLAN OF CARE
Goal Outcome Evaluation:           Progress: improving  Outcome Evaluation: Patient alert and oriented. Vital signs are stable. Egd and colonscopy done. On room air. Patient being discharged home. AVS given. Discharge instructions given to patient.

## 2022-06-17 NOTE — ANESTHESIA PREPROCEDURE EVALUATION
Anesthesia Evaluation     Patient summary reviewed and Nursing notes reviewed   history of anesthetic complications: PONV  NPO Solid Status: > 8 hours  NPO Liquid Status: > 4 hours           Airway   Mallampati: II  Neck ROM: full  No difficulty expected  Dental - normal exam     Pulmonary     breath sounds clear to auscultation  (+) a smoker Former, COPD, sleep apnea,   Cardiovascular     Rhythm: regular    (+) dysrhythmias, hyperlipidemia,       Neuro/Psych  (+) numbness, psychiatric history Depression,    GI/Hepatic/Renal/Endo    (+) obesity, morbid obesity, GERD, GI bleeding ,     Musculoskeletal     Abdominal   (+) obese,    Substance History      OB/GYN          Other   arthritis,    history of cancer                    Anesthesia Plan    ASA 3     MAC     intravenous induction     Anesthetic plan, risks, benefits, and alternatives have been provided, discussed and informed consent has been obtained with: patient.        CODE STATUS:    Code Status (Patient has no pulse and is not breathing): CPR (Attempt to Resuscitate)  Medical Interventions (Patient has pulse or is breathing): Full Support

## 2022-06-17 NOTE — TELEPHONE ENCOUNTER
Pt called and left voicemail. She is currently in the hospital. She had a colonoscopy/EGD. She has been having blood in her stool for 5-6 days. She stated that her PCP switched her from Xarelto to Eliquis on Tuesday.    She would like to know which medication  can cause less bleeding?     She can be reached at [phone] thanks

## 2022-06-17 NOTE — ANESTHESIA POSTPROCEDURE EVALUATION
Patient: Nessa Osuna    Procedure Summary     Date: 06/17/22 Room / Location: [facility] ENDOSCOPY 4 /  [facility]    Anesthesia Start: 0944 Anesthesia Stop: 1017    Procedures:       COLONOSCOPY to cecum and TI (N/A )      ESOPHAGOGASTRODUODENOSCOPY with biopsies (N/A Esophagus) Diagnosis:       Blood in stool      (Blood in stool [K92.1])    Surgeons: Yasmani Bo MD Provider: Tom Hu MD    Anesthesia Type: MAC ASA Status: 3          Anesthesia Type: MAC    Vitals  No vitals data found for the desired time range.          Post Anesthesia Care and Evaluation    Patient location during evaluation: PHASE II  Patient participation: waiting for patient participation  Level of consciousness: sleepy but conscious  Pain management: adequate    Airway patency: patent    Cardiovascular status: acceptable  Respiratory status: acceptable  Hydration status: acceptable

## 2022-06-17 NOTE — PROGRESS NOTES
ED OBSERVATION PROGRESS/DISCHARGE SUMMARY    Date of Admission: 6/16/2022   LOS: 0 days   PCP: Jayne Guerra MD      Subjective    No acute events overnight.  Patient has been completing bowel prep.  Patient reported a small amount of some bright red blood with 1 bowel movement early this morning.  Denies any further rectal bleeding.  She denies any abdominal pain, nausea, vomiting, and diarrhea.    Hospital Outcome:  Patient was admitted to observation unit for further evaluation of bright red blood per rectum.  Patient is anticoagulated on Eliquis for atrial fibrillation.  Patient was seen by GI yesterday and she has completed overnight for EGD and colonoscopy today.  Hemoglobin has stabilized from 11.7 down to 10.1 back to 11 on discharge.  EGD revealed a 1 cm hiatal hernia, GERD grade a reflux esophagitis without bleeding, multiple gastric polyps that were biopsied and a normal duodenum.  Anoscopy revealed diverticulosis throughout the colon and nonbleeding internal hemorrhoids.  GI recommendations for high-fiber diet and GERD diet and lifestyle changes.  Have a follow-up with patient regarding pathology results in 7 to 14 days.  Spoken with patient regarding all of the findings and plan for discharge.  She endorses understanding is in agreement.    General: no fevers, chills  Respiratory: no cough, dyspnea  Cardiovascular: no chest pain, palpitations  Abdomen: No abdominal pain, nausea, vomiting, or diarrhea  Neurologic: No focal weakness    Objective   Physical Exam:  I have reviewed the vital signs.  Temp:  [97.7 °F (36.5 °C)-98.1 °F (36.7 °C)] 98.1 °F (36.7 °C)  Heart Rate:  [51-62] 62  Resp:  [18-19] 18  BP: (112-130)/() 127/60  General Appearance:  72-year-old female, well-nourished, in no acute distress on room air  Head:    Normocephalic, atraumatic  Eyes:    Sclerae anicteric  Neck:   Supple, no mass  Lungs: Clear to auscultation bilaterally, respirations unlabored  Heart: Regular rate and  rhythm, S1 and S2 normal, no murmur, rub or gallop  Abdomen:  Soft, non-tender, bowel sounds active, nondistended  Extremities: No clubbing, cyanosis, or edema to lower extremities  Pulses:  2+ and symmetric in distal lower extremities  Skin: No rashes   Neurologic: Oriented x3, Normal strength to extremities    Results Review:    I have reviewed the labs, radiology results and diagnostic studies.    Results from last 7 days   Lab Units 06/16/22  1953 06/16/22  1428   WBC 10*3/mm3  --  4.32   HEMOGLOBIN g/dL 11.2* 11.3*   HEMATOCRIT % 32.3* 34.3   PLATELETS 10*3/mm3  --  231     Results from last 7 days   Lab Units 06/16/22  1428 06/14/22  1414   SODIUM mmol/L 142 140   POTASSIUM mmol/L 4.2 4.1   CHLORIDE mmol/L 107 104   CO2 mmol/L 24.2 23.3   BUN mg/dL 16 22   CREATININE mg/dL 0.75 0.76   CALCIUM mg/dL 8.7 8.7   BILIRUBIN mg/dL 1.2  --    ALK PHOS U/L 67  --    ALT (SGPT) U/L 18  --    AST (SGOT) U/L 25  --    GLUCOSE mg/dL 94 93     Imaging Results (Last 24 Hours)     Procedure Component Value Units Date/Time    XR Chest 1 View [[id number]] Collected: 06/16/22 1527     Updated: 06/16/22 1532    Narrative:      XR CHEST 1 VW-     Clinical: GI bleed     COMPARISON 5/31/2017     FINDINGS: There is cardiac enlargement. No effusion, edema or acute  airspace disease. Stable mediastinum and jerman.     CONCLUSION: Stable cardiac enlargement, no acute pulmonary process has  developed.     This report was finalized on 6/16/2022 3:28 PM by Dr. Thomas Castro M.D.             I have reviewed the medications.  ---------------------------------------------------------------------------------------------  Assessment & Plan   Assessment/Problem List    Blood in stool    GI bleed      Plan:    GI bleed  -Hemoglobin 11.3-->11.2->10.1->11-stable  -GI following, plan for colonoscopy and EGD in the morning  -EGD showed a 1 cm hiatal hernia and grade 1 esophagitis with multiple gastric polyps that have been biopsied.  -Colonoscopy  revealed diverticulosis throughout the entire colon without evidence of diverticulitis as well as nonbleeding internal hemorrhoids  -GI recommends high-fiber diet with lifestyle changes for managing GERD  -GI will follow up with the patient regarding pathology results in 7 to 14 days.    Paroxysmal atrial fibrillation  -Continue home medications as prescribed, can continue Eliquis starting tomorrow.     Hyperlipidemia  -Continue statin     GERD  -Continue home medications.      Obstructive sleep apnea  -Continue home CPAP use.    Disposition: Home    Follow-up after Discharge: PCP, GI    This note will serve as a discharge summary.    I wore an face mask, eye protection, and gloves during this patient encounter. Patient also wearing a surgical mask. Hand hygeine performed before and after seeing the patient.    Alanna Maria PA-C 06/17/22 13:34 EDT

## 2022-06-17 NOTE — OUTREACH NOTE
Prep Survey    Flowsheet Row Responses   [facility] patient discharged from? [place]   Is LACE score < 7 ? Yes   Emergency Room discharge w/ pulse ox? No   Eligibility [facility]   Date of Admission 06/16/22   Date of Discharge 06/17/22   Discharge Disposition Home or Self Care   Discharge diagnosis Blood in stool,  colonoscopy   Does the patient have one of the following disease processes/diagnoses(primary or secondary)? Other   Does the patient have Home health ordered? No   Is there a DME ordered? No   Prep survey completed? Yes          MONAE Paul Registered Nurse

## 2022-06-17 NOTE — PLAN OF CARE
Goal Outcome Evaluation:         Pt had no complaints throughout shift and successfully completed bowel prep for colonoscopy and EGD this AM. Pt denies any presence of blood in her stool throughout shift. VSS.

## 2022-06-20 ENCOUNTER — TRANSITIONAL CARE MANAGEMENT TELEPHONE ENCOUNTER (OUTPATIENT)
Dept: CALL CENTER | Facility: HOSPITAL | Age: 72
End: 2022-06-20

## 2022-06-20 ENCOUNTER — OFFICE VISIT (OUTPATIENT)
Dept: GASTROENTEROLOGY | Facility: CLINIC | Age: 72
End: 2022-06-20

## 2022-06-20 VITALS — BODY MASS INDEX: 36.32 KG/M2 | WEIGHT: 226 LBS | TEMPERATURE: 97.8 F | HEIGHT: 66 IN

## 2022-06-20 DIAGNOSIS — R11.2 NAUSEA AND VOMITING, UNSPECIFIED VOMITING TYPE: Primary | ICD-10-CM

## 2022-06-20 DIAGNOSIS — K57.90 DIVERTICULOSIS: ICD-10-CM

## 2022-06-20 DIAGNOSIS — R10.32 LLQ PAIN: ICD-10-CM

## 2022-06-20 PROCEDURE — 99214 OFFICE O/P EST MOD 30 MIN: CPT | Performed by: PHYSICIAN ASSISTANT

## 2022-06-20 NOTE — OUTREACH NOTE
Call Center TCM Note    Flowsheet Row Responses   [facility] patient discharged from? [place]   Does the patient have one of the following disease processes/diagnoses(primary or secondary)? Other   TCM attempt successful? Yes   Call start time 1246   Call end time 1252   Discharge diagnosis Blood in stool,  colonoscopy   Person spoke with today (if not patient) and relationship patient   Meds reviewed with patient/caregiver? Yes   Does the patient have all medications ordered at discharge? N/A  [No new meds ordered at discharge. ]   Is the patient taking all medications as directed (includes completed medication regime)? No   What is preventing the patient from taking all medications as directed? Other  [Patient reports that she did not fill the Perianal cream ordered by Dr Guerra due to cost. She reports that she is using OTC cream instead. ]   Comments regarding appointments Patient following up with GI Dr today.    Does the patient have a primary care provider?  Yes   Comments regarding PCP Jayne Guerra MD PCP. Patient declined to schedule HFU appt with PCP today. She reports that its not needed, she intends to keep appt scheduled for 7/26.    Has the patient kept scheduled appointments due by today? N/A   Has home health visited the patient within 72 hours of discharge? N/A   Psychosocial issues? No   Did the patient receive a copy of their discharge instructions? Yes   Nursing interventions Reviewed instructions with patient   What is the patient's perception of their health status since discharge? Improving   Is the patient/caregiver able to teach back signs and symptoms related to disease process for when to call PCP? Yes   Is the patient/caregiver able to teach back signs and symptoms related to disease process for when to call 911? Yes   Is the patient/caregiver able to teach back the hierarchy of who to call/visit for symptoms/problems? PCP, Specialist, Home health nurse, Urgent Care, ED, 911  Yes   If the patient is a current smoker, are they able to teach back resources for cessation? Not a smoker   TCM call completed? Yes   Wrap up additional comments Patient denies questions or needs today.           Tamiko Thapa RN    6/20/2022, 12:52 EDT

## 2022-06-20 NOTE — PROGRESS NOTES
Chief Complaint  Vomiting, Nausea, and Heartburn    Subjective        History of Present Illness  Nessa Osuna is a  72 y.o. female patient of Dr. Kumar, new to me today, here for hospital follow-up for GI bleeding with a combination of bright red blood per rectum and black, tarry stools.  She subsequently underwent EGD and colonoscopy on 6/17/2022 with findings of hiatal hernia, LA grade a esophagitis, multiple benign gastric polyps without stigmata of bleed, normal duodenum, pancolonic diverticulosis out stigmata of bleed, nonbleeding internal hemorrhoids.    She reports nausea with occasional vomiting occurring on Sunday and Monday.  She denies any abdominal pain.  She states has been ongoing for several years and somewhat erratic in nature.  She denies any particular food associations.  She does report that the nausea and vomiting most often occur at night.  She denies heartburn.  She does report sharp pain in the left lower quadrant which often improves with sublingual hyoscyamine.  He has experienced no further episodes of blood per rectum or black, tarry stools following discharge.    Past Medical History:   Diagnosis Date   • A-fib ([facility]) 05/31/2017   • Anemia 1993?   • Breast nodule    • Chest wall mass    • Class 1 obesity due to excess calories without serious comorbidity with body mass index (BMI) of 32.0 to 32.9 in adult    • Closed fracture of head of metacarpal     left second   • Closed fracture of metacarpal bone     left   • Colon polyp 2005    noted with colonoscopy   • COPD (chronic obstructive pulmonary disease) ([facility])    • Coronary artery disease 2019    A-Fib   • DDD (degenerative disc disease), lumbar    • Depression    • Difficulty breathing    • Diverticulitis 2010   • Diverticulitis of colon 2000?   • Fatigue    • GERD (gastroesophageal reflux disease)    • Hip pain, right    • Hyperlipidemia    • Leiomyoma of uterus    • Low back pain    • CHEN on CPAP    • Osteoarthritis    •  Osteopenia    • PAF (paroxysmal atrial fibrillation) (HCC)    • Persistent atrial fibrillation (HCC)    • PONV (postoperative nausea and vomiting)    • Primary localized osteoarthritis of hip    • Sciatica    • Skin cancer 2010   • Snoring    • Spinal stenosis        Past Surgical History:   Procedure Laterality Date   • CARDIOVERSION  05/28/2020    Dr. Wagner   • CATARACT EXTRACTION, BILATERAL     • COLONOSCOPY  2005   • COLONOSCOPY N/A 01/10/2022    Procedure: COLONOSCOPY INTO CECUM AND TI WITH COLD BX POLYPECTOMIES;  Surgeon: Franci Kumar MD;  [facility];  Service: Gastroenterology;  Laterality: N/A;  PRE: LLQ PAIN, ABNORMAL CT  POST: SKIN TAGS, DIVERTICULOSIS, POLYPS, HEMORRHOIDS   • COLONOSCOPY N/A 6/17/2022    Procedure: COLONOSCOPY to cecum and TI;  Surgeon: Yasmani Bo MD;  Location: [facility] END[facility]COPY;  Service: Gastroenterology;  Laterality: N/A;  pre- GI bleed  post- hemorrhoids, diverticulosis   • ENDOSCOPY N/A 6/17/2022    Procedure: ESOPHAGOGASTRODUODENOSCOPY with biopsies;  Surgeon: Yasmani Bo MD;  Location: [facility] ENDOSCOPY;  Service: Gastroenterology;  Laterality: N/A;  pre- GI bleed  post- hiatal hernia, esophagitis, gastric polyp   • FLEXIBLE SIGMOIDOSCOPY  ? 1995   • MOHS SURGERY      chemosurgery (Mohs Micrographic Technique); Dr Jarrett and Dr Franco   • TRIANA'S NEUROMA EXCISION      single neuroma   • RECONSTRUCTION OF NOSE     • TONSILLECTOMY      age 5   • TONSILLECTOMY AND ADENOIDECTOMY  1955   • UPPER GASTROINTESTINAL ENDOSCOPY  6/17/2022       Family History   Problem Relation Age of Onset   • Cancer Mother         bladder   • Osteoporosis Mother    • Heart failure Mother    • Lung cancer Father    • No Known Problems Sister    • Breast cancer Neg Hx        Social History     Socioeconomic History   • Marital status: Single   • Number of children: 0   • Years of education: 14   • Highest education level: Not asked   Tobacco Use   • Smoking status:  Former Smoker     Packs/day: 1.50     Years: 35.00     Pack years: 52.50     Types: Cigarettes, Cigarettes     Start date: 1962     Quit date: 1997     Years since quittin.4   • Smokeless tobacco: Never Used   Vaping Use   • Vaping Use: Never used   Substance and Sexual Activity   • Alcohol use: Yes     Comment: rarely drink, don't drink every week. maybe 25 drinks a year   • Drug use: No   • Sexual activity: Not Currently     Partners: Male     Comment: pills       No Known Allergies    Current Outpatient Medications on File Prior to Visit   Medication Sig Dispense Refill   • acetaminophen (TYLENOL) 500 MG tablet Take 500 mg by mouth Every Night. Take 1 tablet every 4-6 hours as needed     • albuterol sulfate  (90 Base) MCG/ACT inhaler 2 puffs Every 4 (Four) Hours As Needed.     • apixaban (ELIQUIS) 5 MG tablet tablet Take 1 tablet by mouth 2 (Two) Times a Day. 60 tablet 4   • atenolol (TENORMIN) 100 MG tablet TAKE ONE TABLET BY MOUTH TWICE A  tablet 3   • Breo Ellipta 100-25 MCG/INH inhaler Inhale 1 puff Daily.     • Cholecalciferol (VITAMIN D) 2000 UNITS capsule Take 1 capsule by mouth daily.     • DULoxetine (CYMBALTA) 60 MG capsule TAKE ONE CAPSULE BY MOUTH DAILY (Patient taking differently: 60 mg Every Evening.) 90 capsule 3   • Flaxseed, Linseed, (FLAXSEED OIL) 1000 MG capsule Take 1 capsule by mouth Daily.     • fluticasone (FLONASE) 50 MCG/ACT nasal spray PLACE TWO SPRAYS IN EACH NOSTRIL ONCE DAILY 1 bottle 4   • gabapentin (NEURONTIN) 300 MG capsule TAKE ONE CAPSULE BY MOUTH THREE TIMES A  capsule 1   • hyoscyamine (ANASPAZ,LEVSIN) 0.125 MG tablet Take 1 tablet by mouth Every 6 (Six) Hours As Needed for Cramping or Diarrhea (abdomina pain). 60 tablet 0   • Melatonin 5 MG capsule Take 5 mg by mouth Every Night.     • montelukast (SINGULAIR) 10 MG tablet TAKE ONE TABLET BY MOUTH ONCE NIGHTLY 90 tablet 0   • omeprazole (priLOSEC) 20 MG capsule Take 1 capsule by mouth Daily.  "90 capsule 2   • simvastatin (ZOCOR) 20 MG tablet TAKE ONE TABLET BY MOUTH DAILY (Patient taking differently: Take 20 mg by mouth Every Night.) 90 tablet 0     No current facility-administered medications on file prior to visit.       Review of Systems   Constitutional: Negative for chills and fever.   HENT: Negative for trouble swallowing.    Respiratory: Negative for cough and shortness of breath.    Cardiovascular: Negative for chest pain and palpitations.   Gastrointestinal: Positive for abdominal pain, nausea and vomiting. Negative for blood in stool, constipation, diarrhea and GERD.        Objective   Vital Signs:   Temp 97.8 °F (36.6 °C)   Ht 167.6 cm (66\")   Wt 103 kg (226 lb)   BMI 36.48 kg/m²       Physical Exam  Vitals and nursing note reviewed.   Constitutional:       General: She is not in acute distress.     Appearance: Normal appearance. She is not ill-appearing.   HENT:      Head: Normocephalic and atraumatic.      Right Ear: External ear normal.      Left Ear: External ear normal.   Eyes:      General: No scleral icterus.     Conjunctiva/sclera: Conjunctivae normal.      Pupils: Pupils are equal, round, and reactive to light.   Cardiovascular:      Rate and Rhythm: Normal rate and regular rhythm.      Heart sounds: Normal heart sounds.   Pulmonary:      Effort: Pulmonary effort is normal.      Breath sounds: Normal breath sounds.   Abdominal:      General: Abdomen is flat. Bowel sounds are normal. There is no distension.      Palpations: Abdomen is soft.      Tenderness: There is no abdominal tenderness. There is no guarding or rebound.   Musculoskeletal:      Cervical back: Normal range of motion and neck supple.   Skin:     General: Skin is warm and dry.   Neurological:      Mental Status: She is alert and oriented to person, place, and time.   Psychiatric:         Mood and Affect: Mood normal.         Behavior: Behavior normal.          Result Review :     Common labs    Common Labsle 6/14/22 " 6/14/22 6/16/22 6/16/22 6/16/22 6/17/22 6/17/22 6/17/22    1414 1414 1428 1428 1953 0545 0545 1231   Glucose  93  94   95    BUN  22  16   10    Creatinine  0.76  0.75   0.58    Sodium  140  142   142    Potassium  4.1  4.2   3.7    Chloride  104  107   108 (A)    Calcium  8.7  8.7   8.1 (A)    Albumin    4.30       Total Bilirubin    1.2       Alkaline Phosphatase    67       AST (SGOT)    25       ALT (SGPT)    18       WBC 4.90  4.32   5.81     Hemoglobin 11.0 (A)  11.3 (A)  11.2 (A) 10.1 (A)  11.0 (A)   Hematocrit 34.3  34.3  32.3 (A) 29.3 (A)  33.2 (A)   Platelets 239  231   181     (A) Abnormal value                          Assessment and Plan {CC Problem List  Visit Diagnosis   ROS  Review (Popup)  Health Maintenance  Quality  BestPractice  Medications  SmartSets  SnapShot Encounters  Media :23}   Diagnoses and all orders for this visit:    1. Nausea and vomiting, unspecified vomiting type (Primary)    2. LLQ pain    3. Diverticulosis      Pleasant 72-year-old female here for follow-up for nausea, vomiting and GERD.  She was admitted on 6/16/2022 for GI bleed.  She subsequently underwent bidirectional endoscopy with findings of diverticulosis, LA grade a esophagitis, gastric polyps.  She reports continued episodes of nausea and vomiting which is intermittent and irregular.  No particular food associations.  She does report that the nausea and vomiting typically happen in the evenings.    · Continue 20 mg omeprazole daily.  · High-fiber diet and increase water intake.  · Food diary.  · Could consider gastric emptying study if she continues to have intermittent episodes of nausea with vomiting.      Follow Up   No follow-ups on file.    Dragon dictation used throughout this note.     ALFRED Wilson

## 2022-06-21 LAB
LAB AP CASE REPORT: NORMAL
PATH REPORT.ADDENDUM SPEC: NORMAL
PATH REPORT.FINAL DX SPEC: NORMAL
PATH REPORT.GROSS SPEC: NORMAL

## 2022-07-05 RX ORDER — MONTELUKAST SODIUM 10 MG/1
TABLET ORAL
Qty: 90 TABLET | Refills: 0 | Status: SHIPPED | OUTPATIENT
Start: 2022-07-05 | End: 2022-09-30

## 2022-07-05 RX ORDER — SIMVASTATIN 20 MG
TABLET ORAL
Qty: 90 TABLET | Refills: 0 | Status: SHIPPED | OUTPATIENT
Start: 2022-07-05 | End: 2022-09-30

## 2022-08-02 ENCOUNTER — OFFICE VISIT (OUTPATIENT)
Dept: INTERNAL MEDICINE | Facility: CLINIC | Age: 72
End: 2022-08-02

## 2022-08-02 VITALS
BODY MASS INDEX: 36.16 KG/M2 | DIASTOLIC BLOOD PRESSURE: 72 MMHG | HEART RATE: 57 BPM | WEIGHT: 225 LBS | SYSTOLIC BLOOD PRESSURE: 130 MMHG | HEIGHT: 66 IN

## 2022-08-02 DIAGNOSIS — E78.49 OTHER HYPERLIPIDEMIA: ICD-10-CM

## 2022-08-02 DIAGNOSIS — K29.01 GASTROINTESTINAL HEMORRHAGE ASSOCIATED WITH ACUTE GASTRITIS: Primary | ICD-10-CM

## 2022-08-02 PROCEDURE — 99214 OFFICE O/P EST MOD 30 MIN: CPT | Performed by: INTERNAL MEDICINE

## 2022-08-02 NOTE — PROGRESS NOTES
"Chief Complaint   Patient presents with   • Anemia   • Hyperlipidemia       History of Present Illness   Nessa Osuna is a 72 y.o. female presents for follow up evaluation. She is doing well today. She has recent anemia related to a gi bleed. She has not had any recent bleeding. She has hyperlipidemia that is well managed w/ zocor. She has elevated bp today. Reports routine movement. Watching 4 year old great niece etc.   Has suzanne on cpap \"most nights now that I have the new one\"  She does have rate controlled afib. No bleeding issues reported on eliquis.         The following portions of the patient's history were reviewed and updated as appropriate: allergies, current medications, past family history, past medical history, past social history, past surgical history and problem list.  Current Outpatient Medications on File Prior to Visit   Medication Sig Dispense Refill   • acetaminophen (TYLENOL) 500 MG tablet Take 500 mg by mouth Every Night. Take 1 tablet every 4-6 hours as needed     • albuterol sulfate  (90 Base) MCG/ACT inhaler 2 puffs Every 4 (Four) Hours As Needed.     • apixaban (ELIQUIS) 5 MG tablet tablet Take 1 tablet by mouth 2 (Two) Times a Day. 60 tablet 4   • atenolol (TENORMIN) 100 MG tablet TAKE ONE TABLET BY MOUTH TWICE A  tablet 3   • Breo Ellipta 100-25 MCG/INH inhaler Inhale 1 puff Daily.     • Cholecalciferol (VITAMIN D) 2000 UNITS capsule Take 1 capsule by mouth daily.     • DULoxetine (CYMBALTA) 60 MG capsule TAKE ONE CAPSULE BY MOUTH DAILY (Patient taking differently: 60 mg Every Evening.) 90 capsule 3   • Flaxseed, Linseed, (FLAXSEED OIL) 1000 MG capsule Take 1 capsule by mouth Daily.     • fluticasone (FLONASE) 50 MCG/ACT nasal spray PLACE TWO SPRAYS IN EACH NOSTRIL ONCE DAILY 1 bottle 4   • gabapentin (NEURONTIN) 300 MG capsule TAKE ONE CAPSULE BY MOUTH THREE TIMES A  capsule 1   • hyoscyamine (ANASPAZ,LEVSIN) 0.125 MG tablet Take 1 tablet by mouth Every 6 " (Six) Hours As Needed for Cramping or Diarrhea (abdomina pain). 60 tablet 0   • Melatonin 5 MG capsule Take 5 mg by mouth Every Night.     • montelukast (SINGULAIR) 10 MG tablet TAKE ONE TABLET BY MOUTH ONCE NIGHTLY 90 tablet 0   • omeprazole (priLOSEC) 20 MG capsule Take 1 capsule by mouth Daily. 90 capsule 2   • simvastatin (ZOCOR) 20 MG tablet TAKE ONE TABLET BY MOUTH DAILY 90 tablet 0     No current facility-administered medications on file prior to visit.     Review of Systems   Constitutional: Negative.    HENT: Negative.    Eyes: Negative.    Respiratory: Negative.    Cardiovascular: Negative.    Gastrointestinal: Negative.    Endocrine: Negative.    Genitourinary: Negative.    Musculoskeletal: Negative.    Skin: Negative.    Allergic/Immunologic: Negative.    Neurological: Negative.    Hematological: Negative.    Psychiatric/Behavioral: Negative.        Objective   Physical Exam  Vitals and nursing note reviewed.   Constitutional:       Appearance: Normal appearance. She is well-developed.   HENT:      Head: Normocephalic and atraumatic.      Right Ear: Tympanic membrane and external ear normal.      Left Ear: Tympanic membrane and external ear normal.      Nose: Nose normal.      Mouth/Throat:      Mouth: Mucous membranes are moist.   Eyes:      Extraocular Movements: Extraocular movements intact.      Pupils: Pupils are equal, round, and reactive to light.   Cardiovascular:      Rate and Rhythm: Normal rate and regular rhythm.      Pulses: Normal pulses.      Heart sounds: Normal heart sounds.   Pulmonary:      Effort: Pulmonary effort is normal. No respiratory distress.      Breath sounds: Normal breath sounds.   Abdominal:      General: Abdomen is flat.      Palpations: Abdomen is soft.   Musculoskeletal:         General: Normal range of motion.      Cervical back: Normal range of motion and neck supple.   Skin:     General: Skin is warm and dry.   Neurological:      General: No focal deficit present.     "  Mental Status: She is alert and oriented to person, place, and time.   Psychiatric:         Mood and Affect: Mood normal.         Behavior: Behavior normal.         Thought Content: Thought content normal.         Judgment: Judgment normal.          /72   Pulse 57   Ht 167.6 cm (66\")   Wt 102 kg (225 lb)   BMI 36.32 kg/m²     Assessment & Plan   Diagnoses and all orders for this visit:    Gastrointestinal hemorrhage associated with acute gastritis  -     CBC & Differential  -     Comprehensive Metabolic Panel  -     Lipid Panel With LDL / HDL Ratio    Other hyperlipidemia  -     CBC & Differential  -     Comprehensive Metabolic Panel  -     Lipid Panel With LDL / HDL Ratio      Patient w/ anemia after gi bleed. No evidence of bleeding today. Will check cbc. She will have cmp and lipid tested as well. To continue simvastatin. She has suzanne and is encouraged cpap at night every night. Continue eliquis for atrial fibrillation. She is rate controlled.  She will follow up in 6 months or prn.            "

## 2022-08-03 LAB
ALBUMIN SERPL-MCNC: 4.2 G/DL (ref 3.7–4.7)
ALBUMIN/GLOB SERPL: 1.9 {RATIO} (ref 1.2–2.2)
ALP SERPL-CCNC: 75 IU/L (ref 44–121)
ALT SERPL-CCNC: 16 IU/L (ref 0–32)
AST SERPL-CCNC: 22 IU/L (ref 0–40)
BASOPHILS # BLD AUTO: 0 X10E3/UL (ref 0–0.2)
BASOPHILS NFR BLD AUTO: 1 %
BILIRUB SERPL-MCNC: 1.2 MG/DL (ref 0–1.2)
BUN SERPL-MCNC: 13 MG/DL (ref 8–27)
BUN/CREAT SERPL: 19 (ref 12–28)
CALCIUM SERPL-MCNC: 9.2 MG/DL (ref 8.7–10.3)
CHLORIDE SERPL-SCNC: 105 MMOL/L (ref 96–106)
CHOLEST SERPL-MCNC: 142 MG/DL (ref 100–199)
CO2 SERPL-SCNC: 24 MMOL/L (ref 20–29)
CREAT SERPL-MCNC: 0.68 MG/DL (ref 0.57–1)
EGFRCR SERPLBLD CKD-EPI 2021: 92 ML/MIN/1.73
EOSINOPHIL # BLD AUTO: 0.2 X10E3/UL (ref 0–0.4)
EOSINOPHIL NFR BLD AUTO: 6 %
ERYTHROCYTE [DISTWIDTH] IN BLOOD BY AUTOMATED COUNT: 12.3 % (ref 11.7–15.4)
GLOBULIN SER CALC-MCNC: 2.2 G/DL (ref 1.5–4.5)
GLUCOSE SERPL-MCNC: 86 MG/DL (ref 65–99)
HCT VFR BLD AUTO: 35 % (ref 34–46.6)
HDLC SERPL-MCNC: 53 MG/DL
HGB BLD-MCNC: 11.7 G/DL (ref 11.1–15.9)
IMM GRANULOCYTES # BLD AUTO: 0 X10E3/UL (ref 0–0.1)
IMM GRANULOCYTES NFR BLD AUTO: 0 %
LDLC SERPL CALC-MCNC: 76 MG/DL (ref 0–99)
LDLC/HDLC SERPL: 1.4 RATIO (ref 0–3.2)
LYMPHOCYTES # BLD AUTO: 1 X10E3/UL (ref 0.7–3.1)
LYMPHOCYTES NFR BLD AUTO: 23 %
MCH RBC QN AUTO: 30.3 PG (ref 26.6–33)
MCHC RBC AUTO-ENTMCNC: 33.4 G/DL (ref 31.5–35.7)
MCV RBC AUTO: 91 FL (ref 79–97)
MONOCYTES # BLD AUTO: 0.6 X10E3/UL (ref 0.1–0.9)
MONOCYTES NFR BLD AUTO: 13 %
NEUTROPHILS # BLD AUTO: 2.5 X10E3/UL (ref 1.4–7)
NEUTROPHILS NFR BLD AUTO: 57 %
PLATELET # BLD AUTO: 232 X10E3/UL (ref 150–450)
POTASSIUM SERPL-SCNC: 4.3 MMOL/L (ref 3.5–5.2)
PROT SERPL-MCNC: 6.4 G/DL (ref 6–8.5)
RBC # BLD AUTO: 3.86 X10E6/UL (ref 3.77–5.28)
SODIUM SERPL-SCNC: 142 MMOL/L (ref 134–144)
TRIGL SERPL-MCNC: 60 MG/DL (ref 0–149)
VLDLC SERPL CALC-MCNC: 13 MG/DL (ref 5–40)
WBC # BLD AUTO: 4.3 X10E3/UL (ref 3.4–10.8)

## 2022-08-08 RX ORDER — DULOXETIN HYDROCHLORIDE 60 MG/1
CAPSULE, DELAYED RELEASE ORAL
Qty: 90 CAPSULE | Refills: 3 | Status: SHIPPED | OUTPATIENT
Start: 2022-08-08

## 2022-09-30 RX ORDER — MONTELUKAST SODIUM 10 MG/1
TABLET ORAL
Qty: 90 TABLET | Refills: 0 | Status: SHIPPED | OUTPATIENT
Start: 2022-09-30 | End: 2023-01-03

## 2022-09-30 RX ORDER — SIMVASTATIN 20 MG
TABLET ORAL
Qty: 90 TABLET | Refills: 0 | Status: SHIPPED | OUTPATIENT
Start: 2022-09-30 | End: 2023-01-03

## 2022-09-30 RX ORDER — HYOSCYAMINE SULFATE 0.125 MG
TABLET ORAL
Qty: 60 TABLET | Refills: 0 | Status: SHIPPED | OUTPATIENT
Start: 2022-09-30

## 2022-10-04 ENCOUNTER — OFFICE VISIT (OUTPATIENT)
Dept: GASTROENTEROLOGY | Facility: CLINIC | Age: 72
End: 2022-10-04

## 2022-10-04 VITALS
SYSTOLIC BLOOD PRESSURE: 108 MMHG | HEIGHT: 66 IN | WEIGHT: 226.4 LBS | BODY MASS INDEX: 36.38 KG/M2 | DIASTOLIC BLOOD PRESSURE: 70 MMHG | TEMPERATURE: 96.8 F

## 2022-10-04 DIAGNOSIS — R11.2 NAUSEA AND VOMITING, UNSPECIFIED VOMITING TYPE: ICD-10-CM

## 2022-10-04 DIAGNOSIS — R10.32 LLQ PAIN: Primary | ICD-10-CM

## 2022-10-04 PROCEDURE — 99214 OFFICE O/P EST MOD 30 MIN: CPT | Performed by: PHYSICIAN ASSISTANT

## 2022-10-04 NOTE — PROGRESS NOTES
Chief Complaint  Nausea, Vomiting, and Abdominal Pain    Subjective        History of Present Illness  Nessa Osuna is a  72 y.o. female here for complaints of nausea, vomiting.  She is a patient of Dr. Kumar.  She was last seen in the clinic by me on 6/21/2022.    She reports improvement in nausea and vomiting.  She has had roughly 4-5 episodes in the last 2 months. Bowels moving daily and without difficulty; no juan blood or melena. She does still have some cramping abdominal pain for which Levsin seems to help. She also notes episodes of sharp pain in the left lower quadrant which she can pinpoint with one finger; struggles with DDD of lumbar spine.     Heartburn well controlled with 20 mg omeprazole.     Upper GI with small bowel follow-through 5/4/2022 with 2 small duodenal diverticulum, tiny jejunal diverticulum, possible mild gastritis, unremarkable small bowel follow-through.  She underwent EGD and colonoscopy on 6/17/2022 with findings of hiatal hernia, LA grade a esophagitis, multiple benign gastric polyps without stigmata of bleed, normal duodenum, pancolonic diverticulosis out stigmata of bleed, nonbleeding internal hemorrhoids.    Reviewed MRI of lumbar spine from 2/5/2019.    Past Medical History:   Diagnosis Date   • A-fib ([facility]) 05/31/2017   • Anemia 1993?   • Breast nodule    • Chest wall mass    • Class 1 obesity due to excess calories without serious comorbidity with body mass index (BMI) of 32.0 to 32.9 in adult    • Closed fracture of head of metacarpal     left second   • Closed fracture of metacarpal bone     left   • Colon polyp 2005    noted with colonoscopy   • COPD (chronic obstructive pulmonary disease) ([facility])    • Coronary artery disease 2019    A-Fib   • DDD (degenerative disc disease), lumbar    • Depression    • Difficulty breathing    • Diverticulitis 2010   • Diverticulitis of colon 2000?   • Fatigue    • GERD (gastroesophageal reflux disease)    • Hip pain, right    •  Hyperlipidemia    • Leiomyoma of uterus    • Low back pain    • CHEN on CPAP    • Osteoarthritis    • Osteopenia    • PAF (paroxysmal atrial fibrillation) (HCC)    • Persistent atrial fibrillation (HCC)    • PONV (postoperative nausea and vomiting)    • Primary localized osteoarthritis of hip    • Sciatica    • Skin cancer 2010   • Snoring    • Spinal stenosis        Past Surgical History:   Procedure Laterality Date   • CARDIOVERSION  05/28/2020    Dr. Wagner   • CATARACT EXTRACTION, BILATERAL     • COLONOSCOPY  2005   • COLONOSCOPY N/A 01/10/2022    Procedure: COLONOSCOPY INTO CECUM AND TI WITH COLD BX POLYPECTOMIES;  Surgeon: Franci Kumar MD;  Location: [facility] ENDOSCOPY;  Service: Gastroenterology;  Laterality: N/A;  PRE: LLQ PAIN, ABNORMAL CT  POST: SKIN TAGS, DIVERTICULOSIS, POLYPS, HEMORRHOIDS   • COLONOSCOPY N/A 6/17/2022    Procedure: COLONOSCOPY to cecum and TI;  Surgeon: Yasmani Bo MD;  [facility];  Service: Gastroenterology;  Laterality: N/A;  pre- GI bleed  post- hemorrhoids, diverticulosis   • ENDOSCOPY N/A 6/17/2022    Procedure: ESOPHAGOGASTRODUODENOSCOPY with biopsies;  Surgeon: Yasmani Bo MD;  [facility];  Service: Gastroenterology;  Laterality: N/A;  pre- GI bleed  post- hiatal hernia, esophagitis, gastric polyp   • FLEXIBLE SIGMOIDOSCOPY  ? 1995   • MOHS SURGERY      chemosurgery (Mohs Micrographic Technique); Dr Jarrett and Dr Franco   • TRIANA'S NEUROMA EXCISION      single neuroma   • RECONSTRUCTION OF NOSE     • TONSILLECTOMY      age 5   • TONSILLECTOMY AND ADENOIDECTOMY  1955   • UPPER GASTROINTESTINAL ENDOSCOPY  6/17/2022       Family History   Problem Relation Age of Onset   • Cancer Mother         bladder   • Osteoporosis Mother    • Heart failure Mother    • Lung cancer Father    • No Known Problems Sister    • Breast cancer Neg Hx        Social History     Socioeconomic History   • Marital status: Single   • Number of children: 0    • Years of education: 14   • Highest education level: Not asked   Tobacco Use   • Smoking status: Former Smoker     Packs/day: 1.50     Years: 35.00     Pack years: 52.50     Types: Cigarettes     Start date: 1962     Quit date: 1997     Years since quittin.7   • Smokeless tobacco: Never Used   Vaping Use   • Vaping Use: Never used   Substance and Sexual Activity   • Alcohol use: Yes     Comment: rarely drink, don't drink every week. maybe 25 drinks a year   • Drug use: No   • Sexual activity: Not Currently     Partners: Male     Comment: pills       No Known Allergies    Current Outpatient Medications on File Prior to Visit   Medication Sig Dispense Refill   • acetaminophen (TYLENOL) 500 MG tablet Take 500 mg by mouth Every Night. Take 1 tablet every 4-6 hours as needed     • albuterol sulfate  (90 Base) MCG/ACT inhaler 2 puffs Every 4 (Four) Hours As Needed.     • apixaban (ELIQUIS) 5 MG tablet tablet Take 1 tablet by mouth 2 (Two) Times a Day. 60 tablet 4   • atenolol (TENORMIN) 100 MG tablet TAKE ONE TABLET BY MOUTH TWICE A  tablet 3   • Breo Ellipta 100-25 MCG/INH inhaler Inhale 1 puff Daily.     • Cholecalciferol (VITAMIN D) 2000 UNITS capsule Take 1 capsule by mouth daily.     • DULoxetine (CYMBALTA) 60 MG capsule TAKE ONE CAPSULE BY MOUTH DAILY 90 capsule 3   • Flaxseed, Linseed, (FLAXSEED OIL) 1000 MG capsule Take 1 capsule by mouth Daily.     • fluticasone (FLONASE) 50 MCG/ACT nasal spray PLACE TWO SPRAYS IN EACH NOSTRIL ONCE DAILY 1 bottle 4   • gabapentin (NEURONTIN) 300 MG capsule TAKE ONE CAPSULE BY MOUTH THREE TIMES A  capsule 1   • hyoscyamine (ANASPAZ,LEVSIN) 0.125 MG tablet TAKE ONE TABLET BY MOUTH EVERY 6 HOURS AS NEEDED FOR CRAMPING OR DIARRHEA (ABDOMINAL PAIN) 60 tablet 0   • Melatonin 5 MG capsule Take 5 mg by mouth Every Night.     • montelukast (SINGULAIR) 10 MG tablet TAKE ONE TABLET BY MOUTH ONCE NIGHTLY 90 tablet 0   • omeprazole (priLOSEC) 20 MG capsule  "Take 1 capsule by mouth Daily. 90 capsule 2   • simvastatin (ZOCOR) 20 MG tablet TAKE ONE TABLET BY MOUTH DAILY 90 tablet 0     No current facility-administered medications on file prior to visit.       Review of Systems     Objective   Vital Signs:   /70   Temp 96.8 °F (36 °C)   Ht 167.6 cm (66\")   Wt 103 kg (226 lb 6.4 oz)   BMI 36.54 kg/m²       Physical Exam  Vitals and nursing note reviewed.   Constitutional:       General: She is not in acute distress.     Appearance: Normal appearance. She is obese. She is not ill-appearing.   HENT:      Head: Normocephalic and atraumatic.      Right Ear: External ear normal.      Left Ear: External ear normal.   Eyes:      General: No scleral icterus.     Conjunctiva/sclera: Conjunctivae normal.      Pupils: Pupils are equal, round, and reactive to light.   Cardiovascular:      Rate and Rhythm: Normal rate and regular rhythm.      Heart sounds: Normal heart sounds.   Pulmonary:      Effort: Pulmonary effort is normal.      Breath sounds: Normal breath sounds.   Abdominal:      General: Abdomen is flat. Bowel sounds are normal. There is no distension.      Palpations: Abdomen is soft.      Tenderness: There is no abdominal tenderness. There is no guarding or rebound.          Comments: Focal point tenderness at anterior superior iliac spine   Musculoskeletal:      Cervical back: Normal range of motion and neck supple.   Skin:     General: Skin is warm and dry.   Neurological:      Mental Status: She is alert and oriented to person, place, and time.   Psychiatric:         Mood and Affect: Mood normal.         Behavior: Behavior normal.          Result Review :       Common labs    Common Labs 6/16/22 6/16/22 6/16/22 6/17/22 6/17/22 6/17/22 8/2/22 8/2/22 8/2/22    1428 1428 1953 0545 0545 1231 1428 1428 1428   Glucose  94   95   86    BUN  16   10   13    Creatinine  0.75   0.58   0.68    Sodium  142   142   142    Potassium  4.2   3.7   4.3    Chloride  107   108 (A) "   105    Calcium  8.7   8.1 (A)   9.2    Total Protein        6.4    Albumin  4.30      4.2    Total Bilirubin  1.2      1.2    Alkaline Phosphatase  67      75    AST (SGOT)  25      22    ALT (SGPT)  18      16    WBC 4.32   5.81   4.3     Hemoglobin 11.3 (A)  11.2 (A) 10.1 (A)  11.0 (A) 11.7     Hematocrit 34.3  32.3 (A) 29.3 (A)  33.2 (A) 35.0     Platelets 231   181   232     Total Cholesterol         142   Triglycerides         60   HDL Cholesterol         53   LDL Cholesterol          76   (A) Abnormal value                                Assessment and Plan    Diagnoses and all orders for this visit:    1. LLQ pain (Primary)    2. Nausea and vomiting, unspecified vomiting type      · Continue omeprazole.  · Recommend multiple small meals throughout the day. We did discuss possible gastric emptying study but she would prefer to try diet modifications prior to proceeding with any further testing at this point.   · Her left lower quadrant pain is localized with palpation along the iliac spine. She is able to pin point findings with one finger which would be inconsistent with intra-abdominal pathology.  She may benefit from pain management referral for a block. She would like to discuss with her orthopedic.   · Follow-up in 2 months, sooner if necessary.      Follow Up   Return in about 2 months (around 12/4/2022).    Dragon dictation used throughout this note.     ALFRED Wilson

## 2022-11-01 ENCOUNTER — HOSPITAL ENCOUNTER (OUTPATIENT)
Dept: BONE DENSITY | Facility: HOSPITAL | Age: 72
Discharge: HOME | End: 2022-11-01
Admitting: INTERNAL MEDICINE

## 2022-11-01 PROCEDURE — 77080 DXA BONE DENSITY AXIAL: CPT

## 2022-11-03 ENCOUNTER — APPOINTMENT (OUTPATIENT)
Dept: SLEEP MEDICINE | Facility: HOSPITAL | Age: 72
End: 2022-11-03

## 2022-11-03 ENCOUNTER — TELEPHONE (OUTPATIENT)
Dept: INTERNAL MEDICINE | Facility: CLINIC | Age: 72
End: 2022-11-03

## 2022-11-03 NOTE — TELEPHONE ENCOUNTER
----- Message from Jayne Guerra MD sent at 11/3/2022 11:42 AM EDT -----  Bone density score in osteoporotic range. Will discuss this result at scheduled office visit.   dalila

## 2022-11-04 DIAGNOSIS — M54.30 SCIATICA, UNSPECIFIED LATERALITY: ICD-10-CM

## 2022-11-07 ENCOUNTER — OFFICE VISIT (OUTPATIENT)
Dept: CARDIOLOGY | Facility: CLINIC | Age: 72
End: 2022-11-07

## 2022-11-07 VITALS
WEIGHT: 224.2 LBS | BODY MASS INDEX: 36.03 KG/M2 | SYSTOLIC BLOOD PRESSURE: 138 MMHG | HEART RATE: 102 BPM | DIASTOLIC BLOOD PRESSURE: 88 MMHG | HEIGHT: 66 IN

## 2022-11-07 DIAGNOSIS — Z99.89 OSA ON CPAP: ICD-10-CM

## 2022-11-07 DIAGNOSIS — I42.0 DILATED CARDIOMYOPATHY: ICD-10-CM

## 2022-11-07 DIAGNOSIS — I48.0 PAF (PAROXYSMAL ATRIAL FIBRILLATION): Primary | ICD-10-CM

## 2022-11-07 DIAGNOSIS — G47.33 OSA ON CPAP: ICD-10-CM

## 2022-11-07 DIAGNOSIS — E66.09 CLASS 2 OBESITY DUE TO EXCESS CALORIES WITH BODY MASS INDEX (BMI) OF 36.0 TO 36.9 IN ADULT, UNSPECIFIED WHETHER SERIOUS COMORBIDITY PRESENT: ICD-10-CM

## 2022-11-07 PROBLEM — E66.812 CLASS 2 OBESITY DUE TO EXCESS CALORIES WITH BODY MASS INDEX (BMI) OF 36.0 TO 36.9 IN ADULT: Status: ACTIVE | Noted: 2020-08-06

## 2022-11-07 PROCEDURE — 93000 ELECTROCARDIOGRAM COMPLETE: CPT | Performed by: NURSE PRACTITIONER

## 2022-11-07 PROCEDURE — 99214 OFFICE O/P EST MOD 30 MIN: CPT | Performed by: NURSE PRACTITIONER

## 2022-11-07 RX ORDER — GABAPENTIN 300 MG/1
CAPSULE ORAL
Qty: 270 CAPSULE | Refills: 1 | Status: SHIPPED | OUTPATIENT
Start: 2022-11-07 | End: 2023-01-09 | Stop reason: SDUPTHER

## 2022-11-07 RX ORDER — SPIRONOLACTONE 25 MG/1
12.5 TABLET ORAL DAILY
Qty: 30 TABLET | Refills: 11 | Status: SHIPPED | OUTPATIENT
Start: 2022-11-07 | End: 2023-02-24 | Stop reason: SDUPTHER

## 2022-11-07 NOTE — PROGRESS NOTES
"Date of Office Visit: 2022  Encounter Provider: SHAYY Carrillo  Place of Service: [facility] CARDIOLOGY  Patient Name: Nessa Osuna  :1950    Chief Complaint   Patient presents with   • Atrial Fibrillation   :     HPI: Nessa Osuna is a 72 y.o. female who follows with Dr. Blue and Dr. Wagner.    She had PAF for years then had symptomatic persistent AF, cardioversion 2020, follow-up ZIO showing 54% AF burden.    Echo in 3/2020 showed EF 41-45% with severely dilated LA and mild to moderate MR/TR.    Saw Dr. Wagner in April--- complaints of daily shortness of breath and dyspnea as well as edema even walking 1 block, she was in sinus rhythm that day but still short of breath.    She had a normal perfusion scan in 2022.  Monitor in January showed 46% AF burden.     Echo in April showed an EF of 60%, grade 2 diastolic dysfunction, severely dilated left atrium and moderately dilated right atrium with mild to moderate TR and a markedly elevated RVSP of 55 mmHg.    She presents today for follow-up.    She has been diagnosed with COPD and started on maintenance inhalers and her shortness of breath has improved.  She said she is not 100% but it is definitely better.    She denies chest pain, no PND orthopnea.  She does have some intermittent mild lower extremity edema, she wears compression stockings she says \"I have my mother's legs.\"    She has sleep apnea and has CPAP which she struggles to use but she is working on this.    As far as her A. fib it is less intense these days she does not always know when she is in it but she knows that she still has it and it seems to be pretty manageable at this point.    She did have a scope and she had some bleeding Dr. Guerra switched her from Xarelto to apixaban and she seems to be doing well on this.       Past Medical History:   Diagnosis Date   • A-fib (HCC) 2017   • Anemia 1993?   • Breast nodule    • " Chest wall mass    • Class 1 obesity due to excess calories without serious comorbidity with body mass index (BMI) of 32.0 to 32.9 in adult    • Closed fracture of head of metacarpal     left second   • Closed fracture of metacarpal bone     left   • Colon polyp 2005    noted with colonoscopy   • COPD (chronic obstructive pulmonary disease) ([facility])    • Coronary artery disease 2019    A-Fib   • DDD (degenerative disc disease), lumbar    • Depression    • Difficulty breathing    • Diverticulitis 2010   • Diverticulitis of colon 2000?   • Fatigue    • GERD (gastroesophageal reflux disease)    • Hip pain, right    • Hyperlipidemia    • Leiomyoma of uterus    • Low back pain    • CHEN on CPAP    • Osteoarthritis    • Osteopenia    • PAF (paroxysmal atrial fibrillation) ([facility])    • Persistent atrial fibrillation (HCC)    • PONV (postoperative nausea and vomiting)    • Primary localized osteoarthritis of hip    • Sciatica    • Skin cancer 2010   • Snoring    • Spinal stenosis        Past Surgical History:   Procedure Laterality Date   • CARDIOVERSION  05/28/2020    Dr. Wagner   • CATARACT EXTRACTION, BILATERAL     • COLONOSCOPY  2005   • COLONOSCOPY N/A 01/10/2022    Procedure: COLONOSCOPY INTO CECUM AND TI WITH COLD BX POLYPECTOMIES;  Surgeon: Franci Kumar MD;  Location: [facility] ENDOSCOPY;  Service: Gastroenterology;  Laterality: N/A;  PRE: LLQ PAIN, ABNORMAL CT  POST: SKIN TAGS, DIVERTICULOSIS, POLYPS, HEMORRHOIDS   • COLONOSCOPY N/A 6/17/2022    Procedure: COLONOSCOPY to cecum and TI;  Surgeon: Yasmani Bo MD;  Location: [facility] ENDOSCOPY;  Service: Gastroenterology;  Laterality: N/A;  pre- GI bleed  post- hemorrhoids, diverticulosis   • ENDOSCOPY N/A 6/17/2022    Procedure: ESOPHAGOGASTRODUODENOSCOPY with biopsies;  Surgeon: Yasmani Bo MD;  Location: [facility] ENDOSCOPY;  Service: Gastroenterology;  Laterality: N/A;  pre- GI bleed  post- hiatal hernia, esophagitis, gastric polyp   • FLEXIBLE  SIGMOIDOSCOPY  ?    • MOHS SURGERY      chemosurgery (Mohs Micrographic Technique); Dr Jarrett and Dr Franco   • TRIANA'S NEUROMA EXCISION      single neuroma   • RECONSTRUCTION OF NOSE     • TONSILLECTOMY      age 5   • TONSILLECTOMY AND ADENOIDECTOMY     • UPPER GASTROINTESTINAL ENDOSCOPY  2022       Social History     Socioeconomic History   • Marital status: Single   • Number of children: 0   • Years of education: 14   • Highest education level: Not asked   Tobacco Use   • Smoking status: Former     Packs/day: 1.50     Years: 35.00     Pack years: 52.50     Types: Cigarettes     Start date: 1962     Quit date: 1997     Years since quittin.8   • Smokeless tobacco: Never   Vaping Use   • Vaping Use: Never used   Substance and Sexual Activity   • Alcohol use: Yes     Comment: rarely drink, don't drink every week. maybe 25 drinks a year   • Drug use: No   • Sexual activity: Not Currently     Partners: Male     Comment: pills       Family History   Problem Relation Age of Onset   • Cancer Mother         bladder   • Osteoporosis Mother    • Heart failure Mother    • Lung cancer Father    • No Known Problems Sister    • Breast cancer Neg Hx        Review of Systems   Constitutional: Negative for chills, fever and malaise/fatigue.   Cardiovascular: Positive for dyspnea on exertion, irregular heartbeat and palpitations. Negative for chest pain, leg swelling, near-syncope, orthopnea, paroxysmal nocturnal dyspnea and syncope.   Respiratory: Negative for cough and shortness of breath.    Hematologic/Lymphatic: Negative.    Musculoskeletal: Negative for joint pain, joint swelling and myalgias.   Gastrointestinal: Negative for abdominal pain, diarrhea, melena, nausea and vomiting.   Genitourinary: Negative for frequency and hematuria.   Neurological: Negative for light-headedness, numbness, paresthesias and seizures.   Allergic/Immunologic: Negative.    All other systems reviewed and are  "negative.      No Known Allergies      Current Outpatient Medications:   •  acetaminophen (TYLENOL) 500 MG tablet, Take 500 mg by mouth Every Night. Take 1 tablet every 4-6 hours as needed, Disp: , Rfl:   •  albuterol sulfate  (90 Base) MCG/ACT inhaler, 2 puffs Every 4 (Four) Hours As Needed., Disp: , Rfl:   •  apixaban (ELIQUIS) 5 MG tablet tablet, Take 1 tablet by mouth 2 (Two) Times a Day., Disp: 180 tablet, Rfl: 3  •  atenolol (TENORMIN) 100 MG tablet, TAKE ONE TABLET BY MOUTH TWICE A DAY, Disp: 180 tablet, Rfl: 3  •  Breo Ellipta 100-25 MCG/INH inhaler, Inhale 1 puff Daily., Disp: , Rfl:   •  Cholecalciferol (VITAMIN D) 2000 UNITS capsule, Take 1 capsule by mouth daily., Disp: , Rfl:   •  DULoxetine (CYMBALTA) 60 MG capsule, TAKE ONE CAPSULE BY MOUTH DAILY, Disp: 90 capsule, Rfl: 3  •  Flaxseed, Linseed, (FLAXSEED OIL) 1000 MG capsule, Take 1 capsule by mouth Daily., Disp: , Rfl:   •  fluticasone (FLONASE) 50 MCG/ACT nasal spray, PLACE TWO SPRAYS IN EACH NOSTRIL ONCE DAILY, Disp: 1 bottle, Rfl: 4  •  gabapentin (NEURONTIN) 300 MG capsule, TAKE ONE CAPSULE BY MOUTH THREE TIMES A DAY, Disp: 270 capsule, Rfl: 1  •  hyoscyamine (ANASPAZ,LEVSIN) 0.125 MG tablet, TAKE ONE TABLET BY MOUTH EVERY 6 HOURS AS NEEDED FOR CRAMPING OR DIARRHEA (ABDOMINAL PAIN), Disp: 60 tablet, Rfl: 0  •  Melatonin 5 MG capsule, Take 5 mg by mouth Every Night., Disp: , Rfl:   •  montelukast (SINGULAIR) 10 MG tablet, TAKE ONE TABLET BY MOUTH ONCE NIGHTLY, Disp: 90 tablet, Rfl: 0  •  omeprazole (priLOSEC) 20 MG capsule, Take 1 capsule by mouth Daily., Disp: 90 capsule, Rfl: 2  •  simvastatin (ZOCOR) 20 MG tablet, TAKE ONE TABLET BY MOUTH DAILY, Disp: 90 tablet, Rfl: 0  •  spironolactone (ALDACTONE) 25 MG tablet, Take 0.5 tablets by mouth Daily., Disp: 30 tablet, Rfl: 11      Objective:     Vitals:    11/07/22 1136   BP: 138/88   Pulse: 102   Weight: 102 kg (224 lb 3.2 oz)   Height: 167.6 cm (66\")     Body mass index is 36.19 " kg/m².    PHYSICAL EXAM:    Vitals Reviewed.   General Appearance: No acute distress, well developed and well nourished.   Eyes: Conjunctiva and lids: No erythema, swelling, or discharge. Sclera non-icteric.   HENT: Atraumatic, normocephalic. External eyes, ears, and nose normal.   Respiratory: No signs of respiratory distress. Respiration rhythm and depth normal.   Clear to auscultation. No rales, crackles, rhonchi, or wheezing auscultated.   Cardiovascular:  Heart Rate and Rhythm: Irregularly, irregular.  Heart Sounds: S1 and S2.   Murmurs: No murmurs noted. No rubs, thrills, or gallops.   Arterial Pulses:  Posterior tibialis and dorsalis pedis pulses normal.   Lower Extremities: No edema noted.  Gastrointestinal:  Abdomen soft, non-distended, non-tender.   Musculoskeletal: Normal movement of extremities  Skin: Warm and dry.   Psychiatric: Patient alert and oriented to person, place, and time. Speech and behavior appropriate. Normal mood and affect.       ECG 12 Lead    Date/Time: 11/7/2022 11:48 AM  Performed by: Jade Arcos APRN  Authorized by: Jade Arcos APRN   Comparison: compared with previous ECG   Comparison to previous ECG: Afib replaces SR  Rhythm: atrial fibrillation  BPM: 102                Assessment:       Diagnosis Plan   1. PAF (paroxysmal atrial fibrillation) ([facility])  ECG 12 Lead    Basic Metabolic Panel      2. Dilated cardiomyopathy (HCC)  Basic Metabolic Panel      3. CHEN on CPAP        4. Class 2 obesity due to excess calories with body mass index (BMI) of 36.0 to 36.9 in adult, unspecified whether serious comorbidity present               Plan:       1.  PAF, her last monitor showed about a 46% burden, her symptoms are less intense these days, she was in A. fib today she knew she had last night but did not really know that she had not converted out of it.  She remains on atenolol and apixaban for anticoagulation.    Her echo and April showed that her left atrium was severely dilated  with moderate dilation of the right atrium not sure that it would be so easy to try to maintain sinus rhythm with her with antiarrhythmic medication or ablation and right now he seems to be doing pretty well since starting on inhalers for her recently diagnosed COPD.    2.  Dilated cardiomyopathy, her EF was actually normal by echo in April.  It did show that she had elevated pulmonary pressures, she is not on a diuretic, we talked about this to try adding a little spironolactone just to see if this makes her feel even better and she currently feels.  We will check a BMP in a week.    3.  CHEN, she has CPAP but struggles with it intermittently.    4. For the problem of overweight/obesity, we discussed the importance of lifestyle measures and strategies for weight loss, such as improved nutrition, regular exercise and sleep hygiene.      She has not seen Dr. Blue in a while so I'm going to have her see her in 6 months and follow-up with Dr. Wagner in 1 year but sooner should the need arise and I will call her with her lab results and see how she is doing on the spironolactone at that time.    As always, it has been a pleasure to participate in your patient's care.      Sincerely,         SHAYY Cuellar

## 2022-11-16 DIAGNOSIS — E78.49 OTHER HYPERLIPIDEMIA: ICD-10-CM

## 2022-11-16 DIAGNOSIS — I48.19 PERSISTENT ATRIAL FIBRILLATION: Primary | ICD-10-CM

## 2022-11-16 DIAGNOSIS — Z01.89 ROUTINE LAB DRAW: ICD-10-CM

## 2022-11-16 DIAGNOSIS — Z00.00 HEALTHCARE MAINTENANCE: ICD-10-CM

## 2022-11-17 ENCOUNTER — OFFICE VISIT (OUTPATIENT)
Dept: SLEEP MEDICINE | Facility: HOSPITAL | Age: 72
End: 2022-11-17

## 2022-11-17 VITALS
HEART RATE: 88 BPM | WEIGHT: 226 LBS | DIASTOLIC BLOOD PRESSURE: 64 MMHG | SYSTOLIC BLOOD PRESSURE: 122 MMHG | OXYGEN SATURATION: 96 % | BODY MASS INDEX: 36.32 KG/M2 | HEIGHT: 66 IN

## 2022-11-17 DIAGNOSIS — E66.09 CLASS 2 OBESITY DUE TO EXCESS CALORIES WITH BODY MASS INDEX (BMI) OF 36.0 TO 36.9 IN ADULT, UNSPECIFIED WHETHER SERIOUS COMORBIDITY PRESENT: ICD-10-CM

## 2022-11-17 DIAGNOSIS — Z99.89 OSA ON CPAP: Primary | ICD-10-CM

## 2022-11-17 DIAGNOSIS — G47.33 OSA ON CPAP: Primary | ICD-10-CM

## 2022-11-17 PROCEDURE — 99213 OFFICE O/P EST LOW 20 MIN: CPT | Performed by: INTERNAL MEDICINE

## 2022-11-17 PROCEDURE — G0463 HOSPITAL OUTPT CLINIC VISIT: HCPCS

## 2022-11-17 NOTE — PROGRESS NOTES
"  [facility]  [address] [facility] [address] [address]  [address]  [place] [address]  Phone   Fax         SLEEP CLINIC FOLLOW UP PROGRESS NOTE.    Nessa Osuna  [id number]   1950  72 y.o.  female      PCP: Jayne Guerra MD      Date of visit: 11/17/2022    Chief Complaint   Patient presents with   • Sleep Apnea   • Obesity       HPI:  This is a 72 y.o. years old patient is here for the management of obstructive sleep apnea.  Sleep apnea is mild in severity with a AHI of 6.1/h and in supine 11.5/hr. Patient is using positive airway pressure therapy with auto CPAP and the symptoms of snoring, non-restorative sleep and daytime excessive sleepiness have improved significantly on the therapy. Normally goes to bed at base but around midnight and wakes up at 9:30 AM.  The patient wakes up 3 time(s) during the night and has no problem going back to sleep.  Feels refreshed after waking up.  Patient also denies headaches and nasal congestion.     Medications and allergies are reviewed by me and documented in the encounter.     SOCIAL (habits pertaining to sleep medicine)  History tobacco use:No   History of alcohol use: 3 per week  Caffeine use: 4     REVIEW OF SYSTEMS:   [place] Sleepiness Scale :Total score: 9   Nasal congestion:No   Dry mouth/nose:Yes   Post nasal drip; No   Acid reflux/Heartburn:No   Abd bloating:No   Morning headache:No   Anxiety:No   Depression:No    PHYSICAL EXAMINATION:  CONSTITUTIONAL:  Vitals:    11/17/22 1331   BP: 122/64   Pulse: 88   SpO2: 96%   Weight: 103 kg (226 lb)   Height: 167.6 cm (66\")    Body mass index is 36.48 kg/m².   NOSE: nasal passages are clear, No deformities noted   RESP SYSTEM: Not in any respiratory distress, no chest deformities noted,   CARDIOVASULAR: No edema noted  NEURO: Oriented x 3, gait normal,  Mood and affect appeared appropriate      Data reviewed:  The Smart card downloaded on 11/17/2022 has been reviewed independently by me for " compliance and discussed the data with the patient.   Compliance; 70%  More than 4 hr use, 70%  Average use of the device 5 hours and 55 minutes per night  Residual AHI: Four-point /hr (goal < 5.0 /hr)  Mask type: Nasal pillows DreamWear but she wants to change the mask because she is losing hair on top of her head  Device: DreamStation 2  DME: [facility]      ASSESSMENT AND PLAN:  · Obstructive sleep apnea ( G 47.33).  The symptoms of sleep apnea have improved with the device and the treatment.  Patient's compliance with the device is excellent for treatment of sleep apnea.  I have independently reviewed the smart card down load and discussed with the patient the download data and encouarged the patient to continue to use the device.The residual AHI is acceptable. The device is benefiting the patient and the device is medically necessary.  Without proper control of sleep apnea and good compliance there is a increased risk for hypertension, diabetes mellitus and nonrestorative sleep with hypersomnia which can increase risk for motor vehicle accidents.  Untreated sleep apnea is also a risk factor for development of atrial fibrillation, pulmonary hypertension and stroke. The patient is also instructed to get the supplies from the [employer] and and change them on a regular basis.  A prescription for supplies has been sent to the [employer] company.  I have also discussed the good sleep hygiene habits and adequate amount of sleep needed for good health.  · Obesity  2 with BMI is Body mass index is 36.48 kg/m².. I have discuss the relationship between the weight and sleep apnea. The benefit of weight loss in reducing severity of sleep apnea was discussed. Discussed diet and exercise with the patient to achieve ideal BMI.   · Return in about 1 year (around 11/17/2023) for with smart card down load. . Patient's questions were answered.      Sharmila Christiansen MD  Sleep Medicine.  Medical Director, [facility]  and [facility]  11/17/2022 ,

## 2022-11-18 LAB
BUN SERPL-MCNC: 16 MG/DL (ref 8–23)
BUN/CREAT SERPL: 19.8 (ref 7–25)
CALCIUM SERPL-MCNC: 9.6 MG/DL (ref 8.6–10.5)
CHLORIDE SERPL-SCNC: 105 MMOL/L (ref 98–107)
CO2 SERPL-SCNC: 28.6 MMOL/L (ref 22–29)
CREAT SERPL-MCNC: 0.81 MG/DL (ref 0.57–1)
EGFRCR SERPLBLD CKD-EPI 2021: 77.2 ML/MIN/1.73
GLUCOSE SERPL-MCNC: 76 MG/DL (ref 65–99)
POTASSIUM SERPL-SCNC: 4.1 MMOL/L (ref 3.5–5.2)
SODIUM SERPL-SCNC: 143 MMOL/L (ref 136–145)

## 2023-01-03 RX ORDER — OMEPRAZOLE 20 MG/1
CAPSULE, DELAYED RELEASE ORAL
Qty: 90 CAPSULE | Refills: 2 | Status: SHIPPED | OUTPATIENT
Start: 2023-01-03

## 2023-01-03 RX ORDER — SIMVASTATIN 20 MG
TABLET ORAL
Qty: 90 TABLET | Refills: 0 | Status: SHIPPED | OUTPATIENT
Start: 2023-01-03 | End: 2023-03-08

## 2023-01-03 RX ORDER — MONTELUKAST SODIUM 10 MG/1
TABLET ORAL
Qty: 90 TABLET | Refills: 0 | Status: SHIPPED | OUTPATIENT
Start: 2023-01-03 | End: 2023-03-08

## 2023-01-09 ENCOUNTER — TELEPHONE (OUTPATIENT)
Dept: INTERNAL MEDICINE | Facility: CLINIC | Age: 73
End: 2023-01-09

## 2023-01-09 DIAGNOSIS — M54.30 SCIATICA, UNSPECIFIED LATERALITY: ICD-10-CM

## 2023-01-09 RX ORDER — GABAPENTIN 300 MG/1
300 CAPSULE ORAL 3 TIMES DAILY
Qty: 270 CAPSULE | Refills: 1 | Status: SHIPPED | OUTPATIENT
Start: 2023-01-09 | End: 2023-01-18 | Stop reason: SDUPTHER

## 2023-01-09 NOTE — TELEPHONE ENCOUNTER
Caller: Nessa Osuna    Relationship to patient: Self    Best call back number: [phone]    Patient is needing: PATIENT CALLED AND SAID SHE IS GOING TO RUN OUT OF GABAPENTIN WHILE SHE IS ON VACATION AND IS NOT SURE HOW SHE SHOULD HANDLE THAT    SHE IS WANTING TO SEE IF SHE WILL NEED AN EARLY REFILL, OR IF SHE   NEEDS TO GET IT REFILLED WHILE ON VACATION, SHE SAID SHE WILL BE OUT OF MEDICATION THE LAST WEEK OF HER VACATION AND TAKES THREE A DAY    REQUESTED CALLBACK

## 2023-01-11 ENCOUNTER — OFFICE VISIT (OUTPATIENT)
Dept: INTERNAL MEDICINE | Facility: CLINIC | Age: 73
End: 2023-01-11
Payer: MEDICARE

## 2023-01-11 VITALS
SYSTOLIC BLOOD PRESSURE: 120 MMHG | HEIGHT: 66 IN | BODY MASS INDEX: 36.64 KG/M2 | HEART RATE: 50 BPM | DIASTOLIC BLOOD PRESSURE: 78 MMHG | TEMPERATURE: 97.3 F | WEIGHT: 228 LBS | OXYGEN SATURATION: 98 %

## 2023-01-11 DIAGNOSIS — J01.90 ACUTE RHINOSINUSITIS: Primary | ICD-10-CM

## 2023-01-11 DIAGNOSIS — J20.9 ACUTE BRONCHITIS, UNSPECIFIED ORGANISM: ICD-10-CM

## 2023-01-11 PROCEDURE — 99213 OFFICE O/P EST LOW 20 MIN: CPT | Performed by: NURSE PRACTITIONER

## 2023-01-11 RX ORDER — HYDROCORTISONE ACETATE PRAMOXINE HCL 2.5; 1 G/100G; G/100G
CREAM TOPICAL
COMMUNITY
Start: 2022-12-02 | End: 2023-02-24

## 2023-01-11 RX ORDER — DOXYCYCLINE 100 MG/1
100 CAPSULE ORAL 2 TIMES DAILY
Qty: 20 CAPSULE | Refills: 0 | Status: SHIPPED | OUTPATIENT
Start: 2023-01-11

## 2023-01-11 RX ORDER — FLUTICASONE PROPIONATE 50 MCG
2 SPRAY, SUSPENSION (ML) NASAL DAILY
COMMUNITY

## 2023-01-11 NOTE — PROGRESS NOTES
Subjective   Nessa Osuna is a 72 y.o. female.   Chief Complaint   Patient presents with   • Cough     Got sick before [place], still has cough   Answers for HPI/ROS submitted by the patient on 1/9/2023  What is the primary reason for your visit?: Cough      Vitals:    01/11/23 1505   BP: 120/78   Pulse: 50   Temp: 97.3 °F (36.3 °C)   SpO2: 98%     No LMP recorded. Patient is postmenopausal.    History of Present Illness  Ms Osuna is a 72 year old female patient of Dr Guerra who is here for an acute visit. She cough, chest congestion, and nasal congestion for 3 weeks. She has taken home covid tests which have been negative   Cough  This is a new problem. The current episode started 1 to 4 weeks ago. The problem has been gradually worsening. The problem occurs every few hours. The cough is productive of sputum. Associated symptoms include nasal congestion, postnasal drip, rhinorrhea, a sore throat and shortness of breath (at baseline ). Pertinent negatives include no chest pain, chills, ear congestion, ear pain, fever, headaches, heartburn, hemoptysis, myalgias, rash, sweats, weight loss or wheezing. The symptoms are aggravated by lying down. Risk factors for lung disease include occupational exposure and smoking/tobacco exposure.        The following portions of the patient's history were reviewed and updated as appropriate: allergies, current medications, past family history, past medical history, past social history, past surgical history and problem list.    Review of Systems   Constitutional: Negative for chills, fever and weight loss.   HENT: Positive for postnasal drip, rhinorrhea and sore throat. Negative for ear pain.    Respiratory: Positive for cough and shortness of breath (at baseline ). Negative for hemoptysis and wheezing.    Cardiovascular: Negative for chest pain.   Gastrointestinal: Negative for heartburn.   Musculoskeletal: Negative for myalgias.   Skin: Negative for rash.   Neurological:  Negative for headaches.       Objective   Physical Exam  Vitals and nursing note reviewed.   Constitutional:       General: She is not in acute distress.     Appearance: Normal appearance. She is well-developed and well-groomed.   HENT:      Head: Normocephalic.      Right Ear: Tympanic membrane normal.      Left Ear: Tympanic membrane normal.      Nose: Mucosal edema and rhinorrhea present.   Eyes:      General: Lids are normal.   Cardiovascular:      Rate and Rhythm: Normal rate.   Pulmonary:      Effort: Pulmonary effort is normal.      Breath sounds: No wheezing, rhonchi or rales.      Comments: Congested cough   Musculoskeletal:      Cervical back: Neck supple.   Neurological:      Mental Status: She is alert.         Assessment & Plan   Diagnoses and all orders for this visit:    1. Acute rhinosinusitis (Primary)    2. Acute bronchitis, unspecified organism    Other orders  -     doxycycline (MONODOX) 100 MG capsule; Take 1 capsule by mouth 2 (Two) Times a Day.  Dispense: 20 capsule; Refill: 0      Recommend mucinex otc   Continue breo  Use albuterol as needed for wheezing or shortness of breath  Continue flonase   Follow up if symptoms persist, worsen, or if new symptoms develop

## 2023-01-18 DIAGNOSIS — M54.30 SCIATICA, UNSPECIFIED LATERALITY: ICD-10-CM

## 2023-01-18 RX ORDER — GABAPENTIN 300 MG/1
300 CAPSULE ORAL 3 TIMES DAILY
Qty: 21 CAPSULE | Refills: 0 | Status: SHIPPED | OUTPATIENT
Start: 2023-01-18

## 2023-01-18 RX ORDER — GABAPENTIN 300 MG/1
300 CAPSULE ORAL 3 TIMES DAILY
Qty: 270 CAPSULE | Refills: 1 | Status: CANCELLED | OUTPATIENT
Start: 2023-01-18

## 2023-01-18 NOTE — TELEPHONE ENCOUNTER
Caller: Nessa Osuna    Relationship: Self    Best call back number: [phone]    Requested Prescriptions:   Requested Prescriptions     Pending Prescriptions Disp Refills   • gabapentin (NEURONTIN) 300 MG capsule 270 capsule 1     Sig: Take 1 capsule by mouth 3 (Three) Times a Day.        Pharmacy where request should be sent: [facility] [id number] AT [address] [place] [address] [phone] [facility] [phone]      Additional details provided by patient: PATIENT STAES THAT SHE IS GOING OUT OF TOWN Monday, 1/23/23, AND HAD REQUESTED TO GET A PARTIAL REFILL OF 21 TABLETS TO LAST WHILE SHE WAS OUT OF TOWN DURING HER APPOINTMENT WITH JULIEN TAYLOR ON 1/11/23. SHE HAD NOT HEARD BACK ON THIS. PLEASE CALL AND ADVISE IF NECESSARY    Does the patient have less than a 3 day supply:  [] Yes  [x] No    Would you like a call back once the refill request has been completed: [] Yes [x] No    If the office needs to give you a call back, can they leave a voicemail: [] Yes [x] No    Sharona Sanford Rep   01/18/23 13:36 EST

## 2023-02-07 DIAGNOSIS — I48.19 PERSISTENT ATRIAL FIBRILLATION: ICD-10-CM

## 2023-02-07 DIAGNOSIS — E78.49 OTHER HYPERLIPIDEMIA: Primary | ICD-10-CM

## 2023-02-16 LAB
ALBUMIN SERPL-MCNC: 4.7 G/DL (ref 3.5–5.2)
ALBUMIN/GLOB SERPL: 2.5 G/DL
ALP SERPL-CCNC: 84 U/L (ref 39–117)
ALT SERPL-CCNC: 16 U/L (ref 1–33)
APPEARANCE UR: CLEAR
AST SERPL-CCNC: 21 U/L (ref 1–32)
BACTERIA #/AREA URNS HPF: NORMAL /HPF
BASOPHILS # BLD AUTO: 0.04 10*3/MM3 (ref 0–0.2)
BASOPHILS NFR BLD AUTO: 0.5 % (ref 0–1.5)
BILIRUB SERPL-MCNC: 1.4 MG/DL (ref 0–1.2)
BILIRUB UR QL STRIP: NEGATIVE
BUN SERPL-MCNC: 14 MG/DL (ref 8–23)
BUN/CREAT SERPL: 16.5 (ref 7–25)
CALCIUM SERPL-MCNC: 9.7 MG/DL (ref 8.6–10.5)
CASTS URNS QL MICRO: NORMAL /LPF
CHLORIDE SERPL-SCNC: 101 MMOL/L (ref 98–107)
CHOLEST SERPL-MCNC: 167 MG/DL (ref 0–200)
CO2 SERPL-SCNC: 25.4 MMOL/L (ref 22–29)
COLOR UR: YELLOW
CREAT SERPL-MCNC: 0.85 MG/DL (ref 0.57–1)
EGFRCR SERPLBLD CKD-EPI 2021: 72.9 ML/MIN/1.73
EOSINOPHIL # BLD AUTO: 0.25 10*3/MM3 (ref 0–0.4)
EOSINOPHIL NFR BLD AUTO: 3.4 % (ref 0.3–6.2)
EPI CELLS #/AREA URNS HPF: NORMAL /HPF (ref 0–10)
ERYTHROCYTE [DISTWIDTH] IN BLOOD BY AUTOMATED COUNT: 12.6 % (ref 12.3–15.4)
GLOBULIN SER CALC-MCNC: 1.9 GM/DL
GLUCOSE SERPL-MCNC: 90 MG/DL (ref 65–99)
GLUCOSE UR QL STRIP: NEGATIVE
HCT VFR BLD AUTO: 41.3 % (ref 34–46.6)
HDLC SERPL-MCNC: 68 MG/DL (ref 40–60)
HGB BLD-MCNC: 13.7 G/DL (ref 12–15.9)
HGB UR QL STRIP: NEGATIVE
IMM GRANULOCYTES # BLD AUTO: 0.02 10*3/MM3 (ref 0–0.05)
IMM GRANULOCYTES NFR BLD AUTO: 0.3 % (ref 0–0.5)
KETONES UR QL STRIP: NEGATIVE
LDLC SERPL CALC-MCNC: 85 MG/DL (ref 0–100)
LEUKOCYTE ESTERASE UR QL STRIP: NEGATIVE
LYMPHOCYTES # BLD AUTO: 1.25 10*3/MM3 (ref 0.7–3.1)
LYMPHOCYTES NFR BLD AUTO: 16.8 % (ref 19.6–45.3)
MCH RBC QN AUTO: 31.2 PG (ref 26.6–33)
MCHC RBC AUTO-ENTMCNC: 33.2 G/DL (ref 31.5–35.7)
MCV RBC AUTO: 94.1 FL (ref 79–97)
MICRO URNS: NORMAL
MICRO URNS: NORMAL
MONOCYTES # BLD AUTO: 1.02 10*3/MM3 (ref 0.1–0.9)
MONOCYTES NFR BLD AUTO: 13.7 % (ref 5–12)
NEUTROPHILS # BLD AUTO: 4.84 10*3/MM3 (ref 1.7–7)
NEUTROPHILS NFR BLD AUTO: 65.3 % (ref 42.7–76)
NITRITE UR QL STRIP: NEGATIVE
NRBC BLD AUTO-RTO: 0 /100 WBC (ref 0–0.2)
PH UR STRIP: 7 [PH] (ref 5–7.5)
PLATELET # BLD AUTO: 228 10*3/MM3 (ref 140–450)
POTASSIUM SERPL-SCNC: 4.4 MMOL/L (ref 3.5–5.2)
PROT SERPL-MCNC: 6.6 G/DL (ref 6–8.5)
PROT UR QL STRIP: NORMAL
RBC # BLD AUTO: 4.39 10*6/MM3 (ref 3.77–5.28)
RBC #/AREA URNS HPF: NORMAL /HPF (ref 0–2)
SODIUM SERPL-SCNC: 139 MMOL/L (ref 136–145)
SP GR UR STRIP: 1.02 (ref 1–1.03)
TRIGL SERPL-MCNC: 72 MG/DL (ref 0–150)
TSH SERPL DL<=0.005 MIU/L-ACNC: 1.44 UIU/ML (ref 0.27–4.2)
URINALYSIS REFLEX: NORMAL
UROBILINOGEN UR STRIP-MCNC: 1 MG/DL (ref 0.2–1)
VLDLC SERPL CALC-MCNC: 14 MG/DL (ref 5–40)
WBC # BLD AUTO: 7.42 10*3/MM3 (ref 3.4–10.8)
WBC #/AREA URNS HPF: NORMAL /HPF (ref 0–5)

## 2023-02-24 ENCOUNTER — OFFICE VISIT (OUTPATIENT)
Dept: INTERNAL MEDICINE | Facility: CLINIC | Age: 73
End: 2023-02-24
Payer: MEDICARE

## 2023-02-24 VITALS
BODY MASS INDEX: 36.64 KG/M2 | RESPIRATION RATE: 16 BRPM | HEIGHT: 66 IN | WEIGHT: 228 LBS | SYSTOLIC BLOOD PRESSURE: 144 MMHG | HEART RATE: 48 BPM | DIASTOLIC BLOOD PRESSURE: 82 MMHG | OXYGEN SATURATION: 96 %

## 2023-02-24 DIAGNOSIS — Z12.4 ENCOUNTER FOR PAPANICOLAOU SMEAR FOR CERVICAL CANCER SCREENING: Primary | ICD-10-CM

## 2023-02-24 DIAGNOSIS — I48.0 PAF (PAROXYSMAL ATRIAL FIBRILLATION): ICD-10-CM

## 2023-02-24 DIAGNOSIS — I42.0 DILATED CARDIOMYOPATHY: ICD-10-CM

## 2023-02-24 PROCEDURE — G0439 PPPS, SUBSEQ VISIT: HCPCS | Performed by: INTERNAL MEDICINE

## 2023-02-24 PROCEDURE — 1170F FXNL STATUS ASSESSED: CPT | Performed by: INTERNAL MEDICINE

## 2023-02-24 PROCEDURE — 99213 OFFICE O/P EST LOW 20 MIN: CPT | Performed by: INTERNAL MEDICINE

## 2023-02-24 PROCEDURE — 96160 PT-FOCUSED HLTH RISK ASSMT: CPT | Performed by: INTERNAL MEDICINE

## 2023-02-24 PROCEDURE — 1159F MED LIST DOCD IN RCRD: CPT | Performed by: INTERNAL MEDICINE

## 2023-02-24 RX ORDER — SPIRONOLACTONE 25 MG/1
25 TABLET ORAL DAILY
Qty: 90 TABLET | Refills: 3 | Status: SHIPPED | OUTPATIENT
Start: 2023-02-24

## 2023-02-24 NOTE — PROGRESS NOTES
The ABCs of the Annual Wellness Visit  Subsequent Medicare Wellness Visit    Subjective    Nessa Osuna is a 72 y.o. female who presents for a Subsequent Medicare Wellness Visit.    The following portions of the patient's history were reviewed and   updated as appropriate: allergies, current medications, past family history, past medical history, past social history, past surgical history and problem list.    Compared to one year ago, the patient feels her physical   health is the same.    Compared to one year ago, the patient feels her mental   health is the same.    Recent Hospitalizations:  She was not admitted to the hospital during the last year.       Current Medical Providers:  Patient Care Team:  Jayne Guerra MD as PCP - General  Adamaris Blue MD as Consulting Physician (Cardiology)  Letty Wells MD as Consulting Physician (Dermatology)  Wilfrid Ye MD as Consulting Physician (Urology)    Outpatient Medications Prior to Visit   Medication Sig Dispense Refill   • acetaminophen (TYLENOL) 500 MG tablet Take 500 mg by mouth Every Night. Take 1 tablet every 4-6 hours as needed     • albuterol sulfate  (90 Base) MCG/ACT inhaler 2 puffs Every 4 (Four) Hours As Needed.     • apixaban (ELIQUIS) 5 MG tablet tablet Take 1 tablet by mouth 2 (Two) Times a Day. 180 tablet 3   • atenolol (TENORMIN) 100 MG tablet TAKE ONE TABLET BY MOUTH TWICE A  tablet 3   • Breo Ellipta 100-25 MCG/INH inhaler Inhale 1 puff Daily.     • Cholecalciferol (VITAMIN D) 2000 UNITS capsule Take 1 capsule by mouth daily.     • doxycycline (MONODOX) 100 MG capsule Take 1 capsule by mouth 2 (Two) Times a Day. 20 capsule 0   • DULoxetine (CYMBALTA) 60 MG capsule TAKE ONE CAPSULE BY MOUTH DAILY 90 capsule 3   • Flaxseed, Linseed, (FLAXSEED OIL) 1000 MG capsule Take 1 capsule by mouth Daily.     • fluticasone (FLONASE) 50 MCG/ACT nasal spray 2 sprays into the nostril(s) as directed by provider Daily.     •  gabapentin (NEURONTIN) 300 MG capsule Take 1 capsule by mouth 3 (Three) Times a Day. 21 capsule 0   • hyoscyamine (ANASPAZ,LEVSIN) 0.125 MG tablet TAKE ONE TABLET BY MOUTH EVERY 6 HOURS AS NEEDED FOR CRAMPING OR DIARRHEA (ABDOMINAL PAIN) 60 tablet 0   • montelukast (SINGULAIR) 10 MG tablet TAKE ONE TABLET BY MOUTH ONCE NIGHTLY 90 tablet 0   • omeprazole (priLOSEC) 20 MG capsule TAKE ONE CAPSULE BY MOUTH DAILY 90 capsule 2   • simvastatin (ZOCOR) 20 MG tablet TAKE ONE TABLET BY MOUTH DAILY 90 tablet 0   • spironolactone (ALDACTONE) 25 MG tablet Take 0.5 tablets by mouth Daily. 30 tablet 11   • fluticasone (FLONASE) 50 MCG/ACT nasal spray PLACE TWO SPRAYS IN EACH NOSTRIL ONCE DAILY 1 bottle 4   • Hydrocort-Pramoxine, Perianal, (ANALPRAM-HC) 2.5-1 % rectal cream        No facility-administered medications prior to visit.       No opioid medication identified on active medication list. I have reviewed chart for other potential  high risk medication/s and harmful drug interactions in the elderly.          Aspirin is not on active medication list.  Aspirin use is not indicated based on review of current medical condition/s. Risk of harm outweighs potential benefits.  .    Patient Active Problem List   Diagnosis   • Hyperlipidemia   • Osteoarthritis   • Depression   • Osteopenia   • Sciatica   • DDD (degenerative disc disease), lumbar   • Persistent atrial fibrillation   • Vitamin D deficiency   • Spinal stenosis of lumbar region with neurogenic claudication   • Spondylolisthesis at L4-L5 level   • Cardiomyopathy (HCC)   • Diverticulosis   • CHEN on CPAP   • Class 2 obesity due to excess calories with body mass index (BMI) of 36.0 to 36.9 in adult   • PAF (paroxysmal atrial fibrillation) (HCC)   • LLQ pain   • Abnormal CT scan, sigmoid colon   • GI bleed   • Blood in stool     Advance Care Planning  Advance Directive is on file.  ACP discussion was held with the patient during this visit. Patient has an advance directive in  "EMR which is still valid.      Objective    Vitals:    23 1351   BP: 144/82   BP Location: Left arm   Patient Position: Sitting   Cuff Size: Large Adult   Pulse: (!) 48   Resp: 16   SpO2: 96%   Weight: 103 kg (228 lb)   Height: 167.6 cm (65.98\")     Estimated body mass index is 36.82 kg/m² as calculated from the following:    Height as of this encounter: 167.6 cm (65.98\").    Weight as of this encounter: 103 kg (228 lb).    Class 2 Severe Obesity (BMI >=35 and <=39.9). Obesity-related health conditions include the following: hypertension, dyslipidemias and osteoarthritis. Obesity is unchanged. BMI is is above average; BMI management plan is completed. We discussed portion control and increasing exercise.      Does the patient have evidence of cognitive impairment? No    Lab Results   Component Value Date    CHLPL 167 02/15/2023    TRIG 72 02/15/2023    HDL 68 (H) 02/15/2023    LDL 85 02/15/2023    VLDL 14 02/15/2023        HEALTH RISK ASSESSMENT    Smoking Status:  Social History     Tobacco Use   Smoking Status Former   • Packs/day: 1.50   • Years: 35.00   • Pack years: 52.50   • Types: Cigarettes   • Start date: 1962   • Quit date: 1997   • Years since quittin.1   Smokeless Tobacco Never     Alcohol Consumption:  Social History     Substance and Sexual Activity   Alcohol Use Yes    Comment: rarely drink, don't drink every week. maybe 25 drinks a year     Fall Risk Screen:    STEADI Fall Risk Assessment was completed, and patient is at LOW risk for falls.Assessment completed on:2023    Depression Screening:  PHQ-2/PHQ-9 Depression Screening 2023   Little Interest or Pleasure in Doing Things 0-->not at all   Feeling Down, Depressed or Hopeless 0-->not at all   PHQ-9: Brief Depression Severity Measure Score 0       Health Habits and Functional and Cognitive Screening:  Functional & Cognitive Status 2023   Do you have difficulty preparing food and eating? No   Do you have difficulty " bathing yourself, getting dressed or grooming yourself? No   Do you have difficulty using the toilet? No   Do you have difficulty moving around from place to place? No   Do you have trouble with steps or getting out of a bed or a chair? No   Current Diet Unhealthy Diet   Dental Exam Up to date   Eye Exam Up to date   Exercise (times per week) 3 times per week   Current Exercises Include Stationary Bicycling/Spin Class   Current Exercise Activities Include -   Do you need help using the phone?  No   Are you deaf or do you have serious difficulty hearing?  No   Do you need help with transportation? No   Do you need help shopping? No   Do you need help preparing meals?  No   Do you need help with housework?  No   Do you need help with laundry? No   Do you need help taking your medications? No   Do you need help managing money? No   Do you ever drive or ride in a car without wearing a seat belt? No   Have you felt unusual stress, anger or loneliness in the last month? No   Who do you live with? Alone   If you need help, do you have trouble finding someone available to you? No   Have you been bothered in the last four weeks by sexual problems? -   Do you have difficulty concentrating, remembering or making decisions? Yes       Age-appropriate Screening Schedule:  Refer to the list below for future screening recommendations based on patient's age, sex and/or medical conditions. Orders for these recommended tests are listed in the plan section. The patient has been provided with a written plan.    Health Maintenance   Topic Date Due   • ANNUAL WELLNESS VISIT  01/11/2023   • LIPID PANEL  02/15/2024   • MAMMOGRAM  04/07/2024   • DXA SCAN  11/01/2024   • COLORECTAL CANCER SCREENING  06/17/2027   • TDAP/TD VACCINES (5 - Td or Tdap) 05/24/2028   • HEPATITIS C SCREENING  Completed   • COVID-19 Vaccine  Completed   • INFLUENZA VACCINE  Completed   • Pneumococcal Vaccine 65+  Completed   • ZOSTER VACCINE  Completed           "      CMS Preventative Services Quick Reference  Risk Factors Identified During Encounter  None Identified  The above risks/problems have been discussed with the patient.  Pertinent information has been shared with the patient in the After Visit Summary.  An After Visit Summary and PPPS were made available to the patient.    Follow Up:   Next Medicare Wellness visit to be scheduled in 1 year.       Additional E&M Note during same encounter follows:  Patient has multiple medical problems which are significant and separately identifiable that require additional work above and beyond the Medicare Wellness Visit.      Chief Complaint  Medicare Wellness-subsequent  htn  Hyperlipidemia  Bradly    Subjective        HPI  Nessa Osuna is also being seen today for htn, hyperlipidemia, bradly. Patient's bp is elevated in office today. She notes that she previously had pain in her \"gut\". llq is where she points. She notes this could be related to low back. She does note gi irritability. Normal bowel activity. No vomiting. Normal cscope 6/22. She has a history of gi bleed. She has not had recurrence. She is taking prilosec. No reflux symptoms.   bp eleveated today. lipds at goal.                Objective   Vital Signs:  /82 (BP Location: Left arm, Patient Position: Sitting, Cuff Size: Large Adult)   Pulse (!) 48   Resp 16   Ht 167.6 cm (65.98\")   Wt 103 kg (228 lb)   SpO2 96%   BMI 36.82 kg/m²     Physical Exam  Vitals and nursing note reviewed.   Constitutional:       Appearance: Normal appearance. She is well-developed.   HENT:      Head: Normocephalic and atraumatic.      Right Ear: Tympanic membrane and external ear normal.      Left Ear: Tympanic membrane and external ear normal.      Nose: Nose normal.      Mouth/Throat:      Mouth: Mucous membranes are moist.   Eyes:      Extraocular Movements: Extraocular movements intact.      Pupils: Pupils are equal, round, and reactive to light.   Cardiovascular:      " Rate and Rhythm: Normal rate and regular rhythm.      Pulses: Normal pulses.      Heart sounds: Normal heart sounds.   Pulmonary:      Effort: Pulmonary effort is normal. No respiratory distress.      Breath sounds: Normal breath sounds.   Abdominal:      General: Abdomen is flat.      Palpations: Abdomen is soft.   Genitourinary:     General: Normal vulva.      Rectum: Normal.   Musculoskeletal:         General: Normal range of motion.      Cervical back: Normal range of motion and neck supple.   Skin:     General: Skin is warm and dry.   Neurological:      General: No focal deficit present.      Mental Status: She is alert and oriented to person, place, and time.   Psychiatric:         Mood and Affect: Mood normal.         Behavior: Behavior normal.         Thought Content: Thought content normal.         Judgment: Judgment normal.          The following data was reviewed by: Jayne Guerra MD on 02/24/2023:  CMP    CMP 8/2/22 11/17/22 2/15/23   Glucose 86 76 90   BUN 13 16 14   Creatinine 0.68 0.81 0.85   Sodium 142 143 139   Potassium 4.3 4.1 4.4   Chloride 105 105 101   Calcium 9.2 9.6 9.7   Total Protein 6.4  6.6   Albumin 4.2  4.7   Globulin 2.2  1.9   Total Bilirubin 1.2  1.4 (A)   Alkaline Phosphatase 75  84   AST (SGOT) 22  21   ALT (SGPT) 16  16   BUN/Creatinine Ratio 19 19.8 16.5   (A) Abnormal value            CBC w/diff    CBC w/Diff 6/17/22 6/17/22 8/2/22 2/15/23    0545 1231     WBC 5.81  4.3 7.42   RBC 3.24 (A)  3.86 4.39   Hemoglobin 10.1 (A) 11.0 (A) 11.7 13.7   Hematocrit 29.3 (A) 33.2 (A) 35.0 41.3   MCV 90.4  91 94.1   MCH 31.2  30.3 31.2   MCHC 34.5  33.4 33.2   RDW 12.7  12.3 12.6   Platelets 181  232 228   Neutrophil Rel %   57 65.3   Lymphocyte Rel %   23 16.8 (A)   Monocyte Rel %   13 13.7 (A)   Eosinophil Rel %   6 3.4   Basophil Rel %   1 0.5   (A) Abnormal value            Lipid Panel    Lipid Panel 8/2/22 2/15/23   Total Cholesterol 142 167   Triglycerides 60 72   HDL Cholesterol 53  68 (A)   VLDL Cholesterol 13 14   LDL Cholesterol  76 85   LDL/HDL Ratio 1.4    (A) Abnormal value       Comments are available for some flowsheets but are not being displayed.           TSH    TSH 2/15/23   TSH 1.440           UA    Urinalysis 2/15/23 2/15/23    1306 1306   Blood, UA Negative    Leukocytes, UA Negative    Nitrite, UA Negative    RBC, UA  None seen   Bacteria, UA  None seen           Data reviewed: GI studies cscope, egd, swallow study           Assessment and Plan   There are no diagnoses linked to this encounter.     patient w/ htn, dyslipiemia. She is to increase spironolactone to full tablet daily. She will continue simvastatin for lipid management. She is counseled extensively on lifestyle modification. Diet that is low in salt, sugar, and fats. Increase movement. Continue prilosec. Normal h/h no sign of gi bleed at this time. She will follow up here in 6 months or prn.         I spent 45 minutes caring for Nessa on this date of service. This time includes time spent by me in the following activities:preparing for the visit, reviewing tests, obtaining and/or reviewing a separately obtained history, performing a medically appropriate examination and/or evaluation , counseling and educating the patient/family/caregiver, ordering medications, tests, or procedures, referring and communicating with other health care professionals , documenting information in the medical record, independently interpreting results and communicating that information with the patient/family/caregiver and care coordination  Follow Up   No follow-ups on file.  Patient was given instructions and counseling regarding her condition or for health maintenance advice. Please see specific information pulled into the AVS if appropriate.

## 2023-03-02 LAB
CYTOLOGIST CVX/VAG CYTO: NORMAL
CYTOLOGY CVX/VAG DOC CYTO: NORMAL
CYTOLOGY CVX/VAG DOC THIN PREP: NORMAL
DX ICD CODE: NORMAL
HIV 1 & 2 AB SER-IMP: NORMAL
HPV GENOTYPE REFLEX: NORMAL
HPV I/H RISK 4 DNA CVX QL PROBE+SIG AMP: NEGATIVE
OTHER STN SPEC: NORMAL
STAT OF ADQ CVX/VAG CYTO-IMP: NORMAL

## 2023-03-08 RX ORDER — MONTELUKAST SODIUM 10 MG/1
TABLET ORAL
Qty: 90 TABLET | Refills: 0 | Status: SHIPPED | OUTPATIENT
Start: 2023-03-08

## 2023-03-08 RX ORDER — SIMVASTATIN 20 MG
TABLET ORAL
Qty: 90 TABLET | Refills: 0 | Status: SHIPPED | OUTPATIENT
Start: 2023-03-08

## 2023-05-09 ENCOUNTER — OFFICE VISIT (OUTPATIENT)
Dept: CARDIOLOGY | Facility: CLINIC | Age: 73
End: 2023-05-09
Payer: MEDICARE

## 2023-05-09 VITALS
SYSTOLIC BLOOD PRESSURE: 130 MMHG | HEART RATE: 81 BPM | DIASTOLIC BLOOD PRESSURE: 80 MMHG | WEIGHT: 229 LBS | BODY MASS INDEX: 36.8 KG/M2 | HEIGHT: 66 IN | OXYGEN SATURATION: 96 %

## 2023-05-09 DIAGNOSIS — E78.49 OTHER HYPERLIPIDEMIA: ICD-10-CM

## 2023-05-09 DIAGNOSIS — I42.0 DILATED CARDIOMYOPATHY: ICD-10-CM

## 2023-05-09 DIAGNOSIS — Z99.89 OSA ON CPAP: ICD-10-CM

## 2023-05-09 DIAGNOSIS — G47.33 OSA ON CPAP: ICD-10-CM

## 2023-05-09 DIAGNOSIS — E66.09 CLASS 2 OBESITY DUE TO EXCESS CALORIES WITH BODY MASS INDEX (BMI) OF 36.0 TO 36.9 IN ADULT, UNSPECIFIED WHETHER SERIOUS COMORBIDITY PRESENT: ICD-10-CM

## 2023-05-09 DIAGNOSIS — I48.19 PERSISTENT ATRIAL FIBRILLATION: Primary | ICD-10-CM

## 2023-05-09 NOTE — LETTER
May 9, 2023       No Recipients    Patient: Nessa Osuna   YOB: 1950   Date of Visit: 5/9/2023       Dear Jayne Guerra MD    Nessa Osuna was in my office today. Below is a copy of my note.    If you have questions, please do not hesitate to call me. I look forward to following Nessa along with you.         Sincerely,        Adamaris Blue MD        CC:   No Recipients        Subjective:     Encounter Date:05/09/2023     Patient ID: Nessa Osuna is a 73 y.o. female.    Chief Complaint:  History of Present Illness      ROS      Current Outpatient Medications:   •  acetaminophen (TYLENOL) 500 MG tablet, Take 1 tablet by mouth Every Night. Take 1 tablet every 4-6 hours as needed, Disp: , Rfl:   •  albuterol sulfate  (90 Base) MCG/ACT inhaler, 2 puffs Every 4 (Four) Hours As Needed., Disp: , Rfl:   •  apixaban (ELIQUIS) 5 MG tablet tablet, Take 1 tablet by mouth 2 (Two) Times a Day., Disp: 180 tablet, Rfl: 3  •  atenolol (TENORMIN) 100 MG tablet, TAKE ONE TABLET BY MOUTH TWICE A DAY, Disp: 180 tablet, Rfl: 3  •  Breo Ellipta 100-25 MCG/INH inhaler, Inhale 1 puff Daily., Disp: , Rfl:   •  Cholecalciferol (VITAMIN D) 2000 UNITS capsule, Take 1 capsule by mouth Daily., Disp: , Rfl:   •  doxycycline (MONODOX) 100 MG capsule, Take 1 capsule by mouth 2 (Two) Times a Day., Disp: 20 capsule, Rfl: 0  •  DULoxetine (CYMBALTA) 60 MG capsule, TAKE ONE CAPSULE BY MOUTH DAILY, Disp: 90 capsule, Rfl: 3  •  Flaxseed, Linseed, (FLAXSEED OIL) 1000 MG capsule, Take 1 capsule by mouth Daily., Disp: , Rfl:   •  fluticasone (FLONASE) 50 MCG/ACT nasal spray, 2 sprays into the nostril(s) as directed by provider Daily., Disp: , Rfl:   •  gabapentin (NEURONTIN) 300 MG capsule, Take 1 capsule by mouth 3 (Three) Times a Day., Disp: 21 capsule, Rfl: 0  •  hyoscyamine (ANASPAZ,LEVSIN) 0.125 MG tablet, TAKE ONE TABLET BY MOUTH EVERY 6 HOURS AS NEEDED FOR CRAMPING OR DIARRHEA (ABDOMINAL PAIN), Disp: 60  tablet, Rfl: 0  •  montelukast (SINGULAIR) 10 MG tablet, TAKE ONE TABLET BY MOUTH ONCE NIGHTLY, Disp: 90 tablet, Rfl: 0  •  omeprazole (priLOSEC) 20 MG capsule, TAKE ONE CAPSULE BY MOUTH DAILY, Disp: 90 capsule, Rfl: 2  •  simvastatin (ZOCOR) 20 MG tablet, TAKE ONE TABLET BY MOUTH DAILY, Disp: 90 tablet, Rfl: 0  •  spironolactone (ALDACTONE) 25 MG tablet, Take 1 tablet by mouth Daily., Disp: 90 tablet, Rfl: 3    Past Medical History:   Diagnosis Date   • A-fib 05/31/2017   • Abnormal ECG 2018, ck. chart    A-Fib   • Allergic 40+ yrs    unspecified   • Anemia 1993?   • Asthma 2022    COPD   • Breast nodule    • Cataract 5 - 6 yrs.    [id number]   • Chest wall mass    • Class 1 obesity due to excess calories without serious comorbidity with body mass index (BMI) of 32.0 to 32.9 in adult    • Closed fracture of head of metacarpal     left second   • Closed fracture of metacarpal bone     left   • Clotting disorder June 17, 2022    Bloody stool: admitted to hospital   • Colon polyp 2005    noted with colonoscopy   • COPD (chronic obstructive pulmonary disease)    • Coronary artery disease 2019    A-Fib   • DDD (degenerative disc disease), lumbar    • Depression    • Difficulty breathing    • Diverticulitis 2010   • Diverticulitis of colon 2000?   • Diverticulosis    • Fatigue    • GERD (gastroesophageal reflux disease)    • Headache minor/infrequent   • Hip pain, right    • HL (hearing loss)     not significant   • Hyperlipidemia    • Leiomyoma of uterus    • Low back pain    • CHEN on CPAP    • Osteoarthritis    • Osteopenia    • PAF (paroxysmal atrial fibrillation)    • Persistent atrial fibrillation    • PONV (postoperative nausea and vomiting)    • Primary localized osteoarthritis of hip    • Sciatica    • Skin cancer 2010   • Snoring    • Spinal stenosis    • Visual impairment 5-6 yrs.    retinal/cat surg       Past Surgical History:   Procedure Laterality Date   • ADENOIDECTOMY  1955   • CARDIOVERSION  05/28/2020     Dr. Wagner   • CATARACT EXTRACTION, BILATERAL     • COLONOSCOPY     • COLONOSCOPY N/A 01/10/2022    Procedure: COLONOSCOPY INTO CECUM AND TI WITH COLD BX POLYPECTOMIES;  Surgeon: Franci Kumar MD;  Location:  [facility];  Service: Gastroenterology;  Laterality: N/A;  PRE: LLQ PAIN, ABNORMAL CT  POST: SKIN TAGS, DIVERTICULOSIS, POLYPS, HEMORRHOIDS   • COLONOSCOPY N/A 2022    Procedure: COLONOSCOPY to cecum and TI;  Surgeon: Yasmani Bo MD;  Location:  [facility];  Service: Gastroenterology;  Laterality: N/A;  pre- GI bleed  post- hemorrhoids, diverticulosis   • COSMETIC SURGERY      nose/ mohs   • ENDOSCOPY N/A 2022    Procedure: ESOPHAGOGASTRODUODENOSCOPY with biopsies;  Surgeon: Yasmani Bo MD;  Location:  [facility];  Service: Gastroenterology;  Laterality: N/A;  pre- GI bleed  post- hiatal hernia, esophagitis, gastric polyp   • EYE SURGERY  retinal & cataract       • FLEXIBLE SIGMOIDOSCOPY  ?    • FRACTURE SURGERY  Left wrist       • MOHS SURGERY      chemosurgery (Mohs Micrographic Technique); Dr Jarrett and Dr Franco   • TRIANA'S NEUROMA EXCISION      single neuroma   • RECONSTRUCTION OF NOSE     • TONSILLECTOMY      age 5   • TONSILLECTOMY AND ADENOIDECTOMY     • UPPER GASTROINTESTINAL ENDOSCOPY  2022       Family History   Problem Relation Age of Onset   • Cancer Mother         bladder   • Osteoporosis Mother    • Heart failure Mother          @93 Congenital heart failure   • Arthritis Mother         lived 'til 93   • Hearing loss Mother         age related   • Vision loss Mother         macular degeneration   • Lung cancer Father    • Cancer Father         lung   • No Known Problems Sister    • Breast cancer Neg Hx        Social History     Tobacco Use   • Smoking status: Former     Packs/day: 1.50     Years: 35.00     Pack years: 52.50     Types: Cigarettes     Start date: 1962     Quit date: 1997     Years since  "quittin.3   • Smokeless tobacco: Never   Vaping Use   • Vaping Use: Never used   Substance Use Topics   • Alcohol use: Yes     Comment: rarely drink, don't drink every week. maybe 50 drinks a year   • Drug use: No       Procedures      Objective:     Visit Vitals  /80 (BP Location: Right arm, Patient Position: Sitting, Cuff Size: Adult)   Pulse 81   Ht 167.6 cm (66\")   Wt 104 kg (229 lb)   SpO2 96%   BMI 36.96 kg/m²        Physical Exam    Lab Review:      Assessment:         Diagnosis Plan   1. Persistent atrial fibrillation        2. Dilated cardiomyopathy        3. Other hyperlipidemia        4. Class 2 obesity due to excess calories with body mass index (BMI) of 36.0 to 36.9 in adult, unspecified whether serious comorbidity present        5. CHEN on CPAP              Plan:              "

## 2023-05-09 NOTE — PROGRESS NOTES
Subjective:     Encounter Date:05/09/2023      Patient ID: Nessa Osuna is a 73 y.o. female.    Chief Complaint:  History of Present Illness    This is a 73-year-old with hyperlipidemia, paroxysmal atrial fibrillation, cardiomyopathy, who presents for follow-up.     The patient presents today for follow-up.  This is her first office visit with me since 5/2020.  At that last office visit she was having issues with shortness of breath and fatigue.  She appeared to be in persistent atrial fibrillation at that time.  After discussion with Dr. Wagner switch her to atenolol 50 mg twice a day and proceeded with a cardioversion.  She was seen in follow-up with Dr. Wagner in 6/2020.  At that time the patient did not seem to really indicate much improvement in her symptoms when she was in sinus rhythm.  He recommended a sleep evaluation and placement of a ZIO monitor.  A ZIO monitor subsequently showed 54% burden of atrial fibrillation.  She has since been diagnosed with sleep apnea and started on CPAP.  She underwent a stress test in 1/2022 which showed no evidence of ischemia.  Repeat echocardiogram in 4/2022 showed normal left ventricular systolic function and wall motion with an EF of 60%, grade 2 diastolic dysfunction, biatrial enlargement, mild to moderate tricuspid valve regurgitation with severely elevated right ventricular systolic pressure of 55 mmHg.      She has since been diagnosed with COPD and started on treatment.  When she was last seen in the office by SHAYY Avelar she reported some improvement in her shortness of breath at this time.  At that office visit she was started on spironolactone 25 mg daily.    She returns today for follow-up.  She denies any significant change in her shortness of breath.  She denies any chest pain.  She feels like she is in atrial fibrillation about 40 to 50% of the time.  She does admit that she does not always know if she is in atrial fibrillation.  In fact  she did not realize she was in atrial fibrillation today.  She indicates she has been struggling with her CPAP lately and is planning on contacting her sleep physician.  She otherwise denies any significant chest pain, orthopnea, near-syncope or syncope, or worsening lower extremity edema.     Prior History:  I began following the patient when she was admitted to the hospital on 5/2017 with atrial fibrillation with rapid ventricular rate.  Patient reported at that time that she been having episodes lasting up to 2-3 hours over the last few years.  Episodes occurred about 8-9 times a year.  She underwent echocardiogram during that admission that showed normal left ventricular systolic function and wall motion with an ejection fraction of 57%, moderately to severely dilated left atrium, and no significant valvular disease.  I started on metoprolol tartrate 12.5 mg twice a day.  She is also started on rivaroxaban for her CHADS-VASc score of 2.      In 8/2019 she reported that the frequency of her atrial fibrillation episodes had increased.  For the most part the episodes do not last longer than about 10 or 15 minutes.  She was only taking an additional half tablet of metoprolol if the symptoms started at nighttime when she is trying to fall asleep.       In 3/2020 she complained of worsening dyspnea on exertion to Dr. Guerra.  She reported getting out of breath after walking a 1/2 block as opposed to the 2 miles she was walking in the past.  She was sent for an echocardiogram on 3/19/2020 that showed mildly depressed left ventricular systolic function with an EF of 40-45%, mild to moderate mitral and tricuspid regurgitation, biatrial enlargement and a right ventricular systolic pressure of 48 mmHg.       I called the patient at that time and recommended switching metoprolol tartrate to succinate 25 mg.  I recommended that we hold off on further work up due to he pandemic.  We had a telephone follow up appointment on  4/9 at which time she reported only minimal improvement in her symptoms.  There was concern at that time that she was now in persistent atrial fibrillation.  I recommended proceeding with a holter monitor.  This was performed on 4/16/2020 and showed persistent atrial fibrillation with an average heart rate of 112 bpm and a maximum heart rate of 176 bpm.  When I called the patient back I recommended increasing her metoprolol succinate to 25 mg twice a day.    Review of Systems   Constitutional: Negative for malaise/fatigue.   HENT: Negative for hearing loss, hoarse voice, nosebleeds and sore throat.    Eyes: Negative for pain.   Cardiovascular: Positive for chest pain, dyspnea on exertion, leg swelling and palpitations. Negative for claudication, cyanosis, irregular heartbeat, near-syncope, orthopnea, paroxysmal nocturnal dyspnea and syncope.   Respiratory: Negative for shortness of breath and snoring.    Endocrine: Negative for cold intolerance, heat intolerance, polydipsia, polyphagia and polyuria.   Skin: Negative for itching and rash.   Musculoskeletal: Negative for arthritis, falls, joint pain, joint swelling, muscle cramps, muscle weakness and myalgias.   Gastrointestinal: Negative for constipation, diarrhea, dysphagia, heartburn, hematemesis, hematochezia, melena, nausea and vomiting.   Genitourinary: Negative for frequency, hematuria and hesitancy.   Neurological: Negative for excessive daytime sleepiness, dizziness, headaches, light-headedness, numbness and weakness.   Psychiatric/Behavioral: Negative for depression. The patient is not nervous/anxious.          Current Outpatient Medications:   •  acetaminophen (TYLENOL) 500 MG tablet, Take 1 tablet by mouth Every Night. Take 1 tablet every 4-6 hours as needed, Disp: , Rfl:   •  albuterol sulfate  (90 Base) MCG/ACT inhaler, 2 puffs Every 4 (Four) Hours As Needed., Disp: , Rfl:   •  apixaban (ELIQUIS) 5 MG tablet tablet, Take 1 tablet by mouth 2 (Two)  Times a Day., Disp: 180 tablet, Rfl: 3  •  atenolol (TENORMIN) 100 MG tablet, TAKE ONE TABLET BY MOUTH TWICE A DAY, Disp: 180 tablet, Rfl: 3  •  Breo Ellipta 100-25 MCG/INH inhaler, Inhale 1 puff Daily., Disp: , Rfl:   •  Cholecalciferol (VITAMIN D) 2000 UNITS capsule, Take 1 capsule by mouth Daily., Disp: , Rfl:   •  doxycycline (MONODOX) 100 MG capsule, Take 1 capsule by mouth 2 (Two) Times a Day., Disp: 20 capsule, Rfl: 0  •  DULoxetine (CYMBALTA) 60 MG capsule, TAKE ONE CAPSULE BY MOUTH DAILY, Disp: 90 capsule, Rfl: 3  •  Flaxseed, Linseed, (FLAXSEED OIL) 1000 MG capsule, Take 1 capsule by mouth Daily., Disp: , Rfl:   •  fluticasone (FLONASE) 50 MCG/ACT nasal spray, 2 sprays into the nostril(s) as directed by provider Daily., Disp: , Rfl:   •  gabapentin (NEURONTIN) 300 MG capsule, Take 1 capsule by mouth 3 (Three) Times a Day., Disp: 21 capsule, Rfl: 0  •  hyoscyamine (ANASPAZ,LEVSIN) 0.125 MG tablet, TAKE ONE TABLET BY MOUTH EVERY 6 HOURS AS NEEDED FOR CRAMPING OR DIARRHEA (ABDOMINAL PAIN), Disp: 60 tablet, Rfl: 0  •  montelukast (SINGULAIR) 10 MG tablet, TAKE ONE TABLET BY MOUTH ONCE NIGHTLY, Disp: 90 tablet, Rfl: 0  •  omeprazole (priLOSEC) 20 MG capsule, TAKE ONE CAPSULE BY MOUTH DAILY, Disp: 90 capsule, Rfl: 2  •  simvastatin (ZOCOR) 20 MG tablet, TAKE ONE TABLET BY MOUTH DAILY, Disp: 90 tablet, Rfl: 0  •  spironolactone (ALDACTONE) 25 MG tablet, Take 1 tablet by mouth Daily., Disp: 90 tablet, Rfl: 3    Past Medical History:   Diagnosis Date   • A-fib 05/31/2017   • Abnormal ECG 2018, ck. chart    A-Fib   • Allergic 40+ yrs    unspecified   • Anemia 1993?   • Asthma 2022    COPD   • Breast nodule    • Cataract 5 - 6 yrs.    [id number]   • Chest wall mass    • Class 1 obesity due to excess calories without serious comorbidity with body mass index (BMI) of 32.0 to 32.9 in adult    • Closed fracture of head of metacarpal     left second   • Closed fracture of metacarpal bone     left   • Clotting disorder  June 17, 2022    Bloody stool: admitted to hospital   • Colon polyp 2005    noted with colonoscopy   • COPD (chronic obstructive pulmonary disease)    • Coronary artery disease 2019    A-Fib   • DDD (degenerative disc disease), lumbar    • Depression    • Difficulty breathing    • Diverticulitis 2010   • Diverticulitis of colon 2000?   • Diverticulosis    • Fatigue    • GERD (gastroesophageal reflux disease)    • Headache minor/infrequent   • Hip pain, right    • HL (hearing loss)     not significant   • Hyperlipidemia    • Leiomyoma of uterus    • Low back pain    • CHEN on CPAP    • Osteoarthritis    • Osteopenia    • PAF (paroxysmal atrial fibrillation)    • Persistent atrial fibrillation    • PONV (postoperative nausea and vomiting)    • Primary localized osteoarthritis of hip    • Sciatica    • Skin cancer 2010   • Snoring    • Spinal stenosis    • Visual impairment 5-6 yrs.    retinal/cat surg       Past Surgical History:   Procedure Laterality Date   • ADENOIDECTOMY  1955   • CARDIOVERSION  05/28/2020    Dr. Wagner   • CATARACT EXTRACTION, BILATERAL     • COLONOSCOPY  2005   • COLONOSCOPY N/A 01/10/2022    Procedure: COLONOSCOPY INTO CECUM AND TI WITH COLD BX POLYPECTOMIES;  Surgeon: Franci Kumar MD;  Location: [facility];  Service: Gastroenterology;  Laterality: N/A;  PRE: LLQ PAIN, ABNORMAL CT  POST: SKIN TAGS, DIVERTICULOSIS, POLYPS, HEMORRHOIDS   • COLONOSCOPY N/A 06/17/2022    Procedure: COLONOSCOPY to cecum and TI;  Surgeon: Yasmani Bo MD;  Location: [facility];  Service: Gastroenterology;  Laterality: N/A;  pre- GI bleed  post- hemorrhoids, diverticulosis   • COSMETIC SURGERY  2010    nose/ mohs   • ENDOSCOPY N/A 06/17/2022    Procedure: ESOPHAGOGASTRODUODENOSCOPY with biopsies;  Surgeon: Yasmani Bo MD;  Location: [facility];  Service: Gastroenterology;  Laterality: N/A;  pre- GI bleed  post- hiatal hernia, esophagitis, gastric polyp   • EYE SURGERY  retinal &  "cataract       • FLEXIBLE SIGMOIDOSCOPY  ?    • FRACTURE SURGERY  Left wrist       • MOHS SURGERY      chemosurgery (Mohs Micrographic Technique); Dr Jarrett and Dr Franco   • TRIANA'S NEUROMA EXCISION      single neuroma   • RECONSTRUCTION OF NOSE     • TONSILLECTOMY      age 5   • TONSILLECTOMY AND ADENOIDECTOMY     • UPPER GASTROINTESTINAL ENDOSCOPY  2022       Family History   Problem Relation Age of Onset   • Cancer Mother         bladder   • Osteoporosis Mother    • Heart failure Mother          @93 Congenital heart failure   • Arthritis Mother         lived 'til    • Hearing loss Mother         age related   • Vision loss Mother         macular degeneration   • Lung cancer Father    • Cancer Father         lung   • No Known Problems Sister    • Breast cancer Neg Hx        Social History     Tobacco Use   • Smoking status: Former     Packs/day: 1.50     Years: 35.00     Pack years: 52.50     Types: Cigarettes     Start date: 1962     Quit date: 1997     Years since quittin.3   • Smokeless tobacco: Never   Vaping Use   • Vaping Use: Never used   Substance Use Topics   • Alcohol use: Yes     Comment: rarely drink, don't drink every week. maybe 50 drinks a year   • Drug use: No         ECG 12 Lead    Date/Time: 2023 4:37 PM  Performed by: Adamaris Blue MD  Authorized by: Adamaris Blue MD   Comparison: compared with previous ECG   Similar to previous ECG  Rhythm: atrial fibrillation               Objective:     Visit Vitals  /80 (BP Location: Right arm, Patient Position: Sitting, Cuff Size: Adult)   Pulse 81   Ht 167.6 cm (66\")   Wt 104 kg (229 lb)   SpO2 96%   BMI 36.96 kg/m²         Constitutional:       Appearance: Normal appearance. Well-developed.   Eyes:      General: Lids are normal.      Conjunctiva/sclera: Conjunctivae normal.      Pupils: Pupils are equal, round, and reactive to light.   HENT:      Head: Normocephalic and atraumatic.   Neck:      " Vascular: No carotid bruit or JVD.      Lymphadenopathy: No cervical adenopathy.   Pulmonary:      Effort: Pulmonary effort is normal.      Breath sounds: Normal breath sounds.   Cardiovascular:      Normal rate. Irregularly irregular rhythm.      No gallop.   Pulses:     Radial: 2+ bilaterally.  Edema:     Peripheral edema absent.   Abdominal:      Palpations: Abdomen is soft.   Musculoskeletal:      Cervical back: Full passive range of motion without pain, normal range of motion and neck supple. Skin:     General: Skin is warm and dry.   Neurological:      Mental Status: Alert and oriented to person, place, and time.             Assessment:          Diagnosis Plan   1. Persistent atrial fibrillation        2. Dilated cardiomyopathy        3. Other hyperlipidemia        4. Class 2 obesity due to excess calories with body mass index (BMI) of 36.0 to 36.9 in adult, unspecified whether serious comorbidity present        5. CHEN on CPAP               Plan:         1.  Paroxysmal atrial fibrillation.  Previously paroxysmal.  She is in atrial fibrillation today and was also in atrial fibrillation when she saw SHAYY Avelar in 11/2022.  At this point is unclear if she is now in persistent atrial fibrillation or if she still paroxysmal which is happened to capture 2 days in A-fib.  It does not appear that she is consistently symptomatic with atrial fibrillation.  Rates are fairly well controlled.  She is on anticoagulation with apixaban.  Continue current management.  She has follow-up with Dr. Wagner later this year.  2.  History of cardiomyopathy.  Stress test was unremarkable in 1/2022.  Her EF has since normalized.  She is on medical management with atenolol and spironolactone.  No evidence of volume overload at this time.  3.  Hyperlipidemia.  On simvastatin.  4.  Obstructive sleep apnea.  Compliant with CPAP but she is struggling with her device.  Encouraged her to follow-up with her sleep physician.  5.   COPD.    She will keep her follow-up appointment Dr. Wagner later this year.  I will see her back again in 1 year or sooner if further issues arise.

## 2023-05-14 RX ORDER — CEPHALEXIN 500 MG/1
500 CAPSULE ORAL 2 TIMES DAILY
Qty: 14 CAPSULE | Refills: 0 | Status: SHIPPED | OUTPATIENT
Start: 2023-05-14

## 2023-05-15 RX ORDER — ATENOLOL 100 MG/1
TABLET ORAL
Qty: 180 TABLET | Refills: 3 | Status: SHIPPED | OUTPATIENT
Start: 2023-05-15

## 2023-08-06 DIAGNOSIS — M54.30 SCIATICA, UNSPECIFIED LATERALITY: ICD-10-CM

## 2023-08-07 RX ORDER — GABAPENTIN 300 MG/1
CAPSULE ORAL
Qty: 270 CAPSULE | Refills: 1 | Status: SHIPPED | OUTPATIENT
Start: 2023-08-07

## 2023-08-07 RX ORDER — DULOXETIN HYDROCHLORIDE 60 MG/1
CAPSULE, DELAYED RELEASE ORAL
Qty: 90 CAPSULE | Refills: 3 | Status: SHIPPED | OUTPATIENT
Start: 2023-08-07

## 2023-08-31 DIAGNOSIS — R79.89 ABNORMAL CBC: ICD-10-CM

## 2023-08-31 DIAGNOSIS — E78.49 OTHER HYPERLIPIDEMIA: Primary | ICD-10-CM

## 2023-09-06 LAB
ALBUMIN SERPL-MCNC: 4.5 G/DL (ref 3.5–5.2)
ALBUMIN/GLOB SERPL: 2.1 G/DL
ALP SERPL-CCNC: 68 U/L (ref 39–117)
ALT SERPL-CCNC: 19 U/L (ref 1–33)
AST SERPL-CCNC: 20 U/L (ref 1–32)
BASOPHILS # BLD AUTO: 0.06 10*3/MM3 (ref 0–0.2)
BASOPHILS NFR BLD AUTO: 1 % (ref 0–1.5)
BILIRUB SERPL-MCNC: 1 MG/DL (ref 0–1.2)
BUN SERPL-MCNC: 21 MG/DL (ref 8–23)
BUN/CREAT SERPL: 20.2 (ref 7–25)
CALCIUM SERPL-MCNC: 10 MG/DL (ref 8.6–10.5)
CHLORIDE SERPL-SCNC: 106 MMOL/L (ref 98–107)
CHOLEST SERPL-MCNC: 157 MG/DL (ref 0–200)
CO2 SERPL-SCNC: 24.2 MMOL/L (ref 22–29)
CREAT SERPL-MCNC: 1.04 MG/DL (ref 0.57–1)
EGFRCR SERPLBLD CKD-EPI 2021: 56.9 ML/MIN/1.73
EOSINOPHIL # BLD AUTO: 0.29 10*3/MM3 (ref 0–0.4)
EOSINOPHIL NFR BLD AUTO: 5 % (ref 0.3–6.2)
ERYTHROCYTE [DISTWIDTH] IN BLOOD BY AUTOMATED COUNT: 12.3 % (ref 12.3–15.4)
GLOBULIN SER CALC-MCNC: 2.1 GM/DL
GLUCOSE SERPL-MCNC: 94 MG/DL (ref 65–99)
HCT VFR BLD AUTO: 43.2 % (ref 34–46.6)
HDLC SERPL-MCNC: 48 MG/DL (ref 40–60)
HGB BLD-MCNC: 14.6 G/DL (ref 12–15.9)
IMM GRANULOCYTES # BLD AUTO: 0.01 10*3/MM3 (ref 0–0.05)
IMM GRANULOCYTES NFR BLD AUTO: 0.2 % (ref 0–0.5)
LDLC SERPL CALC-MCNC: 91 MG/DL (ref 0–100)
LYMPHOCYTES # BLD AUTO: 1.05 10*3/MM3 (ref 0.7–3.1)
LYMPHOCYTES NFR BLD AUTO: 18.2 % (ref 19.6–45.3)
MCH RBC QN AUTO: 31.6 PG (ref 26.6–33)
MCHC RBC AUTO-ENTMCNC: 33.8 G/DL (ref 31.5–35.7)
MCV RBC AUTO: 93.5 FL (ref 79–97)
MONOCYTES # BLD AUTO: 0.71 10*3/MM3 (ref 0.1–0.9)
MONOCYTES NFR BLD AUTO: 12.3 % (ref 5–12)
NEUTROPHILS # BLD AUTO: 3.66 10*3/MM3 (ref 1.7–7)
NEUTROPHILS NFR BLD AUTO: 63.3 % (ref 42.7–76)
NRBC BLD AUTO-RTO: 0 /100 WBC (ref 0–0.2)
PLATELET # BLD AUTO: 285 10*3/MM3 (ref 140–450)
POTASSIUM SERPL-SCNC: 4.7 MMOL/L (ref 3.5–5.2)
PROT SERPL-MCNC: 6.6 G/DL (ref 6–8.5)
RBC # BLD AUTO: 4.62 10*6/MM3 (ref 3.77–5.28)
SODIUM SERPL-SCNC: 140 MMOL/L (ref 136–145)
TRIGL SERPL-MCNC: 97 MG/DL (ref 0–150)
VLDLC SERPL CALC-MCNC: 18 MG/DL (ref 5–40)
WBC # BLD AUTO: 5.78 10*3/MM3 (ref 3.4–10.8)

## 2023-09-12 ENCOUNTER — OFFICE VISIT (OUTPATIENT)
Dept: INTERNAL MEDICINE | Facility: CLINIC | Age: 73
End: 2023-09-12
Payer: MEDICARE

## 2023-09-12 VITALS
OXYGEN SATURATION: 94 % | BODY MASS INDEX: 36.96 KG/M2 | SYSTOLIC BLOOD PRESSURE: 126 MMHG | HEIGHT: 66 IN | DIASTOLIC BLOOD PRESSURE: 66 MMHG | HEART RATE: 52 BPM | WEIGHT: 230 LBS

## 2023-09-12 DIAGNOSIS — I10 HYPERTENSION, UNSPECIFIED TYPE: ICD-10-CM

## 2023-09-12 DIAGNOSIS — M81.0 AGE-RELATED OSTEOPOROSIS WITHOUT CURRENT PATHOLOGICAL FRACTURE: ICD-10-CM

## 2023-09-12 DIAGNOSIS — I48.0 PAF (PAROXYSMAL ATRIAL FIBRILLATION): ICD-10-CM

## 2023-09-12 DIAGNOSIS — E78.49 OTHER HYPERLIPIDEMIA: Primary | ICD-10-CM

## 2023-09-12 DIAGNOSIS — E66.09 CLASS 2 OBESITY DUE TO EXCESS CALORIES WITH BODY MASS INDEX (BMI) OF 36.0 TO 36.9 IN ADULT, UNSPECIFIED WHETHER SERIOUS COMORBIDITY PRESENT: ICD-10-CM

## 2023-09-12 RX ORDER — FINASTERIDE 5 MG/1
TABLET, FILM COATED ORAL
COMMUNITY
Start: 2023-08-04

## 2023-09-12 RX ORDER — HYDROXYCHLOROQUINE SULFATE 200 MG/1
TABLET, FILM COATED ORAL
COMMUNITY
Start: 2023-08-04

## 2023-09-12 RX ORDER — ALENDRONATE SODIUM 70 MG/1
70 TABLET ORAL
Qty: 12 TABLET | Refills: 0 | Status: SHIPPED | OUTPATIENT
Start: 2023-09-12

## 2023-09-12 NOTE — PROGRESS NOTES
Chief Complaint   Patient presents with    Hyperlipidemia    Osteoporosis    Hypertension    Atrial Fibrillation       Hyperlipidemia    Osteoporosis     Nessa Osuna is a 73 y.o. female presents for follow up evaluation. Patient is doing well today.   Patient recently diagnosed w/ alopecia. She was started on finasteride and plaquenil for this. Patient also reports right hip and right knee pain after increased yard work. She is not interested in injections or physical therapy for this.   Has osteoporosis. She is taking vit d daily and consumes dietary calcium. Does not engage in weight bearing exercises. Patient notes that she is physically active as tolerated by knee and hip pain.       The following portions of the patient's history were reviewed and updated as appropriate: allergies, current medications, past family history, past medical history, past social history, past surgical history and problem list.  Current Outpatient Medications on File Prior to Visit   Medication Sig Dispense Refill    acetaminophen (TYLENOL) 500 MG tablet Take 1 tablet by mouth Every Night. Take 1 tablet every 4-6 hours as needed      albuterol sulfate  (90 Base) MCG/ACT inhaler 2 puffs Every 4 (Four) Hours As Needed.      apixaban (ELIQUIS) 5 MG tablet tablet Take 1 tablet by mouth 2 (Two) Times a Day. 180 tablet 3    atenolol (TENORMIN) 100 MG tablet TAKE ONE TABLET BY MOUTH TWICE A  tablet 3    Breo Ellipta 100-25 MCG/INH inhaler Inhale 1 puff Daily.      DULoxetine (CYMBALTA) 60 MG capsule TAKE ONE CAPSULE BY MOUTH DAILY 90 capsule 3    finasteride (PROSCAR) 5 MG tablet       Flaxseed, Linseed, (FLAXSEED OIL) 1000 MG capsule Take 1 capsule by mouth Daily.      fluticasone (FLONASE) 50 MCG/ACT nasal spray 2 sprays into the nostril(s) as directed by provider Daily.      gabapentin (NEURONTIN) 300 MG capsule TAKE ONE CAPSULE BY MOUTH THREE TIMES A  capsule 1    hydroxychloroquine (PLAQUENIL) 200 MG tablet        hyoscyamine (ANASPAZ,LEVSIN) 0.125 MG tablet TAKE ONE TABLET BY MOUTH EVERY 6 HOURS AS NEEDED FOR CRAMPING OR DIARRHEA (ABDOMINAL PAIN) 60 tablet 0    montelukast (SINGULAIR) 10 MG tablet TAKE ONE TABLET BY MOUTH ONCE NIGHTLY 90 tablet 0    omeprazole (priLOSEC) 20 MG capsule TAKE ONE CAPSULE BY MOUTH DAILY 90 capsule 2    simvastatin (ZOCOR) 20 MG tablet TAKE ONE TABLET BY MOUTH DAILY 90 tablet 0    cephalexin (Keflex) 500 MG capsule Take 1 capsule by mouth 2 (Two) Times a Day. (Patient not taking: Reported on 9/12/2023) 14 capsule 0    Cholecalciferol (VITAMIN D) 2000 UNITS capsule Take 1 capsule by mouth Daily. (Patient not taking: Reported on 9/12/2023)      doxycycline (MONODOX) 100 MG capsule Take 1 capsule by mouth 2 (Two) Times a Day. (Patient not taking: Reported on 9/12/2023) 20 capsule 0    spironolactone (ALDACTONE) 25 MG tablet Take 1 tablet by mouth Daily. (Patient not taking: Reported on 9/12/2023) 90 tablet 3     No current facility-administered medications on file prior to visit.     Review of Systems   Constitutional: Negative.    HENT: Negative.     Eyes: Negative.    Respiratory: Negative.     Cardiovascular: Negative.    Gastrointestinal: Negative.      Objective   Physical Exam  Vitals and nursing note reviewed.   Constitutional:       Appearance: Normal appearance. She is well-developed.   HENT:      Head: Normocephalic and atraumatic.      Right Ear: Tympanic membrane and external ear normal.      Left Ear: Tympanic membrane and external ear normal.      Nose: Nose normal.      Mouth/Throat:      Mouth: Mucous membranes are moist.   Eyes:      Extraocular Movements: Extraocular movements intact.      Pupils: Pupils are equal, round, and reactive to light.   Cardiovascular:      Rate and Rhythm: Normal rate and regular rhythm.      Pulses: Normal pulses.      Heart sounds: Normal heart sounds.   Pulmonary:      Effort: Pulmonary effort is normal. No respiratory distress.      Breath  "sounds: Normal breath sounds.   Abdominal:      Palpations: Abdomen is soft.   Musculoskeletal:         General: Normal range of motion.      Cervical back: Normal range of motion and neck supple.   Skin:     General: Skin is warm and dry.   Neurological:      General: No focal deficit present.      Mental Status: She is alert and oriented to person, place, and time.   Psychiatric:         Mood and Affect: Mood normal.         Behavior: Behavior normal.         Thought Content: Thought content normal.         Judgment: Judgment normal.        /66   Pulse 52   Ht 167.6 cm (65.98\")   Wt 104 kg (230 lb)   SpO2 94%   BMI 37.14 kg/m²     Assessment & Plan   Diagnoses and all orders for this visit:    Other hyperlipidemia    PAF (paroxysmal atrial fibrillation)    Class 2 obesity due to excess calories with body mass index (BMI) of 36.0 to 36.9 in adult, unspecified whether serious comorbidity present    Age-related osteoporosis without current pathological fracture    Hypertension, unspecified type    Other orders  -     finasteride (PROSCAR) 5 MG tablet  -     hydroxychloroquine (PLAQUENIL) 200 MG tablet  -     Fluzone High-Dose 65+yrs (8622-2408)  -     alendronate (Fosamax) 70 MG tablet; Take 1 tablet by mouth Every 7 (Seven) Days.      Patient w/ hyperlipidemia. She will continue simvastatin. She has osteoporosis. She is to start fosamax 70 mg once weekly. Discussed possible gi side effects and she will call if experiencing any. She will continue current meds for atrial fib as she is rate controlled and is on eliquis. She is encouraged healthy activity and is to incorporate weight bearing activities. She will monitor bp at home. Consider reduction spironolactone or atenolol.          "

## 2023-10-06 RX ORDER — OMEPRAZOLE 20 MG/1
CAPSULE, DELAYED RELEASE ORAL
Qty: 90 CAPSULE | Refills: 2 | Status: SHIPPED | OUTPATIENT
Start: 2023-10-06

## 2023-10-09 RX ORDER — SIMVASTATIN 20 MG
20 TABLET ORAL DAILY
Qty: 90 TABLET | Refills: 0 | Status: SHIPPED | OUTPATIENT
Start: 2023-10-09

## 2023-10-09 RX ORDER — MONTELUKAST SODIUM 10 MG/1
TABLET ORAL
Qty: 90 TABLET | Refills: 0 | Status: SHIPPED | OUTPATIENT
Start: 2023-10-09

## 2023-11-15 ENCOUNTER — OFFICE VISIT (OUTPATIENT)
Age: 73
End: 2023-11-15
Payer: MEDICARE

## 2023-11-15 VITALS
HEART RATE: 119 BPM | WEIGHT: 230 LBS | BODY MASS INDEX: 36.96 KG/M2 | HEIGHT: 66 IN | SYSTOLIC BLOOD PRESSURE: 120 MMHG | DIASTOLIC BLOOD PRESSURE: 82 MMHG

## 2023-11-15 DIAGNOSIS — I48.0 PAF (PAROXYSMAL ATRIAL FIBRILLATION): Primary | ICD-10-CM

## 2023-11-15 DIAGNOSIS — I48.19 PERSISTENT ATRIAL FIBRILLATION: ICD-10-CM

## 2023-11-15 PROCEDURE — 99214 OFFICE O/P EST MOD 30 MIN: CPT | Performed by: INTERNAL MEDICINE

## 2023-11-15 PROCEDURE — 93000 ELECTROCARDIOGRAM COMPLETE: CPT | Performed by: INTERNAL MEDICINE

## 2023-11-15 NOTE — PROGRESS NOTES
Date of Office Visit: 11/15/2023  Encounter Provider: Buzz Wagner MD  Place of Service: [facility] CARDIOLOGY  Patient Name: Nessa Osuna  : 1950    Subjective:     Encounter Date:11/15/2023      Patient ID: Nessa Osuna is a 73 y.o. female who has a cc of  now perm AF and is on rate control.     She feels it some days.     Also has fatigue.     She feels the same vigor-wise as she did in May   No anginal chest pain,   Moderate la,   No soa,   No fainting,  No orthostasis.   No edema.   Exercise tolerance: she has terrible back and COPD     There have been no hospital admission since the last visit.     There have been no bleeding events.       Past Medical History:   Diagnosis Date    A-fib 2017    Abnormal ECG 2018, ck. chart    A-Fib    Allergic 40+ yrs    unspecified    Anemia ?    Asthma     COPD    Breast nodule     Cataract 5 - 6 yrs.    [id number]    Chest wall mass     Class 1 obesity due to excess calories without serious comorbidity with body mass index (BMI) of 32.0 to 32.9 in adult     Closed fracture of head of metacarpal     left second    Closed fracture of metacarpal bone     left    Clotting disorder 2022    Bloody stool: admitted to hospital    Colon polyp     noted with colonoscopy    COPD (chronic obstructive pulmonary disease)     Coronary artery disease     A-Fib    DDD (degenerative disc disease), lumbar     Depression     Difficulty breathing     Diverticulitis     Diverticulitis of colon ?    Diverticulosis     Fatigue     GERD (gastroesophageal reflux disease)     GI bleed 2022    Headache minor/infrequent    Hip pain, right     HL (hearing loss)     not significant    Hyperlipidemia     Leiomyoma of uterus     Low back pain     CHEN on CPAP     Osteoarthritis     Osteopenia     PAF (paroxysmal atrial fibrillation)     Persistent atrial fibrillation     PONV (postoperative nausea and vomiting)     Primary  localized osteoarthritis of hip     Sciatica     Skin cancer 2010    Snoring     Spinal stenosis     Visual impairment 5-6 yrs.    retinal/cat surg       Social History     Socioeconomic History    Marital status: Single    Number of children: 0    Years of education: 14    Highest education level: Not asked   Tobacco Use    Smoking status: Former     Packs/day: 1.50     Years: 35.00     Additional pack years: 0.00     Total pack years: 52.50     Types: Cigarettes     Start date: 1962     Quit date: 1997     Years since quittin.8     Passive exposure: Never    Smokeless tobacco: Never   Vaping Use    Vaping Use: Never used   Substance and Sexual Activity    Alcohol use: Yes     Comment: rarely drink, don't drink every week. maybe 50 drinks a year    Drug use: No    Sexual activity: Not Currently     Partners: Male     Birth control/protection: Condom, Birth control pill     Comment: pills       Family History   Problem Relation Age of Onset    Cancer Mother         bladder    Osteoporosis Mother     Heart failure Mother          @93 Congenital heart failure    Arthritis Mother         lived 'til 93    Hearing loss Mother         age related    Vision loss Mother         macular degeneration    Lung cancer Father     Cancer Father         lung    No Known Problems Sister     Breast cancer Neg Hx        Review of Systems   Constitutional: Negative for fever and night sweats.   HENT:  Negative for ear pain and stridor.    Eyes:  Negative for discharge and visual halos.   Cardiovascular:  Negative for cyanosis.   Respiratory:  Negative for hemoptysis and sputum production.    Hematologic/Lymphatic: Negative for adenopathy.   Skin:  Negative for nail changes and unusual hair distribution.   Musculoskeletal:  Positive for arthritis and joint pain. Negative for gout and joint swelling.   Gastrointestinal:  Negative for bowel incontinence and flatus.   Genitourinary:  Negative for dysuria and flank pain.  "  Neurological:  Negative for seizures and tremors.   Psychiatric/Behavioral:  Negative for altered mental status. The patient is not nervous/anxious.             Objective:     Vitals:    11/15/23 1202   BP: 120/82   Pulse: 119   Weight: 104 kg (230 lb)   Height: 167.6 cm (66\")         Eyes:      General:         Right eye: No discharge.         Left eye: No discharge.   HENT:      Head: Normocephalic and atraumatic.   Neck:      Thyroid: No thyromegaly.      Vascular: No JVD.   Pulmonary:      Effort: Pulmonary effort is normal.      Breath sounds: Normal breath sounds. No rales.   Cardiovascular:      Normal rate. Irregularly irregular rhythm.      No gallop.    Edema:     Peripheral edema absent.   Abdominal:      General: Bowel sounds are normal.      Palpations: Abdomen is soft.      Tenderness: There is no abdominal tenderness.   Musculoskeletal: Normal range of motion.         General: No deformity. Skin:     General: Skin is warm and dry.      Findings: No erythema.   Neurological:      Mental Status: Alert and oriented to person, place, and time.      Motor: Normal muscle tone.   Psychiatric:         Behavior: Behavior normal.         Thought Content: Thought content normal.           ECG 12 Lead    Date/Time: 11/15/2023 12:19 PM  Performed by: Buzz Wagner MD    Authorized by: Buzz Wagner MD  Comparison: compared with previous ECG   Similar to previous ECG  Rhythm: atrial fibrillation          Lab Review:       Assessment:          Diagnosis Plan   1. PAF (paroxysmal atrial fibrillation)        2. Persistent atrial fibrillation               Plan:     She has control of symptoms from AF    She is on rate control and this is the best choice.     I am glad she feels ok     She can stay on a-ban     We will see prn       "

## 2023-11-30 RX ORDER — APIXABAN 5 MG/1
5 TABLET, FILM COATED ORAL 2 TIMES DAILY
Qty: 180 TABLET | Refills: 3 | Status: SHIPPED | OUTPATIENT
Start: 2023-11-30

## 2023-12-11 DIAGNOSIS — M54.30 SCIATICA, UNSPECIFIED LATERALITY: ICD-10-CM

## 2023-12-12 RX ORDER — ALENDRONATE SODIUM 70 MG/1
TABLET ORAL
Qty: 12 TABLET | Refills: 2 | Status: SHIPPED | OUTPATIENT
Start: 2023-12-12

## 2023-12-12 RX ORDER — MONTELUKAST SODIUM 10 MG/1
TABLET ORAL
Qty: 90 TABLET | Refills: 0 | Status: SHIPPED | OUTPATIENT
Start: 2023-12-12

## 2023-12-12 RX ORDER — GABAPENTIN 300 MG/1
CAPSULE ORAL
Qty: 270 CAPSULE | Refills: 1 | Status: SHIPPED | OUTPATIENT
Start: 2023-12-12

## 2023-12-12 RX ORDER — SIMVASTATIN 20 MG
20 TABLET ORAL DAILY
Qty: 90 TABLET | Refills: 0 | Status: SHIPPED | OUTPATIENT
Start: 2023-12-12

## 2024-01-12 ENCOUNTER — TELEPHONE (OUTPATIENT)
Dept: INTERNAL MEDICINE | Facility: CLINIC | Age: 74
End: 2024-01-12

## 2024-01-12 DIAGNOSIS — Z12.31 ENCOUNTER FOR SCREENING MAMMOGRAM FOR BREAST CANCER: Primary | ICD-10-CM

## 2024-02-13 RX ORDER — SPIRONOLACTONE 25 MG/1
25 TABLET ORAL DAILY
Qty: 90 TABLET | Refills: 3 | Status: SHIPPED | OUTPATIENT
Start: 2024-02-13

## 2024-04-04 RX ORDER — SIMVASTATIN 20 MG
20 TABLET ORAL DAILY
Qty: 90 TABLET | Refills: 0 | Status: SHIPPED | OUTPATIENT
Start: 2024-04-04

## 2024-04-04 RX ORDER — MONTELUKAST SODIUM 10 MG/1
TABLET ORAL
Qty: 90 TABLET | Refills: 0 | Status: SHIPPED | OUTPATIENT
Start: 2024-04-04

## 2024-05-21 ENCOUNTER — OFFICE VISIT (OUTPATIENT)
Dept: CARDIOLOGY | Facility: CLINIC | Age: 74
End: 2024-05-21
Payer: MEDICARE

## 2024-05-21 VITALS
WEIGHT: 230 LBS | DIASTOLIC BLOOD PRESSURE: 78 MMHG | HEIGHT: 66 IN | BODY MASS INDEX: 36.96 KG/M2 | SYSTOLIC BLOOD PRESSURE: 140 MMHG | HEART RATE: 58 BPM

## 2024-05-21 DIAGNOSIS — I42.0 DILATED CARDIOMYOPATHY: Primary | ICD-10-CM

## 2024-05-21 DIAGNOSIS — I48.0 PAF (PAROXYSMAL ATRIAL FIBRILLATION): ICD-10-CM

## 2024-05-21 DIAGNOSIS — E78.49 OTHER HYPERLIPIDEMIA: ICD-10-CM

## 2024-05-21 NOTE — PROGRESS NOTES
Subjective:     Encounter Date:05/21/2024      Patient ID: Nessa Osuna is a 74 y.o. female.    Chief Complaint:follow up PAF, cardiomyopathy  History of Present Illness  This is a 54-year-old female who follows with Dr. Blue and is new to me today.  She follows with Dr. Wagner as well.  She has a past medical history of hyperlipidemia, paroxysmal atrial fibrillation, cardiomyopathy.    She is here today for follow-up visit.  She says every once in a while when sitting she will get a wave of dizziness.  This happens about once or twice a day and last about 10 to 15 seconds at most.  She feels like she gets lightheaded, not like the room is spinning.  She denies any syncope.  She denies any chest pain, palpitations or dyspnea on exertion.  She does have some shortness of breath due to her COPD and Breo has helped with this.  She does not require an inhaler at this point.  She says she does not walk like she used to due to her shortness of breath.  She has swelling in her legs that is overall stable.  She does wear compression socks.  She denies orthopnea or PND.  She is pretty compliant with wearing her CPAP nightly.  She does not monitor her blood pressure at home but she does have a blood pressure cuff.    Prior history:  Dr. Blue began following the patient in May 2017 when she presented with atrial fibrillation with rapid ventricular rate.  She underwent an echocardiogram that showed normal left ventricular systolic function and wall motion with an ejection fraction of 57%, moderately to severely dilated left atrium, and no significant valvular disease.  He was started on metoprolol tartrate 12.5 mg twice a day and rivaroxaban for her [id number] score of 2.    In August 2019 she reported that the frequency of her atrial fibrillation episodes had increased.  She was taking an additional half tablet of metoprolol if the symptoms started at nighttime when she was trying to fall asleep.    In March  2020 she reported worsening dyspnea on exertion to her PCP.  An echocardiogram was performed that showed mildly depressed left ventricular systolic function with an EF of 40-45%, mild to moderate mitral and tricuspid regurgitation, biatrial enlargement and a right ventricular systolic pressure of 48 mmHg.   At time Dr. Blue switched her metoprolol to tartrate to succinate 25 mg daily.  A telephone follow-up appointment was performed due to the pandemic and at that time she reported only minimal improvement in her symptoms.  There was concern that she was now in persistent atrial fibrillation.  A Holter monitor was placed that showed persistent atrial fibrillation with an average rate of 112 bpm and a max heart rate of 176.  Her metoprolol succinate was increased to 25 mg twice a day.    She continued to struggle with shortness of breath and fatigue.  She was referred to Dr. Wagner who recommended switching her to atenolol 50 mg twice a day and proceed with a cardioversion.  At follow-up following her cardioversion she reported that there was not much improvement in her symptoms even though she was maintaining sinus rhythm.  A ZIO was placed that showed 54% burden of atrial fibrillation.  She was also referred for sleep evaluation was diagnosed with sleep apnea and started on CPAP therapy.  She underwent a stress test that showed no evidence of ischemia.  A repeat echocardiogram was performed that showed normal left ventricular systolic function and wall motion with an EF of 60%, grade 2 diastolic dysfunction, biatrial enlargement, mild to moderate tricuspid valve regurgitation with severely elevated right ventricular systolic pressure of 55 mmHg.     She was then diagnosed with COPD and started on treatment for this.  I had a follow-up visit with SHAYY Avelar she reported some improvement in her shortness of breath.  At that visit she was started on spironolactone 25 mg daily.    She was last seen by   Mirza in May 2023 at which time she denied any significant change in her shortness of breath.  She reported feeling like she was in atrial fibrillation about 40 to 50% of the time.  No changes were made at that visit.    She then saw Dr. Wagner in November 2023 and was doing well.  No changes were made and he said that they would see her on an as needed basis.  I have reviewed and updated as appropriate allergies, current medications, past family history, past medical history, past surgical history and problem list.    Review of Systems   Constitutional: Positive for malaise/fatigue. Negative for fever, weight gain and weight loss.   HENT:  Negative for congestion, hoarse voice and sore throat.    Eyes:  Negative for blurred vision and double vision.   Cardiovascular:  Positive for leg swelling and palpitations. Negative for chest pain, dyspnea on exertion, orthopnea and syncope.   Respiratory:  Negative for cough, shortness of breath and wheezing.    Gastrointestinal:  Negative for abdominal pain, hematemesis, hematochezia and melena.   Genitourinary:  Negative for dysuria and hematuria.   Neurological:  Negative for dizziness, headaches, light-headedness and numbness.   Psychiatric/Behavioral:  Negative for depression. The patient is not nervous/anxious.          Current Outpatient Medications:     acetaminophen (TYLENOL) 500 MG tablet, Take 1 tablet by mouth Every Night. Take 1 tablet every 4-6 hours as needed, Disp: , Rfl:     albuterol sulfate  (90 Base) MCG/ACT inhaler, 2 puffs Every 4 (Four) Hours As Needed., Disp: , Rfl:     alendronate (FOSAMAX) 70 MG tablet, TAKE 1 TABLET BY MOUTH ONCE WEEKLY ON AN EMPTY STOMACH BEFORE BREAKFAST. REMAIN UPRIGHT FOR 30 MINUTES AND TAKE WITH 8 OUNCES OF WATER, Disp: 12 tablet, Rfl: 2    atenolol (TENORMIN) 100 MG tablet, TAKE ONE TABLET BY MOUTH TWICE A DAY, Disp: 180 tablet, Rfl: 3    Breo Ellipta 100-25 MCG/INH inhaler, Inhale 1 puff Daily., Disp: , Rfl:      Cholecalciferol (VITAMIN D) 2000 UNITS capsule, Take 1 capsule by mouth Daily., Disp: , Rfl:     DULoxetine (CYMBALTA) 60 MG capsule, TAKE ONE CAPSULE BY MOUTH DAILY, Disp: 90 capsule, Rfl: 3    Eliquis 5 MG tablet tablet, TAKE ONE TABLET BY MOUTH TWICE A DAY, Disp: 180 tablet, Rfl: 3    finasteride (PROSCAR) 5 MG tablet, , Disp: , Rfl:     Flaxseed, Linseed, (FLAXSEED OIL) 1000 MG capsule, Take 1 capsule by mouth Daily., Disp: , Rfl:     fluticasone (FLONASE) 50 MCG/ACT nasal spray, 2 sprays into the nostril(s) as directed by provider Daily., Disp: , Rfl:     gabapentin (NEURONTIN) 300 MG capsule, TAKE ONE CAPSULE BY MOUTH THREE TIMES A DAY, Disp: 270 capsule, Rfl: 1    hydroxychloroquine (PLAQUENIL) 200 MG tablet, , Disp: , Rfl:     hyoscyamine (ANASPAZ,LEVSIN) 0.125 MG tablet, TAKE ONE TABLET BY MOUTH EVERY 6 HOURS AS NEEDED FOR CRAMPING OR DIARRHEA (ABDOMINAL PAIN), Disp: 60 tablet, Rfl: 0    montelukast (SINGULAIR) 10 MG tablet, TAKE ONE TABLET BY MOUTH ONCE NIGHTLY, Disp: 90 tablet, Rfl: 0    omeprazole (priLOSEC) 20 MG capsule, TAKE ONE CAPSULE BY MOUTH DAILY, Disp: 90 capsule, Rfl: 2    simvastatin (ZOCOR) 20 MG tablet, TAKE 1 TABLET BY MOUTH DAILY, Disp: 90 tablet, Rfl: 0    spironolactone (ALDACTONE) 25 MG tablet, TAKE ONE TABLET BY MOUTH DAILY, Disp: 90 tablet, Rfl: 3    Past Medical History:   Diagnosis Date    A-fib 05/31/2017    Abnormal ECG 2018, ck. chart    A-Fib    Allergic 40+ yrs    unspecified    Anemia 1993?    Asthma 2022    COPD    Breast nodule     Cataract 5 - 6 yrs.    [id number]    Chest wall mass     Class 1 obesity due to excess calories without serious comorbidity with body mass index (BMI) of 32.0 to 32.9 in adult     Closed fracture of head of metacarpal     left second    Closed fracture of metacarpal bone     left    Clotting disorder June 17, 2022    Bloody stool: admitted to hospital    Colon polyp 2005    noted with colonoscopy    COPD (chronic obstructive pulmonary disease)      Coronary artery disease 2019    A-Fib    DDD (degenerative disc disease), lumbar     Depression     Difficulty breathing     Diverticulitis 2010    Diverticulitis of colon 2000?    Diverticulosis     Fatigue     GERD (gastroesophageal reflux disease)     GI bleed 06/16/2022    Headache minor/infrequent    Hip pain, right     HL (hearing loss)     not significant    Hyperlipidemia     Leiomyoma of uterus     Low back pain     CHEN on CPAP     Osteoarthritis     Osteopenia     PAF (paroxysmal atrial fibrillation)     Persistent atrial fibrillation     PONV (postoperative nausea and vomiting)     Primary localized osteoarthritis of hip     Sciatica     Skin cancer 2010    Snoring     Spinal stenosis     Visual impairment 5-6 yrs.    retinal/cat surg       Past Surgical History:   Procedure Laterality Date    ADENOIDECTOMY  1955    CARDIOVERSION  05/28/2020    Dr. Wagner    CATARACT EXTRACTION, BILATERAL      COLONOSCOPY  2005    COLONOSCOPY N/A 01/10/2022    Procedure: COLONOSCOPY INTO CECUM AND TI WITH COLD BX POLYPECTOMIES;  Surgeon: Franci Kumar MD;  Location:  [facility];  Service: Gastroenterology;  Laterality: N/A;  PRE: LLQ PAIN, ABNORMAL CT  POST: SKIN TAGS, DIVERTICULOSIS, POLYPS, HEMORRHOIDS    COLONOSCOPY N/A 06/17/2022    Procedure: COLONOSCOPY to cecum and TI;  Surgeon: Yasmani Bo MD;  Location:  [facility];  Service: Gastroenterology;  Laterality: N/A;  pre- GI bleed  post- hemorrhoids, diverticulosis    COSMETIC SURGERY  2010    nose/ mohs    ENDOSCOPY N/A 06/17/2022    Procedure: ESOPHAGOGASTRODUODENOSCOPY with biopsies;  Surgeon: Yasmani Bo MD;  Location: [facility];  Service: Gastroenterology;  Laterality: N/A;  pre- GI bleed  post- hiatal hernia, esophagitis, gastric polyp    EYE SURGERY  retinal & cataract    2020    FLEXIBLE SIGMOIDOSCOPY  ? 1995    FRACTURE SURGERY  Left wrist    2012    MOHS SURGERY      chemosurgery (Mohs Micrographic Technique);   "[facility] and Dr Joan [facility]'S NEUROMA EXCISION      single neuroma    RECONSTRUCTION OF NOSE      TONSILLECTOMY      age 5    TONSILLECTOMY AND ADENOIDECTOMY      UPPER GASTROINTESTINAL ENDOSCOPY  2022       Family History   Problem Relation Age of Onset    Cancer Mother         bladder    Osteoporosis Mother     Heart failure Mother          @93 Congenital heart failure    Arthritis Mother         lived 'til 93    Hearing loss Mother         age related    Vision loss Mother         macular degeneration    Lung cancer Father     Cancer Father         lung    No Known Problems Sister     Breast cancer Neg Hx        Social History     Tobacco Use    Smoking status: Former     Current packs/day: 0.00     Average packs/day: 1.5 packs/day for 35.0 years (52.5 ttl pk-yrs)     Types: Cigarettes     Start date: 1962     Quit date: 1997     Years since quittin.4     Passive exposure: Never    Smokeless tobacco: Never   Vaping Use    Vaping status: Never Used   Substance Use Topics    Alcohol use: Yes     Comment: rarely drink, don't drink every week. maybe 50 drinks a year    Drug use: No         ECG 12 Lead    Date/Time: 2024 2:17 PM  Performed by: Naima Chang APRN    Authorized by: Naima Chang APRN  Comparison: compared with previous ECG from 11/15/2023  Comparison to previous ECG: No longer in atrial fibrillation  Rhythm: sinus rhythm             Objective:     Visit Vitals  /78   Pulse 58   Ht 167.6 cm (66\")   Wt 104 kg (230 lb)   BMI 37.12 kg/m²             Physical Exam  Constitutional:       Appearance: Normal appearance. She is obese.   HENT:      Head: Normocephalic.   Neck:      Vascular: No carotid bruit.   Cardiovascular:      Rate and Rhythm: Normal rate and regular rhythm.      Chest Wall: PMI is not displaced.      Pulses: Normal pulses.           Radial pulses are 2+ on the right side and 2+ on the left side.        Posterior tibial pulses are 2+ on the right " side.      Heart sounds: Normal heart sounds. No murmur heard.     No friction rub. No gallop.   Pulmonary:      Effort: Pulmonary effort is normal.      Breath sounds: Normal breath sounds.   Abdominal:      General: Bowel sounds are normal. There is no distension.      Palpations: Abdomen is soft.   Musculoskeletal:      Right lower le+ Edema present.      Left lower le+ Edema present.   Skin:     General: Skin is warm and dry.      Capillary Refill: Capillary refill takes less than 2 seconds.   Neurological:      Mental Status: She is alert and oriented to person, place, and time.   Psychiatric:         Mood and Affect: Mood normal.         Behavior: Behavior normal.         Thought Content: Thought content normal.          Lab Review:   Lipid Panel          2023    13:13   Lipid Panel   Total Cholesterol 157    Triglycerides 97    HDL Cholesterol 48    VLDL Cholesterol 18    LDL Cholesterol  91          Cardiac Procedures:       Assessment:         Diagnoses and all orders for this visit:    1. Dilated cardiomyopathy (Primary)    2. Other hyperlipidemia    3. PAF (paroxysmal atrial fibrillation)    Other orders  -     ECG 12 Lead            Plan:       PAF: in sinus on EKG today. On Eliquis for anticoagulation and atenolol. No changes at this time.  Dizziness: This could possibly be her going in and out of afib. We did discuss placing a monitor again. She does not find the dizziness really bothersome at this point so we will hold off for now. If symptoms become worse, we can look at further workup with a monitor.  History of cardiomyopathy: Stress test in 2022 showed no evidence of ischemia. EF has normalized. On beta blocker and spironolactone. Appears compensated on exam.  HLD: on statin therapy  CHEN: on CPAP, mostly compliant  COPD    Thank you for allowing me to participate in this patient's care. Please call with any questions or concerns. Ms. Osuna will follow up with Dr. Blue in 6  months.          Your medication list            Accurate as of May 21, 2024 11:59 PM. If you have any questions, ask your nurse or doctor.                CONTINUE taking these medications        Instructions Last Dose Given Next Dose Due   acetaminophen 500 MG tablet  Commonly known as: TYLENOL      Take 1 tablet by mouth Every Night. Take 1 tablet every 4-6 hours as needed       albuterol sulfate  (90 Base) MCG/ACT inhaler  Commonly known as: PROVENTIL HFA;VENTOLIN HFA;PROAIR HFA      2 puffs Every 4 (Four) Hours As Needed.       alendronate 70 MG tablet  Commonly known as: FOSAMAX      TAKE 1 TABLET BY MOUTH ONCE WEEKLY ON AN EMPTY STOMACH BEFORE BREAKFAST. REMAIN UPRIGHT FOR 30 MINUTES AND TAKE WITH 8 OUNCES OF WATER       atenolol 100 MG tablet  Commonly known as: TENORMIN      TAKE ONE TABLET BY MOUTH TWICE A DAY       Breo Ellipta 100-25 MCG/INH aerosol powder   Generic drug: Fluticasone Furoate-Vilanterol      Inhale 1 puff Daily.       DULoxetine 60 MG capsule  Commonly known as: CYMBALTA      TAKE ONE CAPSULE BY MOUTH DAILY       Eliquis 5 MG tablet tablet  Generic drug: apixaban      TAKE ONE TABLET BY MOUTH TWICE A DAY       finasteride 5 MG tablet  Commonly known as: PROSCAR           Flaxseed Oil 1000 MG capsule      Take 1 capsule by mouth Daily.       fluticasone 50 MCG/ACT nasal spray  Commonly known as: FLONASE      2 sprays into the nostril(s) as directed by provider Daily.       gabapentin 300 MG capsule  Commonly known as: NEURONTIN      TAKE ONE CAPSULE BY MOUTH THREE TIMES A DAY       hydroxychloroquine 200 MG tablet  Commonly known as: PLAQUENIL           hyoscyamine 0.125 MG tablet  Commonly known as: ANASPAZ,LEVSIN      TAKE ONE TABLET BY MOUTH EVERY 6 HOURS AS NEEDED FOR CRAMPING OR DIARRHEA (ABDOMINAL PAIN)       montelukast 10 MG tablet  Commonly known as: SINGULAIR      TAKE ONE TABLET BY MOUTH ONCE NIGHTLY       omeprazole 20 MG capsule  Commonly known as: priLOSEC      TAKE ONE  CAPSULE BY MOUTH DAILY       simvastatin 20 MG tablet  Commonly known as: ZOCOR      TAKE 1 TABLET BY MOUTH DAILY       spironolactone 25 MG tablet  Commonly known as: ALDACTONE      TAKE ONE TABLET BY MOUTH DAILY       Vitamin D 50 MCG (2000 UT) capsule      Take 1 capsule by mouth Daily.                  Naima Chang, SHAYY  05/22/24  1:38 PM EDT

## 2024-05-29 RX ORDER — ATENOLOL 100 MG/1
TABLET ORAL
Qty: 180 TABLET | Refills: 3 | Status: SHIPPED | OUTPATIENT
Start: 2024-05-29

## 2024-06-11 ENCOUNTER — HOSPITAL ENCOUNTER (OUTPATIENT)
Dept: MAMMOGRAPHY | Facility: HOSPITAL | Age: 74
Discharge: HOME | End: 2024-06-11
Admitting: INTERNAL MEDICINE
Payer: MEDICARE

## 2024-06-11 DIAGNOSIS — Z12.31 ENCOUNTER FOR SCREENING MAMMOGRAM FOR BREAST CANCER: ICD-10-CM

## 2024-06-11 PROCEDURE — 77067 SCR MAMMO BI INCL CAD: CPT

## 2024-06-11 PROCEDURE — 77063 BREAST TOMOSYNTHESIS BI: CPT

## 2024-06-13 DIAGNOSIS — R92.8 ABNORMAL MAMMOGRAM OF RIGHT BREAST: Primary | ICD-10-CM

## 2024-06-14 ENCOUNTER — TELEPHONE (OUTPATIENT)
Dept: INTERNAL MEDICINE | Facility: CLINIC | Age: 74
End: 2024-06-14
Payer: MEDICARE

## 2024-06-14 NOTE — TELEPHONE ENCOUNTER
----- Message from Jayne Guerra sent at 6/13/2024  5:55 PM EDT -----  Please advise patient that additional imaging of patient's right breast is advised to fully evaluate the tissues. An order for a right breast mammogram and ultrasound has been placed and she can schedule at this time.   [email or web address]

## 2024-06-27 RX ORDER — SIMVASTATIN 20 MG
20 TABLET ORAL DAILY
Qty: 90 TABLET | Refills: 0 | Status: SHIPPED | OUTPATIENT
Start: 2024-06-27

## 2024-06-27 RX ORDER — OMEPRAZOLE 20 MG/1
20 CAPSULE, DELAYED RELEASE ORAL DAILY
Qty: 90 CAPSULE | Refills: 2 | Status: SHIPPED | OUTPATIENT
Start: 2024-06-27

## 2024-06-27 RX ORDER — MONTELUKAST SODIUM 10 MG/1
TABLET ORAL
Qty: 90 TABLET | Refills: 0 | Status: SHIPPED | OUTPATIENT
Start: 2024-06-27

## 2024-07-19 ENCOUNTER — HOSPITAL ENCOUNTER (OUTPATIENT)
Dept: MAMMOGRAPHY | Facility: HOSPITAL | Age: 74
Discharge: HOME | End: 2024-07-19
Admitting: INTERNAL MEDICINE
Payer: MEDICARE

## 2024-07-19 ENCOUNTER — HOSPITAL ENCOUNTER (OUTPATIENT)
Dept: ULTRASOUND IMAGING | Facility: HOSPITAL | Age: 74
End: 2024-07-19
Payer: MEDICARE

## 2024-07-19 DIAGNOSIS — R92.8 ABNORMAL MAMMOGRAM OF RIGHT BREAST: ICD-10-CM

## 2024-07-19 PROCEDURE — G0279 TOMOSYNTHESIS, MAMMO: HCPCS

## 2024-07-19 PROCEDURE — 77065 DX MAMMO INCL CAD UNI: CPT

## 2024-07-22 ENCOUNTER — TELEPHONE (OUTPATIENT)
Dept: INTERNAL MEDICINE | Facility: CLINIC | Age: 74
End: 2024-07-22
Payer: MEDICARE

## 2024-07-22 NOTE — TELEPHONE ENCOUNTER
----- Message from Jayne Guerra sent at 7/21/2024  8:19 PM EDT -----  Normal mammogram. (Previous density resolves on additional imaging) Continue self breast examinations and routine screening.   [email or web address]

## 2024-07-29 DIAGNOSIS — E78.49 OTHER HYPERLIPIDEMIA: Primary | ICD-10-CM

## 2024-07-29 DIAGNOSIS — M85.80 OSTEOPENIA, UNSPECIFIED LOCATION: ICD-10-CM

## 2024-08-01 LAB
ALBUMIN SERPL-MCNC: 4.3 G/DL (ref 3.5–5.2)
ALBUMIN/GLOB SERPL: 2 G/DL
ALP SERPL-CCNC: 51 U/L (ref 39–117)
ALT SERPL-CCNC: 15 U/L (ref 1–33)
APPEARANCE UR: CLEAR
AST SERPL-CCNC: 21 U/L (ref 1–32)
BACTERIA #/AREA URNS HPF: NORMAL /[HPF]
BASOPHILS # BLD AUTO: 0.04 10*3/MM3 (ref 0–0.2)
BASOPHILS NFR BLD AUTO: 0.9 % (ref 0–1.5)
BILIRUB SERPL-MCNC: 1.1 MG/DL (ref 0–1.2)
BILIRUB UR QL STRIP: NEGATIVE
BUN SERPL-MCNC: 15 MG/DL (ref 8–23)
BUN/CREAT SERPL: 16.1 (ref 7–25)
CALCIUM SERPL-MCNC: 9.3 MG/DL (ref 8.6–10.5)
CASTS URNS QL MICRO: NORMAL /LPF
CHLORIDE SERPL-SCNC: 105 MMOL/L (ref 98–107)
CHOLEST SERPL-MCNC: 149 MG/DL (ref 0–200)
CO2 SERPL-SCNC: 24.8 MMOL/L (ref 22–29)
COLOR UR: YELLOW
CREAT SERPL-MCNC: 0.93 MG/DL (ref 0.57–1)
EGFRCR SERPLBLD CKD-EPI 2021: 64.6 ML/MIN/1.73
EOSINOPHIL # BLD AUTO: 0.23 10*3/MM3 (ref 0–0.4)
EOSINOPHIL NFR BLD AUTO: 5 % (ref 0.3–6.2)
EPI CELLS #/AREA URNS HPF: NORMAL /HPF (ref 0–10)
ERYTHROCYTE [DISTWIDTH] IN BLOOD BY AUTOMATED COUNT: 12.8 % (ref 12.3–15.4)
GLOBULIN SER CALC-MCNC: 2.1 GM/DL
GLUCOSE SERPL-MCNC: 84 MG/DL (ref 65–99)
GLUCOSE UR QL STRIP: NEGATIVE
HCT VFR BLD AUTO: 39.5 % (ref 34–46.6)
HDLC SERPL-MCNC: 57 MG/DL (ref 40–60)
HGB BLD-MCNC: 13.3 G/DL (ref 12–15.9)
HGB UR QL STRIP: NEGATIVE
IMM GRANULOCYTES # BLD AUTO: 0.01 10*3/MM3 (ref 0–0.05)
IMM GRANULOCYTES NFR BLD AUTO: 0.2 % (ref 0–0.5)
KETONES UR QL STRIP: NEGATIVE
LDLC SERPL CALC-MCNC: 77 MG/DL (ref 0–100)
LEUKOCYTE ESTERASE UR QL STRIP: NEGATIVE
LYMPHOCYTES # BLD AUTO: 0.92 10*3/MM3 (ref 0.7–3.1)
LYMPHOCYTES NFR BLD AUTO: 20.2 % (ref 19.6–45.3)
MCH RBC QN AUTO: 32.3 PG (ref 26.6–33)
MCHC RBC AUTO-ENTMCNC: 33.7 G/DL (ref 31.5–35.7)
MCV RBC AUTO: 95.9 FL (ref 79–97)
MICRO URNS: NORMAL
MICRO URNS: NORMAL
MONOCYTES # BLD AUTO: 0.65 10*3/MM3 (ref 0.1–0.9)
MONOCYTES NFR BLD AUTO: 14.3 % (ref 5–12)
NEUTROPHILS # BLD AUTO: 2.71 10*3/MM3 (ref 1.7–7)
NEUTROPHILS NFR BLD AUTO: 59.4 % (ref 42.7–76)
NITRITE UR QL STRIP: NEGATIVE
NRBC BLD AUTO-RTO: 0 /100 WBC (ref 0–0.2)
PH UR STRIP: 6.5 [PH] (ref 5–7.5)
PLATELET # BLD AUTO: 226 10*3/MM3 (ref 140–450)
POTASSIUM SERPL-SCNC: 4.7 MMOL/L (ref 3.5–5.2)
PROT SERPL-MCNC: 6.4 G/DL (ref 6–8.5)
PROT UR QL STRIP: NEGATIVE
RBC # BLD AUTO: 4.12 10*6/MM3 (ref 3.77–5.28)
RBC #/AREA URNS HPF: NORMAL /HPF (ref 0–2)
SODIUM SERPL-SCNC: 140 MMOL/L (ref 136–145)
SP GR UR STRIP: 1.02 (ref 1–1.03)
TRIGL SERPL-MCNC: 78 MG/DL (ref 0–150)
TSH SERPL DL<=0.005 MIU/L-ACNC: 2.15 UIU/ML (ref 0.27–4.2)
URINALYSIS REFLEX: NORMAL
UROBILINOGEN UR STRIP-MCNC: 0.2 MG/DL (ref 0.2–1)
VLDLC SERPL CALC-MCNC: 15 MG/DL (ref 5–40)
WBC # BLD AUTO: 4.56 10*3/MM3 (ref 3.4–10.8)
WBC #/AREA URNS HPF: NORMAL /HPF (ref 0–5)

## 2024-08-06 ENCOUNTER — OFFICE VISIT (OUTPATIENT)
Dept: INTERNAL MEDICINE | Facility: CLINIC | Age: 74
End: 2024-08-06
Payer: MEDICARE

## 2024-08-06 VITALS
HEIGHT: 66 IN | DIASTOLIC BLOOD PRESSURE: 72 MMHG | OXYGEN SATURATION: 93 % | SYSTOLIC BLOOD PRESSURE: 112 MMHG | HEART RATE: 52 BPM | WEIGHT: 236 LBS | BODY MASS INDEX: 37.93 KG/M2

## 2024-08-06 DIAGNOSIS — R32 URINARY INCONTINENCE, UNSPECIFIED TYPE: ICD-10-CM

## 2024-08-06 DIAGNOSIS — I10 HYPERTENSION, UNSPECIFIED TYPE: ICD-10-CM

## 2024-08-06 DIAGNOSIS — E78.49 OTHER HYPERLIPIDEMIA: ICD-10-CM

## 2024-08-06 DIAGNOSIS — M51.36 DDD (DEGENERATIVE DISC DISEASE), LUMBAR: ICD-10-CM

## 2024-08-06 DIAGNOSIS — G47.33 OSA ON CPAP: ICD-10-CM

## 2024-08-06 DIAGNOSIS — E66.9 CLASS 2 OBESITY WITHOUT SERIOUS COMORBIDITY WITH BODY MASS INDEX (BMI) OF 38.0 TO 38.9 IN ADULT, UNSPECIFIED OBESITY TYPE: ICD-10-CM

## 2024-08-06 DIAGNOSIS — M19.072 OSTEOARTHRITIS OF LEFT FOOT, UNSPECIFIED OSTEOARTHRITIS TYPE: ICD-10-CM

## 2024-08-06 DIAGNOSIS — Z00.00 HEALTHCARE MAINTENANCE: Primary | ICD-10-CM

## 2024-08-06 DIAGNOSIS — I48.19 PERSISTENT ATRIAL FIBRILLATION: ICD-10-CM

## 2024-08-06 DIAGNOSIS — M48.062 SPINAL STENOSIS OF LUMBAR REGION WITH NEUROGENIC CLAUDICATION: ICD-10-CM

## 2024-08-06 RX ORDER — MIRABEGRON 50 MG/1
50 TABLET, EXTENDED RELEASE ORAL DAILY
Qty: 30 TABLET | Refills: 0 | COMMUNITY
Start: 2024-08-06

## 2024-08-06 RX ORDER — VIBEGRON 75 MG/1
75 TABLET, FILM COATED ORAL DAILY
Qty: 30 TABLET | Refills: 0 | COMMUNITY
Start: 2024-08-06

## 2024-08-06 NOTE — PROGRESS NOTES
Subjective   The ABCs of the Annual Wellness Visit  Medicare Wellness Visit      Nessa Osuna is a 74 y.o. patient who presents for a Medicare Wellness Visit.    The following portions of the patient's history were reviewed and   updated as appropriate: allergies, current medications, past family history, past medical history, past social history, past surgical history, and problem list.    Compared to one year ago, the patient's physical   health is the same.  Compared to one year ago, the patient's mental   health is the same.    Recent Hospitalizations:  She was not admitted to the hospital during the last year.     Current Medical Providers:  Patient Care Team:  Jayne Guerra MD as PCP - General  Adamaris Blue MD as Consulting Physician (Cardiology)  Letty Wells MD as Consulting Physician (Dermatology)  Wilfrid Ye MD as Consulting Physician (Urology)    Outpatient Medications Prior to Visit   Medication Sig Dispense Refill    acetaminophen (TYLENOL) 500 MG tablet Take 1 tablet by mouth Every Night. Take 1 tablet every 4-6 hours as needed      albuterol sulfate  (90 Base) MCG/ACT inhaler 2 puffs Every 4 (Four) Hours As Needed.      alendronate (FOSAMAX) 70 MG tablet TAKE 1 TABLET BY MOUTH ONCE WEEKLY ON AN EMPTY STOMACH BEFORE BREAKFAST. REMAIN UPRIGHT FOR 30 MINUTES AND TAKE WITH 8 OUNCES OF WATER 12 tablet 2    atenolol (TENORMIN) 100 MG tablet TAKE ONE TABLET BY MOUTH TWICE A  tablet 3    Breo Ellipta 100-25 MCG/INH inhaler Inhale 1 puff Daily.      Cholecalciferol (VITAMIN D) 2000 UNITS capsule Take 1 capsule by mouth Daily.      DULoxetine (CYMBALTA) 60 MG capsule TAKE ONE CAPSULE BY MOUTH DAILY 90 capsule 3    Eliquis 5 MG tablet tablet TAKE ONE TABLET BY MOUTH TWICE A  tablet 3    finasteride (PROSCAR) 5 MG tablet       Flaxseed, Linseed, (FLAXSEED OIL) 1000 MG capsule Take 1 capsule by mouth Daily.      fluticasone (FLONASE) 50 MCG/ACT nasal spray 2  sprays into the nostril(s) as directed by provider Daily.      gabapentin (NEURONTIN) 300 MG capsule TAKE ONE CAPSULE BY MOUTH THREE TIMES A  capsule 1    hydroxychloroquine (PLAQUENIL) 200 MG tablet       hyoscyamine (ANASPAZ,LEVSIN) 0.125 MG tablet TAKE ONE TABLET BY MOUTH EVERY 6 HOURS AS NEEDED FOR CRAMPING OR DIARRHEA (ABDOMINAL PAIN) 60 tablet 0    montelukast (SINGULAIR) 10 MG tablet TAKE ONE TABLET BY MOUTH ONCE NIGHTLY 90 tablet 0    omeprazole (priLOSEC) 20 MG capsule TAKE 1 CAPSULE BY MOUTH DAILY 90 capsule 2    simvastatin (ZOCOR) 20 MG tablet TAKE 1 TABLET BY MOUTH DAILY 90 tablet 0    spironolactone (ALDACTONE) 25 MG tablet TAKE ONE TABLET BY MOUTH DAILY 90 tablet 3     No facility-administered medications prior to visit.     No opioid medication identified on active medication list. I have reviewed chart for other potential  high risk medication/s and harmful drug interactions in the elderly.      Aspirin is not on active medication list.  Aspirin use is not indicated based on review of current medical condition/s. Risk of harm outweighs potential benefits.  .    Patient Active Problem List   Diagnosis    Hyperlipidemia    Osteoarthritis    Depression    Osteopenia    Sciatica    DDD (degenerative disc disease), lumbar    Persistent atrial fibrillation    Vitamin D deficiency    Spinal stenosis of lumbar region with neurogenic claudication    Spondylolisthesis at L4-L5 level    Cardiomyopathy    Diverticulosis    CHEN on CPAP    Class 2 obesity due to excess calories with body mass index (BMI) of 36.0 to 36.9 in adult    PAF (paroxysmal atrial fibrillation)    LLQ pain    Abnormal CT scan, sigmoid colon    GI bleed    Blood in stool     Advance Care Planning Advance Directive is on file.  ACP discussion was held with the patient during this visit. Patient has an advance directive in EMR which is still valid.             Objective   Vitals:    08/06/24 1514   BP: 112/72   Pulse: 52   SpO2: 93%  "  Weight: 107 kg (236 lb)   Height: 167.6 cm (66\")       Estimated body mass index is 38.09 kg/m² as calculated from the following:    Height as of this encounter: 167.6 cm (66\").    Weight as of this encounter: 107 kg (236 lb).    Class 2 Severe Obesity (BMI >=35 and <=39.9). Obesity-related health conditions include the following: hypertension, dyslipidemias, and urinary stress incontinence. Obesity is unchanged. BMI is is above average; BMI management plan is completed. We discussed portion control and increasing exercise.       Does the patient have evidence of cognitive impairment? No  Lab Results   Component Value Date    CHLPL 149 2024    TRIG 78 2024    HDL 57 2024    LDL 77 2024    VLDL 15 2024                                                                                                Health  Risk Assessment    Smoking Status:  Social History     Tobacco Use   Smoking Status Former    Current packs/day: 0.00    Average packs/day: 1.5 packs/day for 35.0 years (52.5 ttl pk-yrs)    Types: Cigarettes    Start date: 1962    Quit date: 1997    Years since quittin.6    Passive exposure: Never   Smokeless Tobacco Never     Alcohol Consumption:  Social History     Substance and Sexual Activity   Alcohol Use Yes    Comment: rarely drink, don't drink every week. maybe 50 drinks a year       Fall Risk Screen  STEADI Fall Risk Assessment was completed, and patient is at HIGH risk for falls. Assessment completed on:2024    Depression Screenin/6/2024     3:14 PM   PHQ-2/PHQ-9 Depression Screening   Little Interest or Pleasure in Doing Things 0-->not at all   Feeling Down, Depressed or Hopeless 0-->not at all   PHQ-9: Brief Depression Severity Measure Score 0     Health Habits and Functional and Cognitive Screenin/6/2024     3:15 PM   Functional & Cognitive Status   Do you have difficulty preparing food and eating? No   Do you have difficulty bathing " yourself, getting dressed or grooming yourself? No   Do you have difficulty using the toilet? No   Do you have difficulty moving around from place to place? No   Do you have trouble with steps or getting out of a bed or a chair? No   Current Diet Unhealthy Diet   Dental Exam Up to date   Eye Exam Up to date   Do you need help using the phone?  No   Are you deaf or do you have serious difficulty hearing?  No   Do you need help to go to places out of walking distance? No   Do you need help shopping? No   Do you need help preparing meals?  No   Do you need help with housework?  No   Do you need help with laundry? No   Do you need help taking your medications? No   Do you need help managing money? No   Do you ever drive or ride in a car without wearing a seat belt? No   Have you felt unusual stress, anger or loneliness in the last month? No   Who do you live with? Alone   If you need help, do you have trouble finding someone available to you? No   Have you been bothered in the last four weeks by sexual problems? No   Do you have difficulty concentrating, remembering or making decisions? Yes           Age-appropriate Screening Schedule:  Refer to the list below for future screening recommendations based on patient's age, sex and/or medical conditions. Orders for these recommended tests are listed in the plan section. The patient has been provided with a written plan.    Health Maintenance List  Health Maintenance   Topic Date Due    COVID-19 Vaccine (7 - 2023-24 season) 02/06/2024    INFLUENZA VACCINE  08/01/2024    DXA SCAN  11/01/2024    LIPID PANEL  07/31/2025    ANNUAL WELLNESS VISIT  08/06/2025    BMI FOLLOWUP  08/06/2025    MAMMOGRAM  07/19/2026    COLORECTAL CANCER SCREENING  06/17/2027    TDAP/TD VACCINES (5 - Td or Tdap) 05/24/2028    HEPATITIS C SCREENING  Completed    Pneumococcal Vaccine 65+  Completed    ZOSTER VACCINE  Completed                                                                                  "                                                               CMS Preventative Services Quick Reference  Risk Factors Identified During Encounter  Immunizations Discussed/Encouraged: Influenza, COVID19, and RSV (Respiratory Syncytial Virus)    The above risks/problems have been discussed with the patient.  Pertinent information has been shared with the patient in the After Visit Summary.  An After Visit Summary and PPPS were made available to the patient.    Follow Up:   Next Medicare Wellness visit to be scheduled in 1 year.         Additional E&M Note during same encounter follows:  Patient has additional, significant, and separately identifiable condition(s)/problem(s) that require work above and beyond the Medicare Wellness Visit     Chief Complaint  Annual Exam, COPD, Hyperlipidemia, Hypertension, Hypothyroidism, and weight managemetn  Urinary incontinence  Htn  Hyperlipidemia  Paf  Weight management    Subjective   COPD    Hyperlipidemia  Exacerbating diseases include hypothyroidism.   Hypertension    Hypothyroidism      Kan is also being seen today for an annual adult preventative physical exam.  and Kan is also being seen today for additional medical problem/s.  Patient has htn, hyperlip, and hypothyroidism. She has c/o urinary incont. First noted early this year when coughing w bronchitis. Now bronchitis has cleared but experiencing both stress and frequency.   Patient has copd. Due to difficulty w breathing she is very sedentary. Making poor eating choices and difficutly w portion control.                 Objective   Vital Signs:  /72   Pulse 52   Ht 167.6 cm (66\")   Wt 107 kg (236 lb)   SpO2 93%   BMI 38.09 kg/m²   Physical Exam  Vitals and nursing note reviewed.   Constitutional:       Appearance: Normal appearance. She is well-developed.   HENT:      Head: Normocephalic and atraumatic.      Right Ear: Tympanic membrane and external ear normal.      Left Ear: Tympanic membrane and " external ear normal.      Nose: Nose normal.      Mouth/Throat:      Mouth: Mucous membranes are moist.   Eyes:      Extraocular Movements: Extraocular movements intact.      Pupils: Pupils are equal, round, and reactive to light.   Cardiovascular:      Rate and Rhythm: Normal rate and regular rhythm.      Pulses: Normal pulses.      Heart sounds: Normal heart sounds.   Pulmonary:      Effort: Pulmonary effort is normal. No respiratory distress.      Breath sounds: Normal breath sounds.   Abdominal:      General: Abdomen is flat.      Palpations: Abdomen is soft.   Musculoskeletal:         General: Normal range of motion.      Cervical back: Normal range of motion and neck supple.   Skin:     General: Skin is warm and dry.   Neurological:      Mental Status: She is alert and oriented to person, place, and time.   Psychiatric:         Behavior: Behavior normal.       Cbc, cmp, lipid, tsh, u/a    The following data was reviewed by: Jayne Guerra MD on 08/06/2024:        Assessment and Plan Additional age appropriate preventative wellness advice topics were discussed during today's preventative wellness exam(some topics already addressed during AWV portion of the note above):    Physical Activity: Advised cardiovascular activity 150 minutes per week as tolerated. (example brisk walk for 30 minutes, 5 days a week).     Nutrition: Discussed nutrition plan with patient. Information shared in after visit summary. Goal is for a well balanced diet to enhance overall health.     Healthy Weight: Discussed current and goal BMI with patient. Steps to attain this goal discussed. Information shared in after visit summary.     Injury Prevention Discussion:  Information shared in after visit summary.                    Urinary incontinence, unspecified type  Gave samples myrbetriq and gemtessa. Advised to avoid caffeine. Kegal exercises. Urogyn referral.   Other hyperlipidemia   Lipid abnormalities are stable    Plan:  Continue  same medication/s without change.      Discussed medication dosage, use, side effects, and goals of treatment in detail.    Counseled patient on lifestyle modifications to help control hyperlipidemia.     Patient Treatment Goals:   LDL goal is less than 70    Followup in 6 months.  Persistent atrial fibrillation    CHEN on CPAP    Spinal stenosis of lumbar region with neurogenic claudication    DDD (degenerative disc disease), lumbar    Osteoarthritis of left foot, unspecified osteoarthritis type    Class 2 obesity without serious comorbidity with body mass index (BMI) of 38.0 to 38.9 in adult, unspecified obesity type  Patient's (Body mass index is 38.09 kg/m².) indicates that they are obese (BMI >30) with health conditions that include obstructive sleep apnea, hypertension, and urinary stress incontinence . Weight is worsening. BMI  is above average; BMI management plan is completed. We discussed portion control, increasing exercise, joining a fitness center or start home based exercise program, Weight Watchers or other Commercial based weight reduction program, consulting a Bariatric surgeon, management of depression/anxiety/stress to control compensatory eating, and Information on healthy weight added to patient's after visit summary.   Hypertension, unspecified type  Hypertension is stable and controlled  Continue current treatment regimen.. Irisetn advised to get a good fitting bp monitor and test bp 4 days a week. Call w readings. Low sodium diet. Increased fitness.     Blood pressure will be reassessed in 6 months.  Healthcare maintenance      Orders Placed This Encounter   Procedures    Ambulatory Referral to Gynecologic Urology     Referral Priority:   Routine     Referral Type:   Consultation     Referral Reason:   Specialty Services Required     Requested Specialty:   Urogynecology     Number of Visits Requested:   1    Ambulatory Referral to Bariatric Surgery     Referral Priority:   Routine     Referral  Type:   Consultation     Referral Reason:   Specialty Services Required     Requested Specialty:   Bariatrics     Number of Visits Requested:   1     New Medications Ordered This Visit   Medications    Vibegron (Gemtesa) 75 MG tablet     Sig: Take 1 tablet by mouth Daily.     Dispense:  30 tablet     Refill:  0     Order Specific Question:   Lot Number?     Answer:   [id number]     Order Specific Question:   Expiration Date?     Answer:   6/20/2027     Order Specific Question:   Quantity     Answer:   1    Mirabegron ER (Myrbetriq) 50 MG tablet sustained-release 24 hour 24 hr tablet     Sig: Take 50 mg by mouth Daily.     Dispense:  30 tablet     Refill:  0     Order Specific Question:   Lot Number?     Answer:   [id number]     Order Specific Question:   Expiration Date?     Answer:   2/28/2026     Order Specific Question:   Quantity     Answer:   1        I spent 55 minutes caring for Nessa on this date of service. This time includes time spent by me in the following activities:preparing for the visit, reviewing tests, obtaining and/or reviewing a separately obtained history, performing a medically appropriate examination and/or evaluation , counseling and educating the patient/family/caregiver, ordering medications, tests, or procedures, referring and communicating with other health care professionals , documenting information in the medical record, and independently interpreting results and communicating that information with the patient/family/caregiver  Follow Up   Return in about 6 months (around 2/6/2025) for Recheck.  Patient was given instructions and counseling regarding her condition or for health maintenance advice. Please see specific information pulled into the AVS if appropriate.

## 2024-08-08 ENCOUNTER — TELEPHONE (OUTPATIENT)
Dept: INTERNAL MEDICINE | Facility: CLINIC | Age: 74
End: 2024-08-08
Payer: MEDICARE

## 2024-08-08 NOTE — TELEPHONE ENCOUNTER
Dr. Hanna office at [facility] office called and wanted clarification on referral. She referenced the office note and saw that patient saw Dr. Hyde in [facility]. Wanted to know if patient was seeing Dr. Hyde for the same thing because [facility] see for the same thing.

## 2024-08-09 NOTE — TELEPHONE ENCOUNTER
Ok for HUB to read:     sent message to patient to call and schedule patient an appointment with [facility]

## 2024-08-13 DIAGNOSIS — M54.30 SCIATICA, UNSPECIFIED LATERALITY: ICD-10-CM

## 2024-08-13 RX ORDER — ALENDRONATE SODIUM 70 MG/1
TABLET ORAL
Qty: 12 TABLET | Refills: 2 | Status: SHIPPED | OUTPATIENT
Start: 2024-08-13

## 2024-08-13 RX ORDER — DULOXETIN HYDROCHLORIDE 60 MG/1
60 CAPSULE, DELAYED RELEASE ORAL DAILY
Qty: 90 CAPSULE | Refills: 3 | Status: SHIPPED | OUTPATIENT
Start: 2024-08-13

## 2024-08-13 RX ORDER — GABAPENTIN 300 MG/1
CAPSULE ORAL
Qty: 270 CAPSULE | Refills: 1 | Status: SHIPPED | OUTPATIENT
Start: 2024-08-13

## 2024-09-09 RX ORDER — VIBEGRON 75 MG/1
75 TABLET, FILM COATED ORAL DAILY
Qty: 90 TABLET | Refills: 3 | COMMUNITY
Start: 2024-09-09 | End: 2024-09-09 | Stop reason: SDUPTHER

## 2024-09-09 RX ORDER — VIBEGRON 75 MG/1
75 TABLET, FILM COATED ORAL DAILY
Qty: 90 TABLET | Refills: 3 | Status: SHIPPED | OUTPATIENT
Start: 2024-09-09 | End: 2024-09-12

## 2024-09-12 RX ORDER — MIRABEGRON 50 MG/1
50 TABLET, EXTENDED RELEASE ORAL DAILY
Qty: 30 TABLET | Refills: 5 | Status: SHIPPED | OUTPATIENT
Start: 2024-09-12

## 2024-09-13 ENCOUNTER — TELEPHONE (OUTPATIENT)
Dept: INTERNAL MEDICINE | Facility: CLINIC | Age: 74
End: 2024-09-13
Payer: MEDICARE

## 2024-09-13 DIAGNOSIS — Z78.0 MENOPAUSE: Primary | ICD-10-CM

## 2024-09-24 RX ORDER — MIRABEGRON 50 MG/1
50 TABLET, FILM COATED, EXTENDED RELEASE ORAL DAILY
Qty: 30 TABLET | Refills: 5 | Status: SHIPPED | OUTPATIENT
Start: 2024-09-24

## 2024-09-30 ENCOUNTER — TRANSCRIBE ORDERS (OUTPATIENT)
Dept: ADMINISTRATIVE | Facility: HOSPITAL | Age: 74
End: 2024-09-30
Payer: MEDICARE

## 2024-09-30 DIAGNOSIS — Z13.6 ENCOUNTER FOR SCREENING FOR VASCULAR DISEASE: Primary | ICD-10-CM

## 2024-10-15 RX ORDER — SIMVASTATIN 20 MG
20 TABLET ORAL DAILY
Qty: 90 TABLET | Refills: 0 | Status: SHIPPED | OUTPATIENT
Start: 2024-10-15

## 2024-10-15 RX ORDER — MONTELUKAST SODIUM 10 MG/1
TABLET ORAL
Qty: 90 TABLET | Refills: 0 | Status: SHIPPED | OUTPATIENT
Start: 2024-10-15

## 2024-11-05 ENCOUNTER — HOSPITAL ENCOUNTER (OUTPATIENT)
Facility: HOSPITAL | Age: 74
Discharge: HOME | End: 2024-11-05
Admitting: INTERNAL MEDICINE
Payer: MEDICARE

## 2024-11-05 PROCEDURE — 77080 DXA BONE DENSITY AXIAL: CPT

## 2024-11-11 NOTE — PROGRESS NOTES
New patient or new problem visit    Chief Complaint   Patient presents with   • Lumbar Spine - Pain   • Right Hip - Pain   • Left Hip - Pain       HPI: She complains of back pain sometimes radiating into the hips.  Occasional pain on the outside a left foot and also complains of pain at the base of the right thumb following a lifting injury 2 or 3 months ago.  She is taken a Medrol Dosepak in the past and its helped with her back.  She has a spare but it is .  Physical therapy is also helped.  Pain is moderate, lumbar, constant aching worse when standing anti-inflammatory agents seem to help somewhat.  She is seen Dr. Mcknight in the past.    PFSH: See chart- reviewed    Review of Systems   Constitutional: Negative.  Negative for chills, diaphoresis, fever and unexpected weight change.   HENT: Positive for postnasal drip. Negative for hearing loss, nosebleeds, sore throat and tinnitus.    Eyes: Negative.  Negative for pain and visual disturbance.   Respiratory: Positive for shortness of breath. Negative for cough and wheezing.    Cardiovascular: Positive for chest pain and leg swelling. Negative for palpitations.   Gastrointestinal: Negative.  Negative for abdominal pain, diarrhea, nausea and vomiting.   Endocrine: Negative.  Negative for cold intolerance, heat intolerance and polydipsia.   Genitourinary: Positive for decreased urine volume. Negative for difficulty urinating, dysuria and hematuria.   Musculoskeletal: Positive for back pain. Negative for arthralgias, joint swelling and myalgias.   Skin: Negative.  Negative for rash and wound.   Allergic/Immunologic: Negative for environmental allergies.   Neurological: Negative.  Negative for dizziness, syncope and numbness.   Hematological: Negative.  Does not bruise/bleed easily.   Psychiatric/Behavioral: Negative.  Negative for dysphoric mood and sleep disturbance. The patient is not nervous/anxious.        PE: Constitutional: Vital signs above-noted.  Awake,  Subjective   Patient ID: Sanford Jones is a 51 y.o. male.     HPI: 51-year-old male presents with a complaint of vomiting diarrhea and fever for the past 2 days.  He reports that beginning of days developed sore throat with nasal congestion and cough.   reports family member diagnosed with mycoplasma pneumonia      History provided by:  Patient   used: No        The following portions of the chart were reviewed this encounter and updated as appropriate:  Tobacco  Allergies  Meds  Problems  Med Hx  Surg Hx  Fam Hx         Review of Systems   Constitutional:  Negative for chills and fever.   HENT:  Positive for congestion, rhinorrhea, sinus pressure, sore throat and trouble swallowing. Negative for ear pain.    Respiratory:  Positive for cough, shortness of breath and wheezing. Negative for chest tightness.    Cardiovascular:  Negative for palpitations.   Gastrointestinal:  Positive for diarrhea, nausea and vomiting. Negative for abdominal pain and constipation.   Genitourinary:  Negative for dysuria and frequency.   Neurological:  Negative for headaches.     Objective   Physical Exam  Vital signs are reviewed. Alert and oriented x3 with normal mood and affect  Patient is well nourished, well-developed, alert and in no acute distress    Deniespain to palpation over frontal, ethmoid or maxillary sinus areas    External eyes, orbits, conjunctiva and eyelids are normal in appearance  Pupils are equal, round, reactive to light and accommodation, extraocular movements intact    External ears appear normal  External canals are normal in appearance  Right tympanic membrane is intact and pale gray in appearance  Left tympanic membrane is intact and pale gray in appearance  There is no middle ear effusion noted on the right  There is no middle ear effusion noted on the left  External appearance of the nose is normal  Nasal mucosa, septum, turbinates are pink in appearance  There is  thin  alert and oriented    Psychiatric: Affect and insight do not appear grossly disturbed.    Pulmonary: Breathing is unlabored, color is good.    Skin: Warm, dry and normal turgor    Cardiac:  pedal pulses intact.  No edema.    Eyesight and hearing appear adequate for examination purposes      Musculoskeletal:  There is minimal tenderness to percussion and palpation of the spine. Motion appears undisturbed.  Posture is unremarkable to coronal and sagittal inspection.    The skin about the area is intact.  There is no palpable or visible deformity.  There is no local spasm.       Neurologic:   Reflexes are 2+ and symmetrical in the patellae and achilles.   Motor function is undisturbed in quadriceps, EHL, and gastrocnemius      Sensation appears symmetrically intact to light touch   .  In the bilateral lower extremities there is no evidence of atrophy.   Clonus is absent..  Gait appears undisturbed.  SLR test negative      MEDICAL DECISION MAKING    XRAY: Plain film x-rays of lumbar spine show slight anterolisthesis at L4 5 and disc space narrowing at the same level.  No comparison views are available.    Other: n/a    Impression: Lumbar disc degeneration probable lumbar spinal stenosis    Plan: For now she may live with this I refilled a Dosepak she'll do home exercises and I will see her as needed.   yellow nasal discharge in both nares    Oral mucosa is uniformly pink and moist  Palate is pink, symmetric and intact  Tongue is moist, mobile and midline  Tonsils are present, not enlarged,  mildly erythematous with no concretions or exudates present  No cervical lymphadenopathy palpated    Heart has regular rate and rhythm. No murmurs, rubs or gallops are auscultated at this exam.    Respiratory rate rhythm and effort are normal. Breath sounds bilaterally are  coarse on auscultation without crackles, rhonchi or friction rub.  There are expiratory wheezes all lung fields especially on the right side.      Abdomen: Normal bowel sounds on auscultation. Soft, nontender without rebound or rigidity on palpation  Procedures    Assessment/Plan   Diagnoses and all orders for this visit:  Cough, unspecified type  -     POCT Covid-19 Rapid Antigen  -     POCT rapid strep A manually resulted  Nausea and vomiting, unspecified vomiting type  -     POCT Covid-19 Rapid Antigen  -     POCT rapid strep A manually resulted  Acute bronchitis due to Mycoplasma pneumoniae  -     doxycycline (Vibramycin) 100 mg capsule; Take 1 capsule (100 mg) by mouth 2 times a day for 7 days. Take with at least 8 ounces (large glass) of water, do not lie down for 30 minutes after  Acute bronchospasm  -     albuterol (Proventil HFA) 90 mcg/actuation inhaler; Take 1 puff via spacer,  take 5-6 easy breaths.  Rest 2 minutes.  Repeat x1.  May use every 4-6 hours as needed  -     predniSONE (Deltasone) 20 mg tablet; Take 2 tablets (40 mg) by mouth once daily for 5 days.  -     inhalational spacing device (Aerochamber MV) inhaler; Use as instructed    Patient disposition: Home

## 2024-11-22 ENCOUNTER — OFFICE VISIT (OUTPATIENT)
Age: 74
End: 2024-11-22
Payer: MEDICARE

## 2024-11-22 VITALS
SYSTOLIC BLOOD PRESSURE: 133 MMHG | WEIGHT: 232.6 LBS | OXYGEN SATURATION: 99 % | BODY MASS INDEX: 37.54 KG/M2 | DIASTOLIC BLOOD PRESSURE: 80 MMHG | HEART RATE: 93 BPM

## 2024-11-22 DIAGNOSIS — I48.0 PAROXYSMAL ATRIAL FIBRILLATION: ICD-10-CM

## 2024-11-22 DIAGNOSIS — G47.33 OSA ON CPAP: ICD-10-CM

## 2024-11-22 DIAGNOSIS — I50.32 CHRONIC HEART FAILURE WITH PRESERVED EJECTION FRACTION (HFPEF): ICD-10-CM

## 2024-11-22 DIAGNOSIS — J44.9 CHRONIC OBSTRUCTIVE PULMONARY DISEASE, UNSPECIFIED COPD TYPE: ICD-10-CM

## 2024-11-22 DIAGNOSIS — R06.09 DYSPNEA ON EXERTION: Primary | ICD-10-CM

## 2024-11-22 DIAGNOSIS — E78.49 OTHER HYPERLIPIDEMIA: ICD-10-CM

## 2024-11-22 PROBLEM — I42.9 CARDIOMYOPATHY: Status: RESOLVED | Noted: 2020-04-09 | Resolved: 2024-11-22

## 2024-11-22 PROCEDURE — 93000 ELECTROCARDIOGRAM COMPLETE: CPT | Performed by: INTERNAL MEDICINE

## 2024-11-22 PROCEDURE — 1160F RVW MEDS BY RX/DR IN RCRD: CPT | Performed by: INTERNAL MEDICINE

## 2024-11-22 PROCEDURE — 1159F MED LIST DOCD IN RCRD: CPT | Performed by: INTERNAL MEDICINE

## 2024-11-22 PROCEDURE — 99214 OFFICE O/P EST MOD 30 MIN: CPT | Performed by: INTERNAL MEDICINE

## 2024-11-22 NOTE — PROGRESS NOTES
Subjective:     Encounter Date:11/22/2024      Patient ID: Nessa Osuna is a 74 y.o. female.    Chief Complaint:  History of Present Illness    This is a 74-year-old with hyperlipidemia, paroxysmal atrial fibrillation, resolved cardiomyopathy, obstructive sleep apnea, COPD, who presents for follow-up.     The patient presents today for follow-up.  This is her first office visit with me since 5/2020.  At that last office visit she was having issues with shortness of breath and fatigue.  She appeared to be in persistent atrial fibrillation at that time.  After discussion with Dr. Wagner switch her to atenolol 50 mg twice a day and proceeded with a cardioversion.  She was seen in follow-up with Dr. Wagner in 6/2020.  At that time the patient did not seem to really indicate much improvement in her symptoms when she was in sinus rhythm.  He recommended a sleep evaluation and placement of a ZIO monitor.  A ZIO monitor subsequently showed 54% burden of atrial fibrillation.  She has since been diagnosed with sleep apnea and started on CPAP.  She underwent a stress test in 1/2022 which showed no evidence of ischemia.  Repeat echocardiogram in 4/2022 showed normal left ventricular systolic function and wall motion with an EF of 60%, grade 2 diastolic dysfunction, biatrial enlargement, mild to moderate tricuspid valve regurgitation with severely elevated right ventricular systolic pressure of 55 mmHg.       She has since been diagnosed with COPD and started on treatment.  When she was last seen in the office by SHAYY Avelar she reported some improvement in her shortness of breath at this time.  At that office visit she was started on spironolactone 25 mg daily.     When I saw the patient last in follow-up in 5/2023 she felt like she was in atrial fibrillation half the time although she was unaware that she was atrial fibrillation at the time of that office visit.  She did admit that she was having trouble  with her CPAP.  It appears that she might be in persistent atrial fibrillation at that time.  It did not appear that she was very symptomatic with atrial fibrillation.    She saw Dr. Wagner in follow-up in 11/2023.  It was felt that she was likely in persistent atrial fibrillation at that point.  He recommended continue with rate control and seeing her back as needed.    She was last seen in the office by SHAYY Manning in 5/2024.  She reported some issues with lightheadedness.  She did not find it too bothersome.  A monitor was discussed but the patient opted to hold off.  She appeared to be in sinus rhythm at that office visit.    She presents back today for follow-up.  She reports that over the last year she feels like she is becoming increasingly short of breath with activity.  She feels like she can only walk about half a block before getting out of breath now.  She is wondering if this could be related to her lungs or her heart.  She cannot really tell when she is in atrial fibrillation or not.  She denies any chest pain, orthopnea, near-syncope or syncope or worsening lower extremity swelling.  She reports that her spironolactone dosage was decreased to 12.5 mg by Dr. Guerra due to some concerns about her lab work.  He tried that but felt like it increased her swelling so now she is alternating between 12.5 and 25 mg.     Prior History:  I began following the patient when she was admitted to the hospital on 5/2017 with atrial fibrillation with rapid ventricular rate.  Patient reported at that time that she been having episodes lasting up to 2-3 hours over the last few years.  Episodes occurred about 8-9 times a year.  She underwent echocardiogram during that admission that showed normal left ventricular systolic function and wall motion with an ejection fraction of 57%, moderately to severely dilated left atrium, and no significant valvular disease.  I started on metoprolol tartrate 12.5 mg twice a day.   She is also started on rivaroxaban for her CHADS-VASc score of 2.      In 8/2019 she reported that the frequency of her atrial fibrillation episodes had increased.  For the most part the episodes do not last longer than about 10 or 15 minutes.  She was only taking an additional half tablet of metoprolol if the symptoms started at nighttime when she is trying to fall asleep.       In 3/2020 she complained of worsening dyspnea on exertion to Dr. Guerra.  She reported getting out of breath after walking a 1/2 block as opposed to the 2 miles she was walking in the past.  She was sent for an echocardiogram on 3/19/2020 that showed mildly depressed left ventricular systolic function with an EF of 40-45%, mild to moderate mitral and tricuspid regurgitation, biatrial enlargement and a right ventricular systolic pressure of 48 mmHg.       I called the patient at that time and recommended switching metoprolol tartrate to succinate 25 mg.  I recommended that we hold off on further work up due to he pandemic.  We had a telephone follow up appointment on 4/9 at which time she reported only minimal improvement in her symptoms.  There was concern at that time that she was now in persistent atrial fibrillation.  I recommended proceeding with a holter monitor.  This was performed on 4/16/2020 and showed persistent atrial fibrillation with an average heart rate of 112 bpm and a maximum heart rate of 176 bpm.  When I called the patient back I recommended increasing her metoprolol succinate to 25 mg twice a day.    Review of Systems   Constitutional: Negative for malaise/fatigue.   HENT:  Negative for hearing loss, hoarse voice, nosebleeds and sore throat.    Eyes:  Negative for pain.   Cardiovascular:  Positive for dyspnea on exertion and leg swelling. Negative for chest pain, claudication, cyanosis, irregular heartbeat, near-syncope, orthopnea, palpitations, paroxysmal nocturnal dyspnea and syncope.   Respiratory:  Negative for  shortness of breath and snoring.    Endocrine: Negative for cold intolerance, heat intolerance, polydipsia, polyphagia and polyuria.   Skin:  Negative for itching and rash.   Musculoskeletal:  Negative for arthritis, falls, joint pain, joint swelling, muscle cramps, muscle weakness and myalgias.   Gastrointestinal:  Negative for constipation, diarrhea, dysphagia, heartburn, hematemesis, hematochezia, melena, nausea and vomiting.   Genitourinary:  Negative for frequency, hematuria and hesitancy.   Neurological:  Negative for excessive daytime sleepiness, dizziness, headaches, light-headedness, numbness and weakness.   Psychiatric/Behavioral:  Negative for depression. The patient is not nervous/anxious.          Current Outpatient Medications:     acetaminophen (TYLENOL) 500 MG tablet, Take 1 tablet by mouth Every Night. Take 1 tablet every 4-6 hours as needed, Disp: , Rfl:     albuterol sulfate  (90 Base) MCG/ACT inhaler, 2 puffs Every 4 (Four) Hours As Needed., Disp: , Rfl:     alendronate (FOSAMAX) 70 MG tablet, TAKE 1 TABLET BY MOUTH ONCE WEEKLY ON AN EMPTY STOMACH BEFORE BREAKFAST. REMAIN UPRIGHT FOR 30 MINUTES AND TAKE WITH 8 OUNCES OF WATER, Disp: 12 tablet, Rfl: 2    atenolol (TENORMIN) 100 MG tablet, TAKE ONE TABLET BY MOUTH TWICE A DAY, Disp: 180 tablet, Rfl: 3    Breo Ellipta 100-25 MCG/INH inhaler, Inhale 1 puff Daily., Disp: , Rfl:     Cholecalciferol (VITAMIN D) 2000 UNITS capsule, Take 1 capsule by mouth Daily., Disp: , Rfl:     DULoxetine (CYMBALTA) 60 MG capsule, TAKE 1 CAPSULE BY MOUTH DAILY, Disp: 90 capsule, Rfl: 3    Eliquis 5 MG tablet tablet, TAKE ONE TABLET BY MOUTH TWICE A DAY, Disp: 180 tablet, Rfl: 3    finasteride (PROSCAR) 5 MG tablet, , Disp: , Rfl:     Flaxseed, Linseed, (FLAXSEED OIL) 1000 MG capsule, Take 1 capsule by mouth Daily., Disp: , Rfl:     fluticasone (FLONASE) 50 MCG/ACT nasal spray, Administer 2 sprays into the nostril(s) as directed by provider Daily., Disp: , Rfl:      gabapentin (NEURONTIN) 300 MG capsule, TAKE ONE CAPSULE BY MOUTH THREE TIMES A DAY, Disp: 270 capsule, Rfl: 1    hydroxychloroquine (PLAQUENIL) 200 MG tablet, , Disp: , Rfl:     hyoscyamine (ANASPAZ,LEVSIN) 0.125 MG tablet, TAKE ONE TABLET BY MOUTH EVERY 6 HOURS AS NEEDED FOR CRAMPING OR DIARRHEA (ABDOMINAL PAIN), Disp: 60 tablet, Rfl: 0    montelukast (SINGULAIR) 10 MG tablet, TAKE ONE TABLET BY MOUTH ONCE NIGHTLY, Disp: 90 tablet, Rfl: 0    Myrbetriq 50 MG tablet sustained-release 24 hour 24 hr tablet, Take 50 mg by mouth Daily., Disp: 30 tablet, Rfl: 5    omeprazole (priLOSEC) 20 MG capsule, TAKE 1 CAPSULE BY MOUTH DAILY, Disp: 90 capsule, Rfl: 2    simvastatin (ZOCOR) 20 MG tablet, TAKE 1 TABLET BY MOUTH DAILY, Disp: 90 tablet, Rfl: 0    spironolactone (ALDACTONE) 25 MG tablet, TAKE ONE TABLET BY MOUTH DAILY, Disp: 90 tablet, Rfl: 3    Past Medical History:   Diagnosis Date    A-fib 05/31/2017    Abnormal ECG 2018, ck. chart    A-Fib    Allergic 40+ yrs    unspecified    Anemia 1993?    Asthma 2022    COPD    Breast nodule     Cataract 5 - 6 yrs.    [id number]    Chest wall mass     Class 1 obesity due to excess calories without serious comorbidity with body mass index (BMI) of 32.0 to 32.9 in adult     Closed fracture of head of metacarpal     left second    Closed fracture of metacarpal bone     left    Clotting disorder June 17, 2022    Bloody stool: admitted to hospital    Colon polyp 2005    noted with colonoscopy    COPD (chronic obstructive pulmonary disease)     Coronary artery disease 2019    A-Fib    DDD (degenerative disc disease), lumbar     Depression     Difficulty breathing     Diverticulitis 2010    Diverticulitis of colon 2000?    Diverticulosis     Fatigue     GERD (gastroesophageal reflux disease)     GI bleed 06/16/2022    Headache minor/infrequent    Hip pain, right     HL (hearing loss)     not significant    Hyperlipidemia     Leiomyoma of uterus     Low back pain     CHEN on CPAP      Osteoarthritis     Osteopenia     PAF (paroxysmal atrial fibrillation)     Persistent atrial fibrillation     PONV (postoperative nausea and vomiting)     Primary localized osteoarthritis of hip     Sciatica     Skin cancer 2010    Snoring     Spinal stenosis     Visual impairment 5-6 yrs.    retinal/cat surg       Past Surgical History:   Procedure Laterality Date    ADENOIDECTOMY      CARDIOVERSION  2020    Dr. Wagner    CATARACT EXTRACTION, BILATERAL      COLONOSCOPY      COLONOSCOPY N/A 01/10/2022    Procedure: COLONOSCOPY INTO CECUM AND TI WITH COLD BX POLYPECTOMIES;  Surgeon: Franci Kumar MD;  Location:  [facility];  Service: Gastroenterology;  Laterality: N/A;  PRE: LLQ PAIN, ABNORMAL CT  POST: SKIN TAGS, DIVERTICULOSIS, POLYPS, HEMORRHOIDS    COLONOSCOPY N/A 2022    Procedure: COLONOSCOPY to cecum and TI;  Surgeon: Yasmani Bo MD;  Location:  [facility];  Service: Gastroenterology;  Laterality: N/A;  pre- GI bleed  post- hemorrhoids, diverticulosis    COSMETIC SURGERY      nose/ mohs    ENDOSCOPY N/A 2022    Procedure: ESOPHAGOGASTRODUODENOSCOPY with biopsies;  Surgeon: Yasmani Bo MD;  Location:  [facility];  Service: Gastroenterology;  Laterality: N/A;  pre- GI bleed  post- hiatal hernia, esophagitis, gastric polyp    EYE SURGERY  retinal & cataract        FLEXIBLE SIGMOIDOSCOPY  ?     FRACTURE SURGERY  Left wrist        MOHS SURGERY      chemosurgery (Mohs Micrographic Technique); Dr Jarrett and Dr Joan [facility]      single neuroma    RECONSTRUCTION OF NOSE      TONSILLECTOMY      age 5    TONSILLECTOMY AND ADENOIDECTOMY      UPPER GASTROINTESTINAL ENDOSCOPY  2022       Family History   Problem Relation Age of Onset    Cancer Mother         bladder    Osteoporosis Mother     Heart failure Mother          @93 Congenital heart failure    Arthritis Mother         lived 'til 93    Hearing loss  Mother         age related    Vision loss Mother         macular degeneration    Lung cancer Father     Cancer Father         lung    No Known Problems Sister     Breast cancer Neg Hx        Social History     Tobacco Use    Smoking status: Former     Current packs/day: 0.00     Average packs/day: 1.5 packs/day for 35.0 years (52.5 ttl pk-yrs)     Types: Cigarettes     Start date: 1962     Quit date: 1997     Years since quittin.9     Passive exposure: Never    Smokeless tobacco: Never   Vaping Use    Vaping status: Never Used   Substance Use Topics    Alcohol use: Yes     Comment: rarely drink, don't drink every week. maybe 50 drinks a year    Drug use: No         ECG 12 Lead    Date/Time: 2024 1:17 PM  Performed by: Adamaris Blue MD    Authorized by: Adamaris Blue MD  Comparison: compared with previous ECG   Comparison to previous ECG: Atrial fibrillation is new  Rhythm: atrial fibrillation  Ectopy: unifocal PVCs             Objective:     Visit Vitals  /80 (BP Location: Left arm, Patient Position: Sitting, Cuff Size: Adult)   Pulse 93   Wt 106 kg (232 lb 9.6 oz)   SpO2 99%   BMI 37.54 kg/m²         Constitutional:       Appearance: Normal appearance. Well-developed.   Eyes:      General: Lids are normal.      Conjunctiva/sclera: Conjunctivae normal.      Pupils: Pupils are equal, round, and reactive to light.   HENT:      Head: Normocephalic and atraumatic.   Neck:      Vascular: No carotid bruit or JVD.      Lymphadenopathy: No cervical adenopathy.   Pulmonary:      Effort: Pulmonary effort is normal.      Breath sounds: Normal breath sounds.   Cardiovascular:      Normal rate. Irregularly irregular rhythm.      No gallop.    Pulses:     Radial: 2+ bilaterally.  Edema:     Peripheral edema present.     Pretibial: bilateral 1+ edema of the pretibial area.     Ankle: bilateral 1+ edema of the ankle.  Abdominal:      Palpations: Abdomen is soft.   Musculoskeletal:      Cervical back:  Full passive range of motion without pain, normal range of motion and neck supple. Skin:     General: Skin is warm and dry.   Neurological:      Mental Status: Alert and oriented to person, place, and time.             Assessment:          Diagnosis Plan   1. Dyspnea on exertion  Adult Transthoracic Echo Complete w/ Color, Spectral and Contrast if Necessary Per Protocol    Stress Test With Myocardial Perfusion One Day      2. Paroxysmal atrial fibrillation  Adult Transthoracic Echo Complete w/ Color, Spectral and Contrast if Necessary Per Protocol    Stress Test With Myocardial Perfusion One Day      3. Chronic heart failure with preserved ejection fraction (HFpEF)  Adult Transthoracic Echo Complete w/ Color, Spectral and Contrast if Necessary Per Protocol    Stress Test With Myocardial Perfusion One Day      4. Other hyperlipidemia        5. CHEN on CPAP        6. Chronic obstructive pulmonary disease, unspecified COPD type               Plan:       1.  Dyspnea on exertion.  Progressive symptoms over the last year.  Unclear at this time if this could be cardiac or pulmonary related.  I suggested we proceed with a stress test and an echocardiogram for cardiac evaluation.  I doubt this is related to her atrial fibrillation since this has been an on and off issue over the last year and she does not appear to recognize when she is in atrial fibrillation.  2.  Paroxysmal atrial fibrillation.  Previously thought to be persistent but she appeared to be in sinus rhythm during her office visit in 5/2024.  She is in atrial fibrillation today.  Rates fairly well-controlled on current dose of atenolol.  On chronic anticoagulation with apixaban.  Continue current management.  3.  Chronic heart failure with preserved ejection fraction.  Noted to have grade 2 diastolic dysfunction in the past.  She does have some mild lower extremity swelling and as above increasing dyspnea on exertion over the last year.  Plan for repeat  echocardiogram as above.  Continue spironolactone for now.  May need to intensify diuretic regimen depending on the results.  4.  Hyperlipidemia.  On simvastatin which is managed by Dr. Guerra.  5.  Obstructive sleep apnea  6.  COPD.  Followed by Dr. Loza.    Will call and discuss results of her stress test and her echocardiogram and determine further recommendations based on those results.  Since the plan patient will be out of town for the months of January and February we will tentatively plan on seeing the patient back again in 6 months.

## 2024-11-22 NOTE — LETTER
November 22, 2024     Jayne Guerra MD  [address] [id number]    Patient: Nessa Osuna   YOB: 1950   Date of Visit: 11/22/2024       Dear Jayne Guerra MD,    Nessa Osuna was in my office today. Below are the relevant portions of my assessment and plan of care.           If you have questions, please do not hesitate to call me. I look forward to following Nessa along with you.         Sincerely,        Adamaris Blue MD        CC: No Recipients

## 2024-12-03 ENCOUNTER — HOSPITAL ENCOUNTER (OUTPATIENT)
Dept: CARDIOLOGY | Facility: HOSPITAL | Age: 74
Discharge: HOME | End: 2024-12-03
Admitting: INTERNAL MEDICINE

## 2024-12-03 DIAGNOSIS — Z13.6 ENCOUNTER FOR SCREENING FOR VASCULAR DISEASE: ICD-10-CM

## 2024-12-03 PROCEDURE — 93799 UNLISTED CV SVC/PROCEDURE: CPT

## 2024-12-04 RX ORDER — APIXABAN 5 MG/1
5 TABLET, FILM COATED ORAL 2 TIMES DAILY
Qty: 180 TABLET | Refills: 3 | Status: SHIPPED | OUTPATIENT
Start: 2024-12-04

## 2024-12-06 LAB
BH CV VAS SCREENING CAROTID CCA LEFT: 87 CM/SEC
BH CV VAS SCREENING CAROTID CCA RIGHT: 90 CM/SEC
BH CV VAS SCREENING CAROTID ICA LEFT: 50 CM/SEC
BH CV VAS SCREENING CAROTID ICA RIGHT: 71 CM/SEC
BH CV XLRA MEAS - MID AO DIAM: 2.1 CM
BH CV XLRA MEAS - PAD LEFT ABI PT: 1.25
BH CV XLRA MEAS - PAD LEFT ARM: 120 MMHG
BH CV XLRA MEAS - PAD LEFT LEG PT: 157 MMHG
BH CV XLRA MEAS - PAD RIGHT ABI PT: 1.25
BH CV XLRA MEAS - PAD RIGHT ARM: 126 MMHG
BH CV XLRA MEAS - PAD RIGHT LEG PT: 157 MMHG
BH CV XLRA MEAS LEFT DIST CCA EDV: -28 CM/SEC
BH CV XLRA MEAS LEFT DIST CCA PSV: -87 CM/SEC
BH CV XLRA MEAS LEFT ICA/CCA RATIO: 0.6
BH CV XLRA MEAS LEFT PROX ICA EDV: -19.8 CM/SEC
BH CV XLRA MEAS LEFT PROX ICA PSV: -49.6 CM/SEC
BH CV XLRA MEAS RIGHT DIST CCA EDV: -24.2 CM/SEC
BH CV XLRA MEAS RIGHT DIST CCA PSV: -90.1 CM/SEC
BH CV XLRA MEAS RIGHT ICA/CCA RATIO: 0.8
BH CV XLRA MEAS RIGHT PROX ICA EDV: -21.7 CM/SEC
BH CV XLRA MEAS RIGHT PROX ICA PSV: -71.4 CM/SEC

## 2024-12-30 ENCOUNTER — TELEPHONE (OUTPATIENT)
Dept: CARDIOLOGY | Facility: CLINIC | Age: 74
End: 2024-12-30
Payer: MEDICARE

## 2025-01-02 ENCOUNTER — HOSPITAL ENCOUNTER (OUTPATIENT)
Dept: CARDIOLOGY | Facility: HOSPITAL | Age: 75
Discharge: HOME | End: 2025-01-02
Admitting: INTERNAL MEDICINE
Payer: MEDICARE

## 2025-01-02 ENCOUNTER — HOSPITAL ENCOUNTER (OUTPATIENT)
Dept: CARDIOLOGY | Facility: HOSPITAL | Age: 75
Discharge: HOME | End: 2025-01-02
Payer: MEDICARE

## 2025-01-02 VITALS
HEIGHT: 66 IN | BODY MASS INDEX: 37.28 KG/M2 | SYSTOLIC BLOOD PRESSURE: 136 MMHG | DIASTOLIC BLOOD PRESSURE: 82 MMHG | HEART RATE: 100 BPM | WEIGHT: 232 LBS

## 2025-01-02 VITALS — HEIGHT: 66 IN | BODY MASS INDEX: 37.2 KG/M2 | WEIGHT: 231.48 LBS

## 2025-01-02 DIAGNOSIS — R06.09 DYSPNEA ON EXERTION: ICD-10-CM

## 2025-01-02 DIAGNOSIS — I48.0 PAROXYSMAL ATRIAL FIBRILLATION: ICD-10-CM

## 2025-01-02 DIAGNOSIS — I50.32 CHRONIC HEART FAILURE WITH PRESERVED EJECTION FRACTION (HFPEF): ICD-10-CM

## 2025-01-02 LAB
AORTIC DIMENSIONLESS INDEX: 0.64 (DI)
ASCENDING AORTA: 2.5 CM
AV MEAN PRESS GRAD SYS DOP V1V2: 3.8 MMHG
AV VMAX SYS DOP: 143.7 CM/SEC
BH CV ECHO MEAS - ACS: 1.66 CM
BH CV ECHO MEAS - AO MAX PG: 8.3 MMHG
BH CV ECHO MEAS - AO ROOT AREA (BSA CORRECTED): 1.3 CM2
BH CV ECHO MEAS - AO ROOT DIAM: 2.7 CM
BH CV ECHO MEAS - AO V2 VTI: 27.5 CM
BH CV ECHO MEAS - AVA(I,D): 1.92 CM2
BH CV ECHO MEAS - EDV(CUBED): 74.1 ML
BH CV ECHO MEAS - EDV(MOD-SP2): 88 ML
BH CV ECHO MEAS - EDV(MOD-SP4): 63 ML
BH CV ECHO MEAS - EF(MOD-SP2): 58 %
BH CV ECHO MEAS - EF(MOD-SP4): 61.9 %
BH CV ECHO MEAS - ESV(CUBED): 55.6 ML
BH CV ECHO MEAS - ESV(MOD-SP2): 37 ML
BH CV ECHO MEAS - ESV(MOD-SP4): 24 ML
BH CV ECHO MEAS - FS: 9.1 %
BH CV ECHO MEAS - IVS/LVPW: 1.29 CM
BH CV ECHO MEAS - IVSD: 0.9 CM
BH CV ECHO MEAS - LAT PEAK E' VEL: 10.1 CM/SEC
BH CV ECHO MEAS - LV DIASTOLIC VOL/BSA (35-75): 29.6 CM2
BH CV ECHO MEAS - LV MASS(C)D: 101.3 GRAMS
BH CV ECHO MEAS - LV MAX PG: 3.6 MMHG
BH CV ECHO MEAS - LV MEAN PG: 2 MMHG
BH CV ECHO MEAS - LV SYSTOLIC VOL/BSA (12-30): 11.3 CM2
BH CV ECHO MEAS - LV V1 MAX: 94.3 CM/SEC
BH CV ECHO MEAS - LV V1 VTI: 17.6 CM
BH CV ECHO MEAS - LVIDD: 4.2 CM
BH CV ECHO MEAS - LVIDS: 3.8 CM
BH CV ECHO MEAS - LVOT AREA: 3 CM2
BH CV ECHO MEAS - LVOT DIAM: 1.96 CM
BH CV ECHO MEAS - LVPWD: 0.7 CM
BH CV ECHO MEAS - MED PEAK E' VEL: 9.2 CM/SEC
BH CV ECHO MEAS - MR MAX PG: 78.3 MMHG
BH CV ECHO MEAS - MR MAX VEL: 442.5 CM/SEC
BH CV ECHO MEAS - MV DEC SLOPE: 1028 CM/SEC2
BH CV ECHO MEAS - MV DEC TIME: 0.13 SEC
BH CV ECHO MEAS - MV E MAX VEL: 115 CM/SEC
BH CV ECHO MEAS - MV MAX PG: 7.2 MMHG
BH CV ECHO MEAS - MV MEAN PG: 3.4 MMHG
BH CV ECHO MEAS - MV P1/2T: 40.2 MSEC
BH CV ECHO MEAS - MV V2 VTI: 21 CM
BH CV ECHO MEAS - MVA(P1/2T): 5.5 CM2
BH CV ECHO MEAS - MVA(VTI): 2.5 CM2
BH CV ECHO MEAS - PA V2 MAX: 80.2 CM/SEC
BH CV ECHO MEAS - PULM A REVS DUR: 0.1 SEC
BH CV ECHO MEAS - PULM A REVS VEL: 15.3 CM/SEC
BH CV ECHO MEAS - PULM DIAS VEL: 41.5 CM/SEC
BH CV ECHO MEAS - PULM S/D: 0.67
BH CV ECHO MEAS - PULM SYS VEL: 28 CM/SEC
BH CV ECHO MEAS - QP/QS: 0.69
BH CV ECHO MEAS - RAP SYSTOLE: 8 MMHG
BH CV ECHO MEAS - RV MAX PG: 0.86 MMHG
BH CV ECHO MEAS - RV V1 MAX: 46.3 CM/SEC
BH CV ECHO MEAS - RV V1 VTI: 10.2 CM
BH CV ECHO MEAS - RVOT DIAM: 2.14 CM
BH CV ECHO MEAS - RVSP: 43 MMHG
BH CV ECHO MEAS - SV(LVOT): 52.9 ML
BH CV ECHO MEAS - SV(MOD-SP2): 51 ML
BH CV ECHO MEAS - SV(MOD-SP4): 39 ML
BH CV ECHO MEAS - SV(RVOT): 36.5 ML
BH CV ECHO MEAS - SVI(LVOT): 24.8 ML/M2
BH CV ECHO MEAS - SVI(MOD-SP2): 23.9 ML/M2
BH CV ECHO MEAS - SVI(MOD-SP4): 18.3 ML/M2
BH CV ECHO MEAS - TAPSE (>1.6): 1.94 CM
BH CV ECHO MEAS - TR MAX PG: 35 MMHG
BH CV ECHO MEAS - TR MAX VEL: 295.9 CM/SEC
BH CV ECHO MEASUREMENTS AVERAGE E/E' RATIO: 11.92
BH CV XLRA - RV BASE: 3.8 CM
BH CV XLRA - RV LENGTH: 6.7 CM
BH CV XLRA - RV MID: 2.31 CM
BH CV XLRA - TDI S': 9.7 CM/SEC
LEFT ATRIUM VOLUME INDEX: 55.4 ML/M2
LV EF 2D ECHO EST: 57.7 %
SINUS: 2.7 CM
STJ: 2.48 CM

## 2025-01-02 PROCEDURE — 25510000001 PERFLUTREN 6.52 MG/ML SUSPENSION 2 ML VIAL: Performed by: INTERNAL MEDICINE

## 2025-01-02 PROCEDURE — 93306 TTE W/DOPPLER COMPLETE: CPT

## 2025-01-02 RX ADMIN — PERFLUTREN 1 ML: 6.52 INJECTION, SUSPENSION INTRAVENOUS at 13:30

## 2025-01-03 NOTE — PROGRESS NOTES
Called and went over echocardiogram results.  Explained that the findings overall were stable except for improvement in her right ventricular systolic pressure.  I asked her to call us back so can reschedule her stress test since she was unable to tolerate the PET stress test because of claustrophobia.

## 2025-01-06 RX ORDER — SIMVASTATIN 20 MG
20 TABLET ORAL DAILY
Qty: 90 TABLET | Refills: 0 | Status: SHIPPED | OUTPATIENT
Start: 2025-01-06

## 2025-01-06 RX ORDER — MONTELUKAST SODIUM 10 MG/1
TABLET ORAL
Qty: 90 TABLET | Refills: 0 | Status: SHIPPED | OUTPATIENT
Start: 2025-01-06

## 2025-01-07 DIAGNOSIS — R06.09 DYSPNEA ON EXERTION: Primary | ICD-10-CM

## 2025-01-07 RX ORDER — VIBEGRON 75 MG/1
75 TABLET, FILM COATED ORAL DAILY
Qty: 90 TABLET | Refills: 3 | Status: SHIPPED | OUTPATIENT
Start: 2025-01-07

## 2025-02-23 DIAGNOSIS — M54.30 SCIATICA, UNSPECIFIED LATERALITY: ICD-10-CM

## 2025-02-24 ENCOUNTER — TELEPHONE (OUTPATIENT)
Dept: CARDIOLOGY | Facility: CLINIC | Age: 75
End: 2025-02-24
Payer: MEDICARE

## 2025-02-24 RX ORDER — GABAPENTIN 300 MG/1
300 CAPSULE ORAL 3 TIMES DAILY
Qty: 270 CAPSULE | Refills: 0 | Status: SHIPPED | OUTPATIENT
Start: 2025-02-24

## 2025-02-25 ENCOUNTER — HOSPITAL ENCOUNTER (OUTPATIENT)
Dept: CARDIOLOGY | Facility: HOSPITAL | Age: 75
Discharge: HOME | End: 2025-02-25
Payer: MEDICARE

## 2025-02-25 DIAGNOSIS — R06.09 DYSPNEA ON EXERTION: ICD-10-CM

## 2025-02-25 LAB
BH CV NUCLEAR PRIOR STUDY: 1
BH CV REST NUCLEAR ISOTOPE DOSE: 11.8 MCI
BH CV STRESS BP STAGE 1: NORMAL
BH CV STRESS COMMENTS STAGE 1: NORMAL
BH CV STRESS DOSE REGADENOSON STAGE 1: 0.4
BH CV STRESS DURATION MIN STAGE 1: 0
BH CV STRESS DURATION SEC STAGE 1: 10
BH CV STRESS HR STAGE 1: 109
BH CV STRESS NUCLEAR ISOTOPE DOSE: 34.4 MCI
BH CV STRESS PROTOCOL 1: NORMAL
BH CV STRESS RECOVERY BP: NORMAL MMHG
BH CV STRESS RECOVERY HR: 99 BPM
BH CV STRESS STAGE 1: 1
MAXIMAL PREDICTED HEART RATE: 146 BPM
PERCENT MAX PREDICTED HR: 74.66 %
SPECT HRT GATED+EF W RNC IV: 40 %
STRESS BASELINE BP: NORMAL MMHG
STRESS BASELINE HR: 90 BPM
STRESS PERCENT HR: 88 %
STRESS POST EXERCISE DUR SEC: 10 SEC
STRESS POST PEAK BP: NORMAL MMHG
STRESS POST PEAK HR: 109 BPM
STRESS TARGET HR: 124 BPM

## 2025-02-25 PROCEDURE — 93016 CV STRESS TEST SUPVJ ONLY: CPT | Performed by: INTERNAL MEDICINE

## 2025-02-25 PROCEDURE — A9502 TC99M TETROFOSMIN: HCPCS | Performed by: INTERNAL MEDICINE

## 2025-02-25 PROCEDURE — 78452 HT MUSCLE IMAGE SPECT MULT: CPT

## 2025-02-25 PROCEDURE — 34310000005 TECHNETIUM TETROFOSMIN KIT: Performed by: INTERNAL MEDICINE

## 2025-02-25 PROCEDURE — 93017 CV STRESS TEST TRACING ONLY: CPT

## 2025-02-25 PROCEDURE — 78452 HT MUSCLE IMAGE SPECT MULT: CPT | Performed by: INTERNAL MEDICINE

## 2025-02-25 PROCEDURE — 25010000002 REGADENOSON 0.4 MG/5ML SOLUTION: Performed by: INTERNAL MEDICINE

## 2025-02-25 PROCEDURE — 93018 CV STRESS TEST I&R ONLY: CPT | Performed by: INTERNAL MEDICINE

## 2025-02-25 RX ORDER — REGADENOSON 0.08 MG/ML
0.4 INJECTION, SOLUTION INTRAVENOUS
Status: COMPLETED | OUTPATIENT
Start: 2025-02-25 | End: 2025-02-25

## 2025-02-25 RX ADMIN — TETROFOSMIN 1 DOSE: 1.38 INJECTION, POWDER, LYOPHILIZED, FOR SOLUTION INTRAVENOUS at 13:25

## 2025-02-25 RX ADMIN — REGADENOSON 0.4 MG: 0.08 INJECTION, SOLUTION INTRAVENOUS at 13:25

## 2025-02-25 RX ADMIN — TETROFOSMIN 1 DOSE: 1.38 INJECTION, POWDER, LYOPHILIZED, FOR SOLUTION INTRAVENOUS at 12:53

## 2025-03-18 ENCOUNTER — RESULTS FOLLOW-UP (OUTPATIENT)
Dept: INTERNAL MEDICINE | Facility: CLINIC | Age: 75
End: 2025-03-18

## 2025-03-18 ENCOUNTER — OFFICE VISIT (OUTPATIENT)
Dept: INTERNAL MEDICINE | Facility: CLINIC | Age: 75
End: 2025-03-18
Payer: MEDICARE

## 2025-03-18 VITALS
HEART RATE: 105 BPM | SYSTOLIC BLOOD PRESSURE: 130 MMHG | BODY MASS INDEX: 38.09 KG/M2 | WEIGHT: 237 LBS | DIASTOLIC BLOOD PRESSURE: 84 MMHG | OXYGEN SATURATION: 96 % | HEIGHT: 66 IN

## 2025-03-18 DIAGNOSIS — M54.41 CHRONIC BILATERAL LOW BACK PAIN WITH BILATERAL SCIATICA: ICD-10-CM

## 2025-03-18 DIAGNOSIS — R35.0 URINE FREQUENCY: Primary | ICD-10-CM

## 2025-03-18 DIAGNOSIS — E78.49 OTHER HYPERLIPIDEMIA: ICD-10-CM

## 2025-03-18 DIAGNOSIS — I10 HYPERTENSION, UNSPECIFIED TYPE: ICD-10-CM

## 2025-03-18 DIAGNOSIS — M54.42 CHRONIC BILATERAL LOW BACK PAIN WITH BILATERAL SCIATICA: ICD-10-CM

## 2025-03-18 DIAGNOSIS — M48.062 SPINAL STENOSIS OF LUMBAR REGION WITH NEUROGENIC CLAUDICATION: ICD-10-CM

## 2025-03-18 DIAGNOSIS — I50.32 CHRONIC HEART FAILURE WITH PRESERVED EJECTION FRACTION (HFPEF): ICD-10-CM

## 2025-03-18 DIAGNOSIS — E66.9 OBESITY (BMI 30-39.9): ICD-10-CM

## 2025-03-18 DIAGNOSIS — G89.29 CHRONIC BILATERAL LOW BACK PAIN WITH BILATERAL SCIATICA: ICD-10-CM

## 2025-03-18 LAB
BILIRUB BLD-MCNC: NEGATIVE MG/DL
CLARITY, POC: CLEAR
COLOR UR: YELLOW
EXPIRATION DATE: NORMAL
GLUCOSE UR STRIP-MCNC: NEGATIVE MG/DL
KETONES UR QL: NEGATIVE
LEUKOCYTE EST, POC: NEGATIVE
Lab: NORMAL
NITRITE UR-MCNC: NEGATIVE MG/ML
PH UR: 6 [PH] (ref 5–8)
PROT UR STRIP-MCNC: NEGATIVE MG/DL
RBC # UR STRIP: NEGATIVE /UL
SP GR UR: 1.01 (ref 1–1.03)
UROBILINOGEN UR QL: NORMAL

## 2025-03-18 NOTE — PROGRESS NOTES
Chief Complaint   Patient presents with    Hyperlipidemia    oab    Urinary Tract Infection    dyspnea    Back Pain       Hyperlipidemia       Nessa Osuna is a 75 y.o. female presents for f/u evaluation. Patient tested positive for e coli. She took pyridium for this. She did not receive the antibiotic from her pharmacy until another day and thoght she was taking an antibiotic. She has not yet started this. She is on gemtessa daily. Unfortunately it is cost prohibitive.   Patient notes shortness of air. She is on breo daily. Rx albuterol but has not used it.   Had stress testing and echo. She is scheduled for pulmonary evaluation as well.         The following portions of the patient's history were reviewed and updated as appropriate: allergies, current medications, past family history, past medical history, past social history, past surgical history and problem list.  Current Outpatient Medications on File Prior to Visit   Medication Sig Dispense Refill    acetaminophen (TYLENOL) 500 MG tablet Take 1 tablet by mouth Every Night. Take 1 tablet every 4-6 hours as needed      albuterol sulfate  (90 Base) MCG/ACT inhaler 2 puffs Every 4 (Four) Hours As Needed.      alendronate (FOSAMAX) 70 MG tablet TAKE 1 TABLET BY MOUTH ONCE WEEKLY ON AN EMPTY STOMACH BEFORE BREAKFAST. REMAIN UPRIGHT FOR 30 MINUTES AND TAKE WITH 8 OUNCES OF WATER 12 tablet 2    atenolol (TENORMIN) 100 MG tablet TAKE ONE TABLET BY MOUTH TWICE A  tablet 3    Breo Ellipta 100-25 MCG/INH inhaler Inhale 1 puff Daily.      Cholecalciferol (VITAMIN D) 2000 UNITS capsule Take 1 capsule by mouth Daily.      DULoxetine (CYMBALTA) 60 MG capsule TAKE 1 CAPSULE BY MOUTH DAILY 90 capsule 3    Eliquis 5 MG tablet tablet TAKE 1 TABLET BY MOUTH TWICE A  tablet 3    finasteride (PROSCAR) 5 MG tablet       Flaxseed, Linseed, (FLAXSEED OIL) 1000 MG capsule Take 1 capsule by mouth Daily.      fluticasone (FLONASE) 50 MCG/ACT nasal spray  Administer 2 sprays into the nostril(s) as directed by provider Daily.      gabapentin (NEURONTIN) 300 MG capsule TAKE 1 CAPSULE BY MOUTH 3 TIMES A  capsule 0    hydroxychloroquine (PLAQUENIL) 200 MG tablet       hyoscyamine (ANASPAZ,LEVSIN) 0.125 MG tablet TAKE ONE TABLET BY MOUTH EVERY 6 HOURS AS NEEDED FOR CRAMPING OR DIARRHEA (ABDOMINAL PAIN) 60 tablet 0    montelukast (SINGULAIR) 10 MG tablet TAKE ONE TABLET BY MOUTH ONCE NIGHTLY 90 tablet 0    omeprazole (priLOSEC) 20 MG capsule TAKE 1 CAPSULE BY MOUTH DAILY 90 capsule 2    simvastatin (ZOCOR) 20 MG tablet TAKE 1 TABLET BY MOUTH DAILY 90 tablet 0    spironolactone (ALDACTONE) 25 MG tablet TAKE ONE TABLET BY MOUTH DAILY 90 tablet 3    Vibegron (Gemtesa) 75 MG tablet Take 1 tablet by mouth Daily. 90 tablet 3     No current facility-administered medications on file prior to visit.     Review of Systems   Constitutional:  Positive for fatigue.   HENT: Negative.     Eyes: Negative.    Respiratory: Negative.     Cardiovascular: Negative.    Gastrointestinal: Negative.    Endocrine: Negative.    Genitourinary:  Positive for frequency.   Musculoskeletal: Negative.    Allergic/Immunologic: Negative.    Neurological: Negative.    Hematological: Negative.    Psychiatric/Behavioral: Negative.         Objective   Physical Exam  Vitals and nursing note reviewed.   Constitutional:       Appearance: Normal appearance. She is well-developed.   HENT:      Head: Normocephalic and atraumatic.      Right Ear: Tympanic membrane and external ear normal.      Left Ear: Tympanic membrane and external ear normal.      Nose: Nose normal.      Mouth/Throat:      Mouth: Mucous membranes are moist.   Eyes:      Extraocular Movements: Extraocular movements intact.      Pupils: Pupils are equal, round, and reactive to light.   Cardiovascular:      Rate and Rhythm: Normal rate and regular rhythm.      Pulses: Normal pulses.      Heart sounds: Normal heart sounds.   Pulmonary:       "Effort: Pulmonary effort is normal. No respiratory distress.      Breath sounds: Normal breath sounds.   Abdominal:      General: Abdomen is flat.      Palpations: Abdomen is soft.   Musculoskeletal:         General: Normal range of motion.      Cervical back: Normal range of motion and neck supple.   Skin:     General: Skin is warm and dry.   Neurological:      General: No focal deficit present.      Mental Status: She is alert and oriented to person, place, and time.   Psychiatric:         Mood and Affect: Mood normal.         Behavior: Behavior normal.         Thought Content: Thought content normal.         Judgment: Judgment normal.          /84   Pulse 105   Ht 167.6 cm (66\")   Wt 108 kg (237 lb)   SpO2 96%   BMI 38.25 kg/m²     Assessment & Plan   Diagnoses and all orders for this visit:    Urine frequency  -     POC Urinalysis Dipstick, Automated  -     Urine Culture - Urine, Urine, Clean Catch    Chronic heart failure with preserved ejection fraction (HFpEF)    Other hyperlipidemia    Hypertension, unspecified type    Obesity (BMI 30-39.9)      Pateint w UTI. Advise her to start the previously rx antibiotic. Patient has exertional dyspnea. She will f/uw pulmonary and see if she is a candidate for suppleemental o2 v other. She is to try albuterol prior to activity. Continue per cardiology advasiesement ats well. Chronic back pain. She is not interested in surgery at this time. She is to try physical therapy and will try aquatic PT at this time. She is taking gabapentin. Consider spinal specialist in the future. She is to f/u here routinely or prn.            "

## 2025-03-18 NOTE — TELEPHONE ENCOUNTER
Urine culture is positive for e coli. Sensitive to keflex. Advise patient to complete course cephalexin.   jw

## 2025-03-19 ENCOUNTER — TELEPHONE (OUTPATIENT)
Dept: INTERNAL MEDICINE | Facility: CLINIC | Age: 75
End: 2025-03-19
Payer: MEDICARE

## 2025-03-19 LAB
CONV .: NORMAL
Lab: NORMAL

## 2025-03-19 NOTE — TELEPHONE ENCOUNTER
[facility] called and stated that an extra urine sample was sent but there is no order for it. Just want to make sure if that urine needs to be testes.    Stephanie best call back # [phone]  ext: [phone]

## 2025-03-21 LAB
BACTERIA UR CULT: ABNORMAL
OTHER ANTIBIOTIC SUSC ISLT: ABNORMAL

## 2025-04-04 RX ORDER — OMEPRAZOLE 20 MG/1
20 CAPSULE, DELAYED RELEASE ORAL DAILY
Qty: 90 CAPSULE | Refills: 2 | Status: SHIPPED | OUTPATIENT
Start: 2025-04-04

## 2025-04-04 RX ORDER — SIMVASTATIN 20 MG
20 TABLET ORAL DAILY
Qty: 90 TABLET | Refills: 0 | Status: SHIPPED | OUTPATIENT
Start: 2025-04-04

## 2025-04-04 RX ORDER — MONTELUKAST SODIUM 10 MG/1
10 TABLET ORAL NIGHTLY
Qty: 90 TABLET | Refills: 0 | Status: SHIPPED | OUTPATIENT
Start: 2025-04-04

## 2025-04-21 RX ORDER — ALENDRONATE SODIUM 70 MG/1
70 TABLET ORAL WEEKLY
Qty: 12 TABLET | Refills: 2 | Status: SHIPPED | OUTPATIENT
Start: 2025-04-21

## 2025-05-12 RX ORDER — SIMVASTATIN 20 MG
20 TABLET ORAL DAILY
Qty: 90 TABLET | Refills: 0 | Status: SHIPPED | OUTPATIENT
Start: 2025-05-12

## 2025-05-12 RX ORDER — SPIRONOLACTONE 25 MG/1
25 TABLET ORAL DAILY
Qty: 90 TABLET | Refills: 3 | Status: SHIPPED | OUTPATIENT
Start: 2025-05-12

## 2025-05-13 ENCOUNTER — TREATMENT (OUTPATIENT)
Dept: PHYSICAL THERAPY | Facility: CLINIC | Age: 75
End: 2025-05-13
Payer: MEDICARE

## 2025-05-13 DIAGNOSIS — M54.16 RADICULOPATHY, LUMBAR REGION: ICD-10-CM

## 2025-05-13 DIAGNOSIS — R29.898 WEAKNESS OF BOTH HIPS: ICD-10-CM

## 2025-05-13 DIAGNOSIS — Z91.81 HISTORY OF RECENT FALL: ICD-10-CM

## 2025-05-13 DIAGNOSIS — G89.29 CHRONIC BILATERAL LOW BACK PAIN WITH BILATERAL SCIATICA: Primary | ICD-10-CM

## 2025-05-13 DIAGNOSIS — R26.89 ANTALGIC GAIT: ICD-10-CM

## 2025-05-13 DIAGNOSIS — M54.42 CHRONIC BILATERAL LOW BACK PAIN WITH BILATERAL SCIATICA: Primary | ICD-10-CM

## 2025-05-13 DIAGNOSIS — M54.41 CHRONIC BILATERAL LOW BACK PAIN WITH BILATERAL SCIATICA: Primary | ICD-10-CM

## 2025-05-13 NOTE — PROGRESS NOTES
Physical Therapy Initial Evaluation and Plan of Care    [facility] [place] [address]  [phone] (phone)  [phone] (fax)      Patient: Nessa Osuan   : 1950  Diagnosis/ICD-10 Code:  Chronic bilateral low back pain with bilateral sciatica [M54.42, M54.41, G89.29]  Referring practitioner: Jayne Guerra MD  Date of Initial Visit: 2025  Today's Date: 2025  Patient seen for 1 sessions    Visit Diagnoses:    ICD-10-CM ICD-9-CM   1. Chronic bilateral low back pain with bilateral sciatica  M54.42 724.2    M54.41 724.3    G89.29 338.29   2. Radiculopathy, lumbar region  M54.16 724.4   3. Antalgic gait  R26.89 781.2   4. Weakness of both hips  R29.898 729.89   5. History of recent fall  Z91.81 V15.88              Subjective Evaluation    History of Present Illness  Onset date: Chronic.  Mechanism of injury: She went to back school in her late 20s.  She was athletic but had back problems.  She has osteoporosis. She has edema in her legs and she wears compression socks.  She has had heart problems and COPD.    She is having a MRI tomorrow.  She has shrunk 4 inches.  She had pain radiating from her left knee up her left thigh last night.  It always hurts to the left side of L4/5 and left hip.  She takes gabepentin 300 mg TID.  She is limited by what she can do around the yard and the house. She has to stop and takes more breaks.  She tries to sit down on a stool but she has to get up and down and that is hard.  Walking is mostly limited by her breathing.  She uses a shopping cart or motorized scooter ([employer], if she is in a lot of pain at store)  She cannot walk her new puppies.  She takes acetaminophen at bed time, melatonin.  Her sleep is disturbed by muscle cramps in her legs and pain.  She has had injections for her back in the past.  She has done physical therapy in the past that was not very helpful.  She cannot step up a  step taller than 6 inches with hand rail.    Pain  Current pain rating: 3  At worst pain ratin (Taking out the garbage, recycling, sitting down to weed on cart for 15 min then getting up)  Relieving factors: heat and medications  Aggravating factors: sleeping, ambulation, lifting, stairs and standing    Social Support  Lives in: one-story house (stairs to basement that she tries to avoid, 3 taller steps off deck)  Lives with: alone (2 dogs)    Patient Goals  Patient goals for therapy: decreased pain, increased strength and independence with ADLs/IADLs             Objective          Active Range of Motion     Lumbar   Flexion: WFL  Extension: Active lumbar extension: Feels good to her back. WFL  Left lateral flexion: Active left lumbar lateral flexion: 50% with increased pain left side.   Right lateral flexion: WFL  Left rotation: WFL  Right rotation: WFL    Strength/Myotome Testing     Left Hip   Planes of Motion   Flexion: 4+  Abduction: 3+    Right Hip   Planes of Motion   Flexion: 4+  Abduction: 3+    Left Knee   Flexion: 5  Extension: 5    Right Knee   Flexion: 5  Extension: 5    Left Ankle/Foot   Dorsiflexion: 5  Plantar flexion: 5    Right Ankle/Foot   Dorsiflexion: 5  Plantar flexion: 5    Additional Strength Details  Transverse abdominus 3+/5  Able to bridge hips but weaker on left side and elicits cramping right leg    Tests     Lumbar     Left   Negative passive SLR.     Right   Negative passive SLR.     Lumbar Flexibility Comments:   Right hip is a little tighter than the left but ROM WFL  Minimal hamstring tightness    Ambulation   Weight-Bearing Status   Assistive device used: none    Observational Gait   Gait: antalgic     Quality of Movement During Gait   Trunk    Trunk (Left): Positive left lateral lean over stance limb.   Trunk (Right): Positive lateral lean over stance limb.     Pelvis    Pelvis (Left): Positive Trendelenburg.   Pelvis (Right): Positive Trendelenburg.     Additional Quality of  Movement During Gait Details  When I try to show her how to walk with a cane to help her lessen trunk side bending she has more difficulty keeping her body straight and that makes her a little off-balanced.  Her right knee experienced a giving out.    Functional Assessment     Comments  Requires moderate support from arms to stand from chair        See Exercise, Manual, and Modality Logs for complete treatment.       Functional Outcome Score: Modified Oswestry score is 54% disability with highest reported disability for pain, walking, personal care, lifting, standing         Assessment & Plan       Assessment  Impairments: abnormal gait, abnormal or restricted ROM, activity intolerance, impaired balance, impaired physical strength, lacks appropriate home exercise program and pain with function   Functional limitations: lifting, sleeping, walking, uncomfortable because of pain and standing   Assessment details: Nessa Osuna is a 75 y.o. female referred to aquatic physical therapy for chronic lower back pain that radiates to both legs. She presents with an evolving clinical presentation.  She is scheduled for lumbar MRI. She has comorbidities of COPD that is a contributor to her activity limitations due to shortness of breath, cardiac history, obesity, osteoporosis and chronic edema in her legs and no known personal factors  that may affect her progress in the plan of care.  Signs and symptoms are consistent with physical therapy diagnosis of chronic lower back and leg pain that is limiting to her ADLs including walking, standing and sleeping. She walks with gait deviations due to significant weakness in her hip abductors. She may have some walking imbalance. She reports most recent fall last week. She has not tried aquatic therapy in past therapies.  She does respond to heat and can no longer [facility]  She will benefit from aquatic therapy for back rehab working on her walking,  strength and pain management.  Prognosis: good    Goals  Plan Goals: Date to achieve goals: 08/11/25    Date to achieve STGs: 06/24/25    STG 1 Patient will perform aquatic therapy exercises for lumbar and hip ROM/flexibility, strength and conditioning without increased  back and leg pain.    STG 2 Patient will demonstrate good postural awareness with standing and walking in pool while reducing trunk side bending while walking so that she is utilizing her hip abductors muscles better    STG 3 Patient will report decreased pain following aquatic therapy session by 1 point so that she reports decreased pain with household tasks including taking out the garbage    Date to achieve LTGs:  08/11/25    LTG 1 Patient will demonstrate an independent aquatic HEP for lumbar  and hip strength,  ROM/flexibility, conditioning and balance with community resources if helpful to continue after discharge    LTG 2 Patient will demonstrate increased core strength and balance stepping up and down from tall step in shallow water of pool so that she reports that is is easier to get up and down her stairs at home by 25% or more     LTG 3 Patient will report decreased functional disability on Modified Oswestry score from 54 % to 44 %r less that is improved for walking, pain level and standing    LTG 4 Patient will demonstrate reduced gait deviations for short distances that improves her walking balance so that she has no falls or near falls for 4 weeks     LTG 5 Patient will report decreased pain level from 7/10 to 5/10 or less so that her tolerance is improved for household tasks including weeding and taking out garbage by 25% or more      Plan  Therapy options: will be seen for skilled therapy services  Other planned modality interventions: Aquatic therapy, Group therapy  Planned therapy interventions: abdominal trunk stabilization, balance/weight-bearing training, flexibility, functional ROM exercises, strengthening, postural training,  neuromuscular re-education, gait training, home exercise program, therapeutic activities and transfer training  Frequency: 2x week  Duration in weeks: 12  Treatment plan discussed with: patient  Plan details: Aquatic therapy in warm water for back rehab  Work on hip abductor strength, core   Work on walking to minimize lateral sway, improve hip abductor use, improve balance  Step ups to help with function at home  Sit to stand  Decompression        Timed:         Manual Therapy:         mins  90671;     Therapeutic Exercise:         mins  01068;     Neuromuscular Dayday:        mins  88354;    Therapeutic Activity:          mins  40637;     Gait Trainin     mins  44134;     Ultrasound:          mins  86949;    Self Care                            mins   80114      Un-Timed:  Electrical Stimulation:         mins  84728 ([facility] );  Dry Needling  (1-2 muscles)                 mins  (Self-pay)  Dry Needling (3-4 muscles)      mins  (Self-pay)  Dry Needling Trial         mins DRYNDLTRIAL  (No Charge)  Traction          mins 69855  Low Eval          Mins  30942  Mod Eval     36     Mins  09585  High Eval                            Mins  30135    Timed Treatment:   4   mins   Total Treatment:     36   mins    PT SIGNATURE: Saba March PT     [place] License Number: [id number]    Electronically signed by Saba March PT, 25, 1:52 PM EDT    DATE TREATMENT INITIATED: 2025    Medicare Initial Certification  Certification Period: 2025  I certify that the therapy services are furnished while this patient is under my care.  The services outlined above are required by this patient, and will be reviewed every 90 days.     PHYSICIAN: Jayne Guerra MD   NPI: [id number]                                         DATE:     Please sign and return via fax to [phone] Thank you, [facility].

## 2025-05-14 ENCOUNTER — HOSPITAL ENCOUNTER (OUTPATIENT)
Dept: MRI IMAGING | Facility: HOSPITAL | Age: 75
Discharge: HOME | End: 2025-05-14
Admitting: INTERNAL MEDICINE
Payer: MEDICARE

## 2025-05-14 DIAGNOSIS — G89.29 CHRONIC BILATERAL LOW BACK PAIN WITH BILATERAL SCIATICA: ICD-10-CM

## 2025-05-14 DIAGNOSIS — M54.41 CHRONIC BILATERAL LOW BACK PAIN WITH BILATERAL SCIATICA: ICD-10-CM

## 2025-05-14 DIAGNOSIS — M54.42 CHRONIC BILATERAL LOW BACK PAIN WITH BILATERAL SCIATICA: ICD-10-CM

## 2025-05-14 PROCEDURE — 72148 MRI LUMBAR SPINE W/O DYE: CPT

## 2025-05-19 ENCOUNTER — TELEPHONE (OUTPATIENT)
Dept: INTERNAL MEDICINE | Facility: CLINIC | Age: 75
End: 2025-05-19
Payer: MEDICARE

## 2025-05-19 NOTE — TELEPHONE ENCOUNTER
----- Message from Jayne Guerra sent at 5/18/2025  5:27 PM EDT -----  See result note. How is patient's pain control? She ahs a f/u w pain management in July but earlier if needs additional med. If having any weakness would advise f/u w neurosurg asap (dr. Hensley).jw

## 2025-05-27 DIAGNOSIS — M54.30 SCIATICA, UNSPECIFIED LATERALITY: ICD-10-CM

## 2025-05-28 ENCOUNTER — OFFICE VISIT (OUTPATIENT)
Age: 75
End: 2025-05-28
Payer: MEDICARE

## 2025-05-28 VITALS
DIASTOLIC BLOOD PRESSURE: 76 MMHG | SYSTOLIC BLOOD PRESSURE: 134 MMHG | WEIGHT: 237 LBS | HEART RATE: 128 BPM | BODY MASS INDEX: 38.09 KG/M2 | HEIGHT: 66 IN

## 2025-05-28 DIAGNOSIS — I48.0 PAROXYSMAL ATRIAL FIBRILLATION: Primary | ICD-10-CM

## 2025-05-28 PROCEDURE — 99214 OFFICE O/P EST MOD 30 MIN: CPT | Performed by: NURSE PRACTITIONER

## 2025-05-28 PROCEDURE — 93000 ELECTROCARDIOGRAM COMPLETE: CPT | Performed by: NURSE PRACTITIONER

## 2025-05-28 RX ORDER — GABAPENTIN 300 MG/1
300 CAPSULE ORAL 3 TIMES DAILY
Qty: 270 CAPSULE | Refills: 1 | Status: SHIPPED | OUTPATIENT
Start: 2025-05-28

## 2025-05-28 NOTE — PROGRESS NOTES
Subjective:     Encounter Date:05/28/2025      Patient ID: Nessa Osuna is a 75 y.o. female.    Chief Complaint:follow up PAF, cardiomyopathy  History of Present Illness  This is a 75-year-old female who follows with Dr. Blue and is known to me.  She follows with Dr. Wagner as well.  She has a past medical history of hyperlipidemia, paroxysmal atrial fibrillation, cardiomyopathy.    She is here today for a follow-up visit.  She tells me that she is still struggling with shortness of breath.  She is also having a lot of issues with back pain.  She is supposed to start aquatic therapy soon.  She feels like her back pain is a big contributor to her breathing.  She is unable to be very active because her back hurts which then leads her to getting short of breath with any type of activity.  She is undergoing a pulmonary workup with pulmonologist.  No complaints of chest pain or palpitations.  She does feel that her heart rate is a little elevated today and notes 128 on her EKG.  I did recheck her heart rate at the end of our visit and it was still in the 120s.  She denies any dizziness or syncope.  Lower extremity swelling is overall stable.    Prior history:  Dr. Blue began following the patient in May 2017 when she presented with atrial fibrillation with rapid ventricular rate.  She underwent an echocardiogram that showed normal left ventricular systolic function and wall motion with an ejection fraction of 57%, moderately to severely dilated left atrium, and no significant valvular disease.  He was started on metoprolol tartrate 12.5 mg twice a day and rivaroxaban for her [id number] score of 2.    In August 2019 she reported that the frequency of her atrial fibrillation episodes had increased.  She was taking an additional half tablet of metoprolol if the symptoms started at nighttime when she was trying to fall asleep.    In March 2020 she reported worsening dyspnea on exertion to her PCP.  An  echocardiogram was performed that showed mildly depressed left ventricular systolic function with an EF of 40-45%, mild to moderate mitral and tricuspid regurgitation, biatrial enlargement and a right ventricular systolic pressure of 48 mmHg.   At time Dr. Blue switched her metoprolol to tartrate to succinate 25 mg daily.  A telephone follow-up appointment was performed due to the pandemic and at that time she reported only minimal improvement in her symptoms.  There was concern that she was now in persistent atrial fibrillation.  A Holter monitor was placed that showed persistent atrial fibrillation with an average rate of 112 bpm and a max heart rate of 176.  Her metoprolol succinate was increased to 25 mg twice a day.    She continued to struggle with shortness of breath and fatigue.  She was referred to Dr. Wagner who recommended switching her to atenolol 50 mg twice a day and proceed with a cardioversion.  At follow-up following her cardioversion she reported that there was not much improvement in her symptoms even though she was maintaining sinus rhythm.  A ZIO was placed that showed 54% burden of atrial fibrillation.  She was also referred for sleep evaluation was diagnosed with sleep apnea and started on CPAP therapy.  She underwent a stress test that showed no evidence of ischemia.  A repeat echocardiogram was performed that showed normal left ventricular systolic function and wall motion with an EF of 60%, grade 2 diastolic dysfunction, biatrial enlargement, mild to moderate tricuspid valve regurgitation with severely elevated right ventricular systolic pressure of 55 mmHg.     She was then diagnosed with COPD and started on treatment for this.  I had a follow-up visit with SHAYY Avelar she reported some improvement in her shortness of breath.  At that visit she was started on spironolactone 25 mg daily.    She was last seen by Dr. Blue in May 2023 at which time she denied any significant change  in her shortness of breath.  She reported feeling like she was in atrial fibrillation about 40 to 50% of the time.  No changes were made at that visit.    She then saw Dr. Wagner in November 2023 and was doing well.  No changes were made and he said that they would see her on an as needed basis.  I have reviewed and updated as appropriate allergies, current medications, past family history, past medical history, past surgical history and problem list.    Review of Systems   Constitutional: Positive for malaise/fatigue. Negative for fever, weight gain and weight loss.   HENT:  Negative for congestion, hoarse voice and sore throat.    Eyes:  Negative for blurred vision and double vision.   Cardiovascular:  Positive for dyspnea on exertion and leg swelling. Negative for chest pain, orthopnea, palpitations and syncope.   Respiratory:  Positive for shortness of breath. Negative for cough and wheezing.    Gastrointestinal:  Negative for abdominal pain, hematemesis, hematochezia and melena.   Genitourinary:  Negative for dysuria and hematuria.   Neurological:  Negative for dizziness, headaches, light-headedness and numbness.   Psychiatric/Behavioral:  Negative for depression. The patient is not nervous/anxious.          Current Outpatient Medications:     acetaminophen (TYLENOL) 500 MG tablet, Take 1 tablet by mouth Every Night. Take 1 tablet every 4-6 hours as needed, Disp: , Rfl:     albuterol sulfate  (90 Base) MCG/ACT inhaler, 2 puffs Every 4 (Four) Hours As Needed., Disp: , Rfl:     alendronate (FOSAMAX) 70 MG tablet, TAKE 1 TABLET BY MOUTH ONCE WEEKLY ON AN EMPTY STOMACH BEFORE BREAKFAST. REMAIN UPRIGHT FOR 30 MINUTES AND TAKE WITH 8 OUNCES OF WATER, Disp: 12 tablet, Rfl: 2    atenolol (TENORMIN) 100 MG tablet, TAKE ONE TABLET BY MOUTH TWICE A DAY, Disp: 180 tablet, Rfl: 3    Breo Ellipta 100-25 MCG/INH inhaler, Inhale 1 puff Daily., Disp: , Rfl:     Cholecalciferol (VITAMIN D) 2000 UNITS capsule, Take 1  capsule by mouth Daily., Disp: , Rfl:     DULoxetine (CYMBALTA) 60 MG capsule, TAKE 1 CAPSULE BY MOUTH DAILY, Disp: 90 capsule, Rfl: 3    Eliquis 5 MG tablet tablet, TAKE 1 TABLET BY MOUTH TWICE A DAY, Disp: 180 tablet, Rfl: 3    finasteride (PROSCAR) 5 MG tablet, , Disp: , Rfl:     Flaxseed, Linseed, (FLAXSEED OIL) 1000 MG capsule, Take 1 capsule by mouth Daily., Disp: , Rfl:     fluticasone (FLONASE) 50 MCG/ACT nasal spray, Administer 2 sprays into the nostril(s) as directed by provider Daily., Disp: , Rfl:     gabapentin (NEURONTIN) 300 MG capsule, TAKE 1 CAPSULE BY MOUTH 3 TIMES A DAY, Disp: 270 capsule, Rfl: 0    hydroxychloroquine (PLAQUENIL) 200 MG tablet, , Disp: , Rfl:     hyoscyamine (ANASPAZ,LEVSIN) 0.125 MG tablet, TAKE ONE TABLET BY MOUTH EVERY 6 HOURS AS NEEDED FOR CRAMPING OR DIARRHEA (ABDOMINAL PAIN), Disp: 60 tablet, Rfl: 0    montelukast (SINGULAIR) 10 MG tablet, TAKE ONE TABLET BY MOUTH ONCE NIGHTLY, Disp: 90 tablet, Rfl: 0    omeprazole (priLOSEC) 20 MG capsule, TAKE 1 CAPSULE BY MOUTH DAILY, Disp: 90 capsule, Rfl: 2    simvastatin (ZOCOR) 20 MG tablet, TAKE 1 TABLET BY MOUTH DAILY, Disp: 90 tablet, Rfl: 0    spironolactone (ALDACTONE) 25 MG tablet, TAKE 1 TABLET BY MOUTH DAILY, Disp: 90 tablet, Rfl: 3    Vibegron (Gemtesa) 75 MG tablet, Take 1 tablet by mouth Daily., Disp: 90 tablet, Rfl: 3    Past Medical History:   Diagnosis Date    A-fib 05/31/2017    Abnormal ECG 2018, ck. chart    A-Fib    Allergic 40+ yrs    unspecified    Anemia 1993?    Asthma 2022    COPD    Breast nodule     Cataract 5 - 6 yrs.    [id number]    Chest wall mass     Class 1 obesity due to excess calories without serious comorbidity with body mass index (BMI) of 32.0 to 32.9 in adult     Closed fracture of head of metacarpal     left second    Closed fracture of metacarpal bone     left    Clotting disorder June 17, 2022    Bloody stool: admitted to hospital    Colon polyp 2005    noted with colonoscopy    COPD (chronic  obstructive pulmonary disease)     Coronary artery disease 2019    A-Fib    DDD (degenerative disc disease), lumbar     Depression     Difficulty breathing     Diverticulitis 2010    Diverticulitis of colon 2000?    Diverticulosis     Fatigue     GERD (gastroesophageal reflux disease)     GI bleed 06/16/2022    Headache minor/infrequent    Hip pain, right     HL (hearing loss)     not significant    Hyperlipidemia     Leiomyoma of uterus     Low back pain     CHEN on CPAP     Osteoarthritis     Osteopenia     PAF (paroxysmal atrial fibrillation)     Persistent atrial fibrillation     PONV (postoperative nausea and vomiting)     Primary localized osteoarthritis of hip     Sciatica     Skin cancer 2010    Snoring     Spinal stenosis     Visual impairment 5-6 yrs.    retinal/cat surg       Past Surgical History:   Procedure Laterality Date    ADENOIDECTOMY  1955    CARDIOVERSION  05/28/2020    Dr. Wagner    CATARACT EXTRACTION, BILATERAL      COLONOSCOPY  2005    COLONOSCOPY N/A 01/10/2022    Procedure: COLONOSCOPY INTO CECUM AND TI WITH COLD BX POLYPECTOMIES;  Surgeon: Franci Kumar MD;  Location:  [facility];  Service: Gastroenterology;  Laterality: N/A;  PRE: LLQ PAIN, ABNORMAL CT  POST: SKIN TAGS, DIVERTICULOSIS, POLYPS, HEMORRHOIDS    COLONOSCOPY N/A 06/17/2022    Procedure: COLONOSCOPY to cecum and TI;  Surgeon: Yasmani Bo MD;  Location:  [facility];  Service: Gastroenterology;  Laterality: N/A;  pre- GI bleed  post- hemorrhoids, diverticulosis    COSMETIC SURGERY  2010    nose/ mohs    ENDOSCOPY N/A 06/17/2022    Procedure: ESOPHAGOGASTRODUODENOSCOPY with biopsies;  Surgeon: Yasmani Bo MD;  Location:  [facility];  Service: Gastroenterology;  Laterality: N/A;  pre- GI bleed  post- hiatal hernia, esophagitis, gastric polyp    EYE SURGERY  retinal & cataract    2020    FLEXIBLE SIGMOIDOSCOPY  ? 1995    FRACTURE SURGERY  Left wrist    2012    MOHS SURGERY      chemosurgery  "(Mohs Micrographic Technique); Dr Jarrett and Dr Joan [facility]      single neuroma    RECONSTRUCTION OF NOSE      TONSILLECTOMY      age 5    TONSILLECTOMY AND ADENOIDECTOMY      UPPER GASTROINTESTINAL ENDOSCOPY  2022       Family History   Problem Relation Age of Onset    Cancer Mother         bladder    Osteoporosis Mother     Heart failure Mother          @93 Congenital heart failure    Arthritis Mother         lived 'til 93    Hearing loss Mother         age related    Vision loss Mother         macular degeneration    Lung cancer Father     Cancer Father         lung    No Known Problems Sister     Breast cancer Neg Hx        Social History     Tobacco Use    Smoking status: Former     Current packs/day: 0.00     Average packs/day: 1.5 packs/day for 35.0 years (52.5 ttl pk-yrs)     Types: Cigarettes     Start date: 1962     Quit date: 1997     Years since quittin.4     Passive exposure: Never    Smokeless tobacco: Never   Vaping Use    Vaping status: Never Used   Substance Use Topics    Alcohol use: Yes     Comment: rarely drink, don't drink every week. maybe 50 drinks a year    Drug use: No         ECG 12 Lead    Date/Time: 2025 3:39 PM  Performed by: Naima Chang APRN    Authorized by: Naima Chang APRN  Comparison: compared with previous ECG from 2024  Similar to previous ECG  Rhythm: atrial flutter  BPM: 128             Objective:     Visit Vitals  /76 (BP Location: Right arm, Patient Position: Sitting, Cuff Size: Adult)   Pulse (!) 128   Ht 167.6 cm (66\")   Wt 108 kg (237 lb)   BMI 38.25 kg/m²             Physical Exam  Constitutional:       Appearance: Normal appearance. She is obese.   HENT:      Head: Normocephalic.   Neck:      Vascular: No carotid bruit.   Cardiovascular:      Rate and Rhythm: Normal rate and regular rhythm.      Chest Wall: PMI is not displaced.      Pulses: Normal pulses.           Radial pulses are 2+ on the right " side and 2+ on the left side.        Posterior tibial pulses are 2+ on the right side.      Heart sounds: Normal heart sounds. No murmur heard.     No friction rub. No gallop.   Pulmonary:      Effort: Pulmonary effort is normal.      Breath sounds: Normal breath sounds.   Abdominal:      General: Bowel sounds are normal. There is no distension.      Palpations: Abdomen is soft.   Musculoskeletal:      Right lower le+ Edema present.      Left lower le+ Edema present.   Skin:     General: Skin is warm and dry.      Capillary Refill: Capillary refill takes less than 2 seconds.   Neurological:      Mental Status: She is alert and oriented to person, place, and time.   Psychiatric:         Mood and Affect: Mood normal.         Behavior: Behavior normal.         Thought Content: Thought content normal.        Lab Review:   Lipid Panel          2024    13:03   Lipid Panel   Total Cholesterol 149    Triglycerides 78    HDL Cholesterol 57    VLDL Cholesterol 15    LDL Cholesterol  77          Cardiac Procedures:       Assessment:         There are no diagnoses linked to this encounter.          Plan:       PAF: in flutter on EKG today. On Eliquis for anticoagulation and atenolol 100mg BID. Rates are a little elevated and remained elevated throughout the visit. She is asymptomatic. I am going to place a 3 day ZIO to reassess. I will also call on Friday to see how her rates are doing at home.  Dyspnea/SOA: cardiac testing to date has been unremarkable. She is undergoing a pulm eval.   History of cardiomyopathy: Stress test in 2022 showed no evidence of ischemia. EF has normalized. On beta blocker and spironolactone. Appears compensated on exam.  HLD: on statin therapy  CHEN: on CPAP, mostly compliant  COPD    Thank you for allowing me to participate in this patient's care. Please call with any questions or concerns. Ms. Osuna will follow up with Dr. Blue in 3 months.          Your medication list             Accurate as of May 28, 2025  1:53 PM. If you have any questions, ask your nurse or doctor.                CONTINUE taking these medications        Instructions Last Dose Given Next Dose Due   acetaminophen 500 MG tablet  Commonly known as: TYLENOL      Take 1 tablet by mouth Every Night. Take 1 tablet every 4-6 hours as needed       albuterol sulfate  (90 Base) MCG/ACT inhaler  Commonly known as: PROVENTIL HFA;VENTOLIN HFA;PROAIR HFA      2 puffs Every 4 (Four) Hours As Needed.       alendronate 70 MG tablet  Commonly known as: FOSAMAX      TAKE 1 TABLET BY MOUTH ONCE WEEKLY ON AN EMPTY STOMACH BEFORE BREAKFAST. REMAIN UPRIGHT FOR 30 MINUTES AND TAKE WITH 8 OUNCES OF WATER       atenolol 100 MG tablet  Commonly known as: TENORMIN      TAKE ONE TABLET BY MOUTH TWICE A DAY       Breo Ellipta 100-25 MCG/INH aerosol powder   Generic drug: Fluticasone Furoate-Vilanterol      Inhale 1 puff Daily.       DULoxetine 60 MG capsule  Commonly known as: CYMBALTA      TAKE 1 CAPSULE BY MOUTH DAILY       Eliquis 5 MG tablet tablet  Generic drug: apixaban      TAKE 1 TABLET BY MOUTH TWICE A DAY       finasteride 5 MG tablet  Commonly known as: PROSCAR           Flaxseed Oil 1000 MG capsule      Take 1 capsule by mouth Daily.       fluticasone 50 MCG/ACT nasal spray  Commonly known as: FLONASE      Administer 2 sprays into the nostril(s) as directed by provider Daily.       gabapentin 300 MG capsule  Commonly known as: NEURONTIN      TAKE 1 CAPSULE BY MOUTH 3 TIMES A DAY       Gemtesa 75 MG tablet  Generic drug: Vibegron      Take 1 tablet by mouth Daily.       hydroxychloroquine 200 MG tablet  Commonly known as: PLAQUENIL           hyoscyamine 0.125 MG tablet  Commonly known as: ANASPAZ,LEVSIN      TAKE ONE TABLET BY MOUTH EVERY 6 HOURS AS NEEDED FOR CRAMPING OR DIARRHEA (ABDOMINAL PAIN)       montelukast 10 MG tablet  Commonly known as: SINGULAIR      TAKE ONE TABLET BY MOUTH ONCE NIGHTLY       omeprazole 20 MG  capsule  Commonly known as: priLOSEC      TAKE 1 CAPSULE BY MOUTH DAILY       simvastatin 20 MG tablet  Commonly known as: ZOCOR      TAKE 1 TABLET BY MOUTH DAILY       spironolactone 25 MG tablet  Commonly known as: ALDACTONE      TAKE 1 TABLET BY MOUTH DAILY       Vitamin D 50 MCG (2000 UT) capsule      Take 1 capsule by mouth Daily.                  Naima Chang, APRN  05/28/25  1:38 PM EDT

## 2025-06-02 ENCOUNTER — TREATMENT (OUTPATIENT)
Dept: PHYSICAL THERAPY | Facility: CLINIC | Age: 75
End: 2025-06-02
Payer: MEDICARE

## 2025-06-02 DIAGNOSIS — M54.16 RADICULOPATHY, LUMBAR REGION: Primary | ICD-10-CM

## 2025-06-02 DIAGNOSIS — R26.89 ANTALGIC GAIT: ICD-10-CM

## 2025-06-02 DIAGNOSIS — M54.42 CHRONIC BILATERAL LOW BACK PAIN WITH BILATERAL SCIATICA: ICD-10-CM

## 2025-06-02 DIAGNOSIS — Z91.81 HISTORY OF RECENT FALL: ICD-10-CM

## 2025-06-02 DIAGNOSIS — R29.898 WEAKNESS OF BOTH HIPS: ICD-10-CM

## 2025-06-02 DIAGNOSIS — M54.41 CHRONIC BILATERAL LOW BACK PAIN WITH BILATERAL SCIATICA: ICD-10-CM

## 2025-06-02 DIAGNOSIS — G89.29 CHRONIC BILATERAL LOW BACK PAIN WITH BILATERAL SCIATICA: ICD-10-CM

## 2025-06-02 PROCEDURE — 97113 AQUATIC THERAPY/EXERCISES: CPT | Performed by: PHYSICAL THERAPIST

## 2025-06-02 NOTE — PROGRESS NOTES
Physical Therapy Daily Treatment Note    [facility]  [address] [place] [address]  [phone] (phone)  [phone] (fax)    Patient: Nessa Osuna   : 1950  Diagnosis/ICD-10 Code:  Radiculopathy, lumbar region [M54.16]  Referring practitioner: Jayne Guerra MD  Date of Initial Visit: Type: THERAPY  Noted: 2025  Today's Date: 2025  Patient seen for 2 sessions             Subjective Evaluation    History of Present Illness    Subjective comment: I've tried land therapy in the past but didn't seem to help much.       Objective     AQUATIC EX:     [x]Water Walk  forward / sideways x 2 laps ea  []Stretch 1  -  []Stretch 2  -  []Stretch 3  -  []Vertical Traction -  [x]Abdominals  LN x 12  [x]Hip Abd/Add 15x  [x]Hip Flex (SLR)  15x  []Hip Ext  -  [x]March in Place 15x  [x]Mini Squat  10x  []Toe/Heel Raises -  []Hip Circles  -  []Leg Press  -  []Step Ups  -  [x]Clams   15x, seated  [x]Bicycle   2 min, seated  [x]Flutter/Scissor  15 / 15, seated  []Exercise 1   -  []Exercise 2   -  []Exercise 3  -  []Exercise 4   -  []Exercise 5   -  []Exercise 6  -    **Education on properties / benefits of water, need for hydration, and importance of posture as well as core activation with ex/activity to help support spine.        Assessment & Plan       Assessment  Assessment details: Patient seen today for initial aquatic therapy session including education and instruction in basic aquatic ex/activity for mobility, flexibility, and strength/stabilization.  Instructed her to stand tall, engage abdominals / gluts / quads, and keep ex performance in comfortable range to minimize compensation / strain on spine / joints.  She reported feeling it in the inner moreso than outer thigh during R hip abd compared to L.  PT provided demonstration and cuing throughout session for optimal posture, core/glut activation, and correct form/technique with ex/activity.    Plan:   Assess patient response to initial aquatic session and modify/progress as appropriate.                  Timed:  Aquatic Therapy    28     mins [id number]  Therapeutic Activities               mins 00002  NM Re-education                     mins 68913   Self-Care              mins 88620     Untimed:  Group Therapy                      mins 10122    Total Treatment:        _      _ mins    Constance Browne, PT  Physical Therapist    [place] License:  [id number]

## 2025-06-05 ENCOUNTER — TREATMENT (OUTPATIENT)
Dept: PHYSICAL THERAPY | Facility: CLINIC | Age: 75
End: 2025-06-05
Payer: MEDICARE

## 2025-06-05 DIAGNOSIS — R29.898 WEAKNESS OF BOTH HIPS: ICD-10-CM

## 2025-06-05 DIAGNOSIS — R26.89 ANTALGIC GAIT: ICD-10-CM

## 2025-06-05 DIAGNOSIS — Z91.81 HISTORY OF RECENT FALL: ICD-10-CM

## 2025-06-05 DIAGNOSIS — M54.16 RADICULOPATHY, LUMBAR REGION: Primary | ICD-10-CM

## 2025-06-05 DIAGNOSIS — M54.42 CHRONIC BILATERAL LOW BACK PAIN WITH BILATERAL SCIATICA: ICD-10-CM

## 2025-06-05 DIAGNOSIS — M54.41 CHRONIC BILATERAL LOW BACK PAIN WITH BILATERAL SCIATICA: ICD-10-CM

## 2025-06-05 DIAGNOSIS — G89.29 CHRONIC BILATERAL LOW BACK PAIN WITH BILATERAL SCIATICA: ICD-10-CM

## 2025-06-05 NOTE — PROGRESS NOTES
"Physical Therapy Daily Treatment Note    [facility]  [address] [place] [address]  [phone] (phone)  [phone] (fax)    Patient: Nessa Osuna   : 1950  Diagnosis/ICD-10 Code:  Radiculopathy, lumbar region [M54.16]  Referring practitioner: Jayne Guerra MD  Date of Initial Visit: Type: THERAPY  Noted: 2025  Today's Date: 2025  Patient seen for 3 sessions             Subjective Evaluation    History of Present Illness    Subjective comment: I did fine after my first pool therapy visit.  Getting up/down si difficult for me.       Objective     AQUATIC EX:     [x]Water Walk             forward, sideways, and backwards x 2 laps ea  [x]March Walk             2 laps   [x]Stretch 1                  HS stretch with noodle under ankle x 30 sec ea  [x]Stretch 2                  Wall 30-60 sec x 2  []Stretch 3                  -  [x]Vertical Traction      LN/rail x 3 min  [x]Hip sweeps x 10 ea with noddle under bent knee  [x]Abdominals             LN x 15  [x]Hip Abd/Add           15x  [x]Hip Flex (SLR)        15x  []Hip Ext                    -  [x]March in Place        15x  [x]Mini Squat               12x  [x]Toe/Heel Raises     15x  []Hip Circles               -  []Leg Press                -  []Step Ups                 -  [x]STS from pool bench 10x, no hands  [x]Clams                      15x, seated  [x]Bicycle                     2 min, seated  [x]Flutter/Scissor         15 / 15, seated  [x]Exercise 1               shoulder abd/add x 12 w/ small foam dumbbells  []Exercise 2               UE \"L\" presses  [x]Exercise 3               alt paddle rows x 10 ea arm w/ closed paddles  []Exercise 4               paddle stirs  []Exercise 5               -  []Exercise 6               -         Assessment & Plan       Assessment  Assessment details: Patient reports she did fine following initial aquatic therapy session.  Continued with previous aquatic " ex/activity for mobility, flexibility, and strength / stabilization.  Increased reps on a few ex and added backwards / march walking,  HS stretch, hip sweeps, heel/toe raises, STS transition, wall stretch, decompression, and a couple of UE / core / ex this visit.  Emphasis on standing tall, engaging abdominals / gluts / quads, and performing ex/activity in comfortable range to minimize compensation / strain on joints and optimize benefit.  Demonstration and cuing provided throughout session for optimal posture, core/glut activation, and correct form/technique with ex/activity.    Plan:  Continue to assess patient response to aquatic ex/activity and modify/progress as appropriate.                  Timed:  Aquatic Therapy    44     mins 09428  Therapeutic Activities               mins 62276  NM Re-education                     mins 78390   Self-Care              mins 31132     Untimed:  Group Therapy                      mins 44932    Total Treatment:        _      _ mins    Constance Browne PT  Physical Therapist    [place] License:  [id number]

## 2025-06-10 ENCOUNTER — TREATMENT (OUTPATIENT)
Dept: PHYSICAL THERAPY | Facility: CLINIC | Age: 75
End: 2025-06-10
Payer: MEDICARE

## 2025-06-10 DIAGNOSIS — G89.29 CHRONIC BILATERAL LOW BACK PAIN WITH BILATERAL SCIATICA: ICD-10-CM

## 2025-06-10 DIAGNOSIS — R29.898 WEAKNESS OF BOTH HIPS: ICD-10-CM

## 2025-06-10 DIAGNOSIS — M54.42 CHRONIC BILATERAL LOW BACK PAIN WITH BILATERAL SCIATICA: ICD-10-CM

## 2025-06-10 DIAGNOSIS — M54.16 RADICULOPATHY, LUMBAR REGION: Primary | ICD-10-CM

## 2025-06-10 DIAGNOSIS — M54.41 CHRONIC BILATERAL LOW BACK PAIN WITH BILATERAL SCIATICA: ICD-10-CM

## 2025-06-10 DIAGNOSIS — Z91.81 HISTORY OF RECENT FALL: ICD-10-CM

## 2025-06-10 DIAGNOSIS — R26.89 ANTALGIC GAIT: ICD-10-CM

## 2025-06-10 NOTE — PROGRESS NOTES
"Physical Therapy Daily Treatment Note    [facility] Physical Therapy Milestone  [address] [place] [address]  [phone] (phone)  [phone] (fax)    Patient: Nessa Osuna   : 1950  Diagnosis/ICD-10 Code:  Radiculopathy, lumbar region [M54.16]  Referring practitioner: Jayne Guerra MD  Date of Initial Visit: Type: THERAPY  Noted: 2025  Today's Date: 6/10/2025  Patient seen for 4 sessions             Subjective Evaluation    History of Present Illness    Subjective comment: Doing okay this afternoon.       Objective     AQUATIC EX:     [x]Water Walk             forward, sideways, and backwards x 2 laps ea - on own  [x]March Walk             2 laps   [x]Stretch 1                  HS stretch with noodle under ankle x 30-45 sec ea  [x]Stretch 2                  Wall 30-60 sec x 2  []Stretch 3                  -  [x]Vertical Traction      LN/rail x 3 min - on own  [x]Hip sweeps  12x ea with noddle under bent knee  [x]Abdominals             LN x 15  [x]Hip Abd/Add           15x  [x]Hip Flex (SLR)        15x  []Hip Ext                    -  [x]March in Place        15x  [x]Mini Squat               15x  [x]Toe/Heel Raises     15x  []Hip Circles               -  [x]Leg Press                10x ea w/ large blue ring  []Step Ups                 -  [x]STS from pool [id number], no hands  []Clams                      15x, seated  [x]Bicycle                     2 min, seated  [x]Flutter/Scissor         15 / 15, seated  [x]Exercise 1               shoulder abd/add x 15 w/ small foam dumbbells  []Exercise 2               UE \"L\" presses  [x]Exercise 3               alt paddle rows x 12 ea arm w/ closed paddles  []Exercise 4               paddle stirs  []Exercise 5               -  []Exercise 6               -      Assessment & Plan       Assessment  Assessment details: Patient reports she's doing okay.  Continued with previous aquatic ex/activity for mobility, flexibility, and strength / " stabilization.  Increased reps on a few ex and added leg press this visit.  Focused on standing up tall, actively engaging abdominals / gluts / quads, and performing ex/activity with controlled movement in comfortable range to help reduce compensation / strain on joints and maximize benefit.  Demonstration and cuing provided throughout session for optimal posture, core/glut activation, and correct form/technique with ex/activity.    Plan:  Continue with aquatic ex/activity and modify/progress as appropriate.                 Timed:  Aquatic Therapy    39     mins 19086  Therapeutic Activities               mins 25106  NM Re-education                     mins 96911   Self-Care              mins 22125     Untimed:  Group Therapy                      mins 63695    Total Treatment:        _      _ mins    Constance Browne, PT  Physical Therapist    [place] License:  [id number]

## 2025-06-11 ENCOUNTER — TELEPHONE (OUTPATIENT)
Dept: CARDIOLOGY | Age: 75
End: 2025-06-11

## 2025-06-11 NOTE — TELEPHONE ENCOUNTER
"    Caller: Nessa Osuna \"Kan\"    Relationship to patient: Self    Best call back number: [phone]    Patient is needing: PT WOULD LIKE TO GET IN TO SEE DAVID SOON DUE TO HER HR STAYING UP AROUND 130 ALL THE TIME. PLEASE CALL PATIENT TO ADVISE/SCHEDULE.           "

## 2025-06-12 ENCOUNTER — DOCUMENTATION (OUTPATIENT)
Age: 75
End: 2025-06-12
Payer: MEDICARE

## 2025-06-12 ENCOUNTER — TREATMENT (OUTPATIENT)
Dept: PHYSICAL THERAPY | Facility: CLINIC | Age: 75
End: 2025-06-12
Payer: MEDICARE

## 2025-06-12 DIAGNOSIS — R26.89 ANTALGIC GAIT: ICD-10-CM

## 2025-06-12 DIAGNOSIS — Z91.81 HISTORY OF RECENT FALL: ICD-10-CM

## 2025-06-12 DIAGNOSIS — M54.41 CHRONIC BILATERAL LOW BACK PAIN WITH BILATERAL SCIATICA: ICD-10-CM

## 2025-06-12 DIAGNOSIS — M54.42 CHRONIC BILATERAL LOW BACK PAIN WITH BILATERAL SCIATICA: ICD-10-CM

## 2025-06-12 DIAGNOSIS — M54.16 RADICULOPATHY, LUMBAR REGION: Primary | ICD-10-CM

## 2025-06-12 DIAGNOSIS — R29.898 WEAKNESS OF BOTH HIPS: ICD-10-CM

## 2025-06-12 DIAGNOSIS — G89.29 CHRONIC BILATERAL LOW BACK PAIN WITH BILATERAL SCIATICA: ICD-10-CM

## 2025-06-12 RX ORDER — DILTIAZEM HYDROCHLORIDE 120 MG/1
120 CAPSULE, EXTENDED RELEASE ORAL DAILY
Qty: 90 CAPSULE | Refills: 3 | Status: SHIPPED | OUTPATIENT
Start: 2025-06-12

## 2025-06-12 NOTE — PROGRESS NOTES
Physical Therapy 30-Day Progress Note     [facility] Physical Therapy Milestone  [address] [place] [address]  [phone] (phone)  [phone] (fax)    Patient: Nessa Osuna   : 1950  Diagnosis/ICD-10 Code:  Radiculopathy, lumbar region [M54.16]  Referring practitioner: Jayne Guerra MD  Date of Initial Visit: Type: THERAPY  Noted: 2025  Today's Date: 2025  Patient seen for 5 sessions      Subjective:     Clinical Progress: unchanged  Home Program Compliance: Yes for land HEP  Treatment has included:  therapeutic exercise, therapeutic activity, neuro-muscular retraining , aquatic therapy, and patient education with home exercise program     Subjective   Pain 2-3/10 today.  Pain affects sleep - still waking up with pain - nerve pain in knee keeping her from going to sleep and/or waking her up 2-3x night.  Also gets cramps in legs during night 4-5x/week affecting sleep.  Activities such as weeding will aggravate and increase pain to 9-10/10.    Objective     Lumbar AROM:  Flexion: WFL  Extension: Active lumbar extension: Feels good to her back. WFL  Lateral flexion:  L 50% with increased pain left side, R WFL  Rotation:  WFL B     MMT:   Hip Flexion:  4+/5 B  Abduction: (standing poolside with UE support):  3/5 B w/ pain B  Hip Extension (standing poolside with UE support):  L 4/5, R 4-/5 w/ mild pain on R  Core:  3+/5     Gait:  antalgic / compensated with positive lateral lean over stance limb B, no AD      STS transition:  able to rise from sitting in poolside chair without UE assist but an effort and increased pain so typically uses UE assist     Stairs:  enters / exits pool via stairs using non reciprocal pattern with UE HR support - states at times she can do a few steps reciprocally but has increased pain so doesn't usually.    Functional Outcome Score:  Modified Oswestry score = 29/50 or 58% perceived disability with highest reported disability for pain,  "personal care, lifting, walking, standing, sleeping (was 54% at eval)        AQUATIC EX:     [x]Water Walk             forward, sideways, and backwards x 2 laps ea - on own  [x]March Walk             2 laps   [x]Stretch 1                  HS stretch with noodle under ankle x 30-45 sec ea  [x]Stretch 2                  Wall 30-60 sec x 2  []Stretch 3                  -  [x]Vertical Traction      LN/rail x 3 min - on own  [x]Hip sweeps             12x ea with noddle under bent knee  [x]Abdominals             LN x 15  [x]Hip Abd/Add           15x  [x]Hip Flex (SLR)        15x  []Hip Ext                    -  [x]March in Place        15x  [x]Mini Squat               15x  [x]Toe/Heel Raises     15x  []Hip Circles               -  [x]Leg Press                10x ea w/ large blue ring  []Step Ups                 -  [x]STS from pool bench 10x, no hands  []Clams                      15x, seated  [x]Bicycle                     2 min, seated - on own  [x]Flutter/Scissor         15 / 15, seated  [x]Exercise 1               shoulder abd/add x 15 w/ small foam dumbbells  []Exercise 2               UE \"L\" presses  [x]Exercise 3               alt paddle rows x 12 ea arm w/ closed paddles  []Exercise 4               paddle stirs  []Exercise 5               -  []Exercise 6               -      Assessment & Plan       Assessment  Assessment details: Nessa Osuna is a 75 y.o. F who has attended 4 aquatic therapy sessions since her evaluation on 5/13/2025 for treatment of chronic lower back pain radiating to both legs (at times).  She has comorbidities / personal factors including cardiac history, obesity, osteoporosis, COPD (that contributes to her activity limitations 2/2 to SOA), and chronic edema in her legs that may affect her progress in the plan of care.  She reports she feels good while in the pool and seems somewhat rejuvenated following therapy sessions but hasn't really noticed much difference in pain or functional " abilities / activity tolerance.  She is scheduled to see pain management in mid July.  She continues to exhibit postural / gait deviations, core/hip strength deficits, and decreased balance / stability.  Pain still interrupts her sleep and limits her tolerance with functional WB activity.  She indicates getting up from sitting and navigating stairs are difficult for her.  She denies any falls since she started therapy (but did have one about a week before she started).  No notable progress towards goals thus far with limited visits therefore all goals are ongoing.  She is appropriate for continuation of skilled therapy for back rehab to help manage symptoms, increase strength/stabilization, and improve functional abilities / activity tolerance.      Goals  Plan Goals: Date to achieve STGs: 06/24/25  STG 1 Patient will perform aquatic therapy exercises for lumbar and hip ROM/flexibility, strength and conditioning without increased back and leg pain.  Ongoing, No noticeable change in pain but does feel somewhat rejuvenated post therapy.  STG 2 Patient will demonstrate good postural awareness with standing and walking in pool while reducing trunk side bending while walking so that she is utilizing her hip abductors muscles better.  Ongoing  STG 3 Patient will report decreased pain following aquatic therapy session by 1 point so that she reports decreased pain with household tasks including taking out the garbage.  Ongoing, reports no noticeable difference after aquatic therapy with pain pretty much the same with household tasks.     Date to achieve LTGs:  08/11/25  LTG 1 Patient will demonstrate an independent aquatic HEP for lumbar and hip strength,  ROM/flexibility, conditioning and balance with community resources if helpful to continue after discharge.  Ongoing  LTG 2 Patient will demonstrate increased core strength and balance stepping up and down from tall step in shallow water of pool so that she reports that is  is easier to get up and down her stairs at home by 25% or more.  Ongoing, enters / exits pool via steps using non reciprocal pattern with UE rail support (step up ex not attempted yet)  LTG 3 Patient will report decreased functional disability on Modified Oswestry score from 54% to 44% or less that is improved for walking, pain level and standing.  Ongoing, Oswestry score today = 29/50 or 58% perceived disability  LTG 4 Patient will demonstrate reduced gait deviations for short distances that improves her walking balance so that she has no falls or near falls for 4 weeks.  Ongoing, continues with postural / gait deviations but denies any falls since starting therapy   LTG 5 Patient will report decreased pain level from 7/10 to 5/10 or less so that her tolerance is improved for household tasks including weeding and taking out garbage by 25% or more.  Ongoing, pain variable 2/10 to 7/10 depending on activity level and seems to be worse at night.                     Recommendations: Continue as planned  Timeframe: 1 month  Prognosis to achieve goals: good      Timed:  Aquatic Therapy    47     mins 24619;  Therapeutic Activities               mins 50028  Self-Care              mins 19480     Untimed:  Group Therapy                      mins 00285    Total Treatment:        _      _ mins      PT Signature: Constance Browne PT  [place] License:  [id number]      Based upon review of the patient's progress and continued therapy plan, it is my medical opinion that Nessa Osuna should continue physical therapy treatment at [facility] PHYSICAL THERAPY  [address] [place] [address]  [phone].    Signature: __________________________________  Jayne Guerra MD  NPI: [id number]

## 2025-06-17 ENCOUNTER — TREATMENT (OUTPATIENT)
Dept: PHYSICAL THERAPY | Facility: CLINIC | Age: 75
End: 2025-06-17
Payer: MEDICARE

## 2025-06-17 DIAGNOSIS — R26.89 ANTALGIC GAIT: ICD-10-CM

## 2025-06-17 DIAGNOSIS — G89.29 CHRONIC BILATERAL LOW BACK PAIN WITH BILATERAL SCIATICA: ICD-10-CM

## 2025-06-17 DIAGNOSIS — Z91.81 HISTORY OF RECENT FALL: ICD-10-CM

## 2025-06-17 DIAGNOSIS — R29.898 WEAKNESS OF BOTH HIPS: ICD-10-CM

## 2025-06-17 DIAGNOSIS — M54.41 CHRONIC BILATERAL LOW BACK PAIN WITH BILATERAL SCIATICA: ICD-10-CM

## 2025-06-17 DIAGNOSIS — M54.42 CHRONIC BILATERAL LOW BACK PAIN WITH BILATERAL SCIATICA: ICD-10-CM

## 2025-06-17 DIAGNOSIS — M54.16 RADICULOPATHY, LUMBAR REGION: Primary | ICD-10-CM

## 2025-06-17 NOTE — PROGRESS NOTES
"Physical Therapy Daily Treatment Note    [facility] Physical Therapy [facility]estone  [address] [place] [address]  [phone] (phone)  [phone] (fax)    Patient: Nessa Osuna   : 1950  Diagnosis/ICD-10 Code:  Radiculopathy, lumbar region [M54.16]  Referring practitioner: Jayne Guerra MD  Date of Initial Visit: Type: THERAPY  Noted: 2025  Today's Date: 2025  Patient seen for 6 sessions             Subjective   I've been doing fine after the aquatic therapy - no issues thus far.     Objective     AQUATIC EX:     [x]Water Walk             forward, sideways, and backwards x 2 laps ea - on own  [x]March Walk             2 laps   [x]Stretch 1                  HS stretch with noodle under ankle x 30-45 sec ea  [x]Stretch 2                  Wall 30-60 sec x 2  []Stretch 3                  -  [x]Vertical Traction      LN/rail x 3 min - on own  [x]Hip sweeps             12x ea with noddle under bent knee  [x]Abdominals             LN x 20  [x]Hip Abd/Add           15x  [x]Hip Flex (SLR)        15x  []Hip Ext                    -  []March in Place        15x  [x]Mini Squat               15x  [x]Toe/Heel Raises     15x  []Hip Circles               -  [x]Leg Press                12x ea w/ large blue ring  []Step Ups                 -  [x]STS from pool bench 10x, no hands  [x]Clams                      15x, seated  [x]Bicycle                     2 min, seated - on own  [x]Flutter/Scissor         15 / 15, seated  [x]Exercise 1               shoulder abd/add x 15 w/ small foam dumbbells  []Exercise 2               UE \"L\" presses  [x]Exercise 3               alt paddle rows x 15 ea arm w/ closed paddles  []Exercise 4               paddle stirs  []Exercise 5               -  []Exercise 6               -      Assessment & Plan       Assessment  Assessment details: Patient reports she has not had any issue thus far following aquatic therapy sessions.  Continued with previous aquatic " ex/activity for mobility, flexibility, and strength / stabilization.  Increased reps on a few ex this visit.  Emphasis on standing tall, actively engaging abdominals / gluts / quads, and performing ex/activity with good form / technique and control in comfortable range to minimize compensation / strain on spine / joints and optimize benefit.  Demonstration and cuing provided throughout session for optimal posture, core/glut activation, and correct form/technique with ex/activity.    Plan:  Continue with skilled therapy progressing ex/activity as appropriate.                   Timed:  Aquatic Therapy    40    mins 81686  Therapeutic Activities               mins 77322  NM Re-education                     mins 09457   Self-Care              mins 84752     Untimed:  Group Therapy                      mins 87368    Total Treatment:        _      _ mins    Constance Browne PT  Physical Therapist    [place] License:  [id number]

## 2025-06-19 ENCOUNTER — TREATMENT (OUTPATIENT)
Dept: PHYSICAL THERAPY | Facility: CLINIC | Age: 75
End: 2025-06-19
Payer: MEDICARE

## 2025-06-19 DIAGNOSIS — G89.29 CHRONIC BILATERAL LOW BACK PAIN WITH BILATERAL SCIATICA: ICD-10-CM

## 2025-06-19 DIAGNOSIS — R29.898 WEAKNESS OF BOTH HIPS: ICD-10-CM

## 2025-06-19 DIAGNOSIS — M54.16 RADICULOPATHY, LUMBAR REGION: Primary | ICD-10-CM

## 2025-06-19 DIAGNOSIS — Z91.81 HISTORY OF RECENT FALL: ICD-10-CM

## 2025-06-19 DIAGNOSIS — M54.41 CHRONIC BILATERAL LOW BACK PAIN WITH BILATERAL SCIATICA: ICD-10-CM

## 2025-06-19 DIAGNOSIS — M54.42 CHRONIC BILATERAL LOW BACK PAIN WITH BILATERAL SCIATICA: ICD-10-CM

## 2025-06-19 DIAGNOSIS — R26.89 ANTALGIC GAIT: ICD-10-CM

## 2025-06-19 NOTE — PROGRESS NOTES
"Physical Therapy Daily Treatment Note    [facility] Physical Therapy Milestone  [address] [place] [address]  [phone] (phone)  [phone] (fax)    Patient: Nessa Osuna   : 1950  Diagnosis/ICD-10 Code:  Radiculopathy, lumbar region [M54.16]  Referring practitioner: Jayne Guerra MD  Date of Initial Visit: Type: THERAPY  Noted: 2025  Today's Date: 2025  Patient seen for 7 sessions         Subjective  Pain pretty typical ~ 2-3/10, location varies depending on activity / movement.    Objective     AQUATIC EX:     [x]Water Walk             forward, sideways, and backwards x 2 laps ea - on own  [x]March Walk             2 laps   [x]Stretch 1                  HS stretch with noodle under ankle x 30-45 sec ea  [x]Stretch 2                  Wall 30-60 sec x 2  []Stretch 3                  -  [x]Vertical Traction      LN/rail x 3 min - on own  [x]Hip sweeps             15x ea with noddle under bent knee  [x]Abdominals             LN x 20  [x]Hip Abd/Add           15x  [x]Hip Flex (SLR)        15x  []Hip Ext                    -  []March in Place        15x  [x]Mini Squat               15x  [x]Toe/Heel Raises     15x  []Hip Circles               -  [x]Leg Press                15x ea w/ large blue ring  []Step Ups                 -  [x]STS from pool bench  10x, no hands  [x]Clams                      20x, seated at rail  [x]Bicycle                     2 min, at rail  [x]Flutter/Scissor         15 / 15, at rail  [x]Exercise 1               shoulder abd/add x 20 w/ small foam dumbbells  []Exercise 2               UE \"L\" presses  [x]Exercise 3               alt paddle rows x 15 ea arm w/ closed paddles  [x]Exercise 4               paddle stirs x 10 ea direction w/ closed paddles  []Exercise 5               -  []Exercise 6               -       Assessment & Plan       Assessment  Assessment details: Patient reports typical pain this afternoon (2-3/10).  Continued with previous " "aquatic ex/activity for mobility, flexibility, and strength / stabilization.  Increased reps on a few ex, added paddle stirs, and performed clams, flutter/scissor, and bicycle at rail (vs seated) this visit.  Focused on standing tall, actively engaging abdominals / gluts / quads, and performing ex/activity with good form / technique and control to help reduce compensation / strain on spine / joints and maximize benefit.  Demonstration and cuing provided throughout session for optimal posture, core/glut activation, and correct form/technique with ex/activity.    Plan:  Continue skilled therapy progressing ex/activity as appropriate.  May consider hip circles, UE \"L\" presses in future.                 Timed:  Aquatic Therapy    41     mins 32485  Therapeutic Activities               mins 35795  NM Re-education                     mins 63124   Self-Care              mins 11948     Untimed:  Group Therapy                      mins 37578    Total Treatment:        _      _ mins    Constance Browne, PT  Physical Therapist    [place] License:  [id number]  "

## 2025-06-20 RX ORDER — DILTIAZEM HYDROCHLORIDE 120 MG/1
120 CAPSULE, EXTENDED RELEASE ORAL DAILY
Qty: 90 CAPSULE | Refills: 3 | Status: SHIPPED | OUTPATIENT
Start: 2025-06-20

## 2025-06-20 RX ORDER — ATENOLOL 100 MG/1
100 TABLET ORAL 2 TIMES DAILY
Qty: 180 TABLET | Refills: 3 | Status: SHIPPED | OUTPATIENT
Start: 2025-06-20

## 2025-06-23 ENCOUNTER — LAB (OUTPATIENT)
Dept: LAB | Facility: HOSPITAL | Age: 75
End: 2025-06-23
Payer: MEDICARE

## 2025-06-23 ENCOUNTER — OFFICE VISIT (OUTPATIENT)
Age: 75
End: 2025-06-23
Payer: MEDICARE

## 2025-06-23 VITALS
WEIGHT: 238 LBS | BODY MASS INDEX: 38.25 KG/M2 | SYSTOLIC BLOOD PRESSURE: 130 MMHG | HEART RATE: 129 BPM | DIASTOLIC BLOOD PRESSURE: 84 MMHG | HEIGHT: 66 IN

## 2025-06-23 DIAGNOSIS — I50.32 CHRONIC HEART FAILURE WITH PRESERVED EJECTION FRACTION (HFPEF): ICD-10-CM

## 2025-06-23 DIAGNOSIS — I48.0 PAROXYSMAL ATRIAL FIBRILLATION: Primary | ICD-10-CM

## 2025-06-23 DIAGNOSIS — I48.0 PAROXYSMAL ATRIAL FIBRILLATION: ICD-10-CM

## 2025-06-23 PROBLEM — I48.4 ATYPICAL ATRIAL FLUTTER: Status: ACTIVE | Noted: 2025-06-23

## 2025-06-23 LAB
ANION GAP SERPL CALCULATED.3IONS-SCNC: 10.2 MMOL/L (ref 5–15)
BASOPHILS # BLD AUTO: 0.03 10*3/MM3 (ref 0–0.2)
BASOPHILS NFR BLD AUTO: 0.6 % (ref 0–1.5)
BUN SERPL-MCNC: 19 MG/DL (ref 8–23)
BUN/CREAT SERPL: 21.8 (ref 7–25)
CALCIUM SPEC-SCNC: 9.9 MG/DL (ref 8.6–10.5)
CHLORIDE SERPL-SCNC: 103 MMOL/L (ref 98–107)
CO2 SERPL-SCNC: 26.8 MMOL/L (ref 22–29)
CREAT SERPL-MCNC: 0.87 MG/DL (ref 0.57–1)
DEPRECATED RDW RBC AUTO: 44.4 FL (ref 37–54)
EGFRCR SERPLBLD CKD-EPI 2021: 69.6 ML/MIN/1.73
EOSINOPHIL # BLD AUTO: 0.24 10*3/MM3 (ref 0–0.4)
EOSINOPHIL NFR BLD AUTO: 5 % (ref 0.3–6.2)
ERYTHROCYTE [DISTWIDTH] IN BLOOD BY AUTOMATED COUNT: 12.5 % (ref 12.3–15.4)
GLUCOSE SERPL-MCNC: 90 MG/DL (ref 65–99)
HCT VFR BLD AUTO: 38.2 % (ref 34–46.6)
HGB BLD-MCNC: 12.4 G/DL (ref 12–15.9)
IMM GRANULOCYTES # BLD AUTO: 0.02 10*3/MM3 (ref 0–0.05)
IMM GRANULOCYTES NFR BLD AUTO: 0.4 % (ref 0–0.5)
INR PPP: 1.4 (ref 0.9–1.1)
LYMPHOCYTES # BLD AUTO: 0.96 10*3/MM3 (ref 0.7–3.1)
LYMPHOCYTES NFR BLD AUTO: 20.1 % (ref 19.6–45.3)
MCH RBC QN AUTO: 31.4 PG (ref 26.6–33)
MCHC RBC AUTO-ENTMCNC: 32.5 G/DL (ref 31.5–35.7)
MCV RBC AUTO: 96.7 FL (ref 79–97)
MONOCYTES # BLD AUTO: 0.65 10*3/MM3 (ref 0.1–0.9)
MONOCYTES NFR BLD AUTO: 13.6 % (ref 5–12)
NEUTROPHILS NFR BLD AUTO: 2.87 10*3/MM3 (ref 1.7–7)
NEUTROPHILS NFR BLD AUTO: 60.3 % (ref 42.7–76)
NRBC BLD AUTO-RTO: 0 /100 WBC (ref 0–0.2)
PLATELET # BLD AUTO: 203 10*3/MM3 (ref 140–450)
PMV BLD AUTO: 11.1 FL (ref 6–12)
POTASSIUM SERPL-SCNC: 4.5 MMOL/L (ref 3.5–5.2)
PROTHROMBIN TIME: 17.2 SECONDS (ref 11.7–14.2)
RBC # BLD AUTO: 3.95 10*6/MM3 (ref 3.77–5.28)
SODIUM SERPL-SCNC: 140 MMOL/L (ref 136–145)
WBC NRBC COR # BLD AUTO: 4.77 10*3/MM3 (ref 3.4–10.8)

## 2025-06-23 PROCEDURE — 80048 BASIC METABOLIC PNL TOTAL CA: CPT

## 2025-06-23 PROCEDURE — 85025 COMPLETE CBC W/AUTO DIFF WBC: CPT

## 2025-06-23 PROCEDURE — 85610 PROTHROMBIN TIME: CPT

## 2025-06-23 PROCEDURE — 93000 ELECTROCARDIOGRAM COMPLETE: CPT | Performed by: INTERNAL MEDICINE

## 2025-06-23 PROCEDURE — 36415 COLL VENOUS BLD VENIPUNCTURE: CPT

## 2025-06-23 PROCEDURE — 99214 OFFICE O/P EST MOD 30 MIN: CPT | Performed by: INTERNAL MEDICINE

## 2025-06-23 NOTE — Clinical Note
She was in in AF but then switched to flutter and has tachy. I will try ablating it. It will be hard because it is likely a left atrial flutter de daniel's are the hardest

## 2025-06-23 NOTE — H&P (VIEW-ONLY)
Date of Office Visit: 2025  Encounter Provider: Buzz Wagner MD  Place of Service: [facility] CARDIOLOGY  Patient Name: Nessa Osuna  : 1950    Subjective:     Encounter Date:2025      Patient ID: Nessa Osuna is a 75 y.o. female who has a cc of  who had ls pers AF and was asymptomatic doing fine until 6 weeks since noting . In atypical atrial flutter.     Pos DELA CRUZ, SOA and severe fatigue.       Past Medical History:   Diagnosis Date    A-fib 2017    Abnormal ECG 2018, ck. chart    A-Fib    Allergic 40+ yrs    unspecified    Anemia ?    Asthma     COPD    Breast nodule     Cataract 5 - 6 yrs.    [id number]    Chest wall mass     Class 1 obesity due to excess calories without serious comorbidity with body mass index (BMI) of 32.0 to 32.9 in adult     Closed fracture of head of metacarpal     left second    Closed fracture of metacarpal bone     left    Clotting disorder 2022    Bloody stool: admitted to hospital    Colon polyp     noted with colonoscopy    COPD (chronic obstructive pulmonary disease)     Coronary artery disease     A-Fib    DDD (degenerative disc disease), lumbar     Depression     Difficulty breathing     Diverticulitis 2010    Diverticulitis of colon ?    Diverticulosis     Fatigue     GERD (gastroesophageal reflux disease)     GI bleed 2022    Headache minor/infrequent    Hip pain, right     HL (hearing loss)     not significant    Hyperlipidemia     Leiomyoma of uterus     Low back pain     CHEN on CPAP     Osteoarthritis     Osteopenia     PAF (paroxysmal atrial fibrillation)     Persistent atrial fibrillation     PONV (postoperative nausea and vomiting)     Primary localized osteoarthritis of hip     Sciatica     Skin cancer 2010    Snoring     Spinal stenosis     Visual impairment 5-6 yrs.    retinal/cat surg       Social History     Socioeconomic History    Marital status: Single    Number of  children: 0    Years of education: 14    Highest education level: Not asked   Tobacco Use    Smoking status: Former     Current packs/day: 0.00     Average packs/day: 1.5 packs/day for 35.0 years (52.5 ttl pk-yrs)     Types: Cigarettes     Start date: 1962     Quit date: 1997     Years since quittin.4     Passive exposure: Never    Smokeless tobacco: Never   Vaping Use    Vaping status: Never Used   Substance and Sexual Activity    Alcohol use: Yes     Comment: rarely drink, don't drink every week. maybe 50 drinks a year    Drug use: No    Sexual activity: Not Currently     Partners: Male     Birth control/protection: Condom, Birth control pill     Comment: pills       Family History   Problem Relation Age of Onset    Cancer Mother         bladder    Osteoporosis Mother     Heart failure Mother          @93 Congenital heart failure    Arthritis Mother         lived 'til 93    Hearing loss Mother         age related    Vision loss Mother         macular degeneration    Lung cancer Father     Cancer Father         lung    No Known Problems Sister     Breast cancer Neg Hx        Review of Systems   Constitutional: Negative for fever and night sweats.   HENT:  Negative for ear pain and stridor.    Eyes:  Negative for discharge and visual halos.   Cardiovascular:  Negative for cyanosis.   Respiratory:  Negative for hemoptysis and sputum production.    Hematologic/Lymphatic: Negative for adenopathy.   Skin:  Negative for nail changes and unusual hair distribution.   Musculoskeletal:  Negative for gout and joint swelling.   Gastrointestinal:  Negative for bowel incontinence and flatus.   Genitourinary:  Negative for dysuria and flank pain.   Neurological:  Negative for seizures and tremors.   Psychiatric/Behavioral:  Negative for altered mental status. The patient is not nervous/anxious.             Objective:     Vitals:    25 0837   BP: 130/84   BP Location: Right arm   Patient Position: Sitting  "  Pulse: (!) 129   Weight: 108 kg (238 lb)   Height: 167.6 cm (66\")         Eyes:      General:         Right eye: No discharge.         Left eye: No discharge.   HENT:      Head: Normocephalic and atraumatic.   Neck:      Thyroid: No thyromegaly.      Vascular: No JVD.   Pulmonary:      Effort: Pulmonary effort is normal.      Breath sounds: Normal breath sounds. No rales.   Cardiovascular:      Tachycardia present. Regular rhythm.      No gallop.    Edema:     Peripheral edema absent.   Abdominal:      General: Bowel sounds are normal.      Palpations: Abdomen is soft.      Tenderness: There is no abdominal tenderness.   Musculoskeletal: Normal range of motion.         General: No deformity. Skin:     General: Skin is warm and dry.      Findings: No erythema.   Neurological:      Mental Status: Alert and oriented to person, place, and time.      Motor: Normal muscle tone.   Psychiatric:         Behavior: Behavior normal.         Thought Content: Thought content normal.           ECG 12 Lead    Date/Time: 6/23/2025 9:36 AM  Performed by: Buzz Wagner MD    Authorized by: Buzz Wagner MD  Rhythm: atrial flutter          Lab Review:       Assessment:          Diagnosis Plan   1. Paroxysmal atrial fibrillation        2. Chronic heart failure with preserved ejection fraction (HFpEF)               Plan:       The cause of her DELA CRUZ is this flutter. She was fine in AF because the hR was controlled. In atrial flutter there is tachycardia.     I think the AFlutter is from the [place]. And we should ablate.     I also discussed meds (amio) and dig. And ablate and pace, but I think with our new PFA catheters we should try ablation.   "

## 2025-06-23 NOTE — PROGRESS NOTES
Date of Office Visit: 2025  Encounter Provider: Buzz Wagner MD  Place of Service: [facility] CARDIOLOGY  Patient Name: Nessa Osuna  : 1950    Subjective:     Encounter Date:2025      Patient ID: Nessa Osuna is a 75 y.o. female who has a cc of  who had ls pers AF and was asymptomatic doing fine until 6 weeks since noting . In atypical atrial flutter.     Pos DELA CRUZ, SOA and severe fatigue.       Past Medical History:   Diagnosis Date    A-fib 2017    Abnormal ECG 2018, ck. chart    A-Fib    Allergic 40+ yrs    unspecified    Anemia ?    Asthma     COPD    Breast nodule     Cataract 5 - 6 yrs.    [id number]    Chest wall mass     Class 1 obesity due to excess calories without serious comorbidity with body mass index (BMI) of 32.0 to 32.9 in adult     Closed fracture of head of metacarpal     left second    Closed fracture of metacarpal bone     left    Clotting disorder 2022    Bloody stool: admitted to hospital    Colon polyp     noted with colonoscopy    COPD (chronic obstructive pulmonary disease)     Coronary artery disease     A-Fib    DDD (degenerative disc disease), lumbar     Depression     Difficulty breathing     Diverticulitis 2010    Diverticulitis of colon ?    Diverticulosis     Fatigue     GERD (gastroesophageal reflux disease)     GI bleed 2022    Headache minor/infrequent    Hip pain, right     HL (hearing loss)     not significant    Hyperlipidemia     Leiomyoma of uterus     Low back pain     CHEN on CPAP     Osteoarthritis     Osteopenia     PAF (paroxysmal atrial fibrillation)     Persistent atrial fibrillation     PONV (postoperative nausea and vomiting)     Primary localized osteoarthritis of hip     Sciatica     Skin cancer 2010    Snoring     Spinal stenosis     Visual impairment 5-6 yrs.    retinal/cat surg       Social History     Socioeconomic History    Marital status: Single    Number of  children: 0    Years of education: 14    Highest education level: Not asked   Tobacco Use    Smoking status: Former     Current packs/day: 0.00     Average packs/day: 1.5 packs/day for 35.0 years (52.5 ttl pk-yrs)     Types: Cigarettes     Start date: 1962     Quit date: 1997     Years since quittin.4     Passive exposure: Never    Smokeless tobacco: Never   Vaping Use    Vaping status: Never Used   Substance and Sexual Activity    Alcohol use: Yes     Comment: rarely drink, don't drink every week. maybe 50 drinks a year    Drug use: No    Sexual activity: Not Currently     Partners: Male     Birth control/protection: Condom, Birth control pill     Comment: pills       Family History   Problem Relation Age of Onset    Cancer Mother         bladder    Osteoporosis Mother     Heart failure Mother          @93 Congenital heart failure    Arthritis Mother         lived 'til 93    Hearing loss Mother         age related    Vision loss Mother         macular degeneration    Lung cancer Father     Cancer Father         lung    No Known Problems Sister     Breast cancer Neg Hx        Review of Systems   Constitutional: Negative for fever and night sweats.   HENT:  Negative for ear pain and stridor.    Eyes:  Negative for discharge and visual halos.   Cardiovascular:  Negative for cyanosis.   Respiratory:  Negative for hemoptysis and sputum production.    Hematologic/Lymphatic: Negative for adenopathy.   Skin:  Negative for nail changes and unusual hair distribution.   Musculoskeletal:  Negative for gout and joint swelling.   Gastrointestinal:  Negative for bowel incontinence and flatus.   Genitourinary:  Negative for dysuria and flank pain.   Neurological:  Negative for seizures and tremors.   Psychiatric/Behavioral:  Negative for altered mental status. The patient is not nervous/anxious.             Objective:     Vitals:    25 0837   BP: 130/84   BP Location: Right arm   Patient Position: Sitting  "  Pulse: (!) 129   Weight: 108 kg (238 lb)   Height: 167.6 cm (66\")         Eyes:      General:         Right eye: No discharge.         Left eye: No discharge.   HENT:      Head: Normocephalic and atraumatic.   Neck:      Thyroid: No thyromegaly.      Vascular: No JVD.   Pulmonary:      Effort: Pulmonary effort is normal.      Breath sounds: Normal breath sounds. No rales.   Cardiovascular:      Tachycardia present. Regular rhythm.      No gallop.    Edema:     Peripheral edema absent.   Abdominal:      General: Bowel sounds are normal.      Palpations: Abdomen is soft.      Tenderness: There is no abdominal tenderness.   Musculoskeletal: Normal range of motion.         General: No deformity. Skin:     General: Skin is warm and dry.      Findings: No erythema.   Neurological:      Mental Status: Alert and oriented to person, place, and time.      Motor: Normal muscle tone.   Psychiatric:         Behavior: Behavior normal.         Thought Content: Thought content normal.           ECG 12 Lead    Date/Time: 6/23/2025 9:36 AM  Performed by: Buzz Wagner MD    Authorized by: Buzz Wagner MD  Rhythm: atrial flutter          Lab Review:       Assessment:          Diagnosis Plan   1. Paroxysmal atrial fibrillation        2. Chronic heart failure with preserved ejection fraction (HFpEF)               Plan:       The cause of her DELA CRUZ is this flutter. She was fine in AF because the hR was controlled. In atrial flutter there is tachycardia.     I think the AFlutter is from the LA. And we should ablate.     I also discussed meds (amio) and dig. And ablate and pace, but I think with our new PFA catheters we should try ablation.   "

## 2025-06-26 ENCOUNTER — TELEPHONE (OUTPATIENT)
Age: 75
End: 2025-06-26

## 2025-06-26 NOTE — TELEPHONE ENCOUNTER
Spk to patient went over detailed instructions for his procedure  7/1    Daryl had labs done on:  6/23

## 2025-07-01 ENCOUNTER — ANESTHESIA (OUTPATIENT)
Dept: CARDIOLOGY | Facility: HOSPITAL | Age: 75
End: 2025-07-01
Payer: MEDICARE

## 2025-07-01 ENCOUNTER — ANESTHESIA EVENT (OUTPATIENT)
Dept: CARDIOLOGY | Facility: HOSPITAL | Age: 75
End: 2025-07-01
Payer: MEDICARE

## 2025-07-01 ENCOUNTER — HOSPITAL ENCOUNTER (OUTPATIENT)
Facility: HOSPITAL | Age: 75
Discharge: HOME | End: 2025-07-02
Attending: INTERNAL MEDICINE | Admitting: INTERNAL MEDICINE
Payer: MEDICARE

## 2025-07-01 DIAGNOSIS — I48.0 PAROXYSMAL ATRIAL FIBRILLATION: ICD-10-CM

## 2025-07-01 DIAGNOSIS — I50.32 CHRONIC HEART FAILURE WITH PRESERVED EJECTION FRACTION (HFPEF): ICD-10-CM

## 2025-07-01 PROBLEM — I49.9 ARRHYTHMIA: Status: ACTIVE | Noted: 2025-07-01

## 2025-07-01 LAB
ACT BLD: 325 SECONDS (ref 82–152)
ACT BLD: 348 SECONDS (ref 82–152)
ACT BLD: 354 SECONDS (ref 82–152)

## 2025-07-01 PROCEDURE — 93657 TX L/R ATRIAL FIB ADDL: CPT | Performed by: INTERNAL MEDICINE

## 2025-07-01 PROCEDURE — 93655 ICAR CATH ABLTJ DSCRT ARRHYT: CPT | Performed by: INTERNAL MEDICINE

## 2025-07-01 PROCEDURE — 25010000002 FAMOTIDINE 10 MG/ML SOLUTION: Performed by: ANESTHESIOLOGY

## 2025-07-01 PROCEDURE — C1766 INTRO/SHEATH,STRBLE,NON-PEEL: HCPCS | Performed by: INTERNAL MEDICINE

## 2025-07-01 PROCEDURE — 93010 ELECTROCARDIOGRAM REPORT: CPT | Performed by: INTERNAL MEDICINE

## 2025-07-01 PROCEDURE — 25010000002 LIDOCAINE 2% SOLUTION

## 2025-07-01 PROCEDURE — 25010000002 LIDOCAINE 1% - EPINEPHRINE 1:100000 1 %-1:100000 SOLUTION: Performed by: INTERNAL MEDICINE

## 2025-07-01 PROCEDURE — 25010000002 DEXAMETHASONE PER 1 MG

## 2025-07-01 PROCEDURE — C1894 INTRO/SHEATH, NON-LASER: HCPCS | Performed by: INTERNAL MEDICINE

## 2025-07-01 PROCEDURE — 25010000002 ONDANSETRON PER 1 MG

## 2025-07-01 PROCEDURE — 25010000002 PROPOFOL 10 MG/ML EMULSION

## 2025-07-01 PROCEDURE — 93005 ELECTROCARDIOGRAM TRACING: CPT | Performed by: INTERNAL MEDICINE

## 2025-07-01 PROCEDURE — G0378 HOSPITAL OBSERVATION PER HR: HCPCS

## 2025-07-01 PROCEDURE — 25010000002 PHENYLEPHRINE 10 MG/ML SOLUTION: Performed by: NURSE ANESTHETIST, CERTIFIED REGISTERED

## 2025-07-01 PROCEDURE — 25010000002 SUGAMMADEX 200 MG/2ML SOLUTION: Performed by: NURSE ANESTHETIST, CERTIFIED REGISTERED

## 2025-07-01 PROCEDURE — C1713 ANCHOR/SCREW BN/BN,TIS/BN: HCPCS | Performed by: INTERNAL MEDICINE

## 2025-07-01 PROCEDURE — 25810000003 SODIUM CHLORIDE 0.9 % SOLUTION: Performed by: INTERNAL MEDICINE

## 2025-07-01 PROCEDURE — C1769 GUIDE WIRE: HCPCS | Performed by: INTERNAL MEDICINE

## 2025-07-01 PROCEDURE — C1893 INTRO/SHEATH, FIXED,NON-PEEL: HCPCS | Performed by: INTERNAL MEDICINE

## 2025-07-01 PROCEDURE — 93656 COMPRE EP EVAL ABLTJ ATR FIB: CPT | Performed by: INTERNAL MEDICINE

## 2025-07-01 PROCEDURE — C1759 CATH, INTRA ECHOCARDIOGRAPHY: HCPCS | Performed by: INTERNAL MEDICINE

## 2025-07-01 PROCEDURE — 85347 COAGULATION TIME ACTIVATED: CPT

## 2025-07-01 PROCEDURE — 25010000002 PROTAMINE SULFATE PER 10 MG: Performed by: INTERNAL MEDICINE

## 2025-07-01 PROCEDURE — C1730 CATH, EP, 19 OR FEW ELECT: HCPCS | Performed by: INTERNAL MEDICINE

## 2025-07-01 PROCEDURE — 25810000003 SODIUM CHLORIDE 0.9 % SOLUTION 250 ML FLEX CONT: Performed by: NURSE ANESTHETIST, CERTIFIED REGISTERED

## 2025-07-01 PROCEDURE — 25010000002 PHENYLEPHRINE 10 MG/ML SOLUTION 5 ML VIAL: Performed by: NURSE ANESTHETIST, CERTIFIED REGISTERED

## 2025-07-01 PROCEDURE — 25810000003 LACTATED RINGERS PER 1000 ML: Performed by: ANESTHESIOLOGY

## 2025-07-01 PROCEDURE — 25010000002 HEPARIN (PORCINE) PER 1000 UNITS: Performed by: INTERNAL MEDICINE

## 2025-07-01 RX ORDER — HEPARIN SODIUM 1000 [USP'U]/ML
INJECTION, SOLUTION INTRAVENOUS; SUBCUTANEOUS
Status: DISCONTINUED | OUTPATIENT
Start: 2025-07-01 | End: 2025-07-01 | Stop reason: HOSPADM

## 2025-07-01 RX ORDER — DROPERIDOL 2.5 MG/ML
0.62 INJECTION, SOLUTION INTRAMUSCULAR; INTRAVENOUS
Status: DISCONTINUED | OUTPATIENT
Start: 2025-07-01 | End: 2025-07-01

## 2025-07-01 RX ORDER — FENTANYL CITRATE 50 UG/ML
50 INJECTION, SOLUTION INTRAMUSCULAR; INTRAVENOUS ONCE AS NEEDED
Status: DISCONTINUED | OUTPATIENT
Start: 2025-07-01 | End: 2025-07-01

## 2025-07-01 RX ORDER — SODIUM CHLORIDE 0.9 % (FLUSH) 0.9 %
10 SYRINGE (ML) INJECTION EVERY 12 HOURS SCHEDULED
Status: DISCONTINUED | OUTPATIENT
Start: 2025-07-01 | End: 2025-07-01

## 2025-07-01 RX ORDER — DIPHENHYDRAMINE HYDROCHLORIDE 50 MG/ML
12.5 INJECTION, SOLUTION INTRAMUSCULAR; INTRAVENOUS
Status: DISCONTINUED | OUTPATIENT
Start: 2025-07-01 | End: 2025-07-01

## 2025-07-01 RX ORDER — SODIUM CHLORIDE 0.9 % (FLUSH) 0.9 %
3-10 SYRINGE (ML) INJECTION AS NEEDED
Status: DISCONTINUED | OUTPATIENT
Start: 2025-07-01 | End: 2025-07-01

## 2025-07-01 RX ORDER — ACETAMINOPHEN 500 MG
500 TABLET ORAL NIGHTLY
Status: DISCONTINUED | OUTPATIENT
Start: 2025-07-01 | End: 2025-07-02 | Stop reason: HOSPADM

## 2025-07-01 RX ORDER — PHENYLEPHRINE HYDROCHLORIDE 10 MG/ML
INJECTION INTRAVENOUS AS NEEDED
Status: DISCONTINUED | OUTPATIENT
Start: 2025-07-01 | End: 2025-07-01 | Stop reason: SURG

## 2025-07-01 RX ORDER — DEXAMETHASONE SODIUM PHOSPHATE 4 MG/ML
INJECTION, SOLUTION INTRA-ARTICULAR; INTRALESIONAL; INTRAMUSCULAR; INTRAVENOUS; SOFT TISSUE AS NEEDED
Status: DISCONTINUED | OUTPATIENT
Start: 2025-07-01 | End: 2025-07-01 | Stop reason: SURG

## 2025-07-01 RX ORDER — EPHEDRINE SULFATE 50 MG/ML
5 INJECTION, SOLUTION INTRAVENOUS ONCE AS NEEDED
Status: DISCONTINUED | OUTPATIENT
Start: 2025-07-01 | End: 2025-07-01

## 2025-07-01 RX ORDER — FLUMAZENIL 0.1 MG/ML
0.2 INJECTION INTRAVENOUS AS NEEDED
Status: DISCONTINUED | OUTPATIENT
Start: 2025-07-01 | End: 2025-07-01

## 2025-07-01 RX ORDER — HYDROCODONE BITARTRATE AND ACETAMINOPHEN 5; 325 MG/1; MG/1
1 TABLET ORAL ONCE AS NEEDED
Refills: 0 | Status: DISCONTINUED | OUTPATIENT
Start: 2025-07-01 | End: 2025-07-01

## 2025-07-01 RX ORDER — SODIUM CHLORIDE 0.9 % (FLUSH) 0.9 %
3 SYRINGE (ML) INJECTION EVERY 12 HOURS SCHEDULED
Status: DISCONTINUED | OUTPATIENT
Start: 2025-07-01 | End: 2025-07-01

## 2025-07-01 RX ORDER — BENZONATATE 100 MG/1
100 CAPSULE ORAL 3 TIMES DAILY PRN
Status: DISCONTINUED | OUTPATIENT
Start: 2025-07-01 | End: 2025-07-02 | Stop reason: HOSPADM

## 2025-07-01 RX ORDER — MIDAZOLAM HYDROCHLORIDE 1 MG/ML
0.5 INJECTION, SOLUTION INTRAMUSCULAR; INTRAVENOUS
Status: DISCONTINUED | OUTPATIENT
Start: 2025-07-01 | End: 2025-07-01

## 2025-07-01 RX ORDER — LIDOCAINE HYDROCHLORIDE 20 MG/ML
INJECTION, SOLUTION INFILTRATION; PERINEURAL AS NEEDED
Status: DISCONTINUED | OUTPATIENT
Start: 2025-07-01 | End: 2025-07-01 | Stop reason: SURG

## 2025-07-01 RX ORDER — PROTAMINE SULFATE 10 MG/ML
INJECTION, SOLUTION INTRAVENOUS
Status: DISCONTINUED | OUTPATIENT
Start: 2025-07-01 | End: 2025-07-01 | Stop reason: HOSPADM

## 2025-07-01 RX ORDER — FENTANYL CITRATE 50 UG/ML
50 INJECTION, SOLUTION INTRAMUSCULAR; INTRAVENOUS
Status: DISCONTINUED | OUTPATIENT
Start: 2025-07-01 | End: 2025-07-01

## 2025-07-01 RX ORDER — HYDROXYCHLOROQUINE SULFATE 200 MG/1
200 TABLET, FILM COATED ORAL DAILY
Status: DISCONTINUED | OUTPATIENT
Start: 2025-07-01 | End: 2025-07-02 | Stop reason: HOSPADM

## 2025-07-01 RX ORDER — PROPOFOL 10 MG/ML
VIAL (ML) INTRAVENOUS AS NEEDED
Status: DISCONTINUED | OUTPATIENT
Start: 2025-07-01 | End: 2025-07-01 | Stop reason: SURG

## 2025-07-01 RX ORDER — PROMETHAZINE HYDROCHLORIDE 25 MG/1
25 TABLET ORAL ONCE AS NEEDED
Status: DISCONTINUED | OUTPATIENT
Start: 2025-07-01 | End: 2025-07-01

## 2025-07-01 RX ORDER — ACETAMINOPHEN 325 MG/1
650 TABLET ORAL EVERY 4 HOURS PRN
Status: DISCONTINUED | OUTPATIENT
Start: 2025-07-01 | End: 2025-07-02 | Stop reason: HOSPADM

## 2025-07-01 RX ORDER — NALOXONE HCL 0.4 MG/ML
0.2 VIAL (ML) INJECTION AS NEEDED
Status: DISCONTINUED | OUTPATIENT
Start: 2025-07-01 | End: 2025-07-01

## 2025-07-01 RX ORDER — ONDANSETRON 2 MG/ML
INJECTION INTRAMUSCULAR; INTRAVENOUS AS NEEDED
Status: DISCONTINUED | OUTPATIENT
Start: 2025-07-01 | End: 2025-07-01 | Stop reason: SURG

## 2025-07-01 RX ORDER — ONDANSETRON 2 MG/ML
4 INJECTION INTRAMUSCULAR; INTRAVENOUS ONCE AS NEEDED
Status: DISCONTINUED | OUTPATIENT
Start: 2025-07-01 | End: 2025-07-01

## 2025-07-01 RX ORDER — FAMOTIDINE 10 MG/ML
20 INJECTION, SOLUTION INTRAVENOUS ONCE
Status: COMPLETED | OUTPATIENT
Start: 2025-07-01 | End: 2025-07-01

## 2025-07-01 RX ORDER — ROCURONIUM BROMIDE 10 MG/ML
INJECTION, SOLUTION INTRAVENOUS AS NEEDED
Status: DISCONTINUED | OUTPATIENT
Start: 2025-07-01 | End: 2025-07-01 | Stop reason: SURG

## 2025-07-01 RX ORDER — NITROGLYCERIN 0.4 MG/1
0.4 TABLET SUBLINGUAL
Status: DISCONTINUED | OUTPATIENT
Start: 2025-07-01 | End: 2025-07-01

## 2025-07-01 RX ORDER — GABAPENTIN 300 MG/1
300 CAPSULE ORAL 3 TIMES DAILY
Status: DISCONTINUED | OUTPATIENT
Start: 2025-07-01 | End: 2025-07-02 | Stop reason: HOSPADM

## 2025-07-01 RX ORDER — PROMETHAZINE HYDROCHLORIDE 25 MG/1
25 SUPPOSITORY RECTAL ONCE AS NEEDED
Status: DISCONTINUED | OUTPATIENT
Start: 2025-07-01 | End: 2025-07-01

## 2025-07-01 RX ORDER — SODIUM CHLORIDE 0.9 % (FLUSH) 0.9 %
10 SYRINGE (ML) INJECTION AS NEEDED
Status: DISCONTINUED | OUTPATIENT
Start: 2025-07-01 | End: 2025-07-01

## 2025-07-01 RX ORDER — LABETALOL HYDROCHLORIDE 5 MG/ML
5 INJECTION, SOLUTION INTRAVENOUS
Status: DISCONTINUED | OUTPATIENT
Start: 2025-07-01 | End: 2025-07-01

## 2025-07-01 RX ORDER — ATROPINE SULFATE 0.4 MG/ML
0.4 INJECTION, SOLUTION INTRAMUSCULAR; INTRAVENOUS; SUBCUTANEOUS ONCE AS NEEDED
Status: DISCONTINUED | OUTPATIENT
Start: 2025-07-01 | End: 2025-07-01

## 2025-07-01 RX ORDER — IPRATROPIUM BROMIDE AND ALBUTEROL SULFATE 2.5; .5 MG/3ML; MG/3ML
3 SOLUTION RESPIRATORY (INHALATION) ONCE AS NEEDED
Status: DISCONTINUED | OUTPATIENT
Start: 2025-07-01 | End: 2025-07-01

## 2025-07-01 RX ORDER — HYDRALAZINE HYDROCHLORIDE 20 MG/ML
5 INJECTION INTRAMUSCULAR; INTRAVENOUS
Status: DISCONTINUED | OUTPATIENT
Start: 2025-07-01 | End: 2025-07-01

## 2025-07-01 RX ORDER — DULOXETIN HYDROCHLORIDE 30 MG/1
60 CAPSULE, DELAYED RELEASE ORAL DAILY
Status: DISCONTINUED | OUTPATIENT
Start: 2025-07-01 | End: 2025-07-02 | Stop reason: HOSPADM

## 2025-07-01 RX ORDER — LIDOCAINE HYDROCHLORIDE AND EPINEPHRINE 10; 10 MG/ML; UG/ML
INJECTION, SOLUTION INFILTRATION; PERINEURAL
Status: DISCONTINUED | OUTPATIENT
Start: 2025-07-01 | End: 2025-07-01 | Stop reason: HOSPADM

## 2025-07-01 RX ORDER — SODIUM CHLORIDE 9 MG/ML
75 INJECTION, SOLUTION INTRAVENOUS CONTINUOUS
Status: DISCONTINUED | OUTPATIENT
Start: 2025-07-01 | End: 2025-07-01

## 2025-07-01 RX ORDER — ACETAMINOPHEN 650 MG/1
650 SUPPOSITORY RECTAL EVERY 4 HOURS PRN
Status: DISCONTINUED | OUTPATIENT
Start: 2025-07-01 | End: 2025-07-02 | Stop reason: HOSPADM

## 2025-07-01 RX ORDER — OXYCODONE AND ACETAMINOPHEN 7.5; 325 MG/1; MG/1
1 TABLET ORAL EVERY 4 HOURS PRN
Refills: 0 | Status: DISCONTINUED | OUTPATIENT
Start: 2025-07-01 | End: 2025-07-01

## 2025-07-01 RX ORDER — SODIUM CHLORIDE, SODIUM LACTATE, POTASSIUM CHLORIDE, CALCIUM CHLORIDE 600; 310; 30; 20 MG/100ML; MG/100ML; MG/100ML; MG/100ML
9 INJECTION, SOLUTION INTRAVENOUS CONTINUOUS
Status: DISCONTINUED | OUTPATIENT
Start: 2025-07-01 | End: 2025-07-02 | Stop reason: HOSPADM

## 2025-07-01 RX ORDER — SPIRONOLACTONE 25 MG/1
25 TABLET ORAL DAILY
Status: DISCONTINUED | OUTPATIENT
Start: 2025-07-01 | End: 2025-07-02 | Stop reason: HOSPADM

## 2025-07-01 RX ORDER — ATENOLOL 25 MG/1
50 TABLET ORAL
Status: DISCONTINUED | OUTPATIENT
Start: 2025-07-01 | End: 2025-07-02 | Stop reason: HOSPADM

## 2025-07-01 RX ORDER — LIDOCAINE HYDROCHLORIDE 10 MG/ML
0.5 INJECTION, SOLUTION INFILTRATION; PERINEURAL ONCE AS NEEDED
Status: DISCONTINUED | OUTPATIENT
Start: 2025-07-01 | End: 2025-07-01

## 2025-07-01 RX ORDER — HYDROMORPHONE HYDROCHLORIDE 1 MG/ML
0.5 INJECTION, SOLUTION INTRAMUSCULAR; INTRAVENOUS; SUBCUTANEOUS
Refills: 0 | Status: DISCONTINUED | OUTPATIENT
Start: 2025-07-01 | End: 2025-07-01

## 2025-07-01 RX ADMIN — LIDOCAINE HYDROCHLORIDE 100 MG: 20 INJECTION, SOLUTION INFILTRATION; PERINEURAL at 13:12

## 2025-07-01 RX ADMIN — SODIUM CHLORIDE, POTASSIUM CHLORIDE, SODIUM LACTATE AND CALCIUM CHLORIDE: 600; 310; 30; 20 INJECTION, SOLUTION INTRAVENOUS at 13:01

## 2025-07-01 RX ADMIN — PHENYLEPHRINE HYDROCHLORIDE 150 MCG: 10 INJECTION INTRAVENOUS at 14:49

## 2025-07-01 RX ADMIN — SPIRONOLACTONE 25 MG: 25 TABLET ORAL at 20:08

## 2025-07-01 RX ADMIN — ONDANSETRON 4 MG: 2 INJECTION INTRAMUSCULAR; INTRAVENOUS at 13:19

## 2025-07-01 RX ADMIN — HYDROXYCHLOROQUINE SULFATE 200 MG: 200 TABLET, FILM COATED ORAL at 20:08

## 2025-07-01 RX ADMIN — SUGAMMADEX 200 MG: 100 INJECTION, SOLUTION INTRAVENOUS at 14:32

## 2025-07-01 RX ADMIN — PHENYLEPHRINE HYDROCHLORIDE 150 MCG: 10 INJECTION INTRAVENOUS at 14:53

## 2025-07-01 RX ADMIN — LIDOCAINE HYDROCHLORIDE 40 MG: 20 INJECTION, SOLUTION INFILTRATION; PERINEURAL at 14:32

## 2025-07-01 RX ADMIN — GABAPENTIN 300 MG: 300 CAPSULE ORAL at 21:25

## 2025-07-01 RX ADMIN — SODIUM CHLORIDE 75 ML/HR: 9 INJECTION, SOLUTION INTRAVENOUS at 11:59

## 2025-07-01 RX ADMIN — ROCURONIUM BROMIDE 15 MG: 10 INJECTION INTRAVENOUS at 14:03

## 2025-07-01 RX ADMIN — OXYCODONE HYDROCHLORIDE AND ACETAMINOPHEN 1 TABLET: 7.5; 325 TABLET ORAL at 15:35

## 2025-07-01 RX ADMIN — PROPOFOL 200 MG: 10 INJECTION, EMULSION INTRAVENOUS at 13:12

## 2025-07-01 RX ADMIN — DULOXETINE 60 MG: 30 CAPSULE, DELAYED RELEASE ORAL at 20:08

## 2025-07-01 RX ADMIN — FAMOTIDINE 20 MG: 10 INJECTION INTRAVENOUS at 12:03

## 2025-07-01 RX ADMIN — DEXAMETHASONE SODIUM PHOSPHATE 4 MG: 4 INJECTION, SOLUTION INTRA-ARTICULAR; INTRALESIONAL; INTRAMUSCULAR; INTRAVENOUS; SOFT TISSUE at 13:19

## 2025-07-01 RX ADMIN — PHENYLEPHRINE HYDROCHLORIDE 0.5 MCG/KG/MIN: 10 INJECTION, SOLUTION INTRAVENOUS at 13:27

## 2025-07-01 RX ADMIN — BENZONATATE 100 MG: 100 CAPSULE ORAL at 21:25

## 2025-07-01 RX ADMIN — APIXABAN 5 MG: 5 TABLET, FILM COATED ORAL at 22:44

## 2025-07-01 RX ADMIN — ROCURONIUM BROMIDE 60 MG: 10 INJECTION INTRAVENOUS at 13:12

## 2025-07-01 RX ADMIN — PHENYLEPHRINE HYDROCHLORIDE 150 MCG: 10 INJECTION INTRAVENOUS at 14:57

## 2025-07-01 RX ADMIN — ACETAMINOPHEN 500 MG: 500 TABLET, FILM COATED ORAL at 21:26

## 2025-07-01 NOTE — ANESTHESIA PROCEDURE NOTES
Airway  Reason: elective    Date/Time: 7/1/2025 1:15 PM  Airway not difficult    General Information and Staff    Patient location during procedure: OR  CRNA/CAA: Luis Antonio Garcia CRNA    Indications and Patient Condition  Indications for airway management: airway protection    Preoxygenated: yes  MILS not maintained throughout    Mask difficulty assessment: 1 - vent by mask    Final Airway Details    Final airway type: endotracheal airway      Successful airway: ETT  Cuffed: yes   Successful intubation technique: direct laryngoscopy  Endotracheal tube insertion site: oral  Blade: Isaias  Blade size: 3  ETT size (mm): 7.0  Cormack-Lehane Classification: grade I - full view of glottis  Placement verified by: chest auscultation and capnometry   Cuff volume (mL): 8  Measured from: teeth  ETT/EBT  to teeth (cm): 22  Number of attempts at approach: 1  Assessment: lips, teeth, and gum same as pre-op and atraumatic intubation

## 2025-07-01 NOTE — ANESTHESIA PREPROCEDURE EVALUATION
Anesthesia Evaluation     NPO Solid Status: > 8 hours             Airway   Mallampati: II  Dental      Pulmonary    (+) a smoker Former, COPD,  Cardiovascular     (+) hypertension, dysrhythmias Atrial Fib      Neuro/Psych  GI/Hepatic/Renal/Endo    (+) obesity, GERD    Musculoskeletal     Abdominal    Substance History      OB/GYN          Other                    Anesthesia Plan    ASA 3     general       Anesthetic plan, risks, benefits, and alternatives have been provided, discussed and informed consent has been obtained with: patient.    CODE STATUS:

## 2025-07-01 NOTE — Clinical Note
A sheath was successfully inserted using micropuncture technique with ultrasound guidance into the left femoral vein.

## 2025-07-01 NOTE — Clinical Note
Hemostasis started on the right femoral vein. Figure 8 suturing was used in achieving hemostasis. Closure device deployed in the vessel. Hemostasis achieved successfully. Closure device additional comment: Sheaths removed by RT ABEL(R) with sutures by MD. Benton downey.

## 2025-07-01 NOTE — Clinical Note
Hemostasis started on the left femoral vein. Figure 8 suturing was used in achieving hemostasis. Closure device deployed in the vessel. Hemostasis achieved successfully. Closure device additional comment: Sheaths removed by RT ABEL(R) with sutures by MD. Benton downey.

## 2025-07-01 NOTE — ANESTHESIA POSTPROCEDURE EVALUATION
"Patient: Nessa Osuna    Procedure Summary       Date: 07/01/25 Room / Location: [facility] CCL 1 / [facility] C[facility] INVASIVE LOCATION    Anesthesia Start: 1301 Anesthesia Stop: 1500    Procedures:       Ablation atrial fibrillation      3D Mapping EP Diagnosis:       Paroxysmal atrial fibrillation      Chronic heart failure with preserved ejection fraction (HFpEF)      (PAF)    Providers: Buzz Wagner MD Provider: Cristino Coyle MD    Anesthesia Type: general ASA Status: 3            Anesthesia Type: general    Vitals  Vitals Value Taken Time   BP 74/52 07/01/25 15:06   Temp     Pulse 56 07/01/25 15:11   Resp     SpO2 99 % 07/01/25 15:11   Vitals shown include unfiled device data.        Post Anesthesia Care and Evaluation    Pain management: adequate    Airway patency: patent  Anesthetic complications: No anesthetic complications    Cardiovascular status: acceptable  Respiratory status: acceptable  Hydration status: acceptable    Comments: /78 (BP Location: Right arm, Patient Position: Lying)   Pulse 120   Temp 36.3 °C (97.4 °F) (Temporal)   Resp 18   Ht 167.6 cm (66\")   Wt 107 kg (235 lb)   SpO2 95%   BMI 37.93 kg/m²       "

## 2025-07-01 NOTE — Clinical Note
Pulses 1/1 bilat. Heels intact and elevated on padding to protect. Cacoon warming system in use. Hovermat system in place.

## 2025-07-02 ENCOUNTER — READMISSION MANAGEMENT (OUTPATIENT)
Dept: CALL CENTER | Facility: HOSPITAL | Age: 75
End: 2025-07-02
Payer: MEDICARE

## 2025-07-02 ENCOUNTER — TELEPHONE (OUTPATIENT)
Dept: INTERNAL MEDICINE | Facility: CLINIC | Age: 75
End: 2025-07-02
Payer: MEDICARE

## 2025-07-02 VITALS
SYSTOLIC BLOOD PRESSURE: 105 MMHG | RESPIRATION RATE: 18 BRPM | HEART RATE: 66 BPM | BODY MASS INDEX: 37.77 KG/M2 | WEIGHT: 235 LBS | TEMPERATURE: 98 F | OXYGEN SATURATION: 95 % | DIASTOLIC BLOOD PRESSURE: 66 MMHG | HEIGHT: 66 IN

## 2025-07-02 LAB
QT INTERVAL: 321 MS
QT INTERVAL: 424 MS
QT INTERVAL: 445 MS
QT INTERVAL: 492 MS
QTC INTERVAL: 424 MS
QTC INTERVAL: 454 MS
QTC INTERVAL: 457 MS
QTC INTERVAL: 473 MS

## 2025-07-02 PROCEDURE — G0378 HOSPITAL OBSERVATION PER HR: HCPCS

## 2025-07-02 PROCEDURE — 93010 ELECTROCARDIOGRAM REPORT: CPT | Performed by: INTERNAL MEDICINE

## 2025-07-02 PROCEDURE — 93005 ELECTROCARDIOGRAM TRACING: CPT | Performed by: INTERNAL MEDICINE

## 2025-07-02 PROCEDURE — 99238 HOSP IP/OBS DSCHRG MGMT 30/<: CPT

## 2025-07-02 NOTE — CASE MANAGEMENT/SOCIAL WORK
Discharge Planning Assessment  [facility]     Patient Name: Nessa Osuan  MRN: [id number]  Today's Date: 7/2/2025    Admit Date: 7/1/2025    Plan: Home   Discharge Needs Assessment       Row Name 07/02/25 0837       Living Environment    People in Home alone    Current Living Arrangements home    Potentially Unsafe Housing Conditions none    Primary Care Provided by self    Provides Primary Care For no one    Quality of Family Relationships involved;helpful    Able to Return to Prior Arrangements yes       Resource/Environmental Concerns    Resource/Environmental Concerns none       Transition Planning    Patient/Family Anticipates Transition to home    Patient/Family Anticipated Services at Transition none    Transportation Anticipated family or friend will provide       Discharge Needs Assessment    Readmission Within the Last 30 Days no previous admission in last 30 days    Equipment Currently Used at Home none    Concerns to be Addressed no discharge needs identified;denies needs/concerns at this time    Anticipated Changes Related to Illness none    Equipment Needed After Discharge none                   Discharge Plan       Row Name 07/02/25 0838       Plan    Plan Home    Plan Comments CCP met with patient at bedside. CCP role explained and face sheet verified. GUARDADO given. Patient's PCP is DR. Guerra. Patient lives alone. Patient is independent with her ADLs and does not use DME. Patient has access to a walker, cane and transport chair if needed. Patient has not used HH/SNF. Patient plans to return home at d/c and does not anticipate needs at this time. Alexia BARBOSA                  Continued Care and Services - Admitted Since 7/1/2025    No active coordination exists.       Expected Discharge Date and Time       Expected Discharge Date Expected Discharge Time    Jul 2, 2025            Demographic Summary       Row Name 07/02/25 0837       General Information    Admission Type observation    Arrived  From emergency department    Required Notices Provided Observation Status Notice    Referral Source admission list    Reason for Consult discharge planning    Preferred Language English                   Functional Status       Row Name 07/02/25 0837       Functional Status    Usual Activity Tolerance good    Current Activity Tolerance good       Functional Status, IADL    Medications independent    Meal Preparation independent    Housekeeping independent    Laundry independent    Shopping independent       Mental Status    General Appearance WDL WDL       Mental Status Summary    Recent Changes in Mental Status/Cognitive Functioning no changes                   Psychosocial    No documentation.                  Abuse/Neglect    No documentation.                  Legal    No documentation.                  Substance Abuse    No documentation.                  Patient Forms    No documentation.                     CHELSEY Jason

## 2025-07-02 NOTE — TELEPHONE ENCOUNTER
Pt is needing to schedule a hospital follow up- Pt is being discharged today, 7/02/2025, for afib- There are currently no 30 min availabilities within TCM time frame- Please advise

## 2025-07-02 NOTE — PLAN OF CARE
Goal Outcome Evaluation:      Patient AXO 4. RA. VSS. Stopcocks removed at 0200 due to oozing from bilateral sites during first part of shift. Hemostatic dressing dry and intact with no signs of bleeding. Patient up x 1.

## 2025-07-02 NOTE — OUTREACH NOTE
Prep Survey      Flowsheet Row Responses   [facility] facility patient discharged from? [place]   Is LACE score < 7 ? Yes   Eligibility [facility]   Date of Admission 07/01/25   Date of Discharge 07/02/25   Discharge Disposition Home or Self Care   Discharge diagnosis Arrhythmia   Does the patient have one of the following disease processes/diagnoses(primary or secondary)? Other   Does the patient have Home health ordered? No   Is there a DME ordered? No   Prep survey completed? Yes            BINA CESPEDES - Registered Nurse

## 2025-07-02 NOTE — CASE MANAGEMENT/SOCIAL WORK
Case Management Discharge Note      Final Note: Home via private vehichle. Alexia BARBOSA         Selected Continued Care - Discharged on 7/2/2025 Admission date: 7/1/2025 - Discharge disposition: Home or Self Care      Destination    No services have been selected for the patient.                Durable Medical Equipment    No services have been selected for the patient.                Dialysis/Infusion    No services have been selected for the patient.                Home Medical Care    No services have been selected for the patient.                Therapy    No services have been selected for the patient.                Community Resources    No services have been selected for the patient.                Community & DME    No services have been selected for the patient.                    Transportation Services  Transportation: Private Transportation  Private: Car    Final Discharge Disposition Code: 01 - home or self-care

## 2025-07-02 NOTE — DISCHARGE SUMMARY
DISCHARGE NOTE    Patient Name: Nessa Osuna  Age/Sex: 75 y.o. female  : 1950  MRN: [id number]    Date of Discharge:  2025   Date of Admit: 2025  Encounter Provider: SHAYY Duran  Place of Service: [facility] CARDIOLOGY  Patient Care Team:  Jayne Guerra MD as PCP - Adamaris Reyes MD as Consulting Physician (Cardiology)  Letty Wells MD as Consulting Physician (Dermatology)  Wilfrid Ye MD as Consulting Physician (Urology)    Subjective:     Discharge Diagnosis:    Arrhythmia    Paroxysmal atrial fibrillation    Chronic heart failure with preserved ejection fraction (HFpEF)      Hospital Course:   Nessa Osuna is a 75 year old female who follows with Dr. Blue and Dr. Wagner. She has persistent atrial fibrillation and atypical atrial flutter.     She has a history of persistent atrial fibrillation but on rate control strategy.    Earlier this year she developed dyspnea on exertion and was noted to be in an atypical atrial flutter.    Saw Dr. Wagner in May of this year and elected proceed with ablation.      Yesterday she underwent PVI and CTI ablation.  She tolerated the procedure well, she did have a little oozing on the right groin incision so she was monitored overnight.    This morning she is doing well.  She has no complaints or concerns.  Vital signs are stable.    Incisions without any signs of infection or hematoma.    She is okay for discharge today.  She will follow-up in the office in 6 to 8 weeks.      Vital Signs  Temp:  [97.4 °F (36.3 °C)-98.4 °F (36.9 °C)] 98 °F (36.7 °C)  Heart Rate:  [] 66  Resp:  [18-20] 18  BP: ()/(52-78) 105/66    Intake/Output Summary (Last 24 hours) at 2025 0923  Last data filed at 2025 0853  Gross per 24 hour   Intake 1240 ml   Output 700 ml   Net 540 ml        Physical Exam:    General Appearance: No acute distress, well developed and well nourished.   Respiratory: No signs of respiratory distress. Respiration rhythm and depth normal.    Cardiovascular:  Heart Rate and Rhythm: Normal, Heart Sounds: Normal S1 and S2. No S3 or S4 noted    Labs:                                     Discharge Diet:    Dietary Orders (From admission, onward)       Start     Ordered    07/01/25 1721  Diet: Regular/House; Fluid Consistency: Thin (IDDSI 0)  Diet Effective Now        References:    Diet Order Definitions   Question Answer Comment   Diets: Regular/House    Fluid Consistency: Thin (IDDSI 0)        07/01/25 1720                      Activity at Discharge:      Discharge Medications     Discharge Medications        Continue These Medications        Instructions Start Date   acetaminophen 500 MG tablet  Commonly known as: TYLENOL   500 mg, Nightly      albuterol sulfate  (90 Base) MCG/ACT inhaler  Commonly known as: PROVENTIL HFA;VENTOLIN HFA;PROAIR HFA   2 puffs, Every 4 Hours PRN      alendronate 70 MG tablet  Commonly known as: FOSAMAX   70 mg, Oral, Weekly, Take on an empty stomach before breakfast with 8 ounces of water. Remain upright for 30 minutes after taking.      atenolol 100 MG tablet  Commonly known as: TENORMIN   100 mg, Oral, 2 Times Daily      Breo Ellipta 100-25 MCG/INH aerosol powder   Generic drug: Fluticasone Furoate-Vilanterol   1 puff, Daily      dilTIAZem  MG 24 hr capsule  Commonly known as: DILACOR XR   120 mg, Oral, Daily      DULoxetine 60 MG capsule  Commonly known as: CYMBALTA   60 mg, Oral, Daily      Eliquis 5 MG tablet tablet  Generic drug: apixaban   5 mg, Oral, 2 Times Daily      finasteride 5 MG tablet  Commonly known as: PROSCAR       Flaxseed Oil 1000 MG capsule   1 capsule, Daily      fluticasone 50 MCG/ACT nasal spray  Commonly known as: FLONASE   2 sprays, Daily      gabapentin 300 MG capsule  Commonly known as: NEURONTIN   300  mg, Oral, 3 Times Daily      Gemtesa 75 MG tablet  Generic drug: Vibegron   75 mg, Oral, Daily      hydroxychloroquine 200 MG tablet  Commonly known as: PLAQUENIL   Take 1 tablet by mouth Daily.      hyoscyamine 0.125 MG tablet  Commonly known as: ANASPAZ,LEVSIN   TAKE ONE TABLET BY MOUTH EVERY 6 HOURS AS NEEDED FOR CRAMPING OR DIARRHEA (ABDOMINAL PAIN)      montelukast 10 MG tablet  Commonly known as: SINGULAIR   10 mg, Oral, Nightly      omeprazole 20 MG capsule  Commonly known as: priLOSEC   20 mg, Oral, Daily      simvastatin 20 MG tablet  Commonly known as: ZOCOR   20 mg, Oral, Daily      spironolactone 25 MG tablet  Commonly known as: ALDACTONE   25 mg, Oral, Daily      Vitamin D 50 MCG (2000 UT) capsule   1 capsule, Daily               Discharge disposition: home    Follow-up Appointments   Follow-up Information       Jayne Guerra MD .    Specialty: Internal Medicine  Contact information:  [address] [place] [address]  [phone]                           Future Appointments   Date Time Provider Department Center   7/10/2025  1:40 PM Hortencia Hartmann MD [facility]   8/5/2025  1:00 PM [facility]   8/7/2025 11:00 AM Peña Lazar PA-C [id number] None   8/12/2025  1:15 PM Jayne Guerra MD [facility]         Lee Waldrop, SHAYY  07/02/25  09:23 EDT

## 2025-07-03 ENCOUNTER — TRANSITIONAL CARE MANAGEMENT TELEPHONE ENCOUNTER (OUTPATIENT)
Dept: CALL CENTER | Facility: HOSPITAL | Age: 75
End: 2025-07-03
Payer: MEDICARE

## 2025-07-03 RX ORDER — MONTELUKAST SODIUM 10 MG/1
10 TABLET ORAL NIGHTLY
Qty: 90 TABLET | Refills: 0 | Status: SHIPPED | OUTPATIENT
Start: 2025-07-03

## 2025-07-03 NOTE — OUTREACH NOTE
Call Center TCM Note      Flowsheet Row Responses   [facility] patient discharged from? [place]   Does the patient have one of the following disease processes/diagnoses(primary or secondary)? Other   TCM attempt successful? No   Unsuccessful attempts Attempt 1            HANK MONTES - Registered Nurse    7/3/2025, 10:32 EDT

## 2025-07-03 NOTE — OUTREACH NOTE
Call Center TCM Note      Flowsheet Row Responses   [facility] patient discharged from? [place]   Does the patient have one of the following disease processes/diagnoses(primary or secondary)? Other   TCM attempt successful? No   Unsuccessful attempts Attempt 2            HANK MONTES - Registered Nurse    7/3/2025, 15:46 EDT

## 2025-07-07 ENCOUNTER — TRANSITIONAL CARE MANAGEMENT TELEPHONE ENCOUNTER (OUTPATIENT)
Dept: CALL CENTER | Facility: HOSPITAL | Age: 75
End: 2025-07-07
Payer: MEDICARE

## 2025-07-07 NOTE — OUTREACH NOTE
Call Center TCM Note      Flowsheet Row Responses   [facility] patient discharged from? [place]   Does the patient have one of the following disease processes/diagnoses(primary or secondary)? Other   TCM attempt successful? No   Unsuccessful attempts Attempt 3   Wrap up additional comments D/C DX: Arrhythmia,  cardiac ablation - Unable tor each pt x 3 attemtps for TCM call. Pt is not scheduled for TCM APPT with PCP Dr Jayne Guerra. Discharged from [facility] 07/02/2025            KASSY KIM - Medical Assistant    7/7/2025, 11:37 EDT

## 2025-07-08 NOTE — PROGRESS NOTES
The patient has a pain history of the following:  Chronic low back pain  Lumbar stenosis with neurogenic claudication    Previous interventions that the patient has received include:   L5-6 Epidural steroid injection 8/7/2020  L4-5 Epidural steroid injection 7/8/2020    Pain medications include:  Acetaminophen  Cymbalta 60 mg daily  Gabapentin 300 mg 3 times daily    Other conservative modalities which the patient reports using include:  Physical Therapy: yes, aquatic therapy - never had any benefit  Chiropractor: yes  Massage Therapy: yes  TENS: yes  Neck or back surgery: no  Past pain management: no    Past Significant Surgical History:  Patient reports she had atrial flutter surgery on 07/01/25.    HPI:       CHIEF COMPLAINT: Back Pain, Hip Pain (Bilateral ), and Leg Pain (Bilateral)    Nessa Osuna is a 75 y.o. female referred here by Jayne Guerra MD. Nessa Osuna presents to the office for evaluation and treatment of Back Pain, Hip Pain (Bilateral ), and Leg Pain (Bilateral)      History of Present Illness  Onset:  Since her 20s  Inciting Event:  nothing in particular, but was very active  Location:  Low back  Pain: Pain described as ache and sharp. Located in the low back and does radiate into bilateral thighs, usually stopping at her knees.  She also gets cramps in her thighs and calf muscles.   Severity:  Pain rated as a 2 /10.  Apportions pain as 70%  back pain and 30% extremity pain.  Symptoms have been constant.  Exacerbation:  Walking long distances - her legs just give out, she's bent forward, but also she can't breathe.  Standing for more than 10-15 minutes.  Alleviation:  Sitting in her recliner.  Associated Symptoms:   She denies any new onset of bowel or bladder weakness, significant leg weakness or saddle anesthesia. She admits to balance problems or lower extremity incoordination.  She has chronic urinary incontinence.  She does still feel the urge to urinate.   Ambulates: Without  assistive device.   Limitations: This pain limits the patient from walking her dogs  Goals: Functional goals include ability to be more active and do things more quickly (everything takes 4-5 times as long).     She recently had ablation for atrial flutter and is on restrictions at this time.     [place] 54       PEG Assessment   What number best describes your pain on average in the past week?3  What number best describes how, during the past week, pain has interfered with your enjoyment of life?10  What number best describes how, during the past week, pain has interfered with your general activity?  10        Current Outpatient Medications:     acetaminophen (TYLENOL) 500 MG tablet, Take 1 tablet by mouth Every Night. Take 1 tablet every 4-6 hours as needed, Disp: , Rfl:     albuterol sulfate  (90 Base) MCG/ACT inhaler, 2 puffs Every 4 (Four) Hours As Needed., Disp: , Rfl:     alendronate (FOSAMAX) 70 MG tablet, TAKE 1 TABLET BY MOUTH ONCE WEEKLY ON AN EMPTY STOMACH BEFORE BREAKFAST. REMAIN UPRIGHT FOR 30 MINUTES AND TAKE WITH 8 OUNCES OF WATER, Disp: 12 tablet, Rfl: 2    atenolol (TENORMIN) 100 MG tablet, Take 1 tablet by mouth 2 (Two) Times a Day., Disp: 180 tablet, Rfl: 3    Breo Ellipta 200-25 MCG/ACT inhaler, Inhale 1 puff Daily., Disp: , Rfl:     Cholecalciferol (VITAMIN D) 2000 UNITS capsule, Take 1 capsule by mouth Daily., Disp: , Rfl:     DULoxetine (CYMBALTA) 60 MG capsule, TAKE 1 CAPSULE BY MOUTH DAILY, Disp: 90 capsule, Rfl: 3    Eliquis 5 MG tablet tablet, TAKE 1 TABLET BY MOUTH TWICE A DAY, Disp: 180 tablet, Rfl: 3    finasteride (PROSCAR) 5 MG tablet, , Disp: , Rfl:     Flaxseed, Linseed, (FLAXSEED OIL) 1000 MG capsule, Take 1 capsule by mouth Daily., Disp: , Rfl:     fluticasone (FLONASE) 50 MCG/ACT nasal spray, Administer 2 sprays into the nostril(s) as directed by provider Daily., Disp: , Rfl:     gabapentin (NEURONTIN) 300 MG capsule, TAKE 1 CAPSULE BY MOUTH 3 TIMES A DAY, Disp: 270  capsule, Rfl: 1    hydroxychloroquine (PLAQUENIL) 200 MG tablet, Take 1 tablet by mouth Daily., Disp: , Rfl:     hyoscyamine (ANASPAZ,LEVSIN) 0.125 MG tablet, TAKE ONE TABLET BY MOUTH EVERY 6 HOURS AS NEEDED FOR CRAMPING OR DIARRHEA (ABDOMINAL PAIN), Disp: 60 tablet, Rfl: 0    montelukast (SINGULAIR) 10 MG tablet, TAKE 1 TABLET BY MOUTH ONCE NIGHTLY, Disp: 90 tablet, Rfl: 0    omeprazole (priLOSEC) 20 MG capsule, TAKE 1 CAPSULE BY MOUTH DAILY, Disp: 90 capsule, Rfl: 2    simvastatin (ZOCOR) 20 MG tablet, TAKE 1 TABLET BY MOUTH DAILY, Disp: 90 tablet, Rfl: 0    spironolactone (ALDACTONE) 25 MG tablet, TAKE 1 TABLET BY MOUTH DAILY, Disp: 90 tablet, Rfl: 3    Vibegron (Gemtesa) 75 MG tablet, Take 1 tablet by mouth Daily., Disp: 90 tablet, Rfl: 3    dilTIAZem XR (DILACOR XR) 120 MG 24 hr capsule, Take 1 capsule by mouth Daily. (Patient not taking: Reported on 7/10/2025), Disp: 90 capsule, Rfl: 3    The following portions of the patient's history were reviewed and updated as appropriate: allergies, current medications, past family history, past medical history, past social history, past surgical history, and problem list.      REVIEW OF PERTINENT MEDICAL DATA    5/14/25 MRI LUMBAR SPINE WITHOUT CONTRAST     HISTORY: Chronic low back pain. Sciatica.     TECHNIQUE: MRI lumbar spine includes sagittal T1, T2 fat-sat, PD as well  as axial T1 weighted sequence angled through the disc spaces and axial  T2 weighted sequence.     COMPARISON: MRI lumbar spine 02/05/2019.     FINDINGS: Conus medullaris tip terminates at L1-2 level and the distal  thoracic cord appears within normal limits.     At T12-L1, the central canal and neural foramen are patent.      At L1-2, there is disc space narrowing and there is a small Schmorl's  node in the superior endplate of L2. 2 mm retrolisthesis L1 with respect  to L2. Broad disc osteophyte complex mildly indents the anterior thecal  sac. Mild narrowing of the left neural foramen. The right  neural foramen  is patent.      At L2-3, there is narrowing of the posterior aspect of the disc space.  The posterior disc osteophyte complex is present indenting the  intrathecal sac with minimal canal narrowing. Mild narrowing of the left  neural foramen. The right neural foramen is patent.     At L3-4, there is narrowing of the posterior aspect of the disc space.  Disc bulge is eccentric to the left minimally indenting the intrathecal  sac with mild narrowing of the left lateral recess. Mild bilateral  foraminal narrowing.     At L4-5, there is 7 mm anterolisthesis of L4 with respect to L5. Severe  bilateral facet arthritis is present with bony overgrowth of the facet  joints. There is uncovered disc bulge with severe narrowing of the  central canal and diminished CSF signal surrounding the nerve roots.  Bilateral lateral recess narrowing is present. There is a borderline  left posterolateral disc protrusion extending into the inferior aspect  of the left neural foramen that is new when compared to the previous  exam and appears to contact the undersurface exiting left L4 nerve near  the exit margin of the left neural foramen. There is diminished  foraminal height with moderate right and mild left foraminal narrowing.     At L5-S1, there is disc space narrowing with endplate spurring and 3 mm  retrolisthesis of L5 with respect to S1. Posterior disc osteophyte  complex is present and there is facet arthritis with moderate bilateral  foraminal narrowing.     IMPRESSION:  1. At L4-5, there is 7 mm anterolisthesis of L4 with respect to L5 and  there is uncovered disc bulge with facet arthritis and severe central  canal stenosis that is similar to the previous exam. At this level there  is also a small borderline left posterolateral foraminal disc protrusion  that is new when compared to the previous exam and appears to contact  the undersurface of the exiting left L4 nerve along the exiting margin  of the left neural  "foramen, sagittal T2 image 13.  2. There is no other evidence for significant change compared to  previous MRI 02/05/2019.     This report was finalized on 5/15/2025 10:27 AM by Aris Rice M.D  on Workstation: [id number]    6/23/25 Creatinine 0.87, Platelets 203 (10*3)    Review of Systems   Constitutional:  Positive for activity change (less).   Gastrointestinal:  Negative for constipation and diarrhea.   Genitourinary:  Negative for difficulty urinating.   Musculoskeletal:  Positive for back pain. Negative for neck pain and neck stiffness.   Neurological:  Negative for weakness and numbness.   Psychiatric/Behavioral:  Positive for sleep disturbance. Negative for self-injury and suicidal ideas.      I have reviewed and confirmed the accuracy of the ROS as documented by the MA/LPN/RN Hortencia Hartmann MD      Vitals:    07/10/25 1349   BP: 117/75   Pulse: 64   Resp: 16   Temp: 97.8 °F (36.6 °C)   SpO2: 96%   Weight: 108 kg (238 lb)   Height: 167.6 cm (66\")   PainSc: 2          Objective   Physical Exam  Constitutional:       General: She is not in acute distress.  Pulmonary:      Effort: Pulmonary effort is normal. No respiratory distress.   Musculoskeletal:      Comments: Ambulation: Without assistive device, waddling  Lumbar Exam:  Appearance: Scoliotic curve not appreciated and scarring absent  Palpated over lumbosacral paravertebral regions and transverse processes with positive tenderness appreciated, Bilateral.   Sacroiliac joints are not tender, Bilateral.  Trochanteric bursa are tender, Left.  Straight leg raise is negative radiculopathy, Bilateral.  Slump test is negative  radiculopathy, Bilateral.  Facet loading is positive for pain, Bilateral.  Paraspinal/adjacent lumbar musculature are not tender to palpation, Bilateral.  Abhi Aurelia's test is negative sacroiliac pain, Bilateral.    Skin:     General: Skin is warm and dry.   Neurological:      Mental Status: She is alert.      Deep Tendon " Reflexes:      Reflex Scores:       Patellar reflexes are 2+ on the right side and 2+ on the left side.       Achilles reflexes are 0 on the right side and 0 on the left side.     Comments: Negative clonus bilaterally   Psychiatric:         Mood and Affect: Mood normal.         Thought Content: Thought content normal.         Assessment & Plan   Diagnoses and all orders for this visit:    1. Lumbar facet arthropathy (Primary)  -     Case Request  -     Case Request    2. Lumbar stenosis with neurogenic claudication    3. Degeneration of intervertebral disc of lumbosacral region, unspecified whether pain present          - Pertinent labs reviewed.   - Pertinent imaging reviewed.   - Will schedule for bilateral ~L4-S1 Medial branch blocks.  Risks discussed including but not limited to bleeding, bruising, infection, damage to surrounding structures, headache, and rare things such as being paralyzed, seizure, stroke, heart attack and death.  If relief, plan for 2nd diagnostic injections and Radiofrequency ablation (thermal, non-pulsed).   - She does not need to hold her eliquis for this procedure.  - Explained that a lot of her symptoms do seem related to her spinal stenosis, however she has not responded to epidurals in the past.  Since the majority of her symptoms are in her low back, we will focus on her facet joints at this time.  May consider L3-4 Epidural steroid injection or bilateral L4-5 Transforaminal epidural steroid injection in the future if needed.     - Patient screened positive for depression based on a PHQ-9 score of  on . Follow-up recommendations include: no score populated .  - Nessa Osuna  reports that she quit smoking about 28 years ago. Her smoking use included cigarettes. She started smoking about 63 years ago. She has a 52.5 pack-year smoking history. She has never been exposed to tobacco smoke. She has never used smokeless tobacco.     --- Follow-up for bilateral ~L4-S1 Medial branch  blocks and office visit 1-2 weeks after.           Hortencia Hartmann MD  Pain Management    EMR Dragon/Transcription disclaimer:   Part of this note may be an electronic transcription/translation of spoken language to printed text using the Dragon Dictation System.

## 2025-07-09 ENCOUNTER — NURSE TRIAGE (OUTPATIENT)
Dept: CALL CENTER | Facility: HOSPITAL | Age: 75
End: 2025-07-09
Payer: MEDICARE

## 2025-07-09 ENCOUNTER — TELEPHONE (OUTPATIENT)
Dept: CARDIOLOGY | Age: 75
End: 2025-07-09
Payer: MEDICARE

## 2025-07-09 ENCOUNTER — TELEPHONE (OUTPATIENT)
Dept: PAIN MEDICINE | Facility: CLINIC | Age: 75
End: 2025-07-09
Payer: MEDICARE

## 2025-07-09 NOTE — TELEPHONE ENCOUNTER
Notified pt. She verbalized understanding.    Thank you,    Andreea Shrestha, RN  Triage [facility] [place]  07/09/25 14:54 EDT

## 2025-07-09 NOTE — TELEPHONE ENCOUNTER
Called patient and left a voicemail. Reminded patient of her new patient appt tomorrow at 1:40 pm with Dr. Hartmann. Asked her to arrive 10-15 min prior for paperwork.

## 2025-07-09 NOTE — TELEPHONE ENCOUNTER
Caller Left VM message requesting call back.  Returned call to number given in VM message.   Had Ablation on July 1st, has hard lumps in groin at the insertion site on both sides.   Reason for Disposition   [1] Raised bruise and [2] size > 2 inches (5 cm) and expanding    Additional Information   Negative: Sounds like a life-threatening emergency to the triager   Negative: Chest pain   Negative: Difficulty breathing   Negative: Acting confused (e.g., disoriented, slurred speech) or excessively sleepy   Negative: Post-Op tonsil and adenoid surgery, symptoms or questions about   Surgical incision symptoms and questions   Negative: [1] Major abdominal surgical incision AND [2] wound gaping open AND [3] visible internal organs   Negative: Sounds like a life-threatening emergency to the triager   Negative: Patient has a Negative Pressure Wound Therapy device   Negative: Patient is followed by a wound clinic or wound specialist for this wound   Negative: [1] Bleeding from incision AND [2] won't stop after 10 minutes of direct pressure   Negative: [1] Bleeding (more than a few drops) from incision AND [2] tracheostomy or blood vessel surgery (e.g., carotid endarterectomy, femoral bypass graft, kidney dialysis fistula)   Negative: [1] Widespread rash AND [2] bright red, sunburn-like   Negative: Severe pain in the incision   Negative: [1] Incision gaping open AND [2] < 48 hours since wound re-opened   Negative: [1] Incision gaping open AND [2] length of opening > 2 inches (5 cm)   Negative: Patient sounds very sick or weak to the triager   Negative: Sounds like a serious complication to the triager   Negative: Fever > 100.4 F (38.0 C)   Negative: [1] Incision looks infected (spreading redness, pain) AND [2] fever > 99.5 F (37.5 C)   Negative: [1] Incision looks infected (spreading redness, pain) AND [2] large red area (> 2 in. or 5 cm)   Negative: [1] Incision looks infected (spreading redness, pain) AND [2] face wound    "Negative: [1] Red streak runs from the incision AND [2] longer than 1 inch (2.5 cm)   Negative: [1] Pus or bad-smelling fluid draining from incision AND [2] no fever   Negative: [1] Post-op pain AND [2] not controlled with pain medications   Negative: Dressing soaked with blood or body fluid (e.g., drainage)   Negative: [1] Scant bleeding (e.g., few drops) from incision AND AND [2] tracheostomy or blood vessel surgery (e.g., carotid endarterectomy, femoral bypass graft, kidney dialysis fistula)    Answer Assessment - Initial Assessment Questions  1. SYMPTOM: \"What's the main symptom you're concerned about?\" (e.g., pain, fever, vomiting)      Insertion site lumps  2. ONSET: \"When did lumps  start?\"      Noticed this yesterday   3. SURGERY: \"What surgery did you have?\"      ablation  4. DATE of SURGERY: \"When was the surgery?\"       July 1st  5. ANESTHESIA: \" What type of anesthesia did you have?\" (e.g., general, spinal, epidural, local)      General   6. PAIN: \"Is there any pain?\" If Yes, ask: \"How bad is it?\"  (Scale 1-10; or mild, moderate, severe)      No pain  7. FEVER: \"Do you have a fever?\" If Yes, ask: \"What is your temperature, how was it measured, and when did it start?\"      no  8. VOMITING: \"Is there any vomiting?\" If Yes, ask: \"How many times?\"      no  9. BLEEDING: \"Is there any bleeding?\" If Yes, ask: \"How much?\" and \"Where?\"      no  10. OTHER SYMPTOMS: \"Do you have any other symptoms?\" (e.g., drainage from wound, painful urination, constipation)        no    Answer Assessment - Initial Assessment Questions  1. SYMPTOM: \"What's the main symptom you're concerned about?\" (e.g., drainage, incision opening up, pain, redness)      Lumps on insertion sites of Ablation, right and left groins   2. ONSET: \"When did lumps  start?\"      yesterday  3. SURGERY: \"What surgery did you have?\"      Cardiac Ablation  4. DATE of SURGERY: \"When was the surgery?\"       July 1st  5. INCISION SITE: \"Where is the incision " "located?\"       Right and left groins  6. REDNESS: \"Is there any redness at the incision site?\" If Yes, ask: \"How wide across is the redness?\" (Inches, centimeters)       no  7. PAIN: \"Is there any pain?\" If Yes, ask: \"How bad is it?\"  (Scale 1-10; or mild, moderate, severe)    - NONE (0): no pain    - MILD (1-3): doesn't interfere with normal activities     - MODERATE (4-7): interferes with normal activities or awakens from sleep     - SEVERE (8-10): excruciating pain, unable to do any normal activities      no  8. BLEEDING: \"Is there any bleeding?\" If Yes, ask: \"How much?\" and \"Where?\"      no  9. DRAINAGE: \"Is there any drainage from the incision site?\" If Yes, ask: \"What color and how much?\" (e.g., red, cloudy, pus; drops, teaspoon)      no  10. FEVER: \"Do you have a fever?\" If Yes, ask: \"What is your temperature, how was it measured, and when did it start?\"        no  11. OTHER SYMPTOMS: \"Do you have any other symptoms?\" (e.g., dizziness, rash elsewhere on body, shaking chills, weakness)        no    Protocols used: Post-Op Symptoms and Questions-ADULT-, Post-Op Incision Symptoms and Questions-ADULT-AH    "

## 2025-07-09 NOTE — TELEPHONE ENCOUNTER
Post-op Problem 2025 Caller reporting lumps at insertion sites where she had Ablation done on , right size of walnut, left is longer and tubular in shape.    Reviewed guideline with caller advises she be seen today. Caller states she has a  this afternoon-visitation and appointment tomorrow Warm transfer to Cardiology office to speak with APRN.

## 2025-07-09 NOTE — TELEPHONE ENCOUNTER
Dr. Wagner,    Pt called the office this afternoon. She has a firm lump in each groin after the ablation. The lump on the right is the larger of the two. She has no pain, color changes, temperature changes or any numbness/tingling.    Do you have any recommendations for her?    Thank you,    Andreea Shrestha, RN  Triage [facility] [place]  07/09/25 14:14 EDT

## 2025-07-10 ENCOUNTER — OFFICE VISIT (OUTPATIENT)
Dept: PAIN MEDICINE | Facility: CLINIC | Age: 75
End: 2025-07-10
Payer: MEDICARE

## 2025-07-10 VITALS
SYSTOLIC BLOOD PRESSURE: 117 MMHG | OXYGEN SATURATION: 96 % | DIASTOLIC BLOOD PRESSURE: 75 MMHG | TEMPERATURE: 97.8 F | HEIGHT: 66 IN | HEART RATE: 64 BPM | BODY MASS INDEX: 38.25 KG/M2 | WEIGHT: 238 LBS | RESPIRATION RATE: 16 BRPM

## 2025-07-10 DIAGNOSIS — M48.062 LUMBAR STENOSIS WITH NEUROGENIC CLAUDICATION: ICD-10-CM

## 2025-07-10 DIAGNOSIS — M47.816 LUMBAR FACET ARTHROPATHY: Primary | ICD-10-CM

## 2025-07-10 DIAGNOSIS — M51.379 DEGENERATION OF INTERVERTEBRAL DISC OF LUMBOSACRAL REGION, UNSPECIFIED WHETHER PAIN PRESENT: ICD-10-CM

## 2025-07-10 PROCEDURE — 1125F AMNT PAIN NOTED PAIN PRSNT: CPT | Performed by: ANESTHESIOLOGY

## 2025-07-10 PROCEDURE — 99204 OFFICE O/P NEW MOD 45 MIN: CPT | Performed by: ANESTHESIOLOGY

## 2025-07-10 PROCEDURE — 1159F MED LIST DOCD IN RCRD: CPT | Performed by: ANESTHESIOLOGY

## 2025-07-10 PROCEDURE — 1160F RVW MEDS BY RX/DR IN RCRD: CPT | Performed by: ANESTHESIOLOGY

## 2025-07-10 RX ORDER — FLUTICASONE FUROATE AND VILANTEROL TRIFENATATE 200; 25 UG/1; UG/1
1 POWDER RESPIRATORY (INHALATION)
COMMUNITY
Start: 2025-07-08

## 2025-07-10 NOTE — TELEPHONE ENCOUNTER
Spoke with yesterday about this issue. See other telephone encounter.    Thank you,    Andreea Shrestha, RN  Triage [facility] [place]  07/10/25 12:00 EDT

## 2025-07-22 ENCOUNTER — TRANSCRIBE ORDERS (OUTPATIENT)
Dept: SURGERY | Facility: SURGERY CENTER | Age: 75
End: 2025-07-22
Payer: MEDICARE

## 2025-07-22 DIAGNOSIS — Z41.9 SURGERY, ELECTIVE: Primary | ICD-10-CM

## 2025-07-31 DIAGNOSIS — E78.49 OTHER HYPERLIPIDEMIA: Primary | ICD-10-CM

## 2025-07-31 DIAGNOSIS — Z13.29 THYROID DISORDER SCREENING: ICD-10-CM

## 2025-08-06 LAB
ALBUMIN SERPL-MCNC: 4.1 G/DL (ref 3.5–5.2)
ALBUMIN/GLOB SERPL: 2 G/DL
ALP SERPL-CCNC: 56 U/L (ref 39–117)
ALT SERPL-CCNC: 16 U/L (ref 1–33)
APPEARANCE UR: CLEAR
AST SERPL-CCNC: 23 U/L (ref 1–32)
BACTERIA #/AREA URNS HPF: ABNORMAL /[HPF]
BASOPHILS # BLD AUTO: 0.06 10*3/MM3 (ref 0–0.2)
BASOPHILS NFR BLD AUTO: 1.3 % (ref 0–1.5)
BILIRUB SERPL-MCNC: 0.8 MG/DL (ref 0–1.2)
BILIRUB UR QL STRIP: NEGATIVE
BUN SERPL-MCNC: 16 MG/DL (ref 8–23)
BUN/CREAT SERPL: 20.3 (ref 7–25)
CALCIUM SERPL-MCNC: 8.8 MG/DL (ref 8.6–10.5)
CASTS URNS QL MICRO: ABNORMAL /LPF
CHLORIDE SERPL-SCNC: 106 MMOL/L (ref 98–107)
CHOLEST SERPL-MCNC: 142 MG/DL (ref 0–200)
CO2 SERPL-SCNC: 24.1 MMOL/L (ref 22–29)
COLOR UR: YELLOW
CREAT SERPL-MCNC: 0.79 MG/DL (ref 0.57–1)
EGFRCR SERPLBLD CKD-EPI 2021: 78.1 ML/MIN/1.73
EOSINOPHIL # BLD AUTO: 0.3 10*3/MM3 (ref 0–0.4)
EOSINOPHIL NFR BLD AUTO: 6.5 % (ref 0.3–6.2)
EPI CELLS #/AREA URNS HPF: ABNORMAL /HPF (ref 0–10)
ERYTHROCYTE [DISTWIDTH] IN BLOOD BY AUTOMATED COUNT: 12.2 % (ref 12.3–15.4)
GLOBULIN SER CALC-MCNC: 2.1 GM/DL
GLUCOSE SERPL-MCNC: 87 MG/DL (ref 65–99)
GLUCOSE UR QL STRIP: NEGATIVE
HCT VFR BLD AUTO: 37 % (ref 34–46.6)
HDLC SERPL-MCNC: 54 MG/DL (ref 40–60)
HGB BLD-MCNC: 12.6 G/DL (ref 12–15.9)
HGB UR QL STRIP: NEGATIVE
IMM GRANULOCYTES # BLD AUTO: 0.02 10*3/MM3 (ref 0–0.05)
IMM GRANULOCYTES NFR BLD AUTO: 0.4 % (ref 0–0.5)
KETONES UR QL STRIP: NEGATIVE
LDLC SERPL CALC-MCNC: 77 MG/DL (ref 0–100)
LEUKOCYTE ESTERASE UR QL STRIP: NEGATIVE
LYMPHOCYTES # BLD AUTO: 1.06 10*3/MM3 (ref 0.7–3.1)
LYMPHOCYTES NFR BLD AUTO: 22.8 % (ref 19.6–45.3)
MCH RBC QN AUTO: 33 PG (ref 26.6–33)
MCHC RBC AUTO-ENTMCNC: 34.1 G/DL (ref 31.5–35.7)
MCV RBC AUTO: 96.9 FL (ref 79–97)
MICRO URNS: NORMAL
MICRO URNS: NORMAL
MONOCYTES # BLD AUTO: 0.62 10*3/MM3 (ref 0.1–0.9)
MONOCYTES NFR BLD AUTO: 13.3 % (ref 5–12)
NEUTROPHILS # BLD AUTO: 2.59 10*3/MM3 (ref 1.7–7)
NEUTROPHILS NFR BLD AUTO: 55.7 % (ref 42.7–76)
NITRITE UR QL STRIP: NEGATIVE
NRBC BLD AUTO-RTO: 0 /100 WBC (ref 0–0.2)
PH UR STRIP: 5.5 [PH] (ref 5–7.5)
PLATELET # BLD AUTO: 218 10*3/MM3 (ref 140–450)
POTASSIUM SERPL-SCNC: 4.3 MMOL/L (ref 3.5–5.2)
PROT SERPL-MCNC: 6.2 G/DL (ref 6–8.5)
PROT UR QL STRIP: NEGATIVE
RBC # BLD AUTO: 3.82 10*6/MM3 (ref 3.77–5.28)
RBC #/AREA URNS HPF: ABNORMAL /HPF (ref 0–2)
SODIUM SERPL-SCNC: 140 MMOL/L (ref 136–145)
SP GR UR STRIP: 1.02 (ref 1–1.03)
TRIGL SERPL-MCNC: 50 MG/DL (ref 0–150)
TSH SERPL DL<=0.005 MIU/L-ACNC: 1.89 UIU/ML (ref 0.27–4.2)
URINALYSIS REFLEX: NORMAL
UROBILINOGEN UR STRIP-MCNC: 0.2 MG/DL (ref 0.2–1)
VLDLC SERPL CALC-MCNC: 11 MG/DL (ref 5–40)
WBC # BLD AUTO: 4.65 10*3/MM3 (ref 3.4–10.8)
WBC #/AREA URNS HPF: ABNORMAL /HPF (ref 0–5)

## 2025-08-07 ENCOUNTER — OFFICE VISIT (OUTPATIENT)
Age: 75
End: 2025-08-07
Payer: MEDICARE

## 2025-08-07 VITALS
HEIGHT: 66 IN | BODY MASS INDEX: 37.7 KG/M2 | WEIGHT: 234.6 LBS | SYSTOLIC BLOOD PRESSURE: 130 MMHG | DIASTOLIC BLOOD PRESSURE: 79 MMHG | HEART RATE: 50 BPM

## 2025-08-07 DIAGNOSIS — I48.4 ATYPICAL ATRIAL FLUTTER: ICD-10-CM

## 2025-08-07 DIAGNOSIS — I48.0 PAROXYSMAL ATRIAL FIBRILLATION: Primary | ICD-10-CM

## 2025-08-07 DIAGNOSIS — J44.9 CHRONIC OBSTRUCTIVE PULMONARY DISEASE, UNSPECIFIED COPD TYPE: ICD-10-CM

## 2025-08-07 DIAGNOSIS — Z98.890 H/O CARDIAC RADIOFREQUENCY ABLATION: ICD-10-CM

## 2025-08-07 PROBLEM — R10.32 LLQ PAIN: Status: RESOLVED | Noted: 2021-10-11 | Resolved: 2025-08-07

## 2025-08-07 PROBLEM — I49.9 ARRHYTHMIA: Status: RESOLVED | Noted: 2025-07-01 | Resolved: 2025-08-07

## 2025-08-07 PROCEDURE — 1159F MED LIST DOCD IN RCRD: CPT | Performed by: PHYSICIAN ASSISTANT

## 2025-08-07 PROCEDURE — 93000 ELECTROCARDIOGRAM COMPLETE: CPT | Performed by: PHYSICIAN ASSISTANT

## 2025-08-07 PROCEDURE — 99214 OFFICE O/P EST MOD 30 MIN: CPT | Performed by: PHYSICIAN ASSISTANT

## 2025-08-07 PROCEDURE — 1160F RVW MEDS BY RX/DR IN RCRD: CPT | Performed by: PHYSICIAN ASSISTANT

## 2025-08-07 RX ORDER — ATENOLOL 100 MG/1
50 TABLET ORAL 2 TIMES DAILY
Qty: 180 TABLET | Refills: 3 | Status: SHIPPED | OUTPATIENT
Start: 2025-08-07

## 2025-08-12 ENCOUNTER — TELEPHONE (OUTPATIENT)
Age: 75
End: 2025-08-12
Payer: MEDICARE

## 2025-08-12 ENCOUNTER — OFFICE VISIT (OUTPATIENT)
Dept: INTERNAL MEDICINE | Facility: CLINIC | Age: 75
End: 2025-08-12
Payer: MEDICARE

## 2025-08-12 VITALS
DIASTOLIC BLOOD PRESSURE: 74 MMHG | WEIGHT: 232 LBS | HEIGHT: 66 IN | HEART RATE: 60 BPM | SYSTOLIC BLOOD PRESSURE: 108 MMHG | BODY MASS INDEX: 37.28 KG/M2 | OXYGEN SATURATION: 95 %

## 2025-08-12 DIAGNOSIS — I48.0 PAROXYSMAL ATRIAL FIBRILLATION: ICD-10-CM

## 2025-08-12 DIAGNOSIS — R31.9 HEMATURIA, UNSPECIFIED TYPE: ICD-10-CM

## 2025-08-12 DIAGNOSIS — R32 URINARY INCONTINENCE, UNSPECIFIED TYPE: ICD-10-CM

## 2025-08-12 DIAGNOSIS — J44.9 CHRONIC OBSTRUCTIVE PULMONARY DISEASE, UNSPECIFIED COPD TYPE: ICD-10-CM

## 2025-08-12 DIAGNOSIS — Z12.31 ENCOUNTER FOR SCREENING MAMMOGRAM FOR BREAST CANCER: ICD-10-CM

## 2025-08-12 DIAGNOSIS — Z00.00 HEALTHCARE MAINTENANCE: Primary | ICD-10-CM

## 2025-08-12 DIAGNOSIS — R11.2 NAUSEA AND VOMITING, UNSPECIFIED VOMITING TYPE: ICD-10-CM

## 2025-08-12 RX ORDER — ATENOLOL 25 MG/1
25 TABLET ORAL 2 TIMES DAILY
Qty: 60 TABLET | Refills: 2 | Status: SHIPPED | OUTPATIENT
Start: 2025-08-12

## 2025-08-13 ENCOUNTER — HOSPITAL ENCOUNTER (OUTPATIENT)
Dept: GENERAL RADIOLOGY | Facility: SURGERY CENTER | Age: 75
Setting detail: HOSPITAL OUTPATIENT SURGERY
End: 2025-08-13
Payer: MEDICARE

## 2025-08-13 ENCOUNTER — HOSPITAL ENCOUNTER (OUTPATIENT)
Facility: SURGERY CENTER | Age: 75
Setting detail: HOSPITAL OUTPATIENT SURGERY
Discharge: HOME | End: 2025-08-13
Attending: ANESTHESIOLOGY | Admitting: ANESTHESIOLOGY
Payer: MEDICARE

## 2025-08-13 VITALS
HEIGHT: 66 IN | RESPIRATION RATE: 16 BRPM | OXYGEN SATURATION: 97 % | WEIGHT: 235 LBS | HEART RATE: 54 BPM | SYSTOLIC BLOOD PRESSURE: 136 MMHG | TEMPERATURE: 97.8 F | DIASTOLIC BLOOD PRESSURE: 85 MMHG | BODY MASS INDEX: 37.77 KG/M2

## 2025-08-13 DIAGNOSIS — Z41.9 SURGERY, ELECTIVE: ICD-10-CM

## 2025-08-13 PROCEDURE — 25010000002 LIDOCAINE PF 1% 1 % SOLUTION 5 ML VIAL: Performed by: ANESTHESIOLOGY

## 2025-08-13 PROCEDURE — 64494 INJ PARAVERT F JNT L/S 2 LEV: CPT | Performed by: ANESTHESIOLOGY

## 2025-08-13 PROCEDURE — 64493 INJ PARAVERT F JNT L/S 1 LEV: CPT | Performed by: ANESTHESIOLOGY

## 2025-08-13 PROCEDURE — 77002 NEEDLE LOCALIZATION BY XRAY: CPT

## 2025-08-13 PROCEDURE — 76000 FLUOROSCOPY <1 HR PHYS/QHP: CPT

## 2025-08-13 PROCEDURE — 25010000002 BUPIVACAINE (PF) 0.25 % SOLUTION 10 ML VIAL: Performed by: ANESTHESIOLOGY

## 2025-08-15 ENCOUNTER — TELEPHONE (OUTPATIENT)
Dept: INTERNAL MEDICINE | Facility: CLINIC | Age: 75
End: 2025-08-15
Payer: MEDICARE

## 2025-08-26 RX ORDER — DULOXETIN HYDROCHLORIDE 60 MG/1
60 CAPSULE, DELAYED RELEASE ORAL DAILY
Qty: 90 CAPSULE | Refills: 3 | Status: SHIPPED | OUTPATIENT
Start: 2025-08-26

## 2025-08-27 ENCOUNTER — HOSPITAL ENCOUNTER (OUTPATIENT)
Dept: GENERAL RADIOLOGY | Facility: HOSPITAL | Age: 75
Discharge: HOME | End: 2025-08-27
Payer: MEDICARE

## 2025-08-27 ENCOUNTER — OFFICE VISIT (OUTPATIENT)
Dept: PAIN MEDICINE | Facility: CLINIC | Age: 75
End: 2025-08-27
Payer: MEDICARE

## 2025-08-27 VITALS
BODY MASS INDEX: 36.87 KG/M2 | HEIGHT: 66 IN | DIASTOLIC BLOOD PRESSURE: 82 MMHG | WEIGHT: 229.4 LBS | TEMPERATURE: 96.9 F | SYSTOLIC BLOOD PRESSURE: 134 MMHG | RESPIRATION RATE: 20 BRPM | OXYGEN SATURATION: 97 % | HEART RATE: 84 BPM

## 2025-08-27 DIAGNOSIS — M25.561 CHRONIC PAIN OF RIGHT KNEE: ICD-10-CM

## 2025-08-27 DIAGNOSIS — M25.551 RIGHT HIP PAIN: ICD-10-CM

## 2025-08-27 DIAGNOSIS — G89.29 CHRONIC PAIN OF RIGHT KNEE: ICD-10-CM

## 2025-08-27 DIAGNOSIS — M25.562 LEFT KNEE PAIN, UNSPECIFIED CHRONICITY: ICD-10-CM

## 2025-08-27 DIAGNOSIS — M25.551 RIGHT HIP PAIN: Primary | ICD-10-CM

## 2025-08-27 DIAGNOSIS — G89.29 BILATERAL CHRONIC KNEE PAIN: ICD-10-CM

## 2025-08-27 DIAGNOSIS — M25.561 BILATERAL CHRONIC KNEE PAIN: ICD-10-CM

## 2025-08-27 DIAGNOSIS — M47.816 LUMBAR FACET ARTHROPATHY: ICD-10-CM

## 2025-08-27 DIAGNOSIS — M48.062 LUMBAR STENOSIS WITH NEUROGENIC CLAUDICATION: ICD-10-CM

## 2025-08-27 DIAGNOSIS — M25.562 BILATERAL CHRONIC KNEE PAIN: ICD-10-CM

## 2025-08-27 PROCEDURE — 73562 X-RAY EXAM OF KNEE 3: CPT

## 2025-08-27 PROCEDURE — 73502 X-RAY EXAM HIP UNI 2-3 VIEWS: CPT

## 2025-08-28 ENCOUNTER — TELEPHONE (OUTPATIENT)
Age: 75
End: 2025-08-28
Payer: MEDICARE

## 2025-08-28 ENCOUNTER — PREP FOR SURGERY (OUTPATIENT)
Dept: SURGERY | Facility: SURGERY CENTER | Age: 75
End: 2025-08-28
Payer: MEDICARE

## 2025-08-28 DIAGNOSIS — M48.062 LUMBAR STENOSIS WITH NEUROGENIC CLAUDICATION: Primary | ICD-10-CM

## 2025-08-28 DIAGNOSIS — M54.16 RADICULOPATHY, LUMBAR REGION: ICD-10-CM

## (undated) DEVICE — PREF.GUIDING SHEATH W/MULT.CRV: Brand: PREFACE

## (undated) DEVICE — EPIDURAL TRAY: Brand: MEDLINE INDUSTRIES, INC.

## (undated) DEVICE — INTRO STEER AGILIS NXT MED/CURL 8.5F

## (undated) DEVICE — LOU EP: Brand: MEDLINE INDUSTRIES, INC.

## (undated) DEVICE — SINGLE-USE BIOPSY FORCEPS: Brand: RADIAL JAW 4

## (undated) DEVICE — Device

## (undated) DEVICE — NEEDLE, QUINCKE, 22GX5": Brand: MEDLINE

## (undated) DEVICE — MSK ENDO PORT O2 POM ELITE CURAPLEX A/

## (undated) DEVICE — PINNACLE INTRODUCER SHEATH: Brand: PINNACLE

## (undated) DEVICE — SYS TRNSEP ACC BRK ACROSS A/ 71CM

## (undated) DEVICE — GW XCHG AMPLTZ XSTIF PTFE CRV .035IN 3X180CM

## (undated) DEVICE — GLV SURG TRIUMPH PF LTX 7.5 STRL

## (undated) DEVICE — SENSR O2 OXIMAX FNGR A/ 18IN NONSTR

## (undated) DEVICE — TUBING, SUCTION, 1/4" X 10', STRAIGHT: Brand: MEDLINE

## (undated) DEVICE — LN SMPL CO2 SHTRM SD STREAM W/M LUER

## (undated) DEVICE — SYS TRNSEP ACC ACROSS ADLT BRK1 71CM

## (undated) DEVICE — KT ORCA ORCAPOD DISP STRL

## (undated) DEVICE — FRCP BX RADJAW4 NDL 2.8 240CM LG OG BX40

## (undated) DEVICE — Device: Brand: WEBSTER CS

## (undated) DEVICE — BITEBLOCK OMNI BLOC

## (undated) DEVICE — CATH ULTRASND ECHO ACUNAV FOR GE VIVID1 8F 90CM

## (undated) DEVICE — ADAPT CLN BIOGUARD AIR/H2O DISP

## (undated) DEVICE — NDL SPINE 22G 5IN BLK

## (undated) DEVICE — CANN O2 ETCO2 FITS ALL CONN CO2 SMPL A/ 7IN DISP LF